# Patient Record
Sex: MALE | Race: BLACK OR AFRICAN AMERICAN | Employment: FULL TIME | URBAN - METROPOLITAN AREA
[De-identification: names, ages, dates, MRNs, and addresses within clinical notes are randomized per-mention and may not be internally consistent; named-entity substitution may affect disease eponyms.]

---
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
## 2018-09-26 PROBLEM — J02.9 SORE THROAT: Status: RESOLVED | Noted: 2017-03-08 | Resolved: 2018-09-26

## 2019-02-01 ENCOUNTER — APPOINTMENT (OUTPATIENT)
Dept: GENERAL RADIOLOGY | Age: 19
DRG: 420 | End: 2019-02-01
Payer: MEDICARE

## 2019-02-01 ENCOUNTER — HOSPITAL ENCOUNTER (INPATIENT)
Age: 19
LOS: 2 days | Discharge: HOME | DRG: 420 | End: 2019-02-03
Attending: EMERGENCY MEDICINE | Admitting: INTERNAL MEDICINE
Payer: MEDICARE

## 2019-02-01 DIAGNOSIS — E10.10 DIABETIC KETOACIDOSIS WITHOUT COMA ASSOCIATED WITH TYPE 1 DIABETES MELLITUS (HCC): Primary | ICD-10-CM

## 2019-02-01 DIAGNOSIS — E10.9 TYPE 1 DIABETES MELLITUS (HCC): ICD-10-CM

## 2019-02-01 PROBLEM — E87.1 HYPONATREMIA: Status: ACTIVE | Noted: 2019-02-01

## 2019-02-01 LAB
-: ABNORMAL
ABSOLUTE EOS #: 0.14 K/UL (ref 0–0.44)
ABSOLUTE IMMATURE GRANULOCYTE: <0.03 K/UL (ref 0–0.3)
ABSOLUTE LYMPH #: 1.12 K/UL (ref 1.2–5.2)
ABSOLUTE MONO #: 0.24 K/UL (ref 0.1–1.4)
ALBUMIN SERPL-MCNC: 4.4 G/DL (ref 3.5–5.2)
ALBUMIN/GLOBULIN RATIO: 1.4 (ref 1–2.5)
ALLEN TEST: ABNORMAL
ALLEN TEST: ABNORMAL
ALP BLD-CCNC: 125 U/L (ref 40–129)
ALT SERPL-CCNC: 8 U/L (ref 5–41)
AMORPHOUS: ABNORMAL
ANION GAP SERPL CALCULATED.3IONS-SCNC: 18 MMOL/L (ref 9–17)
ANION GAP SERPL CALCULATED.3IONS-SCNC: 24 MMOL/L (ref 9–17)
ANION GAP: 12 MMOL/L (ref 7–16)
ANION GAP: 8 MMOL/L (ref 7–16)
AST SERPL-CCNC: 10 U/L
BACTERIA: ABNORMAL
BASOPHILS # BLD: 1 % (ref 0–2)
BASOPHILS ABSOLUTE: 0.05 K/UL (ref 0–0.2)
BETA-HYDROXYBUTYRATE: 7.05 MMOL/L (ref 0.02–0.27)
BILIRUB SERPL-MCNC: 0.57 MG/DL (ref 0.3–1.2)
BILIRUBIN URINE: NEGATIVE
BUN BLDV-MCNC: 16 MG/DL (ref 6–20)
BUN BLDV-MCNC: 18 MG/DL (ref 6–20)
BUN/CREAT BLD: ABNORMAL (ref 9–20)
BUN/CREAT BLD: ABNORMAL (ref 9–20)
CALCIUM SERPL-MCNC: 8.2 MG/DL (ref 8.6–10.4)
CALCIUM SERPL-MCNC: 9.7 MG/DL (ref 8.6–10.4)
CASTS UA: ABNORMAL /LPF (ref 0–8)
CHLORIDE BLD-SCNC: 101 MMOL/L (ref 98–107)
CHLORIDE BLD-SCNC: 92 MMOL/L (ref 98–107)
CHP ED QC CHECK: NORMAL
CO2: 13 MMOL/L (ref 20–31)
CO2: 13 MMOL/L (ref 20–31)
COLOR: YELLOW
CREAT SERPL-MCNC: 0.74 MG/DL (ref 0.7–1.2)
CREAT SERPL-MCNC: 0.92 MG/DL (ref 0.7–1.2)
CRYSTALS, UA: ABNORMAL /HPF
DIFFERENTIAL TYPE: ABNORMAL
DIRECT EXAM: NORMAL
EKG ATRIAL RATE: 60 BPM
EKG P AXIS: 65 DEGREES
EKG P-R INTERVAL: 158 MS
EKG Q-T INTERVAL: 408 MS
EKG QRS DURATION: 106 MS
EKG QTC CALCULATION (BAZETT): 408 MS
EKG R AXIS: -55 DEGREES
EKG T AXIS: 31 DEGREES
EKG VENTRICULAR RATE: 60 BPM
EOSINOPHILS RELATIVE PERCENT: 3 % (ref 1–4)
EPITHELIAL CELLS UA: ABNORMAL /HPF (ref 0–5)
FIO2: ABNORMAL
FIO2: ABNORMAL
GFR AFRICAN AMERICAN: >60 ML/MIN
GFR AFRICAN AMERICAN: >60 ML/MIN
GFR NON-AFRICAN AMERICAN: >60 ML/MIN
GFR SERPL CREATININE-BSD FRML MDRD: >60 ML/MIN
GFR SERPL CREATININE-BSD FRML MDRD: >60 ML/MIN
GFR SERPL CREATININE-BSD FRML MDRD: ABNORMAL ML/MIN/{1.73_M2}
GFR SERPL CREATININE-BSD FRML MDRD: NORMAL ML/MIN/{1.73_M2}
GFR SERPL CREATININE-BSD FRML MDRD: NORMAL ML/MIN/{1.73_M2}
GLUCOSE BLD-MCNC: 252 MG/DL (ref 75–110)
GLUCOSE BLD-MCNC: 274 MG/DL (ref 75–110)
GLUCOSE BLD-MCNC: 295 MG/DL (ref 70–99)
GLUCOSE BLD-MCNC: 306 MG/DL (ref 75–110)
GLUCOSE BLD-MCNC: 330 MG/DL
GLUCOSE BLD-MCNC: 330 MG/DL (ref 75–110)
GLUCOSE BLD-MCNC: 512 MG/DL (ref 74–100)
GLUCOSE BLD-MCNC: 606 MG/DL (ref 74–100)
GLUCOSE BLD-MCNC: 631 MG/DL (ref 70–99)
GLUCOSE URINE: ABNORMAL
HCO3 VENOUS: 13.4 MMOL/L (ref 22–29)
HCT VFR BLD CALC: 41.7 % (ref 40.7–50.3)
HEMOGLOBIN: 13.4 G/DL (ref 13–17)
IMMATURE GRANULOCYTES: 1 %
KETONES, URINE: ABNORMAL
LEUKOCYTE ESTERASE, URINE: NEGATIVE
LIPASE: 18 U/L (ref 13–60)
LYMPHOCYTES # BLD: 27 % (ref 25–45)
Lab: NORMAL
MAGNESIUM: 2.1 MG/DL (ref 1.6–2.6)
MCH RBC QN AUTO: 27.2 PG (ref 25.2–33.5)
MCHC RBC AUTO-ENTMCNC: 32.1 G/DL (ref 28.4–34.8)
MCV RBC AUTO: 84.8 FL (ref 82.6–102.9)
MODE: ABNORMAL
MODE: ABNORMAL
MONOCYTES # BLD: 6 % (ref 2–8)
MUCUS: ABNORMAL
NEGATIVE BASE EXCESS, ART: 13 (ref 0–2)
NEGATIVE BASE EXCESS, VEN: 15 (ref 0–2)
NITRITE, URINE: NEGATIVE
NRBC AUTOMATED: 0 PER 100 WBC
O2 DEVICE/FLOW/%: ABNORMAL
O2 DEVICE/FLOW/%: ABNORMAL
O2 SAT, VEN: 41 % (ref 60–85)
OTHER OBSERVATIONS UA: ABNORMAL
PATIENT TEMP: ABNORMAL
PATIENT TEMP: ABNORMAL
PCO2, VEN: 40.1 MM HG (ref 41–51)
PDW BLD-RTO: 12.1 % (ref 11.8–14.4)
PH UA: 5 (ref 5–8)
PH VENOUS: 7.13 (ref 7.32–7.43)
PHOSPHORUS: 4.7 MG/DL (ref 2.5–4.5)
PLATELET # BLD: 331 K/UL (ref 138–453)
PLATELET ESTIMATE: ABNORMAL
PMV BLD AUTO: 11.8 FL (ref 8.1–13.5)
PO2, VEN: 30.5 MM HG (ref 30–50)
POC CHLORIDE: 105 MMOL/L (ref 98–107)
POC CHLORIDE: 105 MMOL/L (ref 98–107)
POC CREATININE: 0.84 MG/DL (ref 0.51–1.19)
POC CREATININE: 1.1 MG/DL (ref 0.51–1.19)
POC HCO3: 13.9 MMOL/L (ref 21–28)
POC HEMATOCRIT: 44 % (ref 41–53)
POC HEMATOCRIT: 48 % (ref 41–53)
POC HEMOGLOBIN: 15 G/DL (ref 13.5–17.5)
POC HEMOGLOBIN: 16.4 G/DL (ref 13.5–17.5)
POC IONIZED CALCIUM: 1.15 MMOL/L (ref 1.15–1.33)
POC IONIZED CALCIUM: 1.2 MMOL/L (ref 1.15–1.33)
POC LACTIC ACID: 1.74 MMOL/L (ref 0.56–1.39)
POC LACTIC ACID: 2.01 MMOL/L (ref 0.56–1.39)
POC O2 SATURATION: 59 % (ref 94–98)
POC PCO2 TEMP: ABNORMAL MM HG
POC PCO2 TEMP: ABNORMAL MM HG
POC PCO2: 35.5 MM HG (ref 35–48)
POC PH TEMP: ABNORMAL
POC PH TEMP: ABNORMAL
POC PH: 7.2 (ref 7.35–7.45)
POC PO2 TEMP: ABNORMAL MM HG
POC PO2 TEMP: ABNORMAL MM HG
POC PO2: 36.9 MM HG (ref 83–108)
POC POTASSIUM: 5.1 MMOL/L (ref 3.5–4.5)
POC POTASSIUM: 5.7 MMOL/L (ref 3.5–4.5)
POC SODIUM: 127 MMOL/L (ref 138–146)
POC SODIUM: 130 MMOL/L (ref 138–146)
POSITIVE BASE EXCESS, ART: ABNORMAL (ref 0–3)
POSITIVE BASE EXCESS, VEN: ABNORMAL (ref 0–3)
POTASSIUM SERPL-SCNC: 4.3 MMOL/L (ref 3.7–5.3)
POTASSIUM SERPL-SCNC: 5.8 MMOL/L (ref 3.7–5.3)
PROTEIN UA: NEGATIVE
RBC # BLD: 4.92 M/UL (ref 4.21–5.77)
RBC # BLD: ABNORMAL 10*6/UL
RBC UA: ABNORMAL /HPF (ref 0–4)
RENAL EPITHELIAL, UA: ABNORMAL /HPF
SAMPLE SITE: ABNORMAL
SAMPLE SITE: ABNORMAL
SEG NEUTROPHILS: 62 % (ref 34–64)
SEGMENTED NEUTROPHILS ABSOLUTE COUNT: 2.64 K/UL (ref 1.8–8)
SODIUM BLD-SCNC: 129 MMOL/L (ref 135–144)
SODIUM BLD-SCNC: 132 MMOL/L (ref 135–144)
SPECIFIC GRAVITY UA: 1.03 (ref 1–1.03)
SPECIMEN DESCRIPTION: NORMAL
STATUS: NORMAL
TCO2 (CALC), ART: 15 MMOL/L (ref 22–29)
TOTAL CO2, VENOUS: 15 MMOL/L (ref 23–30)
TOTAL PROTEIN: 7.6 G/DL (ref 6.4–8.3)
TRICHOMONAS: ABNORMAL
TROPONIN INTERP: NORMAL
TROPONIN T: NORMAL NG/ML
TROPONIN, HIGH SENSITIVITY: <6 NG/L (ref 0–22)
TURBIDITY: CLEAR
URINE HGB: NEGATIVE
UROBILINOGEN, URINE: NORMAL
WBC # BLD: 4.2 K/UL (ref 4.5–13.5)
WBC # BLD: ABNORMAL 10*3/UL
WBC UA: ABNORMAL /HPF (ref 0–5)
YEAST: ABNORMAL

## 2019-02-01 PROCEDURE — 2580000003 HC RX 258: Performed by: PHYSICIAN ASSISTANT

## 2019-02-01 PROCEDURE — 84132 ASSAY OF SERUM POTASSIUM: CPT

## 2019-02-01 PROCEDURE — 6360000002 HC RX W HCPCS: Performed by: PHYSICIAN ASSISTANT

## 2019-02-01 PROCEDURE — 71046 X-RAY EXAM CHEST 2 VIEWS: CPT

## 2019-02-01 PROCEDURE — 82330 ASSAY OF CALCIUM: CPT

## 2019-02-01 PROCEDURE — 87804 INFLUENZA ASSAY W/OPTIC: CPT

## 2019-02-01 PROCEDURE — 85025 COMPLETE CBC W/AUTO DIFF WBC: CPT

## 2019-02-01 PROCEDURE — 99285 EMERGENCY DEPT VISIT HI MDM: CPT

## 2019-02-01 PROCEDURE — 85014 HEMATOCRIT: CPT

## 2019-02-01 PROCEDURE — 84100 ASSAY OF PHOSPHORUS: CPT

## 2019-02-01 PROCEDURE — 83690 ASSAY OF LIPASE: CPT

## 2019-02-01 PROCEDURE — 6360000002 HC RX W HCPCS: Performed by: STUDENT IN AN ORGANIZED HEALTH CARE EDUCATION/TRAINING PROGRAM

## 2019-02-01 PROCEDURE — 81001 URINALYSIS AUTO W/SCOPE: CPT

## 2019-02-01 PROCEDURE — 80053 COMPREHEN METABOLIC PANEL: CPT

## 2019-02-01 PROCEDURE — 93005 ELECTROCARDIOGRAM TRACING: CPT

## 2019-02-01 PROCEDURE — 82435 ASSAY OF BLOOD CHLORIDE: CPT

## 2019-02-01 PROCEDURE — 2580000003 HC RX 258: Performed by: STUDENT IN AN ORGANIZED HEALTH CARE EDUCATION/TRAINING PROGRAM

## 2019-02-01 PROCEDURE — 82803 BLOOD GASES ANY COMBINATION: CPT

## 2019-02-01 PROCEDURE — 83605 ASSAY OF LACTIC ACID: CPT

## 2019-02-01 PROCEDURE — 83735 ASSAY OF MAGNESIUM: CPT

## 2019-02-01 PROCEDURE — 6370000000 HC RX 637 (ALT 250 FOR IP): Performed by: PHYSICIAN ASSISTANT

## 2019-02-01 PROCEDURE — 84295 ASSAY OF SERUM SODIUM: CPT

## 2019-02-01 PROCEDURE — 96365 THER/PROPH/DIAG IV INF INIT: CPT

## 2019-02-01 PROCEDURE — 2060000000 HC ICU INTERMEDIATE R&B

## 2019-02-01 PROCEDURE — 84484 ASSAY OF TROPONIN QUANT: CPT

## 2019-02-01 PROCEDURE — 82947 ASSAY GLUCOSE BLOOD QUANT: CPT

## 2019-02-01 PROCEDURE — 36415 COLL VENOUS BLD VENIPUNCTURE: CPT

## 2019-02-01 PROCEDURE — 80048 BASIC METABOLIC PNL TOTAL CA: CPT

## 2019-02-01 PROCEDURE — 82010 KETONE BODYS QUAN: CPT

## 2019-02-01 PROCEDURE — 82565 ASSAY OF CREATININE: CPT

## 2019-02-01 RX ORDER — SODIUM CHLORIDE 450 MG/100ML
INJECTION, SOLUTION INTRAVENOUS CONTINUOUS
Status: DISCONTINUED | OUTPATIENT
Start: 2019-02-02 | End: 2019-02-03 | Stop reason: HOSPADM

## 2019-02-01 RX ORDER — 0.9 % SODIUM CHLORIDE 0.9 %
15 INTRAVENOUS SOLUTION INTRAVENOUS ONCE
Status: DISCONTINUED | OUTPATIENT
Start: 2019-02-01 | End: 2019-02-03 | Stop reason: HOSPADM

## 2019-02-01 RX ORDER — POTASSIUM CHLORIDE 7.45 MG/ML
10 INJECTION INTRAVENOUS PRN
Status: DISCONTINUED | OUTPATIENT
Start: 2019-02-01 | End: 2019-02-01

## 2019-02-01 RX ORDER — ACETAMINOPHEN 325 MG/1
650 TABLET ORAL ONCE
Status: COMPLETED | OUTPATIENT
Start: 2019-02-01 | End: 2019-02-01

## 2019-02-01 RX ORDER — DEXTROSE MONOHYDRATE 25 G/50ML
12.5 INJECTION, SOLUTION INTRAVENOUS PRN
Status: DISCONTINUED | OUTPATIENT
Start: 2019-02-01 | End: 2019-02-03 | Stop reason: HOSPADM

## 2019-02-01 RX ORDER — SODIUM CHLORIDE 0.9 % (FLUSH) 0.9 %
10 SYRINGE (ML) INJECTION EVERY 12 HOURS SCHEDULED
Status: DISCONTINUED | OUTPATIENT
Start: 2019-02-01 | End: 2019-02-03 | Stop reason: HOSPADM

## 2019-02-01 RX ORDER — MAGNESIUM SULFATE 1 G/100ML
1 INJECTION INTRAVENOUS PRN
Status: DISCONTINUED | OUTPATIENT
Start: 2019-02-01 | End: 2019-02-03 | Stop reason: HOSPADM

## 2019-02-01 RX ORDER — ACETAMINOPHEN 325 MG/1
650 TABLET ORAL EVERY 4 HOURS PRN
Status: DISCONTINUED | OUTPATIENT
Start: 2019-02-01 | End: 2019-02-03 | Stop reason: HOSPADM

## 2019-02-01 RX ORDER — ONDANSETRON 2 MG/ML
4 INJECTION INTRAMUSCULAR; INTRAVENOUS EVERY 8 HOURS PRN
Status: DISCONTINUED | OUTPATIENT
Start: 2019-02-01 | End: 2019-02-03 | Stop reason: HOSPADM

## 2019-02-01 RX ORDER — SODIUM CHLORIDE 9 MG/ML
INJECTION, SOLUTION INTRAVENOUS CONTINUOUS
Status: DISCONTINUED | OUTPATIENT
Start: 2019-02-01 | End: 2019-02-01

## 2019-02-01 RX ORDER — DEXTROSE AND SODIUM CHLORIDE 5; .45 G/100ML; G/100ML
INJECTION, SOLUTION INTRAVENOUS CONTINUOUS PRN
Status: DISCONTINUED | OUTPATIENT
Start: 2019-02-01 | End: 2019-02-03 | Stop reason: HOSPADM

## 2019-02-01 RX ORDER — DEXTROSE MONOHYDRATE 25 G/50ML
12.5 INJECTION, SOLUTION INTRAVENOUS PRN
Status: DISCONTINUED | OUTPATIENT
Start: 2019-02-01 | End: 2019-02-01

## 2019-02-01 RX ORDER — SODIUM CHLORIDE 0.9 % (FLUSH) 0.9 %
10 SYRINGE (ML) INJECTION PRN
Status: DISCONTINUED | OUTPATIENT
Start: 2019-02-01 | End: 2019-02-03 | Stop reason: HOSPADM

## 2019-02-01 RX ORDER — POTASSIUM CHLORIDE 7.45 MG/ML
10 INJECTION INTRAVENOUS PRN
Status: DISCONTINUED | OUTPATIENT
Start: 2019-02-01 | End: 2019-02-03 | Stop reason: HOSPADM

## 2019-02-01 RX ORDER — MAGNESIUM SULFATE 1 G/100ML
1 INJECTION INTRAVENOUS PRN
Status: DISCONTINUED | OUTPATIENT
Start: 2019-02-01 | End: 2019-02-01

## 2019-02-01 RX ORDER — DEXTROSE AND SODIUM CHLORIDE 5; .45 G/100ML; G/100ML
INJECTION, SOLUTION INTRAVENOUS CONTINUOUS PRN
Status: DISCONTINUED | OUTPATIENT
Start: 2019-02-01 | End: 2019-02-01

## 2019-02-01 RX ORDER — 0.9 % SODIUM CHLORIDE 0.9 %
1000 INTRAVENOUS SOLUTION INTRAVENOUS ONCE
Status: COMPLETED | OUTPATIENT
Start: 2019-02-01 | End: 2019-02-01

## 2019-02-01 RX ADMIN — ACETAMINOPHEN 650 MG: 325 TABLET ORAL at 18:32

## 2019-02-01 RX ADMIN — SODIUM CHLORIDE 0.1 UNITS/KG/HR: 9 INJECTION, SOLUTION INTRAVENOUS at 19:28

## 2019-02-01 RX ADMIN — SODIUM CHLORIDE: 4.5 INJECTION, SOLUTION INTRAVENOUS at 22:38

## 2019-02-01 RX ADMIN — MAGNESIUM SULFATE HEPTAHYDRATE 1 G: 1 INJECTION, SOLUTION INTRAVENOUS at 18:54

## 2019-02-01 RX ADMIN — POTASSIUM CHLORIDE 10 MEQ: 7.46 INJECTION, SOLUTION INTRAVENOUS at 23:58

## 2019-02-01 RX ADMIN — SODIUM CHLORIDE 1000 ML: 9 INJECTION, SOLUTION INTRAVENOUS at 18:33

## 2019-02-01 RX ADMIN — SODIUM CHLORIDE: 9 INJECTION, SOLUTION INTRAVENOUS at 20:10

## 2019-02-01 RX ADMIN — POTASSIUM CHLORIDE 10 MEQ: 7.46 INJECTION, SOLUTION INTRAVENOUS at 22:48

## 2019-02-01 ASSESSMENT — ENCOUNTER SYMPTOMS
DIARRHEA: 0
COUGH: 0
SHORTNESS OF BREATH: 0
NAUSEA: 0
SORE THROAT: 0
COLOR CHANGE: 0
VOMITING: 0
BACK PAIN: 0
ABDOMINAL PAIN: 1
WHEEZING: 0
BLOOD IN STOOL: 0

## 2019-02-01 ASSESSMENT — PAIN DESCRIPTION - DESCRIPTORS
DESCRIPTORS: ACHING
DESCRIPTORS: ACHING

## 2019-02-01 ASSESSMENT — PAIN SCALES - GENERAL
PAINLEVEL_OUTOF10: 8
PAINLEVEL_OUTOF10: 8
PAINLEVEL_OUTOF10: 4

## 2019-02-01 ASSESSMENT — PAIN DESCRIPTION - LOCATION
LOCATION: CHEST
LOCATION: CHEST

## 2019-02-01 ASSESSMENT — PAIN DESCRIPTION - PROGRESSION: CLINICAL_PROGRESSION: GRADUALLY WORSENING

## 2019-02-01 ASSESSMENT — PAIN DESCRIPTION - PAIN TYPE
TYPE: ACUTE PAIN
TYPE: ACUTE PAIN

## 2019-02-01 ASSESSMENT — PAIN DESCRIPTION - ONSET: ONSET: PROGRESSIVE

## 2019-02-02 LAB
ABSOLUTE EOS #: 0.23 K/UL (ref 0–0.44)
ABSOLUTE IMMATURE GRANULOCYTE: <0.03 K/UL (ref 0–0.3)
ABSOLUTE LYMPH #: 2.46 K/UL (ref 1.2–5.2)
ABSOLUTE MONO #: 0.67 K/UL (ref 0.1–1.4)
ANION GAP SERPL CALCULATED.3IONS-SCNC: 15 MMOL/L (ref 9–17)
ANION GAP SERPL CALCULATED.3IONS-SCNC: 8 MMOL/L (ref 9–17)
ANION GAP SERPL CALCULATED.3IONS-SCNC: 9 MMOL/L (ref 9–17)
BASOPHILS # BLD: 1 % (ref 0–2)
BASOPHILS ABSOLUTE: 0.06 K/UL (ref 0–0.2)
BETA-HYDROXYBUTYRATE: 3.78 MMOL/L (ref 0.02–0.27)
BUN BLDV-MCNC: 13 MG/DL (ref 6–20)
BUN BLDV-MCNC: 9 MG/DL (ref 6–20)
BUN BLDV-MCNC: 9 MG/DL (ref 6–20)
BUN/CREAT BLD: ABNORMAL (ref 9–20)
CALCIUM SERPL-MCNC: 8.4 MG/DL (ref 8.6–10.4)
CALCIUM SERPL-MCNC: 8.5 MG/DL (ref 8.6–10.4)
CALCIUM SERPL-MCNC: 8.5 MG/DL (ref 8.6–10.4)
CHLORIDE BLD-SCNC: 103 MMOL/L (ref 98–107)
CHLORIDE BLD-SCNC: 104 MMOL/L (ref 98–107)
CHLORIDE BLD-SCNC: 110 MMOL/L (ref 98–107)
CO2: 16 MMOL/L (ref 20–31)
CO2: 18 MMOL/L (ref 20–31)
CO2: 18 MMOL/L (ref 20–31)
CREAT SERPL-MCNC: 0.55 MG/DL (ref 0.7–1.2)
CREAT SERPL-MCNC: 0.57 MG/DL (ref 0.7–1.2)
CREAT SERPL-MCNC: 0.68 MG/DL (ref 0.7–1.2)
DIFFERENTIAL TYPE: ABNORMAL
EOSINOPHILS RELATIVE PERCENT: 4 % (ref 1–4)
GFR AFRICAN AMERICAN: >60 ML/MIN
GFR NON-AFRICAN AMERICAN: >60 ML/MIN
GFR SERPL CREATININE-BSD FRML MDRD: ABNORMAL ML/MIN/{1.73_M2}
GLUCOSE BLD-MCNC: 114 MG/DL (ref 75–110)
GLUCOSE BLD-MCNC: 120 MG/DL (ref 75–110)
GLUCOSE BLD-MCNC: 124 MG/DL (ref 70–99)
GLUCOSE BLD-MCNC: 132 MG/DL (ref 75–110)
GLUCOSE BLD-MCNC: 145 MG/DL (ref 75–110)
GLUCOSE BLD-MCNC: 148 MG/DL (ref 75–110)
GLUCOSE BLD-MCNC: 160 MG/DL (ref 75–110)
GLUCOSE BLD-MCNC: 170 MG/DL (ref 75–110)
GLUCOSE BLD-MCNC: 178 MG/DL (ref 75–110)
GLUCOSE BLD-MCNC: 195 MG/DL (ref 75–110)
GLUCOSE BLD-MCNC: 206 MG/DL (ref 75–110)
GLUCOSE BLD-MCNC: 208 MG/DL (ref 75–110)
GLUCOSE BLD-MCNC: 225 MG/DL (ref 75–110)
GLUCOSE BLD-MCNC: 225 MG/DL (ref 75–110)
GLUCOSE BLD-MCNC: 233 MG/DL (ref 75–110)
GLUCOSE BLD-MCNC: 255 MG/DL (ref 75–110)
GLUCOSE BLD-MCNC: 265 MG/DL (ref 70–99)
GLUCOSE BLD-MCNC: 384 MG/DL (ref 75–110)
GLUCOSE BLD-MCNC: 406 MG/DL (ref 75–110)
GLUCOSE BLD-MCNC: 430 MG/DL (ref 75–110)
GLUCOSE BLD-MCNC: 60 MG/DL (ref 75–110)
GLUCOSE BLD-MCNC: 85 MG/DL (ref 75–110)
GLUCOSE BLD-MCNC: 89 MG/DL (ref 70–99)
HCT VFR BLD CALC: 35.2 % (ref 40.7–50.3)
HEMOGLOBIN: 11.8 G/DL (ref 13–17)
IMMATURE GRANULOCYTES: 0 %
LYMPHOCYTES # BLD: 43 % (ref 25–45)
MAGNESIUM: 1.8 MG/DL (ref 1.6–2.6)
MAGNESIUM: 1.8 MG/DL (ref 1.6–2.6)
MAGNESIUM: 1.9 MG/DL (ref 1.6–2.6)
MAGNESIUM: 2 MG/DL (ref 1.6–2.6)
MCH RBC QN AUTO: 27.2 PG (ref 25.2–33.5)
MCHC RBC AUTO-ENTMCNC: 33.5 G/DL (ref 28.4–34.8)
MCV RBC AUTO: 81.1 FL (ref 82.6–102.9)
MONOCYTES # BLD: 12 % (ref 2–8)
NRBC AUTOMATED: 0 PER 100 WBC
PDW BLD-RTO: 12.1 % (ref 11.8–14.4)
PHOSPHORUS: 2.8 MG/DL (ref 2.5–4.5)
PHOSPHORUS: 3.1 MG/DL (ref 2.5–4.5)
PHOSPHORUS: 3.3 MG/DL (ref 2.5–4.5)
PLATELET # BLD: 319 K/UL (ref 138–453)
PLATELET ESTIMATE: ABNORMAL
PMV BLD AUTO: 11 FL (ref 8.1–13.5)
POTASSIUM SERPL-SCNC: 4.1 MMOL/L (ref 3.7–5.3)
POTASSIUM SERPL-SCNC: 4.2 MMOL/L (ref 3.7–5.3)
POTASSIUM SERPL-SCNC: 4.4 MMOL/L (ref 3.7–5.3)
RBC # BLD: 4.34 M/UL (ref 4.21–5.77)
RBC # BLD: ABNORMAL 10*6/UL
SEG NEUTROPHILS: 40 % (ref 34–64)
SEGMENTED NEUTROPHILS ABSOLUTE COUNT: 2.32 K/UL (ref 1.8–8)
SODIUM BLD-SCNC: 131 MMOL/L (ref 135–144)
SODIUM BLD-SCNC: 134 MMOL/L (ref 135–144)
SODIUM BLD-SCNC: 136 MMOL/L (ref 135–144)
WBC # BLD: 5.8 K/UL (ref 4.5–13.5)
WBC # BLD: ABNORMAL 10*3/UL

## 2019-02-02 PROCEDURE — 80048 BASIC METABOLIC PNL TOTAL CA: CPT

## 2019-02-02 PROCEDURE — 36415 COLL VENOUS BLD VENIPUNCTURE: CPT

## 2019-02-02 PROCEDURE — 6370000000 HC RX 637 (ALT 250 FOR IP): Performed by: STUDENT IN AN ORGANIZED HEALTH CARE EDUCATION/TRAINING PROGRAM

## 2019-02-02 PROCEDURE — 90686 IIV4 VACC NO PRSV 0.5 ML IM: CPT | Performed by: INTERNAL MEDICINE

## 2019-02-02 PROCEDURE — 83036 HEMOGLOBIN GLYCOSYLATED A1C: CPT

## 2019-02-02 PROCEDURE — 83735 ASSAY OF MAGNESIUM: CPT

## 2019-02-02 PROCEDURE — 6360000002 HC RX W HCPCS: Performed by: STUDENT IN AN ORGANIZED HEALTH CARE EDUCATION/TRAINING PROGRAM

## 2019-02-02 PROCEDURE — G0008 ADMIN INFLUENZA VIRUS VAC: HCPCS | Performed by: INTERNAL MEDICINE

## 2019-02-02 PROCEDURE — 2580000003 HC RX 258: Performed by: INTERNAL MEDICINE

## 2019-02-02 PROCEDURE — 6360000002 HC RX W HCPCS: Performed by: INTERNAL MEDICINE

## 2019-02-02 PROCEDURE — 2580000003 HC RX 258: Performed by: STUDENT IN AN ORGANIZED HEALTH CARE EDUCATION/TRAINING PROGRAM

## 2019-02-02 PROCEDURE — 99223 1ST HOSP IP/OBS HIGH 75: CPT | Performed by: INTERNAL MEDICINE

## 2019-02-02 PROCEDURE — 2060000000 HC ICU INTERMEDIATE R&B

## 2019-02-02 PROCEDURE — 85025 COMPLETE CBC W/AUTO DIFF WBC: CPT

## 2019-02-02 PROCEDURE — 84100 ASSAY OF PHOSPHORUS: CPT

## 2019-02-02 RX ORDER — DEXTROSE MONOHYDRATE 50 MG/ML
100 INJECTION, SOLUTION INTRAVENOUS PRN
Status: DISCONTINUED | OUTPATIENT
Start: 2019-02-02 | End: 2019-02-03 | Stop reason: HOSPADM

## 2019-02-02 RX ORDER — INSULIN GLARGINE 100 [IU]/ML
30 INJECTION, SOLUTION SUBCUTANEOUS NIGHTLY
Status: DISCONTINUED | OUTPATIENT
Start: 2019-02-02 | End: 2019-02-03 | Stop reason: HOSPADM

## 2019-02-02 RX ORDER — INSULIN GLARGINE 100 [IU]/ML
10 INJECTION, SOLUTION SUBCUTANEOUS ONCE
Status: COMPLETED | OUTPATIENT
Start: 2019-02-02 | End: 2019-02-02

## 2019-02-02 RX ORDER — DEXTROSE MONOHYDRATE 25 G/50ML
12.5 INJECTION, SOLUTION INTRAVENOUS PRN
Status: DISCONTINUED | OUTPATIENT
Start: 2019-02-02 | End: 2019-02-03 | Stop reason: HOSPADM

## 2019-02-02 RX ORDER — NICOTINE POLACRILEX 4 MG
15 LOZENGE BUCCAL PRN
Status: DISCONTINUED | OUTPATIENT
Start: 2019-02-02 | End: 2019-02-03 | Stop reason: HOSPADM

## 2019-02-02 RX ADMIN — POTASSIUM CHLORIDE 10 MEQ: 7.46 INJECTION, SOLUTION INTRAVENOUS at 01:02

## 2019-02-02 RX ADMIN — INFLUENZA A VIRUS A/MICHIGAN/45/2015 X-275 (H1N1) ANTIGEN (FORMALDEHYDE INACTIVATED), INFLUENZA A VIRUS A/SINGAPORE/INFIMH-16-0019/2016 IVR-186 (H3N2) ANTIGEN (FORMALDEHYDE INACTIVATED), INFLUENZA B VIRUS B/PHUKET/3073/2013 ANTIGEN (FORMALDEHYDE INACTIVATED), AND INFLUENZA B VIRUS B/MARYLAND/15/2016 BX-69A ANTIGEN (FORMALDEHYDE INACTIVATED) 0.5 ML: 15; 15; 15; 15 INJECTION, SUSPENSION INTRAMUSCULAR at 08:25

## 2019-02-02 RX ADMIN — ENOXAPARIN SODIUM 40 MG: 40 INJECTION SUBCUTANEOUS at 08:26

## 2019-02-02 RX ADMIN — INSULIN LISPRO 6 UNITS: 100 INJECTION, SOLUTION INTRAVENOUS; SUBCUTANEOUS at 23:49

## 2019-02-02 RX ADMIN — INSULIN GLARGINE 10 UNITS: 100 INJECTION, SOLUTION SUBCUTANEOUS at 20:50

## 2019-02-02 RX ADMIN — POTASSIUM CHLORIDE 10 MEQ: 7.46 INJECTION, SOLUTION INTRAVENOUS at 14:07

## 2019-02-02 RX ADMIN — DEXTROSE MONOHYDRATE 12.5 G: 25 INJECTION, SOLUTION INTRAVENOUS at 06:58

## 2019-02-02 RX ADMIN — Medication 10 ML: at 20:53

## 2019-02-02 RX ADMIN — POTASSIUM CHLORIDE 10 MEQ: 7.46 INJECTION, SOLUTION INTRAVENOUS at 06:54

## 2019-02-02 RX ADMIN — POTASSIUM CHLORIDE 10 MEQ: 7.46 INJECTION, SOLUTION INTRAVENOUS at 03:04

## 2019-02-02 RX ADMIN — DEXTROSE AND SODIUM CHLORIDE: 5; 450 INJECTION, SOLUTION INTRAVENOUS at 13:07

## 2019-02-02 RX ADMIN — INSULIN GLARGINE 30 UNITS: 100 INJECTION, SOLUTION SUBCUTANEOUS at 13:07

## 2019-02-02 RX ADMIN — POTASSIUM CHLORIDE 10 MEQ: 7.46 INJECTION, SOLUTION INTRAVENOUS at 12:13

## 2019-02-02 RX ADMIN — POTASSIUM CHLORIDE 10 MEQ: 7.46 INJECTION, SOLUTION INTRAVENOUS at 10:20

## 2019-02-02 RX ADMIN — DEXTROSE AND SODIUM CHLORIDE: 5; 450 INJECTION, SOLUTION INTRAVENOUS at 00:01

## 2019-02-02 RX ADMIN — POTASSIUM CHLORIDE 10 MEQ: 7.46 INJECTION, SOLUTION INTRAVENOUS at 08:29

## 2019-02-03 VITALS
SYSTOLIC BLOOD PRESSURE: 102 MMHG | HEART RATE: 74 BPM | WEIGHT: 142.3 LBS | TEMPERATURE: 97.5 F | HEIGHT: 70 IN | OXYGEN SATURATION: 100 % | BODY MASS INDEX: 20.37 KG/M2 | RESPIRATION RATE: 16 BRPM | DIASTOLIC BLOOD PRESSURE: 89 MMHG

## 2019-02-03 LAB
ANION GAP SERPL CALCULATED.3IONS-SCNC: 15 MMOL/L (ref 9–17)
BUN BLDV-MCNC: 8 MG/DL (ref 6–20)
BUN/CREAT BLD: ABNORMAL (ref 9–20)
CALCIUM SERPL-MCNC: 8.6 MG/DL (ref 8.6–10.4)
CHLORIDE BLD-SCNC: 100 MMOL/L (ref 98–107)
CO2: 20 MMOL/L (ref 20–31)
CREAT SERPL-MCNC: 0.59 MG/DL (ref 0.7–1.2)
ESTIMATED AVERAGE GLUCOSE: 329 MG/DL
GFR AFRICAN AMERICAN: >60 ML/MIN
GFR NON-AFRICAN AMERICAN: >60 ML/MIN
GFR SERPL CREATININE-BSD FRML MDRD: ABNORMAL ML/MIN/{1.73_M2}
GFR SERPL CREATININE-BSD FRML MDRD: ABNORMAL ML/MIN/{1.73_M2}
GLUCOSE BLD-MCNC: 104 MG/DL (ref 75–110)
GLUCOSE BLD-MCNC: 108 MG/DL (ref 75–110)
GLUCOSE BLD-MCNC: 181 MG/DL (ref 70–99)
GLUCOSE BLD-MCNC: 183 MG/DL (ref 75–110)
GLUCOSE BLD-MCNC: 292 MG/DL (ref 75–110)
GLUCOSE BLD-MCNC: 317 MG/DL (ref 75–110)
GLUCOSE BLD-MCNC: 51 MG/DL (ref 75–110)
GLUCOSE BLD-MCNC: 58 MG/DL (ref 75–110)
GLUCOSE BLD-MCNC: 86 MG/DL (ref 75–110)
GLUCOSE BLD-MCNC: 88 MG/DL (ref 75–110)
HBA1C MFR BLD: 13.1 % (ref 4–6)
POTASSIUM SERPL-SCNC: 3.7 MMOL/L (ref 3.7–5.3)
SODIUM BLD-SCNC: 135 MMOL/L (ref 135–144)

## 2019-02-03 PROCEDURE — 80048 BASIC METABOLIC PNL TOTAL CA: CPT

## 2019-02-03 PROCEDURE — 6360000002 HC RX W HCPCS: Performed by: STUDENT IN AN ORGANIZED HEALTH CARE EDUCATION/TRAINING PROGRAM

## 2019-02-03 PROCEDURE — 36415 COLL VENOUS BLD VENIPUNCTURE: CPT

## 2019-02-03 PROCEDURE — 6370000000 HC RX 637 (ALT 250 FOR IP): Performed by: STUDENT IN AN ORGANIZED HEALTH CARE EDUCATION/TRAINING PROGRAM

## 2019-02-03 PROCEDURE — 99239 HOSP IP/OBS DSCHRG MGMT >30: CPT | Performed by: INTERNAL MEDICINE

## 2019-02-03 PROCEDURE — 82947 ASSAY GLUCOSE BLOOD QUANT: CPT

## 2019-02-03 PROCEDURE — 2580000003 HC RX 258: Performed by: STUDENT IN AN ORGANIZED HEALTH CARE EDUCATION/TRAINING PROGRAM

## 2019-02-03 PROCEDURE — 2580000003 HC RX 258: Performed by: INTERNAL MEDICINE

## 2019-02-03 RX ORDER — PEN NEEDLE, DIABETIC 31 GX5/16"
NEEDLE, DISPOSABLE MISCELLANEOUS
Qty: 200 EACH | Refills: 5 | Status: SHIPPED | OUTPATIENT
Start: 2019-02-03 | End: 2019-02-25 | Stop reason: SDUPTHER

## 2019-02-03 RX ORDER — PEN NEEDLE, DIABETIC 31 GX5/16"
NEEDLE, DISPOSABLE MISCELLANEOUS
Qty: 200 EACH | Refills: 3 | Status: SHIPPED | OUTPATIENT
Start: 2019-02-03 | End: 2019-02-03

## 2019-02-03 RX ADMIN — INSULIN LISPRO 2 UNITS: 100 INJECTION, SOLUTION INTRAVENOUS; SUBCUTANEOUS at 08:34

## 2019-02-03 RX ADMIN — DEXTROSE MONOHYDRATE 12.5 G: 25 INJECTION, SOLUTION INTRAVENOUS at 04:04

## 2019-02-03 RX ADMIN — Medication 10 ML: at 08:33

## 2019-02-03 RX ADMIN — INSULIN LISPRO 6 UNITS: 100 INJECTION, SOLUTION INTRAVENOUS; SUBCUTANEOUS at 12:55

## 2019-02-03 RX ADMIN — ENOXAPARIN SODIUM 40 MG: 40 INJECTION SUBCUTANEOUS at 08:33

## 2019-02-25 ENCOUNTER — OFFICE VISIT (OUTPATIENT)
Dept: FAMILY MEDICINE CLINIC | Age: 19
End: 2019-02-25
Payer: MEDICARE

## 2019-02-25 VITALS
BODY MASS INDEX: 20.62 KG/M2 | TEMPERATURE: 98.7 F | WEIGHT: 144 LBS | HEART RATE: 78 BPM | OXYGEN SATURATION: 98 % | SYSTOLIC BLOOD PRESSURE: 110 MMHG | DIASTOLIC BLOOD PRESSURE: 74 MMHG | HEIGHT: 70 IN

## 2019-02-25 DIAGNOSIS — E10.9 TYPE 1 DIABETES MELLITUS WITHOUT COMPLICATION (HCC): Primary | ICD-10-CM

## 2019-02-25 PROCEDURE — G8420 CALC BMI NORM PARAMETERS: HCPCS | Performed by: FAMILY MEDICINE

## 2019-02-25 PROCEDURE — 99203 OFFICE O/P NEW LOW 30 MIN: CPT | Performed by: FAMILY MEDICINE

## 2019-02-25 PROCEDURE — 2022F DILAT RTA XM EVC RTNOPTHY: CPT | Performed by: FAMILY MEDICINE

## 2019-02-25 PROCEDURE — G8427 DOCREV CUR MEDS BY ELIG CLIN: HCPCS | Performed by: FAMILY MEDICINE

## 2019-02-25 PROCEDURE — 3046F HEMOGLOBIN A1C LEVEL >9.0%: CPT | Performed by: FAMILY MEDICINE

## 2019-02-25 PROCEDURE — 4004F PT TOBACCO SCREEN RCVD TLK: CPT | Performed by: FAMILY MEDICINE

## 2019-02-25 PROCEDURE — G8482 FLU IMMUNIZE ORDER/ADMIN: HCPCS | Performed by: FAMILY MEDICINE

## 2019-02-25 RX ORDER — PEN NEEDLE, DIABETIC 31 GX5/16"
NEEDLE, DISPOSABLE MISCELLANEOUS
Qty: 200 EACH | Refills: 5 | Status: ON HOLD | OUTPATIENT
Start: 2019-02-25 | End: 2020-07-16 | Stop reason: HOSPADM

## 2019-02-25 ASSESSMENT — PATIENT HEALTH QUESTIONNAIRE - PHQ9
2. FEELING DOWN, DEPRESSED OR HOPELESS: 3
10. IF YOU CHECKED OFF ANY PROBLEMS, HOW DIFFICULT HAVE THESE PROBLEMS MADE IT FOR YOU TO DO YOUR WORK, TAKE CARE OF THINGS AT HOME, OR GET ALONG WITH OTHER PEOPLE: 1
SUM OF ALL RESPONSES TO PHQ9 QUESTIONS 1 & 2: 6
4. FEELING TIRED OR HAVING LITTLE ENERGY: 3
1. LITTLE INTEREST OR PLEASURE IN DOING THINGS: 3
8. MOVING OR SPEAKING SO SLOWLY THAT OTHER PEOPLE COULD HAVE NOTICED. OR THE OPPOSITE, BEING SO FIGETY OR RESTLESS THAT YOU HAVE BEEN MOVING AROUND A LOT MORE THAN USUAL: 0
3. TROUBLE FALLING OR STAYING ASLEEP: 3
SUM OF ALL RESPONSES TO PHQ QUESTIONS 1-9: 17
7. TROUBLE CONCENTRATING ON THINGS, SUCH AS READING THE NEWSPAPER OR WATCHING TELEVISION: 2
9. THOUGHTS THAT YOU WOULD BE BETTER OFF DEAD, OR OF HURTING YOURSELF: 1
SUM OF ALL RESPONSES TO PHQ QUESTIONS 1-9: 17
5. POOR APPETITE OR OVEREATING: 1
6. FEELING BAD ABOUT YOURSELF - OR THAT YOU ARE A FAILURE OR HAVE LET YOURSELF OR YOUR FAMILY DOWN: 1

## 2019-02-25 ASSESSMENT — ENCOUNTER SYMPTOMS
NAUSEA: 0
SHORTNESS OF BREATH: 0
SORE THROAT: 0
ABDOMINAL PAIN: 0

## 2019-09-22 ENCOUNTER — APPOINTMENT (OUTPATIENT)
Dept: GENERAL RADIOLOGY | Age: 19
End: 2019-09-22
Payer: MEDICARE

## 2019-09-22 ENCOUNTER — HOSPITAL ENCOUNTER (OUTPATIENT)
Age: 19
Setting detail: OBSERVATION
Discharge: HOME | End: 2019-09-23
Attending: EMERGENCY MEDICINE | Admitting: INTERNAL MEDICINE
Payer: MEDICARE

## 2019-09-22 DIAGNOSIS — T78.2XXA ANAPHYLAXIS, INITIAL ENCOUNTER: Primary | ICD-10-CM

## 2019-09-22 DIAGNOSIS — R73.9 HYPERGLYCEMIA: ICD-10-CM

## 2019-09-22 LAB
ABSOLUTE EOS #: 0.2 K/UL (ref 0–0.44)
ABSOLUTE IMMATURE GRANULOCYTE: <0.03 K/UL (ref 0–0.3)
ABSOLUTE LYMPH #: 2.97 K/UL (ref 1.1–3.7)
ABSOLUTE MONO #: 0.25 K/UL (ref 0.1–1.4)
ALBUMIN SERPL-MCNC: 4.4 G/DL (ref 3.5–5.2)
ALBUMIN/GLOBULIN RATIO: 1.4 (ref 1–2.5)
ALLEN TEST: NORMAL
ALP BLD-CCNC: 108 U/L (ref 40–129)
ALT SERPL-CCNC: 11 U/L (ref 5–41)
ANION GAP SERPL CALCULATED.3IONS-SCNC: 16 MMOL/L (ref 9–17)
ANION GAP: 10 MMOL/L (ref 7–16)
AST SERPL-CCNC: 12 U/L
BASOPHILS # BLD: 0 % (ref 0–2)
BASOPHILS ABSOLUTE: <0.03 K/UL (ref 0–0.2)
BETA-HYDROXYBUTYRATE: 0.1 MMOL/L (ref 0.02–0.27)
BILIRUB SERPL-MCNC: 0.25 MG/DL (ref 0.3–1.2)
BUN BLDV-MCNC: 15 MG/DL (ref 6–20)
BUN/CREAT BLD: ABNORMAL (ref 9–20)
CALCIUM SERPL-MCNC: 9.6 MG/DL (ref 8.6–10.4)
CHLORIDE BLD-SCNC: 98 MMOL/L (ref 98–107)
CO2: 25 MMOL/L (ref 20–31)
CREAT SERPL-MCNC: 0.81 MG/DL (ref 0.7–1.2)
D-DIMER QUANTITATIVE: 0.46 MG/L FEU
DIFFERENTIAL TYPE: ABNORMAL
EOSINOPHILS RELATIVE PERCENT: 4 % (ref 1–4)
FIO2: NORMAL
GFR AFRICAN AMERICAN: >60 ML/MIN
GFR NON-AFRICAN AMERICAN: >60 ML/MIN
GFR NON-AFRICAN AMERICAN: >60 ML/MIN
GFR SERPL CREATININE-BSD FRML MDRD: >60 ML/MIN
GFR SERPL CREATININE-BSD FRML MDRD: ABNORMAL ML/MIN/{1.73_M2}
GFR SERPL CREATININE-BSD FRML MDRD: ABNORMAL ML/MIN/{1.73_M2}
GFR SERPL CREATININE-BSD FRML MDRD: NORMAL ML/MIN/{1.73_M2}
GLUCOSE BLD-MCNC: 372 MG/DL (ref 75–110)
GLUCOSE BLD-MCNC: 382 MG/DL (ref 70–99)
GLUCOSE BLD-MCNC: 397 MG/DL (ref 74–100)
HCO3 VENOUS: 26.3 MMOL/L (ref 22–29)
HCT VFR BLD CALC: 44.7 % (ref 40.7–50.3)
HEMOGLOBIN: 14.6 G/DL (ref 13–17)
IMMATURE GRANULOCYTES: 0 %
LYMPHOCYTES # BLD: 64 % (ref 25–45)
MCH RBC QN AUTO: 28.1 PG (ref 25.2–33.5)
MCHC RBC AUTO-ENTMCNC: 32.7 G/DL (ref 28.4–34.8)
MCV RBC AUTO: 86.1 FL (ref 82.6–102.9)
MODE: NORMAL
MONOCYTES # BLD: 5 % (ref 2–8)
NEGATIVE BASE EXCESS, VEN: NORMAL (ref 0–2)
NRBC AUTOMATED: 0 PER 100 WBC
O2 DEVICE/FLOW/%: NORMAL
O2 SAT, VEN: 64 % (ref 60–85)
PATIENT TEMP: NORMAL
PCO2, VEN: 44.6 MM HG (ref 41–51)
PDW BLD-RTO: 12.5 % (ref 11.8–14.4)
PH VENOUS: 7.38 (ref 7.32–7.43)
PLATELET # BLD: 366 K/UL (ref 138–453)
PLATELET ESTIMATE: ABNORMAL
PMV BLD AUTO: 11.7 FL (ref 8.1–13.5)
PO2, VEN: 34.4 MM HG (ref 30–50)
POC CHLORIDE: 104 MMOL/L (ref 98–107)
POC CREATININE: 0.83 MG/DL (ref 0.51–1.19)
POC HEMATOCRIT: 48 % (ref 41–53)
POC HEMOGLOBIN: 16.4 G/DL (ref 13.5–17.5)
POC IONIZED CALCIUM: 1.17 MMOL/L (ref 1.15–1.33)
POC LACTIC ACID: 5.7 MMOL/L (ref 0.56–1.39)
POC PCO2 TEMP: NORMAL MM HG
POC PH TEMP: NORMAL
POC PO2 TEMP: NORMAL MM HG
POC POTASSIUM: 3.7 MMOL/L (ref 3.5–4.5)
POC SODIUM: 140 MMOL/L (ref 138–146)
POSITIVE BASE EXCESS, VEN: 1 (ref 0–3)
POTASSIUM SERPL-SCNC: 3.9 MMOL/L (ref 3.7–5.3)
RBC # BLD: 5.19 M/UL (ref 4.21–5.77)
RBC # BLD: ABNORMAL 10*6/UL
SAMPLE SITE: NORMAL
SEG NEUTROPHILS: 27 % (ref 34–64)
SEGMENTED NEUTROPHILS ABSOLUTE COUNT: 1.24 K/UL (ref 1.5–8.1)
SODIUM BLD-SCNC: 139 MMOL/L (ref 135–144)
TOTAL CO2, VENOUS: 28 MMOL/L (ref 23–30)
TOTAL PROTEIN: 7.6 G/DL (ref 6.4–8.3)
WBC # BLD: 4.7 K/UL (ref 4.5–13.5)
WBC # BLD: ABNORMAL 10*3/UL

## 2019-09-22 PROCEDURE — 82330 ASSAY OF CALCIUM: CPT

## 2019-09-22 PROCEDURE — 82010 KETONE BODYS QUAN: CPT

## 2019-09-22 PROCEDURE — 87880 STREP A ASSAY W/OPTIC: CPT

## 2019-09-22 PROCEDURE — 82803 BLOOD GASES ANY COMBINATION: CPT

## 2019-09-22 PROCEDURE — 2500000003 HC RX 250 WO HCPCS: Performed by: STUDENT IN AN ORGANIZED HEALTH CARE EDUCATION/TRAINING PROGRAM

## 2019-09-22 PROCEDURE — 99285 EMERGENCY DEPT VISIT HI MDM: CPT

## 2019-09-22 PROCEDURE — G0480 DRUG TEST DEF 1-7 CLASSES: HCPCS

## 2019-09-22 PROCEDURE — 85025 COMPLETE CBC W/AUTO DIFF WBC: CPT

## 2019-09-22 PROCEDURE — 96372 THER/PROPH/DIAG INJ SC/IM: CPT

## 2019-09-22 PROCEDURE — 83605 ASSAY OF LACTIC ACID: CPT

## 2019-09-22 PROCEDURE — 80307 DRUG TEST PRSMV CHEM ANLYZR: CPT

## 2019-09-22 PROCEDURE — 82947 ASSAY GLUCOSE BLOOD QUANT: CPT

## 2019-09-22 PROCEDURE — 84295 ASSAY OF SERUM SODIUM: CPT

## 2019-09-22 PROCEDURE — 96374 THER/PROPH/DIAG INJ IV PUSH: CPT

## 2019-09-22 PROCEDURE — 71045 X-RAY EXAM CHEST 1 VIEW: CPT

## 2019-09-22 PROCEDURE — 70360 X-RAY EXAM OF NECK: CPT

## 2019-09-22 PROCEDURE — 85379 FIBRIN DEGRADATION QUANT: CPT

## 2019-09-22 PROCEDURE — 87804 INFLUENZA ASSAY W/OPTIC: CPT

## 2019-09-22 PROCEDURE — 81003 URINALYSIS AUTO W/O SCOPE: CPT

## 2019-09-22 PROCEDURE — 82435 ASSAY OF BLOOD CHLORIDE: CPT

## 2019-09-22 PROCEDURE — 80053 COMPREHEN METABOLIC PANEL: CPT

## 2019-09-22 PROCEDURE — 82565 ASSAY OF CREATININE: CPT

## 2019-09-22 PROCEDURE — 85014 HEMATOCRIT: CPT

## 2019-09-22 PROCEDURE — 2580000003 HC RX 258: Performed by: STUDENT IN AN ORGANIZED HEALTH CARE EDUCATION/TRAINING PROGRAM

## 2019-09-22 PROCEDURE — 6360000002 HC RX W HCPCS: Performed by: STUDENT IN AN ORGANIZED HEALTH CARE EDUCATION/TRAINING PROGRAM

## 2019-09-22 PROCEDURE — 84132 ASSAY OF SERUM POTASSIUM: CPT

## 2019-09-22 PROCEDURE — 96375 TX/PRO/DX INJ NEW DRUG ADDON: CPT

## 2019-09-22 RX ORDER — ONDANSETRON 2 MG/ML
4 INJECTION INTRAMUSCULAR; INTRAVENOUS ONCE
Status: COMPLETED | OUTPATIENT
Start: 2019-09-22 | End: 2019-09-22

## 2019-09-22 RX ORDER — METHYLPREDNISOLONE SODIUM SUCCINATE 125 MG/2ML
125 INJECTION, POWDER, LYOPHILIZED, FOR SOLUTION INTRAMUSCULAR; INTRAVENOUS ONCE
Status: COMPLETED | OUTPATIENT
Start: 2019-09-22 | End: 2019-09-22

## 2019-09-22 RX ORDER — DIPHENHYDRAMINE HYDROCHLORIDE 50 MG/ML
25 INJECTION INTRAMUSCULAR; INTRAVENOUS ONCE
Status: COMPLETED | OUTPATIENT
Start: 2019-09-22 | End: 2019-09-22

## 2019-09-22 RX ORDER — 0.9 % SODIUM CHLORIDE 0.9 %
1000 INTRAVENOUS SOLUTION INTRAVENOUS ONCE
Status: COMPLETED | OUTPATIENT
Start: 2019-09-22 | End: 2019-09-22

## 2019-09-22 RX ORDER — 0.9 % SODIUM CHLORIDE 0.9 %
1000 INTRAVENOUS SOLUTION INTRAVENOUS ONCE
Status: COMPLETED | OUTPATIENT
Start: 2019-09-22 | End: 2019-09-23

## 2019-09-22 RX ADMIN — SODIUM CHLORIDE 1000 ML: 9 INJECTION, SOLUTION INTRAVENOUS at 23:50

## 2019-09-22 RX ADMIN — DIPHENHYDRAMINE HYDROCHLORIDE 25 MG: 50 INJECTION, SOLUTION INTRAMUSCULAR; INTRAVENOUS at 23:06

## 2019-09-22 RX ADMIN — SODIUM CHLORIDE 1000 ML: 9 INJECTION, SOLUTION INTRAVENOUS at 23:08

## 2019-09-22 RX ADMIN — ONDANSETRON 4 MG: 2 INJECTION INTRAMUSCULAR; INTRAVENOUS at 23:06

## 2019-09-22 RX ADMIN — METHYLPREDNISOLONE SODIUM SUCCINATE 125 MG: 125 INJECTION, POWDER, FOR SOLUTION INTRAMUSCULAR; INTRAVENOUS at 23:28

## 2019-09-22 RX ADMIN — EPINEPHRINE 0.3 MG: 1 INJECTION INTRAMUSCULAR; INTRAVENOUS; SUBCUTANEOUS at 23:26

## 2019-09-22 RX ADMIN — FAMOTIDINE 20 MG: 10 INJECTION, SOLUTION INTRAVENOUS at 23:08

## 2019-09-23 ENCOUNTER — APPOINTMENT (OUTPATIENT)
Dept: CT IMAGING | Age: 19
End: 2019-09-23
Payer: MEDICARE

## 2019-09-23 VITALS
OXYGEN SATURATION: 97 % | HEART RATE: 58 BPM | BODY MASS INDEX: 20.19 KG/M2 | SYSTOLIC BLOOD PRESSURE: 117 MMHG | DIASTOLIC BLOOD PRESSURE: 74 MMHG | RESPIRATION RATE: 18 BRPM | WEIGHT: 144.2 LBS | HEIGHT: 71 IN | TEMPERATURE: 97.3 F

## 2019-09-23 PROBLEM — T78.2XXA ACUTE ANAPHYLAXIS: Status: ACTIVE | Noted: 2019-09-23

## 2019-09-23 LAB
ACETAMINOPHEN LEVEL: <5 UG/ML (ref 10–30)
AMPHETAMINE SCREEN URINE: NEGATIVE
BARBITURATE SCREEN URINE: NEGATIVE
BENZODIAZEPINE SCREEN, URINE: NEGATIVE
BILIRUBIN URINE: NEGATIVE
BUPRENORPHINE URINE: ABNORMAL
CANNABINOID SCREEN URINE: POSITIVE
CHP ED QC CHECK: NORMAL
COCAINE METABOLITE, URINE: NEGATIVE
COLOR: YELLOW
COMMENT UA: ABNORMAL
DIRECT EXAM: NORMAL
DIRECT EXAM: NORMAL
ETHANOL PERCENT: <0.01 %
ETHANOL: <10 MG/DL
GLUCOSE BLD-MCNC: 224 MG/DL
GLUCOSE BLD-MCNC: 224 MG/DL (ref 75–110)
GLUCOSE BLD-MCNC: 472 MG/DL (ref 75–110)
GLUCOSE URINE: ABNORMAL
KETONES, URINE: ABNORMAL
LACTIC ACID, WHOLE BLOOD: 1.5 MMOL/L (ref 0.7–2.1)
LACTIC ACID: NORMAL MMOL/L
LEUKOCYTE ESTERASE, URINE: NEGATIVE
Lab: NORMAL
Lab: NORMAL
MDMA URINE: ABNORMAL
METHADONE SCREEN, URINE: NEGATIVE
METHAMPHETAMINE, URINE: ABNORMAL
NITRITE, URINE: NEGATIVE
OPIATES, URINE: NEGATIVE
OXYCODONE SCREEN URINE: NEGATIVE
PH UA: 5.5 (ref 5–8)
PHENCYCLIDINE, URINE: NEGATIVE
PROPOXYPHENE, URINE: ABNORMAL
PROTEIN UA: NEGATIVE
SALICYLATE LEVEL: <1 MG/DL (ref 3–10)
SPECIFIC GRAVITY UA: 1.05 (ref 1–1.03)
SPECIMEN DESCRIPTION: NORMAL
SPECIMEN DESCRIPTION: NORMAL
TEST INFORMATION: ABNORMAL
TOXIC TRICYCLIC SC,BLOOD: NEGATIVE
TRICYCLIC ANTIDEPRESSANTS, UR: ABNORMAL
TURBIDITY: CLEAR
URINE HGB: NEGATIVE
UROBILINOGEN, URINE: NORMAL

## 2019-09-23 PROCEDURE — 96376 TX/PRO/DX INJ SAME DRUG ADON: CPT

## 2019-09-23 PROCEDURE — G0378 HOSPITAL OBSERVATION PER HR: HCPCS

## 2019-09-23 PROCEDURE — 6360000004 HC RX CONTRAST MEDICATION: Performed by: STUDENT IN AN ORGANIZED HEALTH CARE EDUCATION/TRAINING PROGRAM

## 2019-09-23 PROCEDURE — 6360000002 HC RX W HCPCS: Performed by: NURSE PRACTITIONER

## 2019-09-23 PROCEDURE — 82947 ASSAY GLUCOSE BLOOD QUANT: CPT

## 2019-09-23 PROCEDURE — 99219 PR INITIAL OBSERVATION CARE/DAY 50 MINUTES: CPT | Performed by: INTERNAL MEDICINE

## 2019-09-23 PROCEDURE — 2580000003 HC RX 258: Performed by: NURSE PRACTITIONER

## 2019-09-23 PROCEDURE — APPSS45 APP SPLIT SHARED TIME 31-45 MINUTES: Performed by: NURSE PRACTITIONER

## 2019-09-23 PROCEDURE — 2500000003 HC RX 250 WO HCPCS: Performed by: NURSE PRACTITIONER

## 2019-09-23 PROCEDURE — 71260 CT THORAX DX C+: CPT

## 2019-09-23 PROCEDURE — 81003 URINALYSIS AUTO W/O SCOPE: CPT

## 2019-09-23 PROCEDURE — 6370000000 HC RX 637 (ALT 250 FOR IP): Performed by: NURSE PRACTITIONER

## 2019-09-23 RX ORDER — DIPHENHYDRAMINE HYDROCHLORIDE 50 MG/ML
25 INJECTION INTRAMUSCULAR; INTRAVENOUS EVERY 6 HOURS
Status: DISCONTINUED | OUTPATIENT
Start: 2019-09-23 | End: 2019-09-23 | Stop reason: HOSPADM

## 2019-09-23 RX ORDER — INSULIN GLARGINE 100 [IU]/ML
30 INJECTION, SOLUTION SUBCUTANEOUS NIGHTLY
Status: DISCONTINUED | OUTPATIENT
Start: 2019-09-23 | End: 2019-09-23 | Stop reason: HOSPADM

## 2019-09-23 RX ORDER — NICOTINE 21 MG/24HR
1 PATCH, TRANSDERMAL 24 HOURS TRANSDERMAL DAILY PRN
Status: DISCONTINUED | OUTPATIENT
Start: 2019-09-23 | End: 2019-09-23 | Stop reason: HOSPADM

## 2019-09-23 RX ORDER — SODIUM CHLORIDE 9 MG/ML
INJECTION, SOLUTION INTRAVENOUS CONTINUOUS
Status: DISCONTINUED | OUTPATIENT
Start: 2019-09-23 | End: 2019-09-23 | Stop reason: HOSPADM

## 2019-09-23 RX ORDER — DEXTROSE MONOHYDRATE 50 MG/ML
100 INJECTION, SOLUTION INTRAVENOUS PRN
Status: DISCONTINUED | OUTPATIENT
Start: 2019-09-23 | End: 2019-09-23 | Stop reason: HOSPADM

## 2019-09-23 RX ORDER — POTASSIUM CHLORIDE 20 MEQ/1
40 TABLET, EXTENDED RELEASE ORAL PRN
Status: DISCONTINUED | OUTPATIENT
Start: 2019-09-23 | End: 2019-09-23 | Stop reason: HOSPADM

## 2019-09-23 RX ORDER — ONDANSETRON 2 MG/ML
4 INJECTION INTRAMUSCULAR; INTRAVENOUS EVERY 6 HOURS PRN
Status: DISCONTINUED | OUTPATIENT
Start: 2019-09-23 | End: 2019-09-23 | Stop reason: HOSPADM

## 2019-09-23 RX ORDER — POTASSIUM CHLORIDE 7.45 MG/ML
10 INJECTION INTRAVENOUS PRN
Status: DISCONTINUED | OUTPATIENT
Start: 2019-09-23 | End: 2019-09-23 | Stop reason: HOSPADM

## 2019-09-23 RX ORDER — ACETAMINOPHEN 325 MG/1
650 TABLET ORAL EVERY 4 HOURS PRN
Status: DISCONTINUED | OUTPATIENT
Start: 2019-09-23 | End: 2019-09-23 | Stop reason: HOSPADM

## 2019-09-23 RX ORDER — MAGNESIUM SULFATE 1 G/100ML
1 INJECTION INTRAVENOUS PRN
Status: DISCONTINUED | OUTPATIENT
Start: 2019-09-23 | End: 2019-09-23 | Stop reason: HOSPADM

## 2019-09-23 RX ORDER — DEXTROSE MONOHYDRATE 25 G/50ML
12.5 INJECTION, SOLUTION INTRAVENOUS PRN
Status: DISCONTINUED | OUTPATIENT
Start: 2019-09-23 | End: 2019-09-23 | Stop reason: HOSPADM

## 2019-09-23 RX ORDER — METHYLPREDNISOLONE SODIUM SUCCINATE 125 MG/2ML
40 INJECTION, POWDER, LYOPHILIZED, FOR SOLUTION INTRAMUSCULAR; INTRAVENOUS EVERY 6 HOURS
Status: DISCONTINUED | OUTPATIENT
Start: 2019-09-23 | End: 2019-09-23 | Stop reason: HOSPADM

## 2019-09-23 RX ORDER — EPINEPHRINE 0.15 MG/.3ML
INJECTION INTRAMUSCULAR
Qty: 2 DEVICE | Refills: 3 | Status: ON HOLD | OUTPATIENT
Start: 2019-09-23 | End: 2020-10-02 | Stop reason: SDUPTHER

## 2019-09-23 RX ORDER — NICOTINE POLACRILEX 4 MG
15 LOZENGE BUCCAL PRN
Status: DISCONTINUED | OUTPATIENT
Start: 2019-09-23 | End: 2019-09-23 | Stop reason: HOSPADM

## 2019-09-23 RX ADMIN — FAMOTIDINE 20 MG: 10 INJECTION, SOLUTION INTRAVENOUS at 09:35

## 2019-09-23 RX ADMIN — SODIUM CHLORIDE: 9 INJECTION, SOLUTION INTRAVENOUS at 05:03

## 2019-09-23 RX ADMIN — INSULIN LISPRO 4 UNITS: 100 INJECTION, SOLUTION INTRAVENOUS; SUBCUTANEOUS at 09:37

## 2019-09-23 RX ADMIN — DIPHENHYDRAMINE HYDROCHLORIDE 25 MG: 50 INJECTION, SOLUTION INTRAMUSCULAR; INTRAVENOUS at 05:51

## 2019-09-23 RX ADMIN — IOVERSOL 75 ML: 741 INJECTION INTRA-ARTERIAL; INTRAVENOUS at 03:29

## 2019-09-23 RX ADMIN — METHYLPREDNISOLONE SODIUM SUCCINATE 40 MG: 125 INJECTION, POWDER, FOR SOLUTION INTRAMUSCULAR; INTRAVENOUS at 05:50

## 2019-09-23 ASSESSMENT — PAIN SCALES - GENERAL: PAINLEVEL_OUTOF10: 6

## 2019-09-23 ASSESSMENT — PAIN DESCRIPTION - ORIENTATION: ORIENTATION: RIGHT;LEFT

## 2019-09-23 ASSESSMENT — ENCOUNTER SYMPTOMS
VOMITING: 1
ROS SKIN COMMENTS: URTICARIA
NAUSEA: 1
ABDOMINAL PAIN: 1
FACIAL SWELLING: 1
WHEEZING: 0
SHORTNESS OF BREATH: 1

## 2019-09-23 ASSESSMENT — PAIN DESCRIPTION - PAIN TYPE: TYPE: ACUTE PAIN

## 2019-09-23 ASSESSMENT — PAIN DESCRIPTION - LOCATION: LOCATION: LEG

## 2019-09-23 ASSESSMENT — PAIN DESCRIPTION - FREQUENCY: FREQUENCY: CONTINUOUS

## 2019-09-23 NOTE — PROGRESS NOTES
person, place and time and normal affect  Lungs:  clear to auscultation bilaterally, normal effort  Heart:  regular rate and rhythm, no murmur  Abdomen:  soft, nontender, nondistended, normal bowel sounds, no masses, hepatomegaly, splenomegaly  Extremities:  no edema, redness, tenderness in the calves  Skin:  no gross lesions, rashes, induration    Assessment:        Hospital Problems           Last Modified POA    * (Principal) Acute anaphylaxis 9/23/2019 Yes    Type 1 diabetes mellitus ([email or web address] 9/23/2019 Yes    Overview Signed 5/30/2013  2:10 PM by JOSÉ LUIS Brambila NP     Dx April 2013               Plan:         1. Monitor blood glucose levels to determine adequate control before discharge   2. F/U with PCP for allergen testing and referral to diabetic counseling   3. Epi pen on discharge   4.  Pt. Education on marijuana and tobacco use    John Garcia  9/23/2019  8:29 AM

## 2019-09-23 NOTE — H&P
[address]    HISTORY AND PHYSICAL EXAMINATION            Date:   9/23/2019  Patient name:  William Riggins  Date of admission:  9/22/2019 10:45 PM  MRN:   [id number]  Account:  [de-identified]  YOB: 2000  PCP:    Edgar Osborn MD  Room:   0350/0350-01  Code Status:    Full Code    Chief Complaint:     Chief Complaint   Patient presents with    Hyperglycemia       History Obtained From:     patient, electronic medical record    History of Present Illness:     William Riggins is a 22 y.o. Non-/non  male who presents with Hyperglycemia   and is admitted to the hospital for the management of Acute anaphylaxis. Jameel Salomon is a 22 yr old male who has given varying reports of the events that occurred today. Patient was brought to EMS via his cousins for acute onset of facial swelling, chest tightness, throat pain, and suspected allergic reaction. Patient reports to floor RN he was at a party prior to arrival-- reports to writer he was walking alone at the park and had a diet soda on the way to a friends house. En route, he all of a sudden didn't feel well, throat began to hurt and started 'not feeling right'. Patient initially thought his blood sugar was high given his history of DM I-- however, upon arrival to his friends house, family members noted his entire face was swollen and brought him to the hospital.    On arrival the patient had generalized urticaria with redness noted to BUE and Abdomen in addition to facial and respiratory symptoms. Patient was given Benadryl, Epi, Pepcid, and Solumedrol in ED and placed on supplemental Oxygen for short period of time. Labs remain grossly unremarkable. Urine Tox positive for cannabinoids. Patient does admit to smoking both marijuana and cigarettes, but denies marijuana use or additional substances immediately prior to this event.   He denies any known exposure to foods, allergens, or contact irritants. On exam, the patient still with periorbital and lip swelling, denies CP/SOB, no stridor or wheezing, patient appears comfortable and without signs of distress. Overall, reports feeling better. Past Medical History:     Past Medical History:   Diagnosis Date    Diabetes mellitus ([facility]     Type 1 diabetes mellitus ([facility] April 2013        Past Surgical History:     History reviewed. No pertinent surgical history. Medications Prior to Admission:     Prior to Admission medications    Medication Sig Start Date End Date Taking? Authorizing Provider   insulin aspart (NOVOLOG FLEXPEN) 100 UNIT/ML injection pen Inject 1 Units into the skin 3 times daily (before meals) Per sliding scale 2/25/19  Yes Ana Irvin MD   B-D UF III MINI PEN NEEDLES 31G X 5 MM MISC Disp BD Autoshield Duo pen needles, 5mm, Sig:  Use as directed for injections 4 - 6 times per day.  2/25/19  Yes Ana Irvin MD   blood glucose test strips (ONE TOUCH ULTRA TEST) strip Sig: For blood testing 4-6 times/day 2/3/19  Yes Giovanny Cardoso MD   Ketone Blood Test STRP 1 strip by Does not apply route daily as needed    Historical Provider, MD   insulin glargine (BASAGLAR KWIKPEN) 100 UNIT/ML injection pen Inject 30 Units into the skin nightly 2/25/19   Ana Irvin MD   acetaminophen (TYLENOL) 325 MG tablet Take 2 tablets by mouth every 6 hours as needed for Pain 10/22/17   Zohra Estrella DO   ibuprofen (ADVIL;MOTRIN) 200 MG tablet Take 3 tablets by mouth every 8 hours as needed for Pain or Fever 10/22/17   Zohar Estrella DO   insulin aspart (NOVOLOG) 100 UNIT/ML injection vial Inject 1 Units into the skin 3 times daily (before meals) 1 unit per 15 grams CHO for meals and snacks  Sliding scale: =0, 125-150=1, 151-175=2, 175-200=3, 201-225=4, 226-250=5, 251-275=6, 276-300=7, 301-325=8, 326-350=9, 351-375=10, 376-400=11 10/2/17 12/31/17  Molly Justice MD   glucagon (GLUCAGON EMERGENCY) 1 MG injection As directed for extreme hypoglycemia 16   Hugo Heart DO        Allergies:     Patient has no known allergies. Social History:     Tobacco:    reports that he has been smoking cigarettes. He has a 0.50 pack-year smoking history. He has never used smokeless tobacco.  Alcohol:      reports that he does not drink alcohol. Drug Use:  reports that he does not use drugs. Family History:     Family History   Problem Relation Age of Onset    Asthma Father        Review of Systems:     Positive and Negative as described in HPI.     CONSTITUTIONAL:  negative for fevers, chills, sweats, fatigue, weight loss  HEENT:  +lip swelling, +periorbital swelling, +generalized facial swelling  RESPIRATORY:  negative for shortness of breath, cough, congestion, wheezing  CARDIOVASCULAR:  negative for chest pain, palpitations  GASTROINTESTINAL:  negative for nausea, vomiting, diarrhea, constipation, change in bowel habits, abdominal pain   GENITOURINARY:  negative for difficulty of urination, burning with urination, frequency   INTEGUMENT:  negative for rash, skin lesions, easy bruising   HEMATOLOGIC/LYMPHATIC:  negative for swelling/edema   ALLERGIC/IMMUNOLOGIC:  negative for urticaria , itching  ENDOCRINE:  negative increase in drinking, increase in urination, hot or cold intolerance  MUSCULOSKELETAL:  negative joint pains, muscle aches, swelling of joints  NEUROLOGICAL:  negative for headaches, dizziness, lightheadedness, numbness, pain, tingling extremities  BEHAVIOR/PSYCH:  negative for depression, anxiety    Physical Exam:   /79   Pulse 58   Temp 97.9 °F (36.6 °C)   Resp 16   Ht 5' 11\" (1.803 m)   Wt 144 lb 3.2 oz (65.4 kg)   SpO2 97%   BMI 20.11 kg/m²   Temp (24hrs), Av.1 °F (36.7 °C), Min:97.9 °F (36.6 °C), Max:98.3 °F (36.8 °C)    Recent Labs     19  0032   POCGLU 372* 397* 224*       Intake/Output Summary (Last 24 hours) at 2019 0530  Last data filed at 9/23/2019 0035  Gross per 24 hour   Intake 1000 ml   Output --   Net 1000 ml       General Appearance: alert and in no acute distress  Mental status: oriented to person, place, and time  Head: normocephalic, atraumatic  Eye: no icterus, redness, pupils equal and reactive, extraocular eye movements intact, conjunctiva clear, subcutaneous swelling noted around eyes and face  Ear: normal external ear, no discharge, hearing intact  Nose: no drainage noted  Mouth: mucous membranes moist, lip swelling noted  Neck: supple, no carotid bruits, thyroid not palpable  Lungs: Bilateral equal air entry, clear to ausculation, no wheezing, rales or rhonchi, normal effort, no stridor  Cardiovascular: normal rate, regular rhythm, no murmur, gallop, rub  Abdomen: Soft, nontender, nondistended, normal bowel sounds, no hepatomegaly or splenomegaly  Neurologic: There are no new focal motor or sensory deficits, normal muscle tone and bulk, no abnormal sensation, normal speech, cranial nerves II through XII grossly intact  Skin: No gross lesions, rashes, bruising or bleeding on exposed skin area  Extremities: peripheral pulses palpable, no pedal edema or calf pain with palpation  Psych: normal affect    Investigations:      Laboratory Testing:  Recent Results (from the past 24 hour(s))   POC Glucose Fingerstick    Collection Time: 09/22/19 10:53 PM   Result Value Ref Range    POC Glucose 372 (H) 75 - 110 mg/dL   Venous Blood Gas, POC    Collection Time: 09/22/19 10:59 PM   Result Value Ref Range    pH, [place] 7.378 7.320 - 7.430    pCO2, [place] 44.6 41.0 - 51.0 mm Hg    pO2, [place] 34.4 30.0 - 50.0 mm Hg    HCO3, Venous 26.3 22.0 - 29.0 mmol/L    Total CO2, Venous 28 23.0 - 30.0 mmol/L    Negative Base Excess, [place] NOT REPORTED 0.0 - 2.0    Positive Base Excess, [place] 1 0.0 - 3.0    O2 Sat, [place] 64 60.0 - 85.0 %    O2 Device/Flow/% NOT REPORTED     Shane Test NOT REPORTED     Sample Site NOT REPORTED     Mode NOT REPORTED     FIO2 NOT REPORTED Tricyclic Antidepressants, Urine NOT REPORTED NEGATIVE    MDMA, Urine NOT REPORTED NEGATIVE    Buprenorphine Urine NOT REPORTED NEGATIVE    Test Information       Assay provides medical screening only. The absence of expected drug(s) and/or metabolite(s) may indicate diluted or adulterated urine, limitations of testing or timing of collection. Imaging/Diagnostics:  Xr Neck Soft Tissue    Result Date: 9/23/2019  Unremarkable soft tissue radiograph     Ct Chest W Contrast    Result Date: 9/23/2019  Negative CT scan. No abnormalities identified. Xr Chest Portable    Result Date: 9/22/2019  No acute cardiopulmonary abnormality. Assessment :      Hospital Problems           Last Modified POA    * (Principal) Acute anaphylaxis 9/23/2019 Yes    Type 1 diabetes mellitus ([facility] 9/23/2019 Yes    Overview Signed 5/30/2013  2:10 PM by JOSÉ LUIS Bates NP     Dx April 2013               Plan:     Patient status Admit as observation in the  Med/Surge    1. Acute Anaphylaxis-- unknown causative agent. Symptoms improved with Solumedrol, Epi, Benadryl, and Pepcid. 2. DM I-- patient maintains blood glucose on Lantus and Sliding scale coverage-- managed by his PCP  3. Marijuana and tobacco use  4. Continue IV fluids and IV solumedrol  5. Supplement Oxygen as needed  6. Resume home insulin requirements  7. Resume diet  8. Epi pen on discharge  9. Airway precautions  10. Telemetry monitoring    Consultations:   IP CONSULT TO HOSPITALIST    Patient is admitted as inpatient status because of co-morbidities listed above, severity of signs and symptoms as outlined, requirement for current medical therapies and most importantly because of direct risk to patient if care not provided in a hospital setting.     JOSÉ LUIS Doty NP  9/23/2019  5:30 AM    Copy sent to Dr. Mirta Lobo MD

## 2019-09-23 NOTE — ED NOTES
Pt resting on stretcher, no respiratory distress noted, pt updated on plan of care, will continue to monitor, call light in reach.        Hans Bermeo RN  09/23/19 [id number]

## 2019-09-23 NOTE — PROGRESS NOTES
Management assessment at level    ·   · [x]    Bronchodilator Assessment  BRONCHODILATOR ASSESSMENT SCORE  Score 0 1 2 3 4 5   Breath Sounds   [x]  Patient Baseline [x]  No Wheeze good aeration []  Faint, scattered wheezing, good aeration []  Expiratory Wheezing and or moderately diminished []  Insp/Exp wheeze and/or very diminished []  Insp/Exp and/ or marked distress   Respiratory Rate   [x]  Patient Baseline [x]  Less than 20 [x]  Less than 20 []  20-25 []  Greater than 25 []  Greater than 25   Peak flow % of Pred or PB [x]  NA   []  Greater than 90%  []  81-90% []  71-80% []  Less than or equal to 70%  or unable to perform []  Unable due to Respiratory Distress   Dyspnea re []  Patient Baseline [x]  No SOB [x]  No SOB []  SOB on exertion []  SOB min activity []  At rest/acute   e FEV% Predicted       [x]  NA []  Above 69%  []  Unable []  Above 60-69%  []  Unable []  Above 50-59%  []  Unable []  Above 35-49%  []  Unable []  Less than 35%  []  Unable                 []  Hyperinflation Assessment  Score 1 2 3   CXR and Breath Sounds   []  Clear []  No atelectasis  Basilar aeration []  Atelectasis or absent basilar breath sounds   Incentive Spirometry Volume  (Per IBW)   []  Greater than or equal to 15ml/Kg []  less than 15ml/Kg []  less than 15ml/Kg   Surgery within last 2 weeks []  None or general   []  Abdominal or thoracic surgery  []  Abdominal or thoracic   Chronic Pulmonary Historyre []  No []  Yes []  Yes     []  Secretion Management Assessment  Score 1 2 3   Bilateral Breath Sounds   []  Occasional Rhonchi []  Scattered Rhonchi []  Course Rhonchi and/or poor aeration   Sputum    []  Small amount of thin secretions []  Moderate amount of viscous secretions []  Copius, Viscious Yellow/ Secretions   CXR as reported by physician []  clear  []  Unavailable []  Infiltrates and/or consolidation  []  Unavailable []  Mucus Plugging and or lobar consolidation  []  Unavailable   Cough []  Strong, productive cough []  Weak productive cough []  No cough or weak non-productive cough   Herminia SUJATA Mccrary  5:35 AM                            FEMALE                                  MALE                            FEV1 Predicted Normal Values                        FEV1 Predicted Normal Values          Age                                     Height in Feet and Inches       Age                                     Height in Feet and Inches       4' 11\" 5' 1\" 5' 3\" 5' 5\" 5' 7\" 5' 9\" 5' 11\" 6' 1\"  4' 11\" 5' 1\" 5' 3\" 5' 5\" 5' 7\" 5' 9\" 5' 11\" 6' 1\"   42 - 45 2.49 2.66 2.84 3.03 3.22 3.42 3.62 3.83 42 - 45 2.82 3.03 3.26 3.49 3.72 3.96 4.22 4.47   46 - 49 2.40 2.57 2.76 2.94 3.14 3.33 3.54 3.75 46 - 49 2.70 2.92 3.14 3.37 3.61 3.85 4.10 4.36   50 - 53 2.31 2.48 2.66 2.85 3.04 3.24 3.45 3.66 50 - 53 2.58 2.80 3.02 3.25 3.49 3.73 3.98 4.24   54 - 57 2.21 2.38 2.57 2.75 2.95 3.14 3.35 3.56 54 - 57 2.46 2.67 2.89 3.12 3.36 3.60 3.85 4.11   58 - 61 2.10 2.28 2.46 2.65 2.84 3.04 3.24 3.45 58 - 61 2.32 2.54 2.76 2.99 3.23 3.47 3.72 3.98   62 - 65 1.99 2.17 2.35 2.54 2.73 2.93 3.13 3.34 62 - 65 2.19 2.40 2.62 2.85 3.09 3.33 3.58 3.84   66 - 69 1.88 2.05 2.23 2.42 2.61 2.81 3.02 3.23 66 - 69 2.04 2.26 2.48 2.71 2.95 3.19 3.44 3.70   70+ 1.82 1.99 2.17 2.36 2.55 2.75 2.95 3.16 70+ 1.97 2.19 2.41 2.64 2.87 3.12 3.37 3.62             Predicted Peak Expiratory Flow Rate                                       Height (in)  Female       Height (in) Male           Age 62 41 79 01 29 77 78 74 Age            20 473 996 910 649 315 137 907 879  78 00 60 49 00 70 72 74 76   25 337 352 366 381 396 411 426 441 25 447 476 505 533 562 591 619 648 677   30 329 344 359 374 389 404 419 434 30 437 466 494 523 552 580 609 638 667   35 322 337 351 366 381 396 411 426 35 426 455 484 512 541 570 598 627 657   40 314 329 344 359 374 389 404 419 40 416 445 473 502 531 559 588 617 647   45 307 322 336 351 366 381 396 411 45 405 434 463 491 520 549 577 606 636   50 299 314 329

## 2019-09-23 NOTE — ED PROVIDER NOTES
[facility]  ED  Emergency Department Encounter  Emergency Medicine Resident     Pt Name: Drea Munguia  MRN: [id number]  [place] 2000  Date of evaluation: 9/22/19  PCP:  Michael Pascual MD    [address] [place] [address]       Chief Complaint   Patient presents with    Hyperglycemia       HISTORY OFPRESENT ILLNESS  (Location/Symptom, Timing/Onset, Context/Setting, Quality, Duration, Modifying Patrici Hawk.)      Drea Munguia is a 22year old male who presents with nausea vomiting and sob associated with diffuse urticaria which occurred just pta. Brought in by family. No identifiable triggers. Hasn't been recently ill no sick contacts or new foods no new laundry detergents. Patient has not been febrile patient had some nausea and emesis prior to arrival in the parking lot. Never had similar ymptoms before patient is a known type I diabetic on insulin but has no known allergies and denies any new ingestions, medications, etc.     PAST MEDICAL / SURGICAL / SOCIAL / FAMILY HISTORY      has a past medical history of Diabetes mellitus ([facility] [address] [facility] and Type 1 diabetes mellitus ([facility] [address] [facility] has no past surgical history on file.      Social History     Socioeconomic History    Marital status: Single     Spouse name: Not on file    Number of children: Not on file    Years of education: Not on file    Highest education level: Not on file   Occupational History     Employer: N/A   Social Needs    Financial resource strain: Not on file    Food insecurity:     Worry: Not on file     Inability: Not on file    Transportation needs:     Medical: Not on file     Non-medical: Not on file   Tobacco Use    Smoking status: Light Tobacco Smoker     Packs/day: 0.25     Years: 2.00     Pack years: 0.50     Types: Cigarettes    Smokeless tobacco: Never Used   Substance and Sexual Activity    Alcohol use: No    Drug use: No    Sexual activity: Never   Lifestyle    Physical activity:     Days per week: Not on file Minutes per session: Not on file    Stress: Not on file   Relationships    Social connections:     Talks on phone: Not on file     Gets together: Not on file     Attends Orthodox service: Not on file     Active member of club or organization: Not on file     Attends meetings of clubs or organizations: Not on file     Relationship status: Not on file    Intimate partner violence:     Fear of current or ex partner: Not on file     Emotionally abused: Not on file     Physically abused: Not on file     Forced sexual activity: Not on file   Other Topics Concern    Not on file   Social History Narrative    ** Merged History Encounter **            Family History   Problem Relation Age of Onset    Asthma Father         Allergies:  Patient has no known allergies. Home Medications:  Prior to Admission medications    Medication Sig Start Date End Date Taking? Authorizing Provider   Ketone Blood Test STRP 1 strip by Does not apply route daily as needed    Historical Provider, MD   insulin aspart (NOVOLOG FLEXPEN) 100 UNIT/ML injection pen Inject 1 Units into the skin 3 times daily (before meals) Per sliding scale 2/25/19   Lauren Winter MD   insulin glargine Charna Calderon) 100 UNIT/ML injection pen Inject 30 Units into the skin nightly 2/25/19   Lauren Winter MD   B-D UF III MINI PEN NEEDLES 31G X 5 MM MISC Disp BD Autoshield Duo pen needles, 5mm, Sig:  Use as directed for injections 4 - 6 times per day.  2/25/19   Lauren Winter MD   blood glucose test strips (ONE TOUCH ULTRA TEST) strip Sig: For blood testing 4-6 times/day 2/3/19   Lori Starr MD   acetaminophen (TYLENOL) 325 MG tablet Take 2 tablets by mouth every 6 hours as needed for Pain 10/22/17   Smith Estrella,    ibuprofen (ADVIL;MOTRIN) 200 MG tablet Take 3 tablets by mouth every 8 hours as needed for Pain or Fever 10/22/17   Smith Estrella,    insulin aspart (NOVOLOG) 100 UNIT/ML injection vial Inject 1 Units into the skin 3 times RAPID INFLUENZA A/B ANTIGENS    XR CHEST PORTABLE    XR Neck Soft Tissue    CBC Auto Differential    Comprehensive Metabolic Panel w/ Reflex to MG    Beta-Hydroxybutyrate    Urinalysis Reflex to Culture    KETONES, URINE    Hemoglobin and hematocrit, blood    Lactic Acid, Plasma    SODIUM (POC)    POTASSIUM (POC)    CHLORIDE (POC)    CALCIUM, IONIC (POC)    D-Dimer, Quantitative    TOX SCR, BLD, ED    Urine Drug Screen    POCT Glucose    POC Blood Gas and Chemistry    POC Glucose Fingerstick    Venous Blood Gas, POC    Creatinine W/GFR Point of Care    Lactic Acid, POC    POCT Glucose    Anion Gap (Calc) POC       MEDICATIONS ORDERED:  Orders Placed This Encounter   Medications    0.9 % sodium chloride bolus    ondansetron (ZOFRAN) injection 4 mg    diphenhydrAMINE (BENADRYL) injection 25 mg    famotidine (PEPCID) injection 20 mg    EPINEPHrine 1 MG/ML injection 0.3 mg    methylPREDNISolone sodium (SOLU-MEDROL) injection 125 mg       DDX: anaphylaxis,dka, HONK state, intraabdominal infection, pneumonia, aspiration, gastroenteritis, influenza, pulmonary embolism    Initial MDM/Plan: 22 y.o. male who presents with acute sob, n/v, malaise, and urticaria. Plan will be to obtain poc labs/vbg, cbc, chemistry, ua, lactate, ketones, d dimer, cxr, soft tissue neck influenza swabs, provide ivf, famotidine, zofran, benadryl 125 solumedrol and 0.3mg IM epi and reassess.       DIAGNOSTIC RESULTS / EMERGENCYDEPARTMENT COURSE / MDM     LABS:  Labs Reviewed   CBC WITH AUTO DIFFERENTIAL - Abnormal; Notable for the following components:       Result Value    Seg Neutrophils 27 (*)     Lymphocytes 64 (*)     Segs Absolute 1.24 (*)     All other components within normal limits   POC GLUCOSE FINGERSTICK - Abnormal; Notable for the following components:    POC Glucose 372 (*)     All other components within normal limits   LACTIC ACID,POINT OF CARE - Abnormal; Notable for the following components:    POC

## 2019-09-23 NOTE — ED NOTES
Report given to [employer]  All questions answered at this time     Kathy Muñiz, RN  09/23/19 3326

## 2019-09-23 NOTE — DISCHARGE SUMMARY
[facility]    Discharge Summary     Patient ID: Matthieu El  :  2000   MRN: [id number]     ACCOUNT:  [de-identified]   Patient's PCP: Shaylee Garcia MD  Admit Date: 2019   Discharge Date: 2019  Length of Stay: 0  Code Status:  Prior  Admitting Physician: Tayo Hall DO  Discharge Physician: Silvestre Williamson MD     Active Discharge Diagnoses:     Hospital Problem Lists:  Principal Problem:    Acute anaphylaxis  Active Problems:    Type 1 diabetes mellitus ([facility] [address]    Anaphylactic syndrome  Resolved Problems:    * No resolved hospital problems. *      Admission Condition:  fair     Discharged Condition: stable    Hospital Stay:     Hospital Course:   Lamar Summers is a 22 yr old male who has given varying reports of the events that occurred today. Patient was brought to EMS via his cousins for acute onset of facial swelling, chest tightness, throat pain, and suspected allergic reaction. Patient reports to floor RN he was at a party prior to arrival-- reports to writer he was walking alone at the park and had a diet soda on the way to a friends house. En route, he all of a sudden didn't feel well, throat began to hurt and started 'not feeling right'. Patient initially thought his blood sugar was high given his history of DM I-- however, upon arrival to his friends house, family members noted his entire face was swollen and brought him to the hospital.     On arrival the patient had generalized urticaria with redness noted to BUE and Abdomen in addition to facial and respiratory symptoms. Patient was given Benadryl, Epi, Pepcid, and Solumedrol in ED and placed on supplemental Oxygen for short period of time. Labs remain grossly unremarkable. Urine Tox positive for cannabinoids.   Patient does admit to smoking both marijuana and cigarettes, but denies marijuana use or additional substances immediately prior to this Use as directed for injections 4 - 6 times per day. blood glucose test strips strip  Commonly known as:  ASCENSIA AUTODISC VI;ONE TOUCH ULTRA TEST VI  Sig: For blood testing 4-6 times/day     glucagon 1 MG injection  As directed for extreme hypoglycemia     ibuprofen 200 MG tablet  Commonly known as:  ADVIL;MOTRIN  Take 3 tablets by mouth every 8 hours as needed for Pain or Fever     * insulin aspart 100 UNIT/ML injection vial  Commonly known as:  NOVOLOG  Inject 1 Units into the skin 3 times daily (before meals) 1 unit per 15 grams CHO for meals and snacks  Sliding scale: =0, 125-150=1, 151-175=2, 175-200=3, 201-225=4, 226-250=5, 251-275=6, 276-300=7, 301-325=8, 326-350=9, 351-375=10, 376-400=11     * insulin aspart 100 UNIT/ML injection pen  Commonly known as:  NOVOLOG  Inject 1 Units into the skin 3 times daily (before meals) Per sliding scale     insulin glargine 100 UNIT/ML injection pen  Commonly known as:  LANTUS  Inject 30 Units into the skin nightly     Ketone Blood Test Strp         * This list has 2 medication(s) that are the same as other medications prescribed for you. Read the directions carefully, and ask your doctor or other care provider to review them with you. Where to Get Your Medications      These medications were sent to [facility] [address] [facility], [address], [address] [place] [address]    Phone:  [phone]   · EPINEPHrine 0.15 MG/0.3ML Soaj         Time Spent on discharge is  20 mins in patient examination, evaluation, counseling as well as medication reconciliation, prescriptions for required medications, discharge plan and follow up. Electronically signed by   Pelon Alicea MD  9/23/2019  6:48 PM      Thank you Dr. Russell Tejada MD for the opportunity to be involved in this patient's care.

## 2019-09-24 ENCOUNTER — TELEPHONE (OUTPATIENT)
Dept: FAMILY MEDICINE CLINIC | Age: 19
End: 2019-09-24

## 2019-10-23 ENCOUNTER — TELEPHONE (OUTPATIENT)
Dept: FAMILY MEDICINE CLINIC | Age: 19
End: 2019-10-23

## 2019-11-08 ENCOUNTER — TELEPHONE (OUTPATIENT)
Dept: FAMILY MEDICINE CLINIC | Age: 19
End: 2019-11-08

## 2020-03-16 ENCOUNTER — HOSPITAL ENCOUNTER (EMERGENCY)
Age: 20
Discharge: HOME | End: 2020-03-16
Attending: EMERGENCY MEDICINE
Payer: MEDICAID

## 2020-03-16 ENCOUNTER — APPOINTMENT (OUTPATIENT)
Dept: GENERAL RADIOLOGY | Age: 20
End: 2020-03-16
Payer: MEDICAID

## 2020-03-16 VITALS
HEART RATE: 75 BPM | RESPIRATION RATE: 16 BRPM | TEMPERATURE: 97 F | SYSTOLIC BLOOD PRESSURE: 110 MMHG | DIASTOLIC BLOOD PRESSURE: 80 MMHG | OXYGEN SATURATION: 99 %

## 2020-03-16 LAB
ABSOLUTE EOS #: 0.33 K/UL (ref 0–0.44)
ABSOLUTE IMMATURE GRANULOCYTE: <0.03 K/UL (ref 0–0.3)
ABSOLUTE LYMPH #: 1.77 K/UL (ref 1.1–3.7)
ABSOLUTE MONO #: 0.34 K/UL (ref 0.1–1.4)
ALBUMIN SERPL-MCNC: 4.6 G/DL (ref 3.5–5.2)
ALBUMIN/GLOBULIN RATIO: 1.4 (ref 1–2.5)
ALLEN TEST: ABNORMAL
ALP BLD-CCNC: 95 U/L (ref 40–129)
ALT SERPL-CCNC: 9 U/L (ref 5–41)
ANION GAP SERPL CALCULATED.3IONS-SCNC: 13 MMOL/L (ref 9–17)
ANION GAP: 10 MMOL/L (ref 7–16)
AST SERPL-CCNC: 13 U/L
BASOPHILS # BLD: 1 % (ref 0–2)
BASOPHILS ABSOLUTE: 0.05 K/UL (ref 0–0.2)
BETA-HYDROXYBUTYRATE: 1.57 MMOL/L (ref 0.02–0.27)
BILIRUB SERPL-MCNC: 0.87 MG/DL (ref 0.3–1.2)
BILIRUBIN URINE: NEGATIVE
BUN BLDV-MCNC: 14 MG/DL (ref 6–20)
BUN/CREAT BLD: ABNORMAL (ref 9–20)
CALCIUM SERPL-MCNC: 9.6 MG/DL (ref 8.6–10.4)
CHLORIDE BLD-SCNC: 95 MMOL/L (ref 98–107)
CO2: 24 MMOL/L (ref 20–31)
COLOR: YELLOW
COMMENT UA: ABNORMAL
CREAT SERPL-MCNC: 0.49 MG/DL (ref 0.7–1.2)
DIFFERENTIAL TYPE: ABNORMAL
EOSINOPHILS RELATIVE PERCENT: 8 % (ref 1–4)
FIO2: ABNORMAL
GFR AFRICAN AMERICAN: >60 ML/MIN
GFR NON-AFRICAN AMERICAN: >60 ML/MIN
GFR NON-AFRICAN AMERICAN: >60 ML/MIN
GFR SERPL CREATININE-BSD FRML MDRD: >60 ML/MIN
GFR SERPL CREATININE-BSD FRML MDRD: ABNORMAL ML/MIN/{1.73_M2}
GFR SERPL CREATININE-BSD FRML MDRD: ABNORMAL ML/MIN/{1.73_M2}
GFR SERPL CREATININE-BSD FRML MDRD: NORMAL ML/MIN/{1.73_M2}
GLUCOSE BLD-MCNC: 282 MG/DL (ref 75–110)
GLUCOSE BLD-MCNC: 320 MG/DL (ref 75–110)
GLUCOSE BLD-MCNC: 344 MG/DL (ref 70–99)
GLUCOSE BLD-MCNC: 397 MG/DL (ref 74–100)
GLUCOSE BLD-MCNC: 454 MG/DL (ref 75–110)
GLUCOSE URINE: ABNORMAL
HCO3 VENOUS: 28.3 MMOL/L (ref 22–29)
HCT VFR BLD CALC: 46.8 % (ref 40.7–50.3)
HEMOGLOBIN: 15.1 G/DL (ref 13–17)
IMMATURE GRANULOCYTES: 1 %
KETONES, URINE: ABNORMAL
LEUKOCYTE ESTERASE, URINE: NEGATIVE
LIPASE: 15 U/L (ref 13–60)
LYMPHOCYTES # BLD: 44 % (ref 25–45)
MCH RBC QN AUTO: 27.6 PG (ref 25.2–33.5)
MCHC RBC AUTO-ENTMCNC: 32.3 G/DL (ref 28.4–34.8)
MCV RBC AUTO: 85.6 FL (ref 82.6–102.9)
MODE: ABNORMAL
MONOCYTES # BLD: 8 % (ref 2–8)
NEGATIVE BASE EXCESS, VEN: ABNORMAL (ref 0–2)
NITRITE, URINE: NEGATIVE
NRBC AUTOMATED: 0 PER 100 WBC
O2 DEVICE/FLOW/%: ABNORMAL
O2 SAT, VEN: 45 % (ref 60–85)
PATIENT TEMP: ABNORMAL
PCO2, VEN: 48 MM HG (ref 41–51)
PDW BLD-RTO: 12.2 % (ref 11.8–14.4)
PH UA: 6.5 (ref 5–8)
PH VENOUS: 7.38 (ref 7.32–7.43)
PLATELET # BLD: 339 K/UL (ref 138–453)
PLATELET ESTIMATE: ABNORMAL
PMV BLD AUTO: 11.6 FL (ref 8.1–13.5)
PO2, VEN: 25.8 MM HG (ref 30–50)
POC CHLORIDE: 98 MMOL/L (ref 98–107)
POC CREATININE: 0.69 MG/DL (ref 0.51–1.19)
POC HEMATOCRIT: 49 % (ref 41–53)
POC HEMOGLOBIN: 16.6 G/DL (ref 13.5–17.5)
POC IONIZED CALCIUM: 1.21 MMOL/L (ref 1.15–1.33)
POC LACTIC ACID: 1.27 MMOL/L (ref 0.56–1.39)
POC PCO2 TEMP: ABNORMAL MM HG
POC PH TEMP: ABNORMAL
POC PO2 TEMP: ABNORMAL MM HG
POC POTASSIUM: 5.1 MMOL/L (ref 3.5–4.5)
POC SODIUM: 136 MMOL/L (ref 138–146)
POSITIVE BASE EXCESS, VEN: 2 (ref 0–3)
POTASSIUM SERPL-SCNC: 5.2 MMOL/L (ref 3.7–5.3)
PROTEIN UA: NEGATIVE
RBC # BLD: 5.47 M/UL (ref 4.21–5.77)
RBC # BLD: ABNORMAL 10*6/UL
SAMPLE SITE: ABNORMAL
SEG NEUTROPHILS: 38 % (ref 34–64)
SEGMENTED NEUTROPHILS ABSOLUTE COUNT: 1.55 K/UL (ref 1.5–8.1)
SODIUM BLD-SCNC: 132 MMOL/L (ref 135–144)
SPECIFIC GRAVITY UA: 1.05 (ref 1–1.03)
TOTAL CO2, VENOUS: 30 MMOL/L (ref 23–30)
TOTAL PROTEIN: 7.9 G/DL (ref 6.4–8.3)
TURBIDITY: CLEAR
URINE HGB: NEGATIVE
UROBILINOGEN, URINE: NORMAL
WBC # BLD: 4.1 K/UL (ref 4.5–13.5)
WBC # BLD: ABNORMAL 10*3/UL

## 2020-03-16 PROCEDURE — 2580000003 HC RX 258: Performed by: PHYSICIAN ASSISTANT

## 2020-03-16 PROCEDURE — 6360000002 HC RX W HCPCS: Performed by: PHYSICIAN ASSISTANT

## 2020-03-16 PROCEDURE — 83605 ASSAY OF LACTIC ACID: CPT

## 2020-03-16 PROCEDURE — 83690 ASSAY OF LIPASE: CPT

## 2020-03-16 PROCEDURE — 6370000000 HC RX 637 (ALT 250 FOR IP): Performed by: PHYSICIAN ASSISTANT

## 2020-03-16 PROCEDURE — 82803 BLOOD GASES ANY COMBINATION: CPT

## 2020-03-16 PROCEDURE — 93005 ELECTROCARDIOGRAM TRACING: CPT | Performed by: PHYSICIAN ASSISTANT

## 2020-03-16 PROCEDURE — 82435 ASSAY OF BLOOD CHLORIDE: CPT

## 2020-03-16 PROCEDURE — 85025 COMPLETE CBC W/AUTO DIFF WBC: CPT

## 2020-03-16 PROCEDURE — 82010 KETONE BODYS QUAN: CPT

## 2020-03-16 PROCEDURE — 80053 COMPREHEN METABOLIC PANEL: CPT

## 2020-03-16 PROCEDURE — 82947 ASSAY GLUCOSE BLOOD QUANT: CPT

## 2020-03-16 PROCEDURE — 82565 ASSAY OF CREATININE: CPT

## 2020-03-16 PROCEDURE — 96361 HYDRATE IV INFUSION ADD-ON: CPT

## 2020-03-16 PROCEDURE — 84295 ASSAY OF SERUM SODIUM: CPT

## 2020-03-16 PROCEDURE — 99285 EMERGENCY DEPT VISIT HI MDM: CPT

## 2020-03-16 PROCEDURE — 85014 HEMATOCRIT: CPT

## 2020-03-16 PROCEDURE — 82330 ASSAY OF CALCIUM: CPT

## 2020-03-16 PROCEDURE — 96374 THER/PROPH/DIAG INJ IV PUSH: CPT

## 2020-03-16 PROCEDURE — 71046 X-RAY EXAM CHEST 2 VIEWS: CPT

## 2020-03-16 PROCEDURE — 84132 ASSAY OF SERUM POTASSIUM: CPT

## 2020-03-16 PROCEDURE — 81003 URINALYSIS AUTO W/O SCOPE: CPT

## 2020-03-16 PROCEDURE — 96372 THER/PROPH/DIAG INJ SC/IM: CPT

## 2020-03-16 RX ORDER — 0.9 % SODIUM CHLORIDE 0.9 %
1000 INTRAVENOUS SOLUTION INTRAVENOUS ONCE
Status: COMPLETED | OUTPATIENT
Start: 2020-03-16 | End: 2020-03-16

## 2020-03-16 RX ORDER — ONDANSETRON 2 MG/ML
4 INJECTION INTRAMUSCULAR; INTRAVENOUS ONCE
Status: COMPLETED | OUTPATIENT
Start: 2020-03-16 | End: 2020-03-16

## 2020-03-16 RX ORDER — ACETAMINOPHEN 325 MG/1
650 TABLET ORAL ONCE
Status: COMPLETED | OUTPATIENT
Start: 2020-03-16 | End: 2020-03-16

## 2020-03-16 RX ORDER — INSULIN GLARGINE 100 [IU]/ML
30 INJECTION, SOLUTION SUBCUTANEOUS ONCE
Status: COMPLETED | OUTPATIENT
Start: 2020-03-16 | End: 2020-03-16

## 2020-03-16 RX ORDER — INSULIN GLARGINE 100 [IU]/ML
30 INJECTION, SOLUTION SUBCUTANEOUS NIGHTLY
Qty: 5 PEN | Refills: 1 | Status: ON HOLD | OUTPATIENT
Start: 2020-03-16 | End: 2020-07-16 | Stop reason: SDUPTHER

## 2020-03-16 RX ORDER — INSULIN ASPART 100 [IU]/ML
1 INJECTION, SOLUTION INTRAVENOUS; SUBCUTANEOUS
Qty: 5 PEN | Refills: 1 | Status: ON HOLD | OUTPATIENT
Start: 2020-03-16 | End: 2020-07-16 | Stop reason: HOSPADM

## 2020-03-16 RX ADMIN — ONDANSETRON 4 MG: 2 INJECTION, SOLUTION INTRAMUSCULAR; INTRAVENOUS at 17:29

## 2020-03-16 RX ADMIN — SODIUM CHLORIDE 1000 ML: 9 INJECTION, SOLUTION INTRAVENOUS at 17:29

## 2020-03-16 RX ADMIN — INSULIN GLARGINE 30 UNITS: 100 INJECTION, SOLUTION SUBCUTANEOUS at 20:16

## 2020-03-16 RX ADMIN — ACETAMINOPHEN 650 MG: 325 TABLET ORAL at 19:38

## 2020-03-16 RX ADMIN — SODIUM CHLORIDE 1000 ML: 9 INJECTION, SOLUTION INTRAVENOUS at 18:48

## 2020-03-16 ASSESSMENT — ENCOUNTER SYMPTOMS
COLOR CHANGE: 0
COUGH: 0
DIARRHEA: 0
ABDOMINAL PAIN: 1
BACK PAIN: 0
VOMITING: 0
NAUSEA: 1
SORE THROAT: 0
SHORTNESS OF BREATH: 0

## 2020-03-16 ASSESSMENT — PAIN SCALES - GENERAL: PAINLEVEL_OUTOF10: 6

## 2020-03-16 NOTE — ED PROVIDER NOTES
[facility]     Emergency Department     Faculty Attestation    I performed a history and physical examination of the patient and discussed management with the resident. I reviewed the residents note and agree with the documented findings and plan of care. Any areas of disagreement are noted on the chart. I was personally present for the key portions of any procedures. I have documented in the chart those procedures where I was not present during the key portions. I have reviewed the emergency nurses triage note. I agree with the chief complaint, past medical history, past surgical history, allergies, medications, social and family history as documented unless otherwise noted below. Documentation of the HPI, Physical Exam and Medical Decision Making performed by medical students or scribes is based on my personal performance of the HPI, PE and MDM. For Physician Assistant/ Nurse Practitioner cases/documentation I have personally evaluated this patient and have completed at least one if not all key elements of the E/M (history, physical exam, and MDM). Additional findings are as noted. Vital signs:   Vitals:    03/16/20 1730   BP:    Pulse:    Resp: 16   Temp:    SpO3       49-year-old male with known history of diabetes here with elevated glucose. He states he was unable to get his prescription filled because the copay was $135 and he could not afford it. Patient states he feels fatigued and mildly nauseated. On exam, his mucus membranes look dry. His abdomen is soft and non-tender. Normal bowel sounds. We will check labs. IV fluids. Insulin as needed. Social work consult.              Angela Desouza M.D,  Attending Emergency  Physician           Amado Cockayne, MD  03/16/20 9904

## 2020-03-16 NOTE — ED NOTES
Writer vouchered diabetic Rx under [employer]. Patient denied additional needs.       EDE Connell, [place]  03/16/20 9066

## 2020-03-16 NOTE — ED NOTES
Pt to ED with for medication refill of his insulin and diabetic supplies. Pt last had insulin yesterday. Pt states he had issues with his insurance yesterday and was anable to pay for his insulin but is working on it. In the mean time he needs assistance with getting refill at affordable cost. Pt states he feels generally fatigued but denies any other symptoms. Pt is alert and oriented x4, no acute signs of distress noted.       Magali Valdovinos RN  03/16/20 6404

## 2020-03-16 NOTE — ED NOTES
Report received from Davian Newsome, [place]  All questions answered     Manuel Jaffe, TERESA  03/16/20 1261

## 2020-03-16 NOTE — ED PROVIDER NOTES
[facility]  ED  eMERGENCY dEPARTMENT eNCOUnter      Pt Name: Catherine Hannah  MRN: [id number]  [place] 2000  Date of evaluation: 3/16/2020  Provider: Alvena Landau, [address]       Chief Complaint   Patient presents with    Medication Refill    Hyperglycemia             HISTORY OF PRESENT ILLNESS  (Location/Symptom, Timing/Onset, Context/Setting, Quality, Duration, Modifying Factors, Severity.)   Catherine Hannah is a 22 y.o. male who presents to the emergencydepartment for a refill on his insulin. He states he ran out yesterday and did receive a dose yesterday. He states he does feel slightly nauseated with mild epigastric abdominal pain and chest pain. He states this usually happens when he runs out of his insulin. He states he is working with his insurance right now to get everything squared away as they did not approve his refill yesterday. He does complain of some mild chest pain but again this is normal when he runs out of his insulin. No shortness of breath. No vomiting. He states he has been in DKA before and it does not feel like this. No fever or chills. No other symptoms. REVIEW OF SYSTEMS    (2-9 systems for level 4, 10 ormore for level 5)     Review of Systems   Constitutional: Negative for chills, fatigue and fever. HENT: Negative for sore throat. Respiratory: Negative for cough and shortness of breath. Cardiovascular: Positive for chest pain. Negative for palpitations and leg swelling. Gastrointestinal: Positive for abdominal pain and nausea. Negative for diarrhea and vomiting. Genitourinary: Negative for flank pain. Musculoskeletal: Negative for back pain, neck pain and neck stiffness. Skin: Negative for color change. Neurological: Negative for dizziness, facial asymmetry, speech difficulty, weakness, light-headedness, numbness and headaches.          PAST MEDICAL HISTORY         Diagnosis Date    Diabetes mellitus ([facility] [address]     Type 1 diabetes mellitus [facility]) April 2013       SURGICAL HISTORY     History reviewed. No pertinent surgical history. CURRENT MEDICATIONS       Discharge Medication List as of 3/16/2020  7:48 PM      CONTINUE these medications which have NOT CHANGED    Details   Ketone Blood Test STRP 1 strip by Does not apply route daily as neededHistorical Med      blood glucose test strips (ONE TOUCH ULTRA TEST) strip Disp-150 each, R-3, NormalSig: For blood testing 4-6 times/day      EPINEPHrine (EPIPEN JR 2-ITALO) 0.15 MG/0.3ML SOAJ Use as directed for allergic reaction, Disp-2 Device, R-3Normal      B-D UF III MINI PEN NEEDLES 31G X 5 MM MISC Disp-200 each, R-5, MIRELLA, NormalDisp BD Autoshield Duo pen needles, 5mm, Sig:  Use as directed for injections 4 - 6 times per day. acetaminophen (TYLENOL) 325 MG tablet Take 2 tablets by mouth every 6 hours as needed for Pain, Disp-30 tablet, R-0Print      ibuprofen (ADVIL;MOTRIN) 200 MG tablet Take 3 tablets by mouth every 8 hours as needed for Pain or Fever, Disp-30 tablet, R-0Print      insulin aspart (NOVOLOG) 100 UNIT/ML injection vial Inject 1 Units into the skin 3 times daily (before meals) 1 unit per 15 grams CHO for meals and snacks  Sliding scale: =0, 125-150=1, 151-175=2, 175-200=3, 201-225=4, 226-250=5, 251-275=6, 276-300=7, 301-325=8, 326-350=9, 351-375=10, 376-400=11, Di sp-2.7 mL, R-0Print      glucagon (GLUCAGON EMERGENCY) 1 MG injection As directed for extreme hypoglycemia, Disp-1 kit, R-2             ALLERGIES     Patient has no known allergies. Reviewed   FAMILY HISTORY           Problem Relation Age of Onset    Asthma Father      Family Status   Relation Name Status    Father  (Not Specified)        SOCIAL HISTORY      reports that he has been smoking cigarettes. He has a 0.50 pack-year smoking history. He has never used smokeless tobacco. He reports that he does not drink alcohol or use drugs.     PHYSICAL EXAM    (up to 7 for level 4, 8 or more for level normal.           DIAGNOSTIC RESULTS     EKG: All EKG's are interpreted by Cady Weathers PA-C in theabsence of a cardiologist.    Reviewed by attending physician. RADIOLOGY:   Non-plain film images such asCT, Ultrasound and MRI are read by the radiologist. Plain radiographic images are visualized and preliminarily interpreted by Cady Weathers PA-C with the below findings:    See below. Interpretation per the Radiologist below, if available at the time of this note:    XR CHEST STANDARD (2 VW)   Final Result   No radiographic evidence of acute cardiopulmonary disease.                LABS:  Labs Reviewed   CBC WITH AUTO DIFFERENTIAL - Abnormal; Notable for the following components:       Result Value    WBC 4.1 (*)     Eosinophils % 8 (*)     Immature Granulocytes 1 (*)     All other components within normal limits   COMPREHENSIVE METABOLIC PANEL - Abnormal; Notable for the following components:    Glucose 344 (*)     CREATININE 0.49 (*)     Sodium 132 (*)     Chloride 95 (*)     All other components within normal limits   URINE RT REFLEX TO CULTURE - Abnormal; Notable for the following components:    Glucose, Ur 3+ (*)     Ketones, Urine LARGE (*)     Specific Gravity, UA 1.047 (*)     All other components within normal limits   BETA-HYDROXYBUTYRATE - Abnormal; Notable for the following components:    Beta-Hydroxybutyrate 1.57 (*)     All other components within normal limits   SODIUM (POC) - Abnormal; Notable for the following components:    POC Sodium 136 (*)     All other components within normal limits   POTASSIUM (POC) - Abnormal; Notable for the following components:    POC Potassium 5.1 (*)     All other components within normal limits   POC GLUCOSE FINGERSTICK - Abnormal; Notable for the following components:    POC Glucose 454 (*)     All other components within normal limits   VENOUS BLOOD GAS, POINT OF CARE - Abnormal; Notable for the following components:    pO2, [place] 25.8 (*)     O2 Sat, [place] 45

## 2020-03-17 ENCOUNTER — TELEPHONE (OUTPATIENT)
Dept: FAMILY MEDICINE CLINIC | Age: 20
End: 2020-03-17

## 2020-03-17 LAB
EKG ATRIAL RATE: 55 BPM
EKG P AXIS: 52 DEGREES
EKG P-R INTERVAL: 152 MS
EKG Q-T INTERVAL: 420 MS
EKG QRS DURATION: 98 MS
EKG QTC CALCULATION (BAZETT): 401 MS
EKG R AXIS: -68 DEGREES
EKG T AXIS: 31 DEGREES
EKG VENTRICULAR RATE: 55 BPM

## 2020-06-25 ENCOUNTER — OFFICE VISIT (OUTPATIENT)
Dept: PRIMARY CARE CLINIC | Age: 20
End: 2020-06-25
Payer: MEDICARE

## 2020-06-25 VITALS
WEIGHT: 145 LBS | SYSTOLIC BLOOD PRESSURE: 131 MMHG | TEMPERATURE: 99.1 F | OXYGEN SATURATION: 98 % | DIASTOLIC BLOOD PRESSURE: 76 MMHG | BODY MASS INDEX: 20.22 KG/M2 | HEART RATE: 80 BPM

## 2020-06-25 PROCEDURE — 4004F PT TOBACCO SCREEN RCVD TLK: CPT | Performed by: INTERNAL MEDICINE

## 2020-06-25 PROCEDURE — G8420 CALC BMI NORM PARAMETERS: HCPCS | Performed by: INTERNAL MEDICINE

## 2020-06-25 PROCEDURE — G8427 DOCREV CUR MEDS BY ELIG CLIN: HCPCS | Performed by: INTERNAL MEDICINE

## 2020-06-25 PROCEDURE — 99213 OFFICE O/P EST LOW 20 MIN: CPT | Performed by: INTERNAL MEDICINE

## 2020-06-25 RX ORDER — AMOXICILLIN AND CLAVULANATE POTASSIUM 875; 125 MG/1; MG/1
1 TABLET, FILM COATED ORAL 2 TIMES DAILY
Qty: 20 TABLET | Refills: 0 | Status: SHIPPED | OUTPATIENT
Start: 2020-06-25 | End: 2020-07-05

## 2020-06-25 ASSESSMENT — PATIENT HEALTH QUESTIONNAIRE - PHQ9
SUM OF ALL RESPONSES TO PHQ9 QUESTIONS 1 & 2: 0
SUM OF ALL RESPONSES TO PHQ QUESTIONS 1-9: 0
1. LITTLE INTEREST OR PLEASURE IN DOING THINGS: 0
2. FEELING DOWN, DEPRESSED OR HOPELESS: 0
SUM OF ALL RESPONSES TO PHQ QUESTIONS 1-9: 0

## 2020-06-25 NOTE — PROGRESS NOTES
[address] [facility]  [address] [facility] [address]  Dept: [phone]  Dept Fax: [phone]    Yesenia Santamaria is a 22 y.o. male who presents to the urgent care today for his medicalconditions/complaints as noted below. Yesenia Santamaria is c/o of Dental Pain (RT side )      HPI:     Dental Pain    This is a new problem. The current episode started in the past 7 days. The problem occurs constantly. The problem has been gradually worsening. The pain is moderate. Associated symptoms include facial pain. He has tried nothing for the symptoms. The treatment provided no relief.        Past Medical History:   Diagnosis Date    Diabetes mellitus ([facility] [address]     Type 1 diabetes mellitus ([facility] [address] April 2013        Current Outpatient Medications   Medication Sig Dispense Refill    amoxicillin-clavulanate (AUGMENTIN) 875-125 MG per tablet Take 1 tablet by mouth 2 times daily for 10 days 20 tablet 0    insulin aspart (NOVOLOG FLEXPEN) 100 UNIT/ML injection pen Inject 1 Units into the skin 3 times daily (before meals) Indications: Use per sliding scale Per sliding scale 5 pen 1    Ketone Blood Test STRP 1 strip by Does not apply route daily as needed      acetaminophen (TYLENOL) 325 MG tablet Take 2 tablets by mouth every 6 hours as needed for Pain 30 tablet 0    ibuprofen (ADVIL;MOTRIN) 200 MG tablet Take 3 tablets by mouth every 8 hours as needed for Pain or Fever 30 tablet 0    insulin aspart (NOVOLOG) 100 UNIT/ML injection vial Inject 1 Units into the skin 3 times daily (before meals) 1 unit per 15 grams CHO for meals and snacks  Sliding scale: =0, 125-150=1, 151-175=2, 175-200=3, 201-225=4, 226-250=5, 251-275=6, 276-300=7, 301-325=8, 326-350=9, 351-375=10, 376-400=11 2.7 mL 0    insulin glargine (BASAGLAR KWIKPEN) 100 UNIT/ML injection pen Inject 30 Units into the skin nightly (Patient not taking: Reported on 6/25/2020) 5 pen 1    EPINEPHrine (EPIPEN JR 2-ITALO) 0.15 MG/0.3ML SOAJ Use as directed for allergic reaction (Patient not taking: Reported on 6/25/2020) 2 Device 3    B-D UF III MINI PEN NEEDLES 31G X 5 MM MISC Disp BD Autoshield Duo pen needles, 5mm, Sig:  Use as directed for injections 4 - 6 times per day. (Patient not taking: Reported on 6/25/2020) 200 each 5    blood glucose test strips (ONE TOUCH ULTRA TEST) strip Sig: For blood testing 4-6 times/day (Patient not taking: Reported on 6/25/2020) 150 each 3    glucagon (GLUCAGON EMERGENCY) 1 MG injection As directed for extreme hypoglycemia (Patient not taking: Reported on 6/25/2020) 1 kit 2     No current facility-administered medications for this visit. No Known Allergies    Health Maintenance   Topic Date Due    Pneumococcal 0-64 years Vaccine (1 of 1 - PPSV23) 09/16/2004    Diabetic foot exam  09/16/2008    Diabetic retinal exam  09/16/2008    HIV screen  09/16/2013    Diabetic microalbuminuria test  01/21/2017    Lipid screen  01/21/2017    A1C test (Diabetic or Prediabetic)  02/02/2020    Flu vaccine (Season Ended) 09/01/2020    DTaP/Tdap/Td vaccine (7 - Td) 05/08/2022    Hepatitis A vaccine  Completed    Hepatitis B vaccine  Completed    Hib vaccine  Completed    HPV vaccine  Completed    Varicella vaccine  Completed    Meningococcal (ACWY) vaccine  Aged Out       Subjective:      Review of Systems   All other systems reviewed and are negative. Objective:     Physical Exam  Constitutional:       Appearance: Normal appearance. HENT:      Head: Normocephalic and atraumatic. Mouth/Throat:     Skin:     General: Skin is warm and dry. Neurological:      General: No focal deficit present. Mental Status: He is alert and oriented to person, place, and time.    Psychiatric:         Mood and Affect: Mood normal.         Behavior: Behavior normal.       /76 (Site: Right Upper Arm, Position: Sitting, Cuff Size: Medium Adult)   Pulse 80   Temp 99.1 °F (37.3 °C) Wt 145 lb (65.8 kg)   SpO2 98%   BMI 20.22 kg/m²       Assessment:       Diagnosis Orders   1. Dental abscess  amoxicillin-clavulanate (AUGMENTIN) 875-125 MG per tablet       Plan:      No follow-ups on file. Orders Placed This Encounter   Medications    amoxicillin-clavulanate (AUGMENTIN) 875-125 MG per tablet     Sig: Take 1 tablet by mouth 2 times daily for 10 days     Dispense:  20 tablet     Refill:  0     No orders of the defined types were placed in this encounter. Patient given educational materials - see patient instructions. Discussed use, benefit, and side effects of prescribed medications. All patientquestions answered. Pt voiced understanding.     Electronically signed by Luis Jackson MD on 6/25/2020at 3:36 PM

## 2020-07-15 ENCOUNTER — HOSPITAL ENCOUNTER (INPATIENT)
Age: 20
LOS: 1 days | Discharge: HOME | DRG: 420 | End: 2020-07-16
Attending: EMERGENCY MEDICINE | Admitting: FAMILY MEDICINE
Payer: MEDICARE

## 2020-07-15 ENCOUNTER — APPOINTMENT (OUTPATIENT)
Dept: GENERAL RADIOLOGY | Age: 20
DRG: 420 | End: 2020-07-15
Payer: MEDICARE

## 2020-07-15 PROBLEM — E10.10 TYPE 1 DIABETES MELLITUS WITH KETOACIDOSIS AND WITHOUT COMA (HCC): Status: ACTIVE | Noted: 2020-07-15

## 2020-07-15 PROBLEM — R00.1 BRADYCARDIA: Status: ACTIVE | Noted: 2020-07-15

## 2020-07-15 LAB
-: NORMAL
ALBUMIN SERPL-MCNC: 5.1 G/DL (ref 3.5–5.2)
ALBUMIN/GLOBULIN RATIO: 1.5 (ref 1–2.5)
ALLEN TEST: ABNORMAL
ALP BLD-CCNC: 142 U/L (ref 40–129)
ALT SERPL-CCNC: 12 U/L (ref 5–41)
AMORPHOUS: NORMAL
ANION GAP SERPL CALCULATED.3IONS-SCNC: 16 MMOL/L (ref 9–17)
ANION GAP SERPL CALCULATED.3IONS-SCNC: 22 MMOL/L (ref 9–17)
ANION GAP SERPL CALCULATED.3IONS-SCNC: 33 MMOL/L (ref 9–17)
ANION GAP: 16 MMOL/L (ref 7–16)
AST SERPL-CCNC: 15 U/L
BACTERIA: NORMAL
BETA-HYDROXYBUTYRATE: 7.58 MMOL/L (ref 0.02–0.27)
BILIRUB SERPL-MCNC: 1.14 MG/DL (ref 0.3–1.2)
BILIRUBIN URINE: NEGATIVE
BUN BLDV-MCNC: 20 MG/DL (ref 6–20)
BUN BLDV-MCNC: 22 MG/DL (ref 6–20)
BUN BLDV-MCNC: 23 MG/DL (ref 6–20)
BUN/CREAT BLD: ABNORMAL (ref 9–20)
CALCIUM SERPL-MCNC: 10.5 MG/DL (ref 8.6–10.4)
CALCIUM SERPL-MCNC: 8.8 MG/DL (ref 8.6–10.4)
CALCIUM SERPL-MCNC: 9 MG/DL (ref 8.6–10.4)
CASTS UA: NORMAL /LPF (ref 0–8)
CHLORIDE BLD-SCNC: 102 MMOL/L (ref 98–107)
CHLORIDE BLD-SCNC: 102 MMOL/L (ref 98–107)
CHLORIDE BLD-SCNC: 89 MMOL/L (ref 98–107)
CO2: 14 MMOL/L (ref 20–31)
CO2: 17 MMOL/L (ref 20–31)
CO2: 17 MMOL/L (ref 20–31)
COLOR: YELLOW
COMMENT UA: ABNORMAL
CREAT SERPL-MCNC: 0.75 MG/DL (ref 0.7–1.2)
CREAT SERPL-MCNC: 0.87 MG/DL (ref 0.7–1.2)
CREAT SERPL-MCNC: 1.01 MG/DL (ref 0.7–1.2)
CRYSTALS, UA: NORMAL /HPF
EPITHELIAL CELLS UA: NORMAL /HPF (ref 0–5)
FIO2: ABNORMAL
GFR AFRICAN AMERICAN: >60 ML/MIN
GFR NON-AFRICAN AMERICAN: >60 ML/MIN
GFR SERPL CREATININE-BSD FRML MDRD: >60 ML/MIN
GFR SERPL CREATININE-BSD FRML MDRD: ABNORMAL ML/MIN/{1.73_M2}
GFR SERPL CREATININE-BSD FRML MDRD: NORMAL ML/MIN/{1.73_M2}
GLUCOSE BLD-MCNC: 173 MG/DL (ref 75–110)
GLUCOSE BLD-MCNC: 176 MG/DL (ref 75–110)
GLUCOSE BLD-MCNC: 185 MG/DL (ref 70–99)
GLUCOSE BLD-MCNC: 195 MG/DL (ref 75–110)
GLUCOSE BLD-MCNC: 217 MG/DL (ref 75–110)
GLUCOSE BLD-MCNC: 218 MG/DL (ref 75–110)
GLUCOSE BLD-MCNC: 224 MG/DL (ref 75–110)
GLUCOSE BLD-MCNC: 234 MG/DL (ref 70–99)
GLUCOSE BLD-MCNC: 288 MG/DL (ref 75–110)
GLUCOSE BLD-MCNC: 320 MG/DL (ref 75–110)
GLUCOSE BLD-MCNC: 359 MG/DL (ref 75–110)
GLUCOSE BLD-MCNC: 517 MG/DL (ref 70–99)
GLUCOSE BLD-MCNC: 581 MG/DL (ref 74–100)
GLUCOSE URINE: ABNORMAL
HCO3 VENOUS: 17.8 MMOL/L (ref 22–29)
HCT VFR BLD CALC: 46.5 % (ref 40.7–50.3)
HEMOGLOBIN: 15.1 G/DL (ref 13–17)
KETONES, URINE: ABNORMAL
LACTIC ACID, SEPSIS WHOLE BLOOD: 1.2 MMOL/L (ref 0.5–1.9)
LACTIC ACID, SEPSIS WHOLE BLOOD: 2.1 MMOL/L (ref 0.5–1.9)
LACTIC ACID, SEPSIS: ABNORMAL MMOL/L (ref 0.5–1.9)
LACTIC ACID, SEPSIS: NORMAL MMOL/L (ref 0.5–1.9)
LEUKOCYTE ESTERASE, URINE: NEGATIVE
LIPASE: 16 U/L (ref 13–60)
MAGNESIUM: 2 MG/DL (ref 1.6–2.6)
MAGNESIUM: 2.1 MG/DL (ref 1.6–2.6)
MAGNESIUM: 2.4 MG/DL (ref 1.6–2.6)
MCH RBC QN AUTO: 28.9 PG (ref 25.2–33.5)
MCHC RBC AUTO-ENTMCNC: 32.5 G/DL (ref 28.4–34.8)
MCV RBC AUTO: 89.1 FL (ref 82.6–102.9)
MODE: ABNORMAL
MUCUS: NORMAL
NEGATIVE BASE EXCESS, VEN: 8 (ref 0–2)
NITRITE, URINE: NEGATIVE
NRBC AUTOMATED: 0 PER 100 WBC
O2 DEVICE/FLOW/%: ABNORMAL
O2 SAT, VEN: 72 % (ref 60–85)
OTHER OBSERVATIONS UA: NORMAL
PATIENT TEMP: ABNORMAL
PCO2, VEN: 35.9 MM HG (ref 41–51)
PDW BLD-RTO: 12.5 % (ref 11.8–14.4)
PH UA: 5.5 (ref 5–8)
PH VENOUS: 7.3 (ref 7.32–7.43)
PHOSPHORUS: 2.7 MG/DL (ref 2.5–4.5)
PHOSPHORUS: 4.1 MG/DL (ref 2.5–4.5)
PHOSPHORUS: 6.2 MG/DL (ref 2.5–4.5)
PLATELET # BLD: 395 K/UL (ref 138–453)
PMV BLD AUTO: 11.8 FL (ref 8.1–13.5)
PO2, VEN: 41.4 MM HG (ref 30–50)
POC CHLORIDE: 99 MMOL/L (ref 98–107)
POC CREATININE: 0.92 MG/DL (ref 0.51–1.19)
POC HEMATOCRIT: 51 % (ref 41–53)
POC HEMOGLOBIN: 17.2 G/DL (ref 13.5–17.5)
POC IONIZED CALCIUM: 1.07 MMOL/L (ref 1.15–1.33)
POC LACTIC ACID: 3.21 MMOL/L (ref 0.56–1.39)
POC PCO2 TEMP: ABNORMAL MM HG
POC PH TEMP: ABNORMAL
POC PO2 TEMP: ABNORMAL MM HG
POC POTASSIUM: 4.1 MMOL/L (ref 3.5–4.5)
POC SODIUM: 133 MMOL/L (ref 138–146)
POSITIVE BASE EXCESS, VEN: ABNORMAL (ref 0–3)
POTASSIUM SERPL-SCNC: 4.3 MMOL/L (ref 3.7–5.3)
POTASSIUM SERPL-SCNC: 4.6 MMOL/L (ref 3.7–5.3)
POTASSIUM SERPL-SCNC: 5.2 MMOL/L (ref 3.7–5.3)
PROTEIN UA: ABNORMAL
RBC # BLD: 5.22 M/UL (ref 4.21–5.77)
RBC UA: NORMAL /HPF (ref 0–4)
RENAL EPITHELIAL, UA: NORMAL /HPF
SAMPLE SITE: ABNORMAL
SERUM OSMOLALITY: 326 MOSM/KG (ref 275–295)
SODIUM BLD-SCNC: 135 MMOL/L (ref 135–144)
SODIUM BLD-SCNC: 136 MMOL/L (ref 135–144)
SODIUM BLD-SCNC: 141 MMOL/L (ref 135–144)
SPECIFIC GRAVITY UA: 1.03 (ref 1–1.03)
TOTAL CO2, VENOUS: 19 MMOL/L (ref 23–30)
TOTAL PROTEIN: 8.5 G/DL (ref 6.4–8.3)
TRICHOMONAS: NORMAL
TROPONIN INTERP: NORMAL
TROPONIN T: NORMAL NG/ML
TROPONIN, HIGH SENSITIVITY: <6 NG/L (ref 0–22)
TURBIDITY: CLEAR
URINE HGB: NEGATIVE
UROBILINOGEN, URINE: NORMAL
WBC # BLD: 5.8 K/UL (ref 4.5–13.5)
WBC UA: NORMAL /HPF (ref 0–5)
YEAST: NORMAL

## 2020-07-15 PROCEDURE — 84132 ASSAY OF SERUM POTASSIUM: CPT

## 2020-07-15 PROCEDURE — 82330 ASSAY OF CALCIUM: CPT

## 2020-07-15 PROCEDURE — 83930 ASSAY OF BLOOD OSMOLALITY: CPT

## 2020-07-15 PROCEDURE — 80053 COMPREHEN METABOLIC PANEL: CPT

## 2020-07-15 PROCEDURE — 6360000002 HC RX W HCPCS: Performed by: NURSE PRACTITIONER

## 2020-07-15 PROCEDURE — 82947 ASSAY GLUCOSE BLOOD QUANT: CPT

## 2020-07-15 PROCEDURE — 82565 ASSAY OF CREATININE: CPT

## 2020-07-15 PROCEDURE — 6370000000 HC RX 637 (ALT 250 FOR IP): Performed by: NURSE PRACTITIONER

## 2020-07-15 PROCEDURE — 82435 ASSAY OF BLOOD CHLORIDE: CPT

## 2020-07-15 PROCEDURE — 83735 ASSAY OF MAGNESIUM: CPT

## 2020-07-15 PROCEDURE — 2580000003 HC RX 258: Performed by: EMERGENCY MEDICINE

## 2020-07-15 PROCEDURE — 71045 X-RAY EXAM CHEST 1 VIEW: CPT

## 2020-07-15 PROCEDURE — 96374 THER/PROPH/DIAG INJ IV PUSH: CPT

## 2020-07-15 PROCEDURE — 2580000003 HC RX 258: Performed by: NURSE PRACTITIONER

## 2020-07-15 PROCEDURE — 93005 ELECTROCARDIOGRAM TRACING: CPT | Performed by: NURSE PRACTITIONER

## 2020-07-15 PROCEDURE — 6360000002 HC RX W HCPCS: Performed by: EMERGENCY MEDICINE

## 2020-07-15 PROCEDURE — 2060000000 HC ICU INTERMEDIATE R&B

## 2020-07-15 PROCEDURE — 83605 ASSAY OF LACTIC ACID: CPT

## 2020-07-15 PROCEDURE — 81001 URINALYSIS AUTO W/SCOPE: CPT

## 2020-07-15 PROCEDURE — 84484 ASSAY OF TROPONIN QUANT: CPT

## 2020-07-15 PROCEDURE — 36415 COLL VENOUS BLD VENIPUNCTURE: CPT

## 2020-07-15 PROCEDURE — 85014 HEMATOCRIT: CPT

## 2020-07-15 PROCEDURE — 99222 1ST HOSP IP/OBS MODERATE 55: CPT | Performed by: NURSE PRACTITIONER

## 2020-07-15 PROCEDURE — 6370000000 HC RX 637 (ALT 250 FOR IP): Performed by: EMERGENCY MEDICINE

## 2020-07-15 PROCEDURE — 83690 ASSAY OF LIPASE: CPT

## 2020-07-15 PROCEDURE — 80048 BASIC METABOLIC PNL TOTAL CA: CPT

## 2020-07-15 PROCEDURE — 84295 ASSAY OF SERUM SODIUM: CPT

## 2020-07-15 PROCEDURE — 99285 EMERGENCY DEPT VISIT HI MDM: CPT

## 2020-07-15 PROCEDURE — 82803 BLOOD GASES ANY COMBINATION: CPT

## 2020-07-15 PROCEDURE — 82010 KETONE BODYS QUAN: CPT

## 2020-07-15 PROCEDURE — 84100 ASSAY OF PHOSPHORUS: CPT

## 2020-07-15 PROCEDURE — 85027 COMPLETE CBC AUTOMATED: CPT

## 2020-07-15 RX ORDER — DEXTROSE MONOHYDRATE 25 G/50ML
12.5 INJECTION, SOLUTION INTRAVENOUS PRN
Status: DISCONTINUED | OUTPATIENT
Start: 2020-07-15 | End: 2020-07-16 | Stop reason: HOSPADM

## 2020-07-15 RX ORDER — DEXTROSE AND SODIUM CHLORIDE 5; .45 G/100ML; G/100ML
INJECTION, SOLUTION INTRAVENOUS CONTINUOUS PRN
Status: DISCONTINUED | OUTPATIENT
Start: 2020-07-15 | End: 2020-07-15

## 2020-07-15 RX ORDER — MAGNESIUM SULFATE 1 G/100ML
1 INJECTION INTRAVENOUS PRN
Status: DISCONTINUED | OUTPATIENT
Start: 2020-07-15 | End: 2020-07-16 | Stop reason: HOSPADM

## 2020-07-15 RX ORDER — SODIUM CHLORIDE 450 MG/100ML
INJECTION, SOLUTION INTRAVENOUS CONTINUOUS
Status: DISCONTINUED | OUTPATIENT
Start: 2020-07-15 | End: 2020-07-16

## 2020-07-15 RX ORDER — SODIUM CHLORIDE 450 MG/100ML
INJECTION, SOLUTION INTRAVENOUS CONTINUOUS
Status: DISCONTINUED | OUTPATIENT
Start: 2020-07-15 | End: 2020-07-15

## 2020-07-15 RX ORDER — DEXTROSE, SODIUM CHLORIDE, AND POTASSIUM CHLORIDE 5; .45; .15 G/100ML; G/100ML; G/100ML
INJECTION INTRAVENOUS CONTINUOUS PRN
Status: DISCONTINUED | OUTPATIENT
Start: 2020-07-15 | End: 2020-07-15

## 2020-07-15 RX ORDER — ONDANSETRON 2 MG/ML
4 INJECTION INTRAMUSCULAR; INTRAVENOUS ONCE
Status: COMPLETED | OUTPATIENT
Start: 2020-07-15 | End: 2020-07-15

## 2020-07-15 RX ORDER — INSULIN GLARGINE 100 [IU]/ML
30 INJECTION, SOLUTION SUBCUTANEOUS NIGHTLY
Status: DISCONTINUED | OUTPATIENT
Start: 2020-07-15 | End: 2020-07-16 | Stop reason: HOSPADM

## 2020-07-15 RX ORDER — 0.9 % SODIUM CHLORIDE 0.9 %
1000 INTRAVENOUS SOLUTION INTRAVENOUS ONCE
Status: COMPLETED | OUTPATIENT
Start: 2020-07-15 | End: 2020-07-15

## 2020-07-15 RX ORDER — POTASSIUM CHLORIDE 7.45 MG/ML
10 INJECTION INTRAVENOUS PRN
Status: DISCONTINUED | OUTPATIENT
Start: 2020-07-15 | End: 2020-07-16 | Stop reason: HOSPADM

## 2020-07-15 RX ORDER — DEXTROSE AND SODIUM CHLORIDE 5; .45 G/100ML; G/100ML
INJECTION, SOLUTION INTRAVENOUS CONTINUOUS
Status: DISCONTINUED | OUTPATIENT
Start: 2020-07-15 | End: 2020-07-16

## 2020-07-15 RX ORDER — 0.9 % SODIUM CHLORIDE 0.9 %
15 INTRAVENOUS SOLUTION INTRAVENOUS ONCE
Status: DISCONTINUED | OUTPATIENT
Start: 2020-07-15 | End: 2020-07-16

## 2020-07-15 RX ADMIN — ENOXAPARIN SODIUM 40 MG: 40 INJECTION SUBCUTANEOUS at 18:09

## 2020-07-15 RX ADMIN — POTASSIUM CHLORIDE: 2 INJECTION, SOLUTION, CONCENTRATE INTRAVENOUS at 13:45

## 2020-07-15 RX ADMIN — POTASSIUM CHLORIDE 10 MEQ: 7.46 INJECTION, SOLUTION INTRAVENOUS at 23:35

## 2020-07-15 RX ADMIN — ONDANSETRON 4 MG: 2 INJECTION INTRAMUSCULAR; INTRAVENOUS at 11:45

## 2020-07-15 RX ADMIN — POTASSIUM CHLORIDE 10 MEQ: 7.46 INJECTION, SOLUTION INTRAVENOUS at 21:57

## 2020-07-15 RX ADMIN — SODIUM CHLORIDE 1000 ML: 9 INJECTION, SOLUTION INTRAVENOUS at 11:45

## 2020-07-15 RX ADMIN — INSULIN GLARGINE 30 UNITS: 100 INJECTION, SOLUTION SUBCUTANEOUS at 22:02

## 2020-07-15 RX ADMIN — SODIUM CHLORIDE 0.1 UNITS/KG/HR: 9 INJECTION, SOLUTION INTRAVENOUS at 13:23

## 2020-07-15 RX ADMIN — DEXTROSE AND SODIUM CHLORIDE: 5; 450 INJECTION, SOLUTION INTRAVENOUS at 19:00

## 2020-07-15 ASSESSMENT — ENCOUNTER SYMPTOMS
BACK PAIN: 0
SINUS PRESSURE: 0
CONSTIPATION: 0
ABDOMINAL PAIN: 1
SORE THROAT: 0
CHEST TIGHTNESS: 0
DIARRHEA: 0
EYE PAIN: 0
VOMITING: 1
COUGH: 0
ABDOMINAL PAIN: 0
TROUBLE SWALLOWING: 0
SHORTNESS OF BREATH: 0
NAUSEA: 1
SINUS PAIN: 0

## 2020-07-15 ASSESSMENT — PAIN DESCRIPTION - FREQUENCY: FREQUENCY: INTERMITTENT

## 2020-07-15 ASSESSMENT — PAIN DESCRIPTION - ONSET: ONSET: AWAKENED FROM SLEEP

## 2020-07-15 ASSESSMENT — PAIN SCALES - GENERAL
PAINLEVEL_OUTOF10: 0
PAINLEVEL_OUTOF10: 8

## 2020-07-15 ASSESSMENT — PAIN DESCRIPTION - LOCATION: LOCATION: ABDOMEN;CHEST

## 2020-07-15 ASSESSMENT — PAIN DESCRIPTION - PROGRESSION: CLINICAL_PROGRESSION: NOT CHANGED

## 2020-07-15 ASSESSMENT — PAIN DESCRIPTION - DESCRIPTORS: DESCRIPTORS: PRESSURE

## 2020-07-15 ASSESSMENT — PAIN DESCRIPTION - PAIN TYPE: TYPE: ACUTE PAIN

## 2020-07-15 NOTE — H&P
[address] [facility]    HISTORY AND PHYSICAL EXAMINATION            Date:   7/15/2020  Patient name:  Graciela Aguilar  Date of admission:  7/15/2020 11:24 AM  MRN:   [id number]  Account:  [de-identified]  YOB: 2000  PCP:    Jesus Raymundo MD  Room:   27/27  Code Status:    Prior    Chief Complaint:     Chief Complaint   Patient presents with    Chest Pain    Emesis       History Obtained From:     patient, electronic medical record    History of Present Illness:     Graciela Aguilar is a 22 y.o. Non-/non  male who presents with Chest Pain and Emesis   and is admitted to the hospital for the management of Diabetic ketoacidosis without coma associated with type 1 diabetes mellitus ([facility] This is a 24-year-old male with a history of diabetes mellitus type 1, with frequent admissions for DKA who presented to the emergency department with nausea vomiting with complaints similar to previous DKA admissions. Patient also complaining of chest pain with a negative troponin, described more like GERD and or acid reflux. Diagnostics in the emergency department included negative chest x-ray. Laboratory data revealed metabolic acidosis in the setting of diabetic ketoacidosis with a lactic acid of 3.2, Blood sugars greater than 500, beta hydroxybutyrate at 7.58. Blood gas also reflective of metabolic acidosis. Patient was initiated on insulin drip with home dose of Lantus started. On assessment patient is awake and alert answering questions and following simple commands. Insulin drip is been initiated. He states that he has been nauseous with vomiting over the last couple days but still been giving himself his insulin. He denies any other symptoms with the abdominal pain/nausea/vomiting. He states that he is already feeling better after receiving IV fluids.   Currently denying any chest pain, shortness of breath, diarrhea, constipation, fever, chills, urinary symptoms or pain. BS responding well to insulin gtt  Past Medical History:     Past Medical History:   Diagnosis Date    Diabetes mellitus ([facility]     Type 1 diabetes mellitus ([facility] April 2013        Past Surgical History:     No past surgical history on file. Medications Prior to Admission:     Prior to Admission medications    Medication Sig Start Date End Date Taking? Authorizing Provider   insulin glargine (BASAGLAR KWIKPEN) 100 UNIT/ML injection pen Inject 30 Units into the skin nightly  Patient not taking: Reported on 6/25/2020 3/16/20   [facility]   insulin aspart (NOVOLOG FLEXPEN) 100 UNIT/ML injection pen Inject 1 Units into the skin 3 times daily (before meals) Indications: Use per sliding scale Per sliding scale 3/16/20   [address], PA-C   EPINEPHrine (Khalida Beecham 2-ITALO) 0.15 MG/0.3ML SOAJ Use as directed for allergic reaction  Patient not taking: Reported on 6/25/2020 9/23/19   Viraj Montenegro MD   Ketone Blood Test STRP 1 strip by Does not apply route daily as needed    Historical Provider, MD VELÁZQUEZ UF III MINI PEN NEEDLES 31G X 5 MM MISC Disp BD Autoshield Duo pen needles, 5mm, Sig:  Use as directed for injections 4 - 6 times per day.   Patient not taking: Reported on 6/25/2020 2/25/19   Jose Michaels MD   blood glucose test strips (ONE TOUCH ULTRA TEST) strip Sig: For blood testing 4-6 times/day  Patient not taking: Reported on 6/25/2020 2/3/19   Malu Fuentes MD   acetaminophen (TYLENOL) 325 MG tablet Take 2 tablets by mouth every 6 hours as needed for Pain 10/22/17   Wai Estrella,    ibuprofen (ADVIL;MOTRIN) 200 MG tablet Take 3 tablets by mouth every 8 hours as needed for Pain or Fever 10/22/17   Wai Estrella, DO   insulin aspart (NOVOLOG) 100 UNIT/ML injection vial Inject 1 Units into the skin 3 times daily (before meals) 1 unit per 15 grams CHO for meals and snacks  Sliding scale: =0, 125-150=1, 151-175=2, 175-200=3, 201-225=4, 226-250=5, 251-275=6, 276-300=7, 301-325=8, 326-350=9, 351-375=10, 376-400=11 10/2/17 6/25/20  Jean Marie Gomez MD   glucagon (GLUCAGON EMERGENCY) 1 MG injection As directed for extreme hypoglycemia  Patient not taking: Reported on 2020   Thien Méndez DO        Allergies:     Patient has no known allergies. Social History:     Tobacco:    reports that he has been smoking cigarettes. He has a 0.50 pack-year smoking history. He has never used smokeless tobacco.  Alcohol:      reports no history of alcohol use. Drug Use:  reports no history of drug use. Family History:     Family History   Problem Relation Age of Onset    Asthma Father        Review of Systems:     Positive and Negative as described in HPI. Review of Systems   Constitutional: Positive for appetite change. Negative for activity change and chills. HENT: Negative for congestion, sinus pressure, sinus pain, sore throat and trouble swallowing. Eyes: Negative for visual disturbance. Respiratory: Negative for shortness of breath. Cardiovascular: Positive for chest pain (described more nausea versus cardiac in nature ). Negative for palpitations and leg swelling. Gastrointestinal: Positive for abdominal pain, nausea and vomiting. Negative for constipation and diarrhea. Genitourinary: Negative for dysuria, frequency and urgency. Musculoskeletal: Negative for back pain and neck pain. Skin: Negative for rash and wound. Neurological: Positive for headaches. Negative for dizziness, facial asymmetry, weakness and light-headedness. Hematological: Negative for adenopathy. Psychiatric/Behavioral: Negative for confusion.        Physical Exam:   /79   Pulse 66   Temp 97.8 °F (36.6 °C)   Resp 13   Ht 5' 11\" (1.803 m)   Wt 140 lb (63.5 kg)   SpO2 97%   BMI 19.53 kg/m²   Temp (24hrs), Av.8 °F (36.6 °C), Min:97.8 °F (36.6 °C), Max:97.8 °F (36.6 °C)    Recent Labs     07/15/20  1137   POCGLU 581* NOT REPORTED     Mode NOT REPORTED     FIO2 NOT REPORTED     Pt Temp NOT REPORTED     POC pH Temp NOT REPORTED     POC pCO2 Temp NOT REPORTED mm Hg    POC pO2 Temp NOT REPORTED mm Hg   Hemoglobin and hematocrit, blood    Collection Time: 07/15/20 11:37 AM   Result Value Ref Range    POC Hemoglobin 17.2 13.5 - 17.5 g/dL    POC Hematocrit 51 41 - 53 %   Creatinine W/GFR Point of Care    Collection Time: 07/15/20 11:37 AM   Result Value Ref Range    POC Creatinine 0.92 0.51 - 1.19 mg/dL    GFR Comment >60 >60 mL/min    GFR Non-African American >60 >60 mL/min    GFR Comment         SODIUM (POC)    Collection Time: 07/15/20 11:37 AM   Result Value Ref Range    POC Sodium 133 (L) 138 - 146 mmol/L   POTASSIUM (POC)    Collection Time: 07/15/20 11:37 AM   Result Value Ref Range    POC Potassium 4.1 3.5 - 4.5 mmol/L   CHLORIDE (POC)    Collection Time: 07/15/20 11:37 AM   Result Value Ref Range    POC Chloride 99 98 - 107 mmol/L   CALCIUM, IONIC (POC)    Collection Time: 07/15/20 11:37 AM   Result Value Ref Range    POC Ionized Calcium 1.07 (L) 1.15 - 1.33 mmol/L   Lactic Acid, POC    Collection Time: 07/15/20 11:37 AM   Result Value Ref Range    POC Lactic Acid 3.21 (H) 0.56 - 1.39 mmol/L   POCT Glucose    Collection Time: 07/15/20 11:37 AM   Result Value Ref Range    POC Glucose 581 (HH) 74 - 100 mg/dL   Anion Gap (Calc) POC    Collection Time: 07/15/20 11:37 AM   Result Value Ref Range    Anion Gap 16 7 - 16 mmol/L   CBC    Collection Time: 07/15/20 11:45 AM   Result Value Ref Range    WBC 5.8 4.5 - 13.5 k/uL    RBC 5.22 4.21 - 5.77 m/uL    Hemoglobin 15.1 13.0 - 17.0 g/dL    Hematocrit 46.5 40.7 - 50.3 %    MCV 89.1 82.6 - 102.9 fL    MCH 28.9 25.2 - 33.5 pg    MCHC 32.5 28.4 - 34.8 g/dL    RDW 12.5 11.8 - 14.4 %    Platelets 848 299 - 210 k/uL    MPV 11.8 8.1 - 13.5 fL    NRBC Automated 0.0 0.0 per 100 WBC   Comprehensive Metabolic Panel    Collection Time: 07/15/20 11:45 AM   Result Value Ref Range    Glucose 517 (HH) 70 - 99 mg/dL    BUN 23 (H) 6 - 20 mg/dL    CREATININE 1.01 0.70 - 1.20 mg/dL    Bun/Cre Ratio NOT REPORTED 9 - 20    Calcium 10.5 (H) 8.6 - 10.4 mg/dL    Sodium 136 135 - 144 mmol/L    Potassium 4.3 3.7 - 5.3 mmol/L    Chloride 89 (L) 98 - 107 mmol/L    CO2 14 (L) 20 - 31 mmol/L    Anion Gap 33 (H) 9 - 17 mmol/L    Alkaline Phosphatase 142 (H) 40 - 129 U/L    ALT 12 5 - 41 U/L    AST 15 <40 U/L    Total Bilirubin 1.14 0.3 - 1.2 mg/dL    Total Protein 8.5 (H) 6.4 - 8.3 g/dL    Alb 5.1 3.5 - 5.2 g/dL    Albumin/Globulin Ratio 1.5 1.0 - 2.5    GFR Non-African American >60 >60 mL/min    GFR African American >60 >60 mL/min    GFR Comment          GFR Staging NOT REPORTED    Lipase    Collection Time: 07/15/20 11:45 AM   Result Value Ref Range    Lipase 16 13 - 60 U/L   Beta-Hydroxybutyrate    Collection Time: 07/15/20 11:45 AM   Result Value Ref Range    Beta-Hydroxybutyrate 7.58 (H) 0.02 - 0.27 mmol/L   OSMOLALITY    Collection Time: 07/15/20 11:45 AM   Result Value Ref Range    Serum Osmolality 326 (HH) 275 - 295 mOsm/kg   MAGNESIUM    Collection Time: 07/15/20 11:45 AM   Result Value Ref Range    Magnesium 2.4 1.6 - 2.6 mg/dL   PHOSPHORUS    Collection Time: 07/15/20 11:45 AM   Result Value Ref Range    Phosphorus 6.2 (H) 2.5 - 4.5 mg/dL   Troponin    Collection Time: 07/15/20 11:45 AM   Result Value Ref Range    Troponin, High Sensitivity <6 0 - 22 ng/L    Troponin T NOT REPORTED <0.03 ng/mL    Troponin Interp NOT REPORTED        Imaging/Diagnostics:  Xr Chest Portable    Result Date: 7/15/2020  Negative portable chest exam       Assessment :      Hospital Problems           Last Modified POA    * (Principal) Diabetic ketoacidosis without coma associated with type [facility] diabetes mellitus [facility] 7/15/2020 Yes    Anemia 4/01/1783 Yes    Metabolic acidosis 2/08/6549 Yes    Bradycardia 7/15/2020 Yes          Plan:     Patient status inpatient in the Progressive Unit/Step down    1.  Diabetic ketoacidosis: Continue insulin

## 2020-07-15 NOTE — PLAN OF CARE
Problem: Falls - Risk of:  Goal: Will remain free from falls  Description: Will remain free from falls  Outcome: Met This Shift  Goal: Absence of physical injury  Description: Absence of physical injury  Outcome: Met This Shift     Problem: Serum Glucose Level - Abnormal:  Goal: Ability to maintain appropriate glucose levels will improve  Description: Ability to maintain appropriate glucose levels will improve  Outcome: Ongoing

## 2020-07-15 NOTE — ED PROVIDER NOTES
[facility]  Emergency Department Encounter  Emergency Medicine Attending Note     Pt Name: Enoch Kee  MRN: [id number]  [place] 2000   Date of evaluation: 7/15/2020  PCP:  Rishi Meza MD    [address] [place] [address]       Chief Complaint   Patient presents with    Chest Pain    Emesis       HISTORY OF PRESENT ILLNESS  (Location/Symptom, Timing/Onset, Context/Setting, Quality, Duration, Modifying Factors, Severity.)      Enoch Kee is a 22 y.o. male who presents with nausea, vomiting, and midsternal chest pain that started this morning. Woke up and started vomiting and then developed chest pain. Patient otherwise felt well recently. No fevers. No cough. No known sick contacts. Has a history of type 1 diabetes and has been in DKA previously. He states until today his sugars been fine. Not checking sugars today. No headache, lightheadedness, or dizziness. PAST MEDICAL / SURGICAL / SOCIAL / FAMILY HISTORY     Past Medical History:  has a past medical history of Diabetes mellitus ([address] and Type 1 diabetes mellitus ([facility] Past Surgical History: reviewed with patient and none reported. Allergies:  Patient has no known allergies. Home Meds:   Prior to Visit Medications    Medication Sig Taking?  Authorizing Provider   insulin glargine (BASAGLAR KWIKPEN) 100 UNIT/ML injection pen Inject 30 Units into the skin nightly  Patient not taking: Reported on 6/25/2020  [address], PA-C   insulin aspart (NOVOLOG FLEXPEN) 100 UNIT/ML injection pen Inject 1 Units into the skin 3 times daily (before meals) Indications: Use per sliding scale Per sliding scale  [address], PA-C   EPINEPHrine (Norma Rosenbaum 2-ITALO) 0.15 MG/0.3ML SOAJ Use as directed for allergic reaction  Patient not taking: Reported on 6/25/2020  Ubaldo Munoz MD   Ketone Blood Test STRP 1 strip by Does not apply route daily as needed  Historical Provider, MD VELÁZQUEZ UF III MINI PEN NEEDLES 31G X 5 MM MISC Disp BD Autoshield Duo pen needles, 5mm, Sig:  Use as directed for injections 4 - 6 times per day. Patient not taking: Reported on 6/25/2020  Rishi Meza MD   blood glucose test strips (ONE TOUCH ULTRA TEST) strip Sig: For blood testing 4-6 times/day  Patient not taking: Reported on 6/25/2020  Louis Nair MD   acetaminophen (TYLENOL) 325 MG tablet Take 2 tablets by mouth every 6 hours as needed for Pain  Slava Stark DO   ibuprofen (ADVIL;MOTRIN) 200 MG tablet Take 3 tablets by mouth every 8 hours as needed for Pain or Fever  Luis F Estrella,    insulin aspart (NOVOLOG) 100 UNIT/ML injection vial Inject 1 Units into the skin 3 times daily (before meals) 1 unit per 15 grams CHO for meals and snacks  Sliding scale: =0, 125-150=1, 151-175=2, 175-200=3, 201-225=4, 226-250=5, 251-275=6, 276-300=7, 301-325=8, 326-350=9, 351-375=10, 376-400=11  Bobby Siddiqi MD   glucagon (GLUCAGON EMERGENCY) 1 MG injection As directed for extreme hypoglycemia  Patient not taking: Reported on 6/25/2020  Ana Ayoub DO     Please note that medications prescribed at discharge will auto-populate into this medication list when note is refreshed. Please look at prescription date andprescriber to clarify. Family History:family history includes Asthma in his father. Social History: He reports that he has been smoking cigarettes. He has a 0.50 pack-year smoking history. He has never used smokeless tobacco. He reports that he does not drink alcohol or use drugs. He reports never being sexually active. REVIEW OF SYSTEMS    (2-9 systems for level 4, 10 or more for level 5)      Review of Systems   Constitutional: Negative for chills and fever. HENT: Negative for congestion, sneezing and sore throat. Eyes: Negative for pain and visual disturbance. Respiratory: Negative for cough, chest tightness and shortness of breath. Cardiovascular: Positive for chest pain. Negative for palpitations. Gastrointestinal: Positive for nausea and vomiting. Negative for abdominal pain, constipation and diarrhea. Genitourinary: Negative for dysuria and frequency. Musculoskeletal: Negative for arthralgias, back pain, joint swelling and myalgias. Skin: Negative for rash and wound. Neurological: Negative for dizziness, light-headedness and headaches. PHYSICAL EXAM   (up to 7 for level 4, 8 or more for level 5)      Initial Vitals   ED Triage Vitals   BP Temp Temp src Pulse Resp SpO2 Height Weight   07/15/20 1137 07/15/20 1135 -- 07/15/20 1134 07/15/20 1134 07/15/20 1134 07/15/20 1134 07/15/20 1134   (!) 126/98 97.8 °F (36.6 °C)  (!) 47 16 96 % 5' 11\" (1.803 m) 140 lb (63.5 kg)       Physical Exam  Vitals signs and nursing note reviewed. Constitutional:       Comments: Appears to feel ill, but non-toxic   HENT:      Head: Normocephalic and atraumatic. Mouth/Throat:      Comments: Patient is currently wearing a facial mask. Given that patient is not complaining of sore throat or difficulty swallowing, mask was left in place to decrease risk of aersolization of particles in the setting of current CoVID-19 pandemic    Eyes:      Extraocular Movements: Extraocular movements intact. Conjunctiva/sclera: Conjunctivae normal.   Neck:      Musculoskeletal: Normal range of motion. No neck rigidity or muscular tenderness. Cardiovascular:      Rate and Rhythm: Regular rhythm. Bradycardia present. Heart sounds: No murmur. No friction rub. No gallop. Pulmonary:      Effort: Pulmonary effort is normal.      Breath sounds: Normal breath sounds. No wheezing. Abdominal:      General: There is no distension. Palpations: Abdomen is soft. Tenderness: There is no abdominal tenderness. Musculoskeletal: Normal range of motion. General: No swelling. Skin:     General: Skin is warm and dry. Capillary Refill: Capillary refill takes less than 2 seconds. Findings: No bruising. Neurological:      General: No focal deficit present. Mental Status: He is alert and oriented to person, place, and time. Motor: No weakness. DIFFERENTIAL DIAGNOSIS/IMPRESSION     DDX: DKA, ACS, esophageal irritation from vomiting    Impression: 22 y.o. male who presents with type 1 diabetes woke up this morning with nausea vomiting and then developed chest pain. On exam appears to feel ill but nontoxic-appearing. Patient is initial glucose is in the 500s and pH is 7.302, with an anion gap on the point of care, consistent with likely DKA. Patient is history of recurrent DKA. Will start an IV fluid bolus. I with a chest is likely esophageal irritation from vomiting, however will check EKG and troponin . Get basic labs. Likely plan for admission. DIAGNOSTIC RESULTS     EKG: All EKG's are interpreted by the Emergency Department Physician who either signs or Co-signs this chart in the absence of a cardiologist.    EKG Interpretation    Interpreted by emergency department physician    Rhythm: sinus bradycardia  Rate: normal  Axis: left  Ectopy: none  Conduction: right bundle branch block (complete) and left anterior fasciclar block  ST Segments: normal  T Waves: non specific changes  Q Waves: nonspecific    Clinical Impression: Bradycardia with right bundle branch block and left anterior fascicular block. Age-indeterminate septal infarct. Compared to EKG on 3/16/2020, no acute changes.     Martha Fall      LABS: Giovanny Thakkar reviewed by me and abnormal results are displayed above     Labs Reviewed   COMPREHENSIVE METABOLIC PANEL - Abnormal; Notable for the following components:       Result Value    Glucose 517 (*)     BUN 23 (*)     Calcium 10.5 (*)     Chloride 89 (*)     CO2 14 (*)     Anion Gap 33 (*)     Alkaline Phosphatase 142 (*)     Total Protein 8.5 (*)     All other components within normal limits   BETA-HYDROXYBUTYRATE - Abnormal; Notable for the following components: Beta-Hydroxybutyrate 7.58 (*)     All other components within normal limits   OSMOLALITY - Abnormal; Notable for the following components:    Serum Osmolality 326 (*)     All other components within normal limits   PHOSPHORUS - Abnormal; Notable for the following components:    Phosphorus 6.2 (*)     All other components within normal limits   SODIUM (POC) - Abnormal; Notable for the following components:    POC Sodium 133 (*)     All other components within normal limits   CALCIUM, IONIC (POC) - Abnormal; Notable for the following components:    POC Ionized Calcium 1.07 (*)     All other components within normal limits   VENOUS BLOOD GAS, POINT OF CARE - Abnormal; Notable for the following components:    pH, [place] 7.304 (*)     pCO2, [place] 35.9 (*)     HCO3, Venous 17.8 (*)     Total CO2, Venous 19 (*)     Negative Base Excess, [place] 8 (*)     All other components within normal limits   LACTIC ACID,POINT OF CARE - Abnormal; Notable for the following components:    POC Lactic Acid 3.21 (*)     All other components within normal limits   POCT GLUCOSE - Abnormal; Notable for the following components:    POC Glucose 581 (*)     All other components within normal limits   POC GLUCOSE FINGERSTICK - Abnormal; Notable for the following components:    POC Glucose 359 (*)     All other components within normal limits   CBC   LIPASE   MAGNESIUM   TROPONIN   HGB/HCT   POTASSIUM (POC)   CHLORIDE (POC)   URINE RT REFLEX TO CULTURE   BASIC METABOLIC PANEL   BASIC METABOLIC PANEL   MAGNESIUM   MAGNESIUM   PHOSPHORUS   PHOSPHORUS   BASIC METABOLIC PANEL   MAGNESIUM   PHOSPHORUS   POC BLOOD GAS AND CHEMISTRY   CREATININE W/GFR POINT OF CARE   ANION GAP (CALC) POC   POCT GLUCOSE   POCT GLUCOSE   POCT GLUCOSE   POCT GLUCOSE   POCT GLUCOSE   POCT GLUCOSE   POCT GLUCOSE   POCT GLUCOSE   POCT GLUCOSE   POCT GLUCOSE   POCT GLUCOSE   POCT GLUCOSE   POCT GLUCOSE   POCT GLUCOSE       RADIOLOGY: All plain film, CT, MRI, and formal ultrasound images (except ED bedside ultrasound) are read by the radiologist, see reports below, unless otherwise noted in ED Course, MDM or here. Xr Chest Portable    Result Date: 7/15/2020  EXAMINATION: ONE XRAY VIEW OF THE CHEST 7/15/2020 12:04 pm COMPARISON: March 16, 2020 HISTORY: ORDERING SYSTEM PROVIDED HISTORY: chest pain, vomiting TECHNOLOGIST PROVIDED HISTORY: chest pain, vomiting Reason for Exam: Chest pain, vomiting/   AP erect/ port./ GP used Acuity: Unknown Type of Exam: Unknown FINDINGS: Cardiomediastinal silhouette, hilar structures and pulmonary vascularity are within normal limits. No focal lung consolidation or infiltrate. No pleural effusion or pneumothorax. Osseous structures are grossly intact. Negative portable chest exam       BEDSIDE ULTRASOUND:  Not clinically indicated at this time.       ED COURSE      ED Medication Orders (From admission, onward)    Start Ordered     Status Ordering Provider    07/15/20 1300 07/15/20 1240  insulin regular (HUMULIN R;NOVOLIN R) 100 Units in sodium chloride 0.9 % 100 mL infusion  CONTINUOUS      Last MAR action:  Rate/Dose Change - by Rafal Harrison on 07/15/20 [facility]    07/15/20 1245 07/15/20 1241  potassium chloride 20 mEq in sodium chloride 0.45 % 1,000 mL infusion  CONTINUOUS      Last MAR action:  New Bag - by Guy Tate on 07/15/20 at 1345 K[facility]LIE WARE E    07/15/20 1239 07/15/20 1240  dextrose 5 % and 0.45 % NaCl with KCl 20 mEq infusion  CONTINUOUS PRN      Acknowledged KARLIE WARE    07/15/20 1237 07/15/20 1240  dextrose 50 % IV solution  PRN      Acknowledged KARLIE WARE    07/15/20 1145 07/15/20 1142  0.9 % sodium chloride bolus  ONCE      Last MAR action:  Stopped - by Rafal Christy on 07/15/20 at [address]    07/15/20 1145 07/15/20 1142  ondansetron (ZOFRAN) injection 4 mg  ONCE      Last MAR action:  Given - by Rafal Christy on 07/15/20 at [facility] E          EMERGENCYDEPARTMENT COURSE:  ED Course as of Jul 15 1509   Wed Jul 15, 2020   1243 Patient sodium is normal, potassium 4.3. Will start on half-normal saline with 20 equivalents KCl. Start on insulin drip. Plan for admission, stable for stepdown given that bicarb is 14. [CK]   1256 Spoke to [facility], patient's pH 7.302 and lactic is 3.2. The accepted patient for the floor.    [CK]      ED Course User Index  [CK] Sebastian Amaya MD        PROCEDURES:  None     CONSULTS:  IP CONSULT TO HOSPITALIST  IP CONSULT TO DIABETES EDUCATOR  IP CONSULT TO DIETITIAN    CRITICAL CARE:  There was significant risk of life threatening deterioration of patient's condition requiring my direct management. Critical care time 30minutes, excluding any documented procedures. FINAL IMPRESSION      1.  Diabetic ketoacidosis without coma associated with type 1 diabetes mellitus ([facility]        DISPOSITION / PLAN     DISPOSITION Admitted 07/15/2020 01:20:23 PM      PATIENT REFERRED TO:  Gita Worthy MD  [place] [address]  [phone]            DISCHARGE MEDICATIONS:  New Prescriptions    No medications on file       Sebastian Amaya MD  Emergency Medicine Attending      (Please note that portions of this note were completed with a voice recognition program.  Efforts were made to edit the dictations but occasionallywords are mis-transcribed.)         Sebastian Amaya MD  07/15/20 [id number]

## 2020-07-16 VITALS
HEART RATE: 59 BPM | OXYGEN SATURATION: 98 % | BODY MASS INDEX: 19.6 KG/M2 | SYSTOLIC BLOOD PRESSURE: 123 MMHG | RESPIRATION RATE: 15 BRPM | WEIGHT: 140 LBS | HEIGHT: 71 IN | TEMPERATURE: 98.8 F | DIASTOLIC BLOOD PRESSURE: 78 MMHG

## 2020-07-16 PROBLEM — E87.20 METABOLIC ACIDOSIS: Status: RESOLVED | Noted: 2017-03-08 | Resolved: 2020-07-16

## 2020-07-16 PROBLEM — E10.10 TYPE 1 DIABETES MELLITUS WITH KETOACIDOSIS AND WITHOUT COMA (HCC): Status: RESOLVED | Noted: 2020-07-15 | Resolved: 2020-07-16

## 2020-07-16 PROBLEM — R00.1 BRADYCARDIA: Status: RESOLVED | Noted: 2020-07-15 | Resolved: 2020-07-16

## 2020-07-16 LAB
ANION GAP SERPL CALCULATED.3IONS-SCNC: 12 MMOL/L (ref 9–17)
ANION GAP SERPL CALCULATED.3IONS-SCNC: 14 MMOL/L (ref 9–17)
BUN BLDV-MCNC: 16 MG/DL (ref 6–20)
BUN BLDV-MCNC: 18 MG/DL (ref 6–20)
BUN/CREAT BLD: ABNORMAL (ref 9–20)
BUN/CREAT BLD: ABNORMAL (ref 9–20)
CALCIUM SERPL-MCNC: 8.3 MG/DL (ref 8.6–10.4)
CALCIUM SERPL-MCNC: 8.6 MG/DL (ref 8.6–10.4)
CHLORIDE BLD-SCNC: 100 MMOL/L (ref 98–107)
CHLORIDE BLD-SCNC: 101 MMOL/L (ref 98–107)
CO2: 18 MMOL/L (ref 20–31)
CO2: 18 MMOL/L (ref 20–31)
CREAT SERPL-MCNC: 0.63 MG/DL (ref 0.7–1.2)
CREAT SERPL-MCNC: 0.72 MG/DL (ref 0.7–1.2)
EKG ATRIAL RATE: 47 BPM
EKG P AXIS: 79 DEGREES
EKG P-R INTERVAL: 164 MS
EKG Q-T INTERVAL: 476 MS
EKG QRS DURATION: 122 MS
EKG QTC CALCULATION (BAZETT): 421 MS
EKG R AXIS: -77 DEGREES
EKG T AXIS: 65 DEGREES
EKG VENTRICULAR RATE: 47 BPM
ESTIMATED AVERAGE GLUCOSE: 283 MG/DL
GFR AFRICAN AMERICAN: >60 ML/MIN
GFR AFRICAN AMERICAN: >60 ML/MIN
GFR NON-AFRICAN AMERICAN: >60 ML/MIN
GFR NON-AFRICAN AMERICAN: >60 ML/MIN
GFR SERPL CREATININE-BSD FRML MDRD: ABNORMAL ML/MIN/{1.73_M2}
GLUCOSE BLD-MCNC: 113 MG/DL (ref 75–110)
GLUCOSE BLD-MCNC: 121 MG/DL (ref 70–99)
GLUCOSE BLD-MCNC: 126 MG/DL (ref 75–110)
GLUCOSE BLD-MCNC: 132 MG/DL (ref 70–99)
GLUCOSE BLD-MCNC: 134 MG/DL (ref 75–110)
GLUCOSE BLD-MCNC: 137 MG/DL (ref 75–110)
GLUCOSE BLD-MCNC: 138 MG/DL (ref 75–110)
GLUCOSE BLD-MCNC: 157 MG/DL (ref 75–110)
GLUCOSE BLD-MCNC: 175 MG/DL (ref 75–110)
GLUCOSE BLD-MCNC: 180 MG/DL (ref 75–110)
HBA1C MFR BLD: 11.5 % (ref 4–6)
HCT VFR BLD CALC: 37.6 % (ref 40.7–50.3)
HEMOGLOBIN: 12.2 G/DL (ref 13–17)
MAGNESIUM: 1.8 MG/DL (ref 1.6–2.6)
MAGNESIUM: 1.9 MG/DL (ref 1.6–2.6)
MAGNESIUM: 2 MG/DL (ref 1.6–2.6)
MCH RBC QN AUTO: 28.2 PG (ref 25.2–33.5)
MCHC RBC AUTO-ENTMCNC: 32.4 G/DL (ref 28.4–34.8)
MCV RBC AUTO: 87 FL (ref 82.6–102.9)
NRBC AUTOMATED: 0 PER 100 WBC
PDW BLD-RTO: 12.6 % (ref 11.8–14.4)
PHOSPHORUS: 3.2 MG/DL (ref 2.5–4.5)
PLATELET # BLD: 341 K/UL (ref 138–453)
PMV BLD AUTO: 11.6 FL (ref 8.1–13.5)
POTASSIUM SERPL-SCNC: 3.9 MMOL/L (ref 3.7–5.3)
POTASSIUM SERPL-SCNC: 4.2 MMOL/L (ref 3.7–5.3)
RBC # BLD: 4.32 M/UL (ref 4.21–5.77)
SODIUM BLD-SCNC: 131 MMOL/L (ref 135–144)
SODIUM BLD-SCNC: 132 MMOL/L (ref 135–144)
WBC # BLD: 9.9 K/UL (ref 4.5–13.5)

## 2020-07-16 PROCEDURE — 93010 ELECTROCARDIOGRAM REPORT: CPT | Performed by: INTERNAL MEDICINE

## 2020-07-16 PROCEDURE — 80048 BASIC METABOLIC PNL TOTAL CA: CPT

## 2020-07-16 PROCEDURE — 99232 SBSQ HOSP IP/OBS MODERATE 35: CPT | Performed by: FAMILY MEDICINE

## 2020-07-16 PROCEDURE — 2500000003 HC RX 250 WO HCPCS: Performed by: NURSE PRACTITIONER

## 2020-07-16 PROCEDURE — 36415 COLL VENOUS BLD VENIPUNCTURE: CPT

## 2020-07-16 PROCEDURE — APPSS60 APP SPLIT SHARED TIME 46-60 MINUTES: Performed by: NURSE PRACTITIONER

## 2020-07-16 PROCEDURE — 6360000002 HC RX W HCPCS: Performed by: NURSE PRACTITIONER

## 2020-07-16 PROCEDURE — G0108 DIAB MANAGE TRN  PER INDIV: HCPCS

## 2020-07-16 PROCEDURE — 6370000000 HC RX 637 (ALT 250 FOR IP): Performed by: NURSE PRACTITIONER

## 2020-07-16 PROCEDURE — 82947 ASSAY GLUCOSE BLOOD QUANT: CPT

## 2020-07-16 PROCEDURE — 2580000003 HC RX 258: Performed by: NURSE PRACTITIONER

## 2020-07-16 PROCEDURE — 84100 ASSAY OF PHOSPHORUS: CPT

## 2020-07-16 PROCEDURE — 85027 COMPLETE CBC AUTOMATED: CPT

## 2020-07-16 PROCEDURE — 83036 HEMOGLOBIN GLYCOSYLATED A1C: CPT

## 2020-07-16 PROCEDURE — 83735 ASSAY OF MAGNESIUM: CPT

## 2020-07-16 RX ORDER — NICOTINE POLACRILEX 4 MG
15 LOZENGE BUCCAL PRN
Status: DISCONTINUED | OUTPATIENT
Start: 2020-07-16 | End: 2020-07-16 | Stop reason: HOSPADM

## 2020-07-16 RX ORDER — INSULIN ASPART 100 [IU]/ML
1-11 INJECTION, SOLUTION INTRAVENOUS; SUBCUTANEOUS
Qty: 5 PEN | Refills: 3 | Status: ON HOLD | OUTPATIENT
Start: 2020-07-16 | End: 2020-10-02 | Stop reason: SDUPTHER

## 2020-07-16 RX ORDER — DEXTROSE MONOHYDRATE 50 MG/ML
100 INJECTION, SOLUTION INTRAVENOUS PRN
Status: DISCONTINUED | OUTPATIENT
Start: 2020-07-16 | End: 2020-07-16 | Stop reason: HOSPADM

## 2020-07-16 RX ORDER — DEXTROSE MONOHYDRATE 25 G/50ML
12.5 INJECTION, SOLUTION INTRAVENOUS PRN
Status: DISCONTINUED | OUTPATIENT
Start: 2020-07-16 | End: 2020-07-16 | Stop reason: HOSPADM

## 2020-07-16 RX ORDER — LANCETS 30 GAUGE
1 EACH MISCELLANEOUS
Qty: 300 EACH | Refills: 0 | Status: ON HOLD | OUTPATIENT
Start: 2020-07-16 | End: 2020-10-02 | Stop reason: SDUPTHER

## 2020-07-16 RX ORDER — INSULIN GLARGINE 100 [IU]/ML
30 INJECTION, SOLUTION SUBCUTANEOUS NIGHTLY
Qty: 5 PEN | Refills: 1 | Status: ON HOLD | OUTPATIENT
Start: 2020-07-16 | End: 2020-10-02 | Stop reason: HOSPADM

## 2020-07-16 RX ADMIN — DEXTROSE AND SODIUM CHLORIDE: 5; 450 INJECTION, SOLUTION INTRAVENOUS at 01:19

## 2020-07-16 RX ADMIN — INSULIN LISPRO 2 UNITS: 100 INJECTION, SOLUTION INTRAVENOUS; SUBCUTANEOUS at 11:52

## 2020-07-16 RX ADMIN — POTASSIUM CHLORIDE 10 MEQ: 7.46 INJECTION, SOLUTION INTRAVENOUS at 04:29

## 2020-07-16 RX ADMIN — INSULIN LISPRO 2 UNITS: 100 INJECTION, SOLUTION INTRAVENOUS; SUBCUTANEOUS at 17:29

## 2020-07-16 RX ADMIN — ENOXAPARIN SODIUM 40 MG: 40 INJECTION SUBCUTANEOUS at 09:17

## 2020-07-16 RX ADMIN — POTASSIUM CHLORIDE 10 MEQ: 7.46 INJECTION, SOLUTION INTRAVENOUS at 06:35

## 2020-07-16 RX ADMIN — POTASSIUM CHLORIDE 10 MEQ: 7.46 INJECTION, SOLUTION INTRAVENOUS at 00:54

## 2020-07-16 RX ADMIN — POTASSIUM CHLORIDE 10 MEQ: 7.46 INJECTION, SOLUTION INTRAVENOUS at 03:12

## 2020-07-16 RX ADMIN — SODIUM PHOSPHATE, MONOBASIC, MONOHYDRATE 10 MMOL: 276; 142 INJECTION, SOLUTION INTRAVENOUS at 01:17

## 2020-07-16 ASSESSMENT — ENCOUNTER SYMPTOMS
BACK PAIN: 0
CONSTIPATION: 0
ABDOMINAL DISTENTION: 0
ABDOMINAL PAIN: 0
SINUS PAIN: 0
SINUS PRESSURE: 0
SHORTNESS OF BREATH: 0
VOMITING: 0
DIARRHEA: 0
COUGH: 0
NAUSEA: 0

## 2020-07-16 NOTE — DISCHARGE INSTR - DIET

## 2020-07-16 NOTE — PROGRESS NOTES
Comprehensive Nutrition Assessment    Type and Reason for Visit:  Initial, Consult(DKA/Uncontrolled DM)    Nutrition Recommendations/Plan:   - Continue current Carb Control diet - encourage/monitor intakes as tolerated. - Will provide Glucerna ONS x 2 per day to boost intakes. Nutrition Assessment:  Consulted for DKA/Uncontrolled DM. Pt admitted with chest pain, nausea, and vomiting. Pt has been started on an oral diet and is tolerating PO intakes. Pt not really taking much of meals currently - ate about 25% of his meal tray. Noted Diabetes Educator has spoken with pt about diet. Labs/Meds reviewed. Malnutrition Assessment:  Malnutrition Status: At risk for malnutrition (Comment)    Context:  Chronic Illness     Findings of the 6 clinical characteristics of malnutrition:  Energy Intake:  Mild decrease in energy intake (Comment)  Weight Loss:  Unable to assess     Body Fat Loss:  No significant body fat loss     Muscle Mass Loss:  No significant muscle mass loss    Fluid Accumulation:  No significant fluid accumulation     Strength:  Not Performed    Estimated Daily Nutrient Needs:  Energy (kcal):  MSJ x 1.2-1.3 = 6056-8979 kcals/day; Weight Used for Energy Requirements:  Admission     Protein (g):  x 1.2-1.5 =  gm pro/day; Weight Used for Protein Requirements:  Admission          Nutrition Related Findings:  Labs reviewed: Na+131 mmol/L (L), HgbA1c 11.5 (H), Glucose 113-175 mg/dL. Meds reviewed. Wounds:  None       Current Nutrition Therapies:    DIET GENERAL; Carb Control: 5 carb choices (75 gms)/meal    Anthropometric Measures:  · Height: 5' 11\" (180.3 cm)  · Current Body Weight: 140 lb (63.5 kg)   · Admission Body Weight: 140 lb (63.5 kg)    · Ideal Body Weight: 172 lbs; 81.4 lbs   · BMI: 19.5  · BMI Categories: Normal Weight (BMI 18.5-24. 9)       Nutrition Diagnosis:   · Inadequate oral intake related to endocrine dysfuntion(DKA) as evidenced by poor intake prior to admission, nausea, vomiting    Nutrition Interventions:   Food and/or Nutrient Delivery:  Continue Current Diet, Start Oral Nutrition Supplement  Nutrition Education/Counseling:  Education initiated(Handout left at bedside.)   Coordination of Nutrition Care:  Continued Inpatient Monitoring    Goals:  Oral intakes to meet at least 75% of estimated nutrition needs. Nutrition Monitoring and Evaluation:   Behavioral-Environmental Outcomes:  (N/A)   Food/Nutrient Intake Outcomes:  Food and Nutrient Intake, Supplement Intake  Physical Signs/Symptoms Outcomes:  Biochemical Data, GI Status, Hemodynamic Status, Weight     Discharge Planning:     Too soon to determine     Electronically signed by Masoud Randall RD, LD on 7/16/20 at 2:54 PM EDT    Contact: [phone]

## 2020-07-16 NOTE — PROGRESS NOTES
Occupational Therapy Not Seen Note    DATE: 2020  Name: Rosalio Krishnamurthy  : 2000  MRN: [id number]    Patient not available for Occupational Therapy due to:    Per pt and RN report, pt independent with functional mobility and functional tasks.  Pt with no OT acute care needs at this time, will defer OT eval.    Next Scheduled Treatment: N/A    Electronically signed by Lavonne Toure on 2020 at 1:38 PM

## 2020-07-16 NOTE — PLAN OF CARE
Problem: Falls - Risk of:  Goal: Will remain free from falls  Description: Will remain free from falls  7/16/2020 1450 by Billy Grady RN  Outcome: Met This Shift  Note: Patient assessed for fall risk and fall precautions initiated as needed. Patient and family  instructed about safety devices and allowed to make decisions related to safey. Environment kept free of clutter and adequate lighting provided. Bed in lowest position with brakes locked. Call light in reach. Patient ID band correct and in place. Patient transferred with appropriate methods. Will continue to monitor.     7/16/2020 7312 by Alyson Figueroa RN  Outcome: Met This Shift     Problem: Falls - Risk of:  Goal: Absence of physical injury  Description: Absence of physical injury  7/16/2020 1450 by Billy Grady RN  Outcome: Met This Shift  7/16/2020 0614 by Alyson Figueroa RN  Outcome: Met This Shift     Problem: Serum Glucose Level - Abnormal:  Goal: Ability to maintain appropriate glucose levels will improve  Description: Ability to maintain appropriate glucose levels will improve  7/16/2020 1450 by Billy Grady RN  Outcome: Met This Shift  7/16/2020 0614 by Alyson Figueroa RN  Outcome: Ongoing

## 2020-07-16 NOTE — DISCHARGE SUMMARY
[facility]    Discharge Summary     Patient ID: Alvarez Garcia  :  2000   MRN: [id number]     ACCOUNT:  [de-identified]   Patient's PCP: Oren Russo MD  Admit Date: 7/15/2020   Discharge Date: 2020     Length of Stay: 1  Code Status:  Full Code  Admitting Physician: Anthony Boo MD  Discharge Physician: JOSÉ LUIS Wilder NP     Active Discharge Diagnoses:     Hospital Problem Lists:  Principal Problem (Resolved):    Diabetic ketoacidosis without coma associated with type 1 diabetes mellitus ([facility]  Active Problems:    * No active hospital problems. *  Resolved Problems:    Metabolic acidosis    Bradycardia    Type 1 diabetes mellitus with ketoacidosis and without coma [facility])      Admission Condition:  fair     Discharged Condition: good    Hospital Stay:     Hospital Course:  Alvarez Garcia is a 22 y.o. male who was admitted for the management of   Diabetic ketoacidosis without coma associated with type 1 diabetes mellitus ([facility] , presented to ER with Chest Pain and Emesis    Per records:    Cielo Friend a 19 y.o. Non-/non  male who presents with Chest Pain and Emesis   and is admitted to the hospital for the management of Diabetic ketoacidosis without coma associated with type 1 diabetes mellitus ([facility]      This is a 23-year-old male with a history of diabetes mellitus type 1, with frequent admissions for DKA who presented to the emergency department with nausea vomiting with complaints similar to previous DKA admissions.  Patient also complaining of chest pain with a negative troponin, described more like GERD and or acid reflux.  Diagnostics in the emergency department included negative chest x-ray.  Laboratory data revealed metabolic acidosis in the setting of diabetic ketoacidosis with a lactic acid of 3.2, Blood sugars greater than 500, beta hydroxybutyrate at 7.58.  Blood gas also reflective of metabolic acidosis.  Patient was initiated on insulin drip with home dose of Lantus started.      Insulin gtt stopped  Po intake established      Significant therapeutic interventions:     1. DKA: resolved. Continue SSI and lantus at time of discharge  2. Metabolic acidosis: resolved, labs reviewed  3. Bradycardia: asymptomatic  4.  Discharge planning home today    Significant Diagnostic Studies:   Labs / Micro:  CBC:   Lab Results   Component Value Date    WBC 9.9 07/16/2020    RBC 4.32 07/16/2020    HGB 12.2 07/16/2020    HCT 37.6 07/16/2020    MCV 87.0 07/16/2020    MCH 28.2 07/16/2020    MCHC 32.4 07/16/2020    RDW 12.6 07/16/2020     07/16/2020     BMP:    Lab Results   Component Value Date    GLUCOSE 121 07/16/2020     07/16/2020    K 4.2 07/16/2020     07/16/2020    CO2 18 07/16/2020    ANIONGAP 12 07/16/2020    BUN 16 07/16/2020    CREATININE 0.63 07/16/2020    BUNCRER NOT REPORTED 07/16/2020    CALCIUM 8.3 07/16/2020    LABGLOM >60 07/16/2020    GFRAA >60 07/16/2020    GFR      07/16/2020    GFR NOT REPORTED 07/16/2020     HFP:    Lab Results   Component Value Date    PROT 8.5 07/15/2020     CMP:    Lab Results   Component Value Date    GLUCOSE 121 07/16/2020     07/16/2020    K 4.2 07/16/2020     07/16/2020    CO2 18 07/16/2020    BUN 16 07/16/2020    CREATININE 0.63 07/16/2020    ANIONGAP 12 07/16/2020    ALKPHOS 142 07/15/2020    ALT 12 07/15/2020    AST 15 07/15/2020    BILITOT 1.14 07/15/2020    LABALBU 5.1 07/15/2020    ALBUMIN 1.5 07/15/2020    LABGLOM >60 07/16/2020    GFRAA >60 07/16/2020    GFR      07/16/2020    GFR NOT REPORTED 07/16/2020    PROT 8.5 07/15/2020    CALCIUM 8.3 07/16/2020     PT/INR:  No results found for: PTINR, PROTIME, INR  PTT: No results found for: APTT  FLP:    Lab Results   Component Value Date    CHOL 328 01/21/2016    TRIG 192 01/21/2016    HDL 75 01/21/2016     U/A:    Lab Results   Component Value Date    COLORU YELLOW 07/15/2020 TURBIDITY CLEAR 07/15/2020    SPECGRAV 1.030 07/15/2020    HGBUR NEGATIVE 07/15/2020    PHUR 5.5 07/15/2020    PROTEINU 1+ 07/15/2020    GLUCOSEU 3+ 07/15/2020    KETUA LARGE 07/15/2020    BILIRUBINUR NEGATIVE 07/15/2020    BILIRUBINUR neg 05/30/2013    UROBILINOGEN Normal 07/15/2020    NITRU NEGATIVE 07/15/2020    LEUKOCYTESUR NEGATIVE 07/15/2020     TSH:    Lab Results   Component Value Date    TSH 1.13 03/08/2017        Radiology:  Xr Chest Portable    Result Date: 7/15/2020  Negative portable chest exam       Consultations:    Consults:     Final Specialist Recommendations/Findings:   IP CONSULT TO HOSPITALIST  IP CONSULT TO DIABETES EDUCATOR  IP CONSULT TO DIETITIAN      The patient was seen and examined on day of discharge and this discharge summary is in conjunction with any daily progress note from day of discharge. Discharge plan:     Disposition: Home    Physician Follow Up:     Danisha Laurent MD  [address]  [phone]    In 1 week  for follow up after hospitalization       Requiring Further Evaluation/Follow Up POST HOSPITALIZATION/Incidental Findings: none    Diet: diabetic diet    Activity: As tolerated    Instructions to Patient: Take all medications as prescribed. Continue to take BS 4 times daily with carb counting.      Discharge Medications:      Medication List      CHANGE how you take these medications    Insulin Pen Needle 30G X 5 MM Misc  1 Device by Does not apply route 4 times daily  What changed:    · medication strength  · how much to take  · how to take this  · when to take this  · additional instructions     NovoLOG FlexPen 100 UNIT/ML injection pen  Generic drug:  insulin aspart  Inject 1-11 Units into the skin 3 times daily (before meals) 1 unit per 15 grams CHO for meals and snacks  Sliding scale: =0, 125-150=1, 151-175=2, 175-200=3, 201-225=4, 226-250=5, 251-275=6, 276-300=7, 301-325=8, 326-350=9, 351-375=10, 376-400=11  What changed: · how much to take  · additional instructions  · Another medication with the same name was removed. Continue taking this medication, and follow the directions you see here. CONTINUE taking these medications    acetaminophen 325 MG tablet  Commonly known as:  Tylenol  Take 2 tablets by mouth every 6 hours as needed for Pain     Basaglar KwikPen 100 UNIT/ML injection pen  Generic drug:  insulin glargine  Inject 30 Units into the skin nightly     blood glucose test strips strip  Commonly known as:  ONE TOUCH ULTRA TEST  Sig: For blood testing 4-6 times/day     EPINEPHrine 0.15 MG/0.3ML Soaj  Commonly known as:  EpiPen Jr 2-Ernie  Use as directed for allergic reaction     glucagon 1 MG injection  Commonly known as:  Glucagon Emergency  As directed for extreme hypoglycemia     ibuprofen 200 MG tablet  Commonly known as:  ADVIL;MOTRIN  Take 3 tablets by mouth every 8 hours as needed for Pain or Fever     Ketone Blood Test Strp           Where to Get Your Medications      These medications were sent to [facility] [address] [facility], [address], [address] [facility] [address]    Phone:  [phone]   · Basaglar KwikPen 100 UNIT/ML injection pen  · blood glucose test strips strip  · Insulin Pen Needle 30G X 5 MM Misc     You can get these medications from any pharmacy    Bring a paper prescription for each of these medications  · NovoLOG FlexPen 100 UNIT/ML injection pen         Discharge Procedure Orders   Lancets   Order Comments: Delica       Time Spent on discharge is  35 mins in patient examination, evaluation, counseling as well as medication reconciliation, prescriptions for required medications, discharge plan and follow up. Discussed with attending  Electronically signed by   JOSÉ LUIS Canales NP  7/16/2020  1:16 PM      Thank you Dr. Levon Red MD for the opportunity to be involved in this patient's care.

## 2020-07-16 NOTE — PLAN OF CARE
Problem: Falls - Risk of:  Goal: Will remain free from falls  Description: Will remain free from falls  7/16/2020 0614 by Hayde Smith RN  Outcome: Met This Shift     Problem: Falls - Risk of:  Goal: Absence of physical injury  Description: Absence of physical injury  7/16/2020 0614 by Hayde Smith RN  Outcome: Met This Shift     Problem: Serum Glucose Level - Abnormal:  Goal: Ability to maintain appropriate glucose levels will improve  Description: Ability to maintain appropriate glucose levels will improve  7/16/2020 0614 by Hayde Smith RN  Outcome: Ongoing

## 2020-07-16 NOTE — PROGRESS NOTES
[facility]    Progress Note    7/16/2020    8:07 AM    Name:   Nahun Jain  MRN:     [id number]     Acct:      [de-identified]   Room:   [address] [place] [address] Day:  1  Admit Date:  7/15/2020 11:24 AM    PCP:   Spencer Gilmore MD  Code Status:  Full Code    Subjective:     C/C:   Chief Complaint   Patient presents with    Chest Pain    Emesis     Interval History Status: improved. Patient seen and evaluated in room resting in bed in no acute distress. Insulin drip has been discontinued. Blood sugars responded well. Clarified with patient he has been out of his insulin for approximately 2 days prior to presentation. Tolerating p.o. intake well. Discharge planning today    Brief History:     Per records:    Nahun Jain is a 22 y.o. Non-/non  male who presents with Chest Pain and Emesis   and is admitted to the hospital for the management of Diabetic ketoacidosis without coma associated with type 1 diabetes mellitus ([facility]    This is a 53-year-old male with a history of diabetes mellitus type 1, with frequent admissions for DKA who presented to the emergency department with nausea vomiting with complaints similar to previous DKA admissions. Patient also complaining of chest pain with a negative troponin, described more like GERD and or acid reflux. Diagnostics in the emergency department included negative chest x-ray. Laboratory data revealed metabolic acidosis in the setting of diabetic ketoacidosis with a lactic acid of 3.2, Blood sugars greater than 500, beta hydroxybutyrate at 7.58. Blood gas also reflective of metabolic acidosis. Patient was initiated on insulin drip with home dose of Lantus started. Insulin gtt stopped  Po intake established    Review of Systems:     Review of Systems   Constitutional: Positive for appetite change (improved). Negative for activity change, fatigue and fever.    HENT: Negative for congestion, sinus pressure and sinus pain. Eyes: Negative for visual disturbance. Respiratory: Negative for cough and shortness of breath. Cardiovascular: Negative for chest pain, palpitations and leg swelling. Gastrointestinal: Negative for abdominal distention, abdominal pain, constipation, diarrhea, nausea and vomiting. Genitourinary: Negative for dysuria, frequency and urgency. Musculoskeletal: Negative for back pain and neck pain. Skin: Negative for rash and wound. Neurological: Negative for dizziness, weakness and numbness. Hematological: Negative for adenopathy. Psychiatric/Behavioral: Negative for confusion. Medications: Allergies:  No Known Allergies    Current Meds:   Scheduled Meds:    insulin lispro  0-12 Units Subcutaneous TID WC    insulin lispro  0-6 Units Subcutaneous Nightly    enoxaparin  40 mg Subcutaneous Daily    insulin glargine  30 Units Subcutaneous Nightly     Continuous Infusions:    dextrose      IV infusion builder 250 mL/hr at 07/15/20 1345     PRN Meds: glucose, dextrose, glucagon (rDNA), dextrose, dextrose, dextrose, potassium chloride, magnesium sulfate, sodium phosphate IVPB **OR** sodium phosphate IVPB **OR** sodium phosphate IVPB    Data:     Past Medical History:   has a past medical history of Diabetes mellitus ([facility] and Type 1 diabetes mellitus ([facility] Social History:   reports that he has been smoking cigarettes. He has a 0.50 pack-year smoking history. He has never used smokeless tobacco. He reports that he does not drink alcohol or use drugs.      Family History:   Family History   Problem Relation Age of Onset    Asthma Father        Vitals:  /71   Pulse 61   Temp 98.2 °F (36.8 °C) (Temporal)   Resp 11   Ht 5' 11\" (1.803 m)   Wt 140 lb (63.5 kg)   SpO2 98%   BMI 19.53 kg/m²   Temp (24hrs), Av.3 °F (36.8 °C), Min:97.8 °F (36.6 °C), Max:98.9 °F (37.2 °C)    Recent Labs     20  0309 20  0427 20  0748 07/16/20  0631   POCGLU 138* 137* 113* 126*       I/O (24Hr): Intake/Output Summary (Last 24 hours) at 7/16/2020 0807  Last data filed at 7/16/2020 0315  Gross per 24 hour   Intake 2835 ml   Output --   Net 2835 ml       Labs:  Hematology:  Recent Labs     07/15/20  1145   WBC 5.8   RBC 5.22   HGB 15.1   HCT 46.5   MCV 89.1   MCH 28.9   MCHC 32.5   RDW 12.5      MPV 11.8     Chemistry:  Recent Labs     07/15/20  1145 07/15/20  2024 07/15/20  2258 07/16/20  0304    141 135 132*   K 4.3 5.2 4.6 3.9   CL 89* 102 102 100   CO2 14* 17* 17* 18*   GLUCOSE 517* 234* 185* 132*   BUN 23* 22* 20 18   CREATININE 1.01 0.87 0.75 0.72   MG 2.4 2.1 2.0 2.0   ANIONGAP 33* 22* 16 14   LABGLOM >60 >60 >60 >60   GFRAA >60 >60 >60 >60   CALCIUM 10.5* 9.0 8.8 8.6   PHOS 6.2* 4.1 2.7 3.2   TROPHS <6  --   --   --      Recent Labs     07/15/20  1145  07/16/20  0051 07/16/20  0155 07/16/20  0309 07/16/20  0427 07/16/20  0535 07/16/20  0631   PROT 8.5*  --   --   --   --   --   --   --    LABALBU 5.1  --   --   --   --   --   --   --    AST 15  --   --   --   --   --   --   --    ALT 12  --   --   --   --   --   --   --    ALKPHOS 142*  --   --   --   --   --   --   --    BILITOT 1.14  --   --   --   --   --   --   --    LIPASE 16  --   --   --   --   --   --   --    POCGLU  --    < > 157* 134* 138* 137* 113* 126*    < > = values in this interval not displayed.      ABG:  Lab Results   Component Value Date    POCPH 7.201 02/01/2019    POCPCO2 35.5 02/01/2019    POCPO2 36.9 02/01/2019    POCHCO3 13.9 02/01/2019    NBEA 13 02/01/2019    PBEA NOT REPORTED 02/01/2019    BJR6PGO 15 02/01/2019    ALVU8QGF 59 02/01/2019    FIO2 NOT REPORTED 07/15/2020     Lab Results   Component Value Date/Time    SPECIAL NOT REPORTED 09/22/2019 11:45 PM    SPECIAL NOT REPORTED 09/22/2019 11:45 PM     Lab Results   Component Value Date/Time    CULTURE NO SIGNIFICANT GROWTH 03/19/2015 03:44 PM    CULTURE  03/19/2015 03:44 PM     [facility] [address] [facility], [address] [place] [address] [phone]       Radiology:  Xr Chest Portable    Result Date: 7/15/2020  Negative portable chest exam       Physical Examination:        Physical Exam  Vitals signs and nursing note reviewed. Constitutional:       General: He is not in acute distress. Appearance: Normal appearance. He is normal weight. HENT:      Head: Normocephalic and atraumatic. Mouth/Throat:      Mouth: Mucous membranes are moist.   Eyes:      Extraocular Movements: Extraocular movements intact. Pupils: Pupils are equal, round, and reactive to light. Neck:      Musculoskeletal: Normal range of motion and neck supple. Cardiovascular:      Rate and Rhythm: Normal rate. Pulses: Normal pulses. Heart sounds: Normal heart sounds. No murmur. Pulmonary:      Effort: Pulmonary effort is normal.      Breath sounds: Normal breath sounds. No wheezing or rhonchi. Abdominal:      General: Bowel sounds are normal.      Palpations: Abdomen is soft. Tenderness: There is no abdominal tenderness. There is no guarding. Musculoskeletal: Normal range of motion. Right lower leg: No edema. Left lower leg: No edema. Skin:     General: Skin is warm and dry. Capillary Refill: Capillary refill takes less than 2 seconds. Neurological:      General: No focal deficit present. Mental Status: He is alert and oriented to person, place, and time. Psychiatric:         Mood and Affect: Mood normal.         Behavior: Behavior normal.         Assessment:        Hospital Problems           Last Modified POA    * (Principal) Diabetic ketoacidosis without coma associated with type 1 diabetes mellitus ([facility] 6/06/4746 Yes    Metabolic acidosis 1/26/7017 Yes    Bradycardia 7/15/2020 Yes    Type 1 diabetes mellitus with ketoacidosis and without coma ([facility] 7/15/2020 Yes          Plan:        1. DKA: resolved. Continue SSI and lantus at time of discharge  2.  Metabolic acidosis: resolved, labs reviewed  3. Bradycardia: asymptomatic  4.  Discharge planning home today    JOSÉ LUIS Osborne NP  7/16/2020  8:07 AM

## 2020-07-16 NOTE — PROGRESS NOTES
Diabetes Education Progress Note  Lenore Taj, 2000  Lab Results   Component Value Date    LABA1C 13.1 (H) 02/02/2019     Lab Results   Component Value Date     02/02/2019         Met with Pollo Vang  for education. Patient states that he woke up yesterday with chest pain and difficulty breathing, having nausea and vomiting. Pt states that he was taking his Basaglar 30 units at bedtime and Novolog for meals , however he was with out insurance and had to get it restarted so ran out of insulin as he needed to see a  to get Rx. Pt states he followed with Doctor on the [place] side but recently moved with family to [facility]. He no longer has a doctor to follow for his Diabetes. He did see an Endocrinologist years ago when he was first Dx around age 15-16. He lives with mother, father, 11 sisters, 1 brother and nieces and nephews. 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 He keeps busy with chores at home. Reviewed pathophysiology of Type 1 Diabetes and basic CHO metabolism, the role of insulin and production of ketones, checking for ketones with ketostix. Patient does not have any test strips at home to check ketones. Reviewed signs and symptoms of hyperglycemia and DKA. Also addressed prevention of long term complications as R1K has been very elevated. Patient could verbalize target range of <7%. Briefly discussed role of diet and physical activity, and need for routine self monitoring of blood sugars. Patient uses One Touch Ultra meter and tries to check 3x/d but sometimes misses. If no testing at meal time he will only cover CHO eaten. Discussed the action of long & rapid acting insulins, and basal/bolus insulin administration. Reviewed carbohydrate counting & label reading, and calculation of meal time insulin dosage using carb-to-insulin ratio & correction scale.   Patient states that he covers 1 unit for every 10 grams/CHO and estimates at meals, he does not look at labels. \" he knows now as he eats same foods\". Patient states he only drinks diet pop or water as beverage. Patient thought he covered 1 unit for every 30mg/dl > 130 mg/dl, but not sure. Reviewed set up of pens and patient verbalized proper technique. Patient uses arms, legs and abd area. Arms show signs of some hypertrophy and gave chart to show areas of rotation. Discussed signs and symptoms of hypoglycemia and treatment. Patient does have Glucagon Emergency kit at home and mom and Dad know how to use it. He has never needed it as he is seldom low. Marialuisa Negrete has  understanding  of survival skills education. Plan:  Patient will need medications before discharge . Patient need new PCP and also encouraged to get back in to see Endocrinology. Recommended OP Diabetes Education as ongoing support but referral will be needed by pcp. Contact number given. Patient will need prescriptions for the following supplies at discharge:   Preferred / formulary blood glucose meter, with strips and lancets for 4 times per day blood glucose testing  Insulin pen needle tips - 4mm yana or mini(5mm) needles that he has used in past for insulin injections 5 times per day  Ketodiastix  Glucagon Emergency kit /Patient states he does have at home.

## 2020-07-17 ENCOUNTER — TELEPHONE (OUTPATIENT)
Dept: FAMILY MEDICINE CLINIC | Age: 20
End: 2020-07-17

## 2020-07-17 NOTE — PROGRESS NOTES
CLINICAL PHARMACY NOTE: MEDS TO [address] Select Patient?: No  Total # of Prescriptions Filled: 6   The following medications were delivered to the patient:  · ketostix   · Lancets   · Pen needles   · novolog  · basaglar   · Test strips    Total # of Interventions Completed: 0  Time Spent (min): 0    Additional Documentation:

## 2020-07-17 NOTE — TELEPHONE ENCOUNTER
[facility] Trans[facility] Initial Follow Up Call    Outreach made within 2 business days of discharge: Yes    Patient: Andreina Cee Patient : 2000   MRN: [id number]  Reason for Admission: There are no discharge diagnoses documented for the most recent discharge. Discharge Date: 20       Spoke with: Gin Rivers    Discharge department/facility: [facility]     TCM Interactive Patient Contact:  Was patient able to fill all prescriptions: Yes  Was patient instructed to bring all medications to the follow-up visit: Yes  Is patient taking all medications as directed in the discharge summary?  Yes  Does patient understand their discharge instructions: Yes  Does patient have questions or concerns that need addressed prior to 7-14 day follow up office visit: no    Scheduled appointment with PCP within 7-14 days    Follow Up  Future Appointments   Date Time Provider Hans Zheng   2020  2:15 PM Leander Barraza, APRN - CNP fp sc [address], [place]

## 2020-09-26 ENCOUNTER — HOSPITAL ENCOUNTER (EMERGENCY)
Age: 20
Discharge: HOME | End: 2020-09-26
Attending: EMERGENCY MEDICINE
Payer: MEDICARE

## 2020-09-26 VITALS
DIASTOLIC BLOOD PRESSURE: 78 MMHG | OXYGEN SATURATION: 98 % | RESPIRATION RATE: 18 BRPM | HEIGHT: 71 IN | BODY MASS INDEX: 19.6 KG/M2 | HEART RATE: 82 BPM | TEMPERATURE: 98 F | SYSTOLIC BLOOD PRESSURE: 115 MMHG | WEIGHT: 140 LBS

## 2020-09-26 PROCEDURE — 99283 EMERGENCY DEPT VISIT LOW MDM: CPT

## 2020-09-26 NOTE — ED PROVIDER NOTES
[facility]  ED  Emergency Department Encounter  EmergencyMedicine Resident     Pt Mel Darnell  MRN: [id number]  [place] 2000  Date of evaluation: 9/26/20  PCP:  Jordyn Polanco MD    CHIEF COMPLAINT       Chief Complaint   Patient presents with    Medication Refill     Pt arrives stating has prescription for insulin ins has ran out until the 4th, unable to pay for insulin, last used insulin last night, now out       HISTORY OF PRESENT ILLNESS  (Location/Symptom, Timing/Onset, Context/Setting, Quality, Duration, Modifying Factors, Severity.)      Dawn Duran is a 23 y.o. male who presents with medication refill. Patient is a type I diabetic, diagnosed 8 to 9 years ago. Has a prescription for basiglar 30 units nightly, NovoLog sliding scale insulin for short acting coverage. Patient has not not had a problem with obtaining this medication in the past.  Today, patient went to get refill of his insulin, pharmacist reported that his insurance would not cover this insulin until 4 October. Patient tried to call his insurance company, but the office is not open until Monday. Patient comes for insulin refill. Patient also reports that he feels like he is a little hyperglycemic with very slight lightheadedness type feeling, does not feel like he is going to pass out though. Patient denies any other symptoms. PAST MEDICAL / SURGICAL / SOCIAL / FAMILY HISTORY      has a past medical history of Diabetes mellitus ([address] and Type 1 diabetes mellitus ([facility] [address] has no past surgical history on file.   No past surgical history    Social History     Socioeconomic History    Marital status: Single     Spouse name: Not on file    Number of children: Not on file    Years of education: Not on file    Highest education level: Not on file   Occupational History     Employer: N/A   Social Needs    Financial resource strain: Not on file    Food insecurity     Worry: Not on file Inability: Not on file    Transportation needs     Medical: Not on file     Non-medical: Not on file   Tobacco Use    Smoking status: Current Some Day Smoker     Packs/day: 0.25     Years: 2.00     Pack years: 0.50     Types: Cigarettes    Smokeless tobacco: Never Used   Substance and Sexual Activity    Alcohol use: No    Drug use: No    Sexual activity: Never   Lifestyle    Physical activity     Days per week: Not on file     Minutes per session: Not on file    Stress: Not on file   Relationships    Social connections     Talks on phone: Not on file     Gets together: Not on file     Attends Denominational service: Not on file     Active member of club or organization: Not on file     Attends meetings of clubs or organizations: Not on file     Relationship status: Not on file    Intimate partner violence     Fear of current or ex partner: Not on file     Emotionally abused: Not on file     Physically abused: Not on file     Forced sexual activity: Not on file   Other Topics Concern    Not on file   Social History Narrative    ** Merged History Encounter **            Family History   Problem Relation Age of Onset    Asthma Father        Allergies:  Patient has no known allergies. Home Medications:  Prior to Admission medications    Medication Sig Start Date End Date Taking?  Authorizing Provider   insulin glargine [facility]) 100 UNIT/ML injection pen Inject 30 Units into the skin nightly 7/16/20   JOSÉ LUIS Ruano NP   blood glucose test strips (ONE TOUCH ULTRA TEST) strip Sig: For blood testing 4-6 times/day 7/16/20   JOSÉ LUIS Ruano NP   Insulin Pen Needle 30G X 5 MM MISC 1 Device by Does not apply route 4 times daily 7/16/20   JOSÉ LUIS Ruano NP   insulin aspart (NOVOLOG FLEXPEN) 100 UNIT/ML injection pen Inject 1-11 Units into the skin 3 times daily (before meals) 1 unit per 15 grams CHO for meals and snacks  Sliding scale: =0, 125-150=1, 151-175=2, 175-200=3, 201-225=4, 226-250=5, 251-275=6, 276-300=7, 301-325=8, 326-350=9, 351-375=10, 376-400=11 7/16/20   JOSÉ LUIS Escalera NP   Ketone Blood Test STRP 1 strip by Does not apply route daily as needed (for high blood sugar) 7/16/20   JOSÉ LUIS Escalera NP   Lancets MISC 1 each by Does not apply route 4 times daily (before meals and nightly) 7/16/20   JOSÉ LUIS Escalera NP   EPINEPHrine ([place] 2-ITALO) 0.15 MG/0.3ML SOAJ Use as directed for allergic reaction  Patient not taking: Reported on 6/25/2020 9/23/19   Earl Robledo MD   acetaminophen (TYLENOL) 325 MG tablet Take 2 tablets by mouth every 6 hours as needed for Pain 10/22/17   Brayan Estrella,    ibuprofen (ADVIL;MOTRIN) 200 MG tablet Take 3 tablets by mouth every 8 hours as needed for Pain or Fever 10/22/17   Lucero Garza DO   glucagon (GLUCAGON EMERGENCY) 1 MG injection As directed for extreme hypoglycemia  Patient not taking: Reported on 6/25/2020 1/22/16   Adrianne Dasilva,        REVIEW OF SYSTEMS    (2-9 systems for level 4, 10 or more for level 5)      Review of Systems   Positive for lightheadedness, negative for visual changes, hearing changes, dizziness, syncope, cough, congestion, respiratory distress, confusion. PHYSICAL EXAM   (up to 7 for level 4, 8 or more for level 5)      INITIAL VITALS:   /78   Pulse 82   Temp 98 °F (36.7 °C) (Oral)   Resp 18   Ht 5' 11\" (1.803 m)   Wt 140 lb (63.5 kg)   SpO2 98%   BMI 19.53 kg/m²     Physical Exam  Constitutional:       General: He is not in acute distress. Appearance: Normal appearance. He is well-developed. He is not ill-appearing, toxic-appearing or diaphoretic. HENT:      Head: Normocephalic and atraumatic. Eyes:      General:         Right eye: No discharge. Left eye: No discharge. Extraocular Movements: Extraocular movements intact. Neck:      Musculoskeletal: Normal range of motion. Vascular: No JVD. Trachea: No tracheal deviation.    Cardiovascular:      Rate and Rhythm: Normal rate and regular rhythm. Heart sounds: Normal heart sounds. No murmur. No friction rub. No gallop. Pulmonary:      Effort: Pulmonary effort is normal. No respiratory distress. Breath sounds: Normal breath sounds. No stridor. No wheezing, rhonchi or rales. Chest:      Chest wall: No tenderness. Abdominal:      General: There is no distension. Palpations: Abdomen is soft. Tenderness: There is no abdominal tenderness. Musculoskeletal: Normal range of motion. Neurological:      Mental Status: He is alert and oriented to person, place, and time. Cranial Nerves: No cranial nerve deficit. Sensory: No sensory deficit. Psychiatric:         Behavior: Behavior normal.         DIFFERENTIAL  DIAGNOSIS     PLAN (LABS / IMAGING / EKG):  Orders Placed This Encounter   Procedures    POCT Glucose       MEDICATIONS ORDERED:  No orders of the defined types were placed in this encounter. DDX: Hyperglycemia, medication refill    DIAGNOSTIC RESULTS / EMERGENCY DEPARTMENT COURSE / MDM   LAB RESULTS:  No results found for this visit on 09/26/20. IMPRESSION: 78-year-old male who is a known type I diabetic for the past 8 or 9 years, unable to obtain insulin. Patient reports feeling like his blood glucose is elevated, will obtain point-of-care glucose. No other complaints. Will consult social work for obtaining insulin. RADIOLOGY:      EKG      All EKG's are interpreted by the Emergency Department Physician who either signs or Co-signs this chart in the absence of a cardiologist.    EMERGENCY DEPARTMENT COURSE:  Patient came to emergency department, HPI and physical exam were conducted. All nursing notes were reviewed. Social work discussed with patient options for obtaining insulin. Patient was not happy with options discussed, left the emergency department before treatment was complete. Unable to get point-of-care glucose prior to patient leaving.     Attempted to call patient on his phone, left voicemail. Notify patient that I would write him a prescription for insulin which he could take to pharmacy and attempt to fill this prescription. If patient returns to the emergency department, recommend writing prescription for new insulin requirements so that he can get the insulin he requires to live. PROCEDURES:      CONSULTS:  None    CRITICAL CARE:  Please see attending note    FINAL IMPRESSION      1. Encounter for medication refill          DISPOSITION / PLAN     DISPOSITION eloped      PATIENT REFERRED TO:  No follow-up provider specified.     DISCHARGE MEDICATIONS:  Discharge Medication List as of 9/26/2020  1:02 PM          Justin Livingston MD  Emergency Medicine Resident    (Please note that portions of thisnote were completed with a voice recognition program.  Efforts were made to edit the dictations but occasionally words are mis-transcribed.)       Justin Livingston MD  Resident  09/26/20 [id number]

## 2020-09-26 NOTE — ED NOTES
Sw was consulted for Pt. Pt has run out of insulin and the pharmacy will not refill it due to insurance barriers. Pt reported he ran out yesterday (9/25/20) and is not scheduled to get it filled until 10/4/20. Sw called his pharmacy who reported his one month script was filled on the 14th and that in order to get a new script filled he would need to set up a new appointment with his primary care. This was due to him running out of his insulin quickly and needing to have everything reevaluated. They reported once his primary care appointment has been completed they can fill a script. This information was relayed to the pt who had no other questions.      Note entered by Nisreen Rojas [place]   MSW [address]  09/26/20 8093

## 2020-09-26 NOTE — ED PROVIDER NOTES
[facility] ED     Emergency Department     Faculty Attestation    I performed a history and physical examination of the patient and discussed management with the resident. I reviewed the residents note and agree with the documented findings and plan of care. Any areas of disagreement are noted on the chart. I was personally present for the key portions of any procedures. I have documented in the chart those procedures where I was not present during the key portions. I have reviewed the emergency nurses triage note. I agree with the chief complaint, past medical history, past surgical history, allergies, medications, social and family history as documented unless otherwise noted below. For Physician Assistant/ Nurse Practitioner cases/documentation I have personally evaluated this patient and have completed at least one if not all key elements of the E/M (history, physical exam, and MDM). Additional findings are as noted. This patient was evaluated in the Emergency Department for symptoms described in the history of present illness. He/she was evaluated in the context of the global COVID-19 pandemic, which necessitated consideration that the patient might be at risk for infection with the SARS-CoV-2 virus that causes COVID-19. Institutional protocols and algorithms that pertain to the evaluation of patients at risk for COVID-19 are in a state of rapid change based on information released by regulatory bodies including the CDC and federal and state organizations. These policies and algorithms were followed during the patient's care in the ED. Ran out of insulin last night, states couldn't refill at the pharmacy. No other complaints. Will have LSW see patient for assistance.       Critical Care     non    Freeman Gutierrez MD, Odalys Middleton  Attending Emergency  Physician              Freeman Gutierrez MD  09/26/20 [id number]

## 2020-09-28 ENCOUNTER — TELEPHONE (OUTPATIENT)
Dept: FAMILY MEDICINE CLINIC | Age: 20
End: 2020-09-28

## 2020-09-28 NOTE — TELEPHONE ENCOUNTER
[facility]) ED Follow up Call    Reason for ED visit:           Ray Prakash , this is Dilan Gilmore from Dr. Camden Steele office, just calling to see how you are doing after your recent ED visit. Did you receive discharge instructions? Yes  Do you understand the discharge instructions? Yes  Did the ED give you any new prescriptions? Yes  Were you able to fill your prescriptions? Yes      Do you have one of our red, yellow and green  Zone sheets that help you to determine when you should go to the ED? Yes      Do you need or want to make a follow up appt with your PCP? Yes    Do you have any further needs in the home i.e. Equipment?   No        FU appts/Provider:    Future Appointments   Date Time Provider Hans Zheng   9/28/2020  1:15 PM JOSÉ LUIS Canales - CNP fp sc [facility]

## 2020-09-30 ENCOUNTER — HOSPITAL ENCOUNTER (INPATIENT)
Age: 20
LOS: 2 days | Discharge: HOME | DRG: 420 | End: 2020-10-02
Attending: EMERGENCY MEDICINE | Admitting: INTERNAL MEDICINE
Payer: MEDICARE

## 2020-09-30 PROBLEM — E10.10 DKA, TYPE 1, NOT AT GOAL (HCC): Status: ACTIVE | Noted: 2020-09-30

## 2020-09-30 LAB
-: ABNORMAL
ALLEN TEST: ABNORMAL
AMORPHOUS: ABNORMAL
ANION GAP SERPL CALCULATED.3IONS-SCNC: 14 MMOL/L (ref 9–17)
ANION GAP SERPL CALCULATED.3IONS-SCNC: 24 MMOL/L (ref 9–17)
ANION GAP: 10 MMOL/L (ref 7–16)
BACTERIA: ABNORMAL
BETA-HYDROXYBUTYRATE: 7.07 MMOL/L (ref 0.02–0.27)
BILIRUBIN URINE: NEGATIVE
BUN BLDV-MCNC: 14 MG/DL (ref 6–20)
BUN BLDV-MCNC: 18 MG/DL (ref 6–20)
BUN/CREAT BLD: ABNORMAL (ref 9–20)
BUN/CREAT BLD: ABNORMAL (ref 9–20)
CALCIUM SERPL-MCNC: 8.3 MG/DL (ref 8.6–10.4)
CALCIUM SERPL-MCNC: 9.9 MG/DL (ref 8.6–10.4)
CASTS UA: ABNORMAL /LPF (ref 0–8)
CHLORIDE BLD-SCNC: 100 MMOL/L (ref 98–107)
CHLORIDE BLD-SCNC: 92 MMOL/L (ref 98–107)
CHP ED QC CHECK: NORMAL
CO2: 15 MMOL/L (ref 20–31)
CO2: 18 MMOL/L (ref 20–31)
COLOR: YELLOW
CREAT SERPL-MCNC: 0.64 MG/DL (ref 0.7–1.2)
CREAT SERPL-MCNC: 0.87 MG/DL (ref 0.7–1.2)
CRYSTALS, UA: ABNORMAL /HPF
EPITHELIAL CELLS UA: ABNORMAL /HPF (ref 0–5)
FIO2: ABNORMAL
GFR AFRICAN AMERICAN: >60 ML/MIN
GFR AFRICAN AMERICAN: >60 ML/MIN
GFR NON-AFRICAN AMERICAN: >60 ML/MIN
GFR SERPL CREATININE-BSD FRML MDRD: >60 ML/MIN
GFR SERPL CREATININE-BSD FRML MDRD: ABNORMAL ML/MIN/{1.73_M2}
GFR SERPL CREATININE-BSD FRML MDRD: NORMAL ML/MIN/{1.73_M2}
GLUCOSE BLD-MCNC: 123 MG/DL (ref 75–110)
GLUCOSE BLD-MCNC: 127 MG/DL (ref 75–110)
GLUCOSE BLD-MCNC: 139 MG/DL (ref 75–110)
GLUCOSE BLD-MCNC: 142 MG/DL (ref 75–110)
GLUCOSE BLD-MCNC: 143 MG/DL (ref 75–110)
GLUCOSE BLD-MCNC: 144 MG/DL (ref 75–110)
GLUCOSE BLD-MCNC: 150 MG/DL (ref 70–99)
GLUCOSE BLD-MCNC: 157 MG/DL (ref 75–110)
GLUCOSE BLD-MCNC: 177 MG/DL (ref 75–110)
GLUCOSE BLD-MCNC: 267 MG/DL
GLUCOSE BLD-MCNC: 267 MG/DL (ref 75–110)
GLUCOSE BLD-MCNC: 410 MG/DL (ref 75–110)
GLUCOSE BLD-MCNC: 421 MG/DL (ref 75–110)
GLUCOSE BLD-MCNC: 446 MG/DL (ref 70–99)
GLUCOSE BLD-MCNC: 471 MG/DL (ref 74–100)
GLUCOSE BLD-MCNC: 91 MG/DL (ref 75–110)
GLUCOSE URINE: ABNORMAL
HCO3 VENOUS: 17.8 MMOL/L (ref 22–29)
HCT VFR BLD CALC: 48.7 % (ref 40.7–50.3)
HEMOGLOBIN: 15.4 G/DL (ref 13–17)
KETONES, URINE: ABNORMAL
LEUKOCYTE ESTERASE, URINE: NEGATIVE
MAGNESIUM: 1.9 MG/DL (ref 1.6–2.6)
MCH RBC QN AUTO: 28.1 PG (ref 25.2–33.5)
MCHC RBC AUTO-ENTMCNC: 31.6 G/DL (ref 28.4–34.8)
MCV RBC AUTO: 88.9 FL (ref 82.6–102.9)
MODE: ABNORMAL
MUCUS: ABNORMAL
NEGATIVE BASE EXCESS, VEN: 8 (ref 0–2)
NITRITE, URINE: NEGATIVE
NRBC AUTOMATED: 0 PER 100 WBC
O2 DEVICE/FLOW/%: ABNORMAL
O2 SAT, VEN: 65 % (ref 60–85)
OTHER OBSERVATIONS UA: ABNORMAL
PATIENT TEMP: ABNORMAL
PCO2, VEN: 37 MM HG (ref 41–51)
PDW BLD-RTO: 12.2 % (ref 11.8–14.4)
PH UA: 5 (ref 5–8)
PH VENOUS: 7.29 (ref 7.32–7.43)
PHOSPHORUS: 3.5 MG/DL (ref 2.5–4.5)
PLATELET # BLD: 343 K/UL (ref 138–453)
PMV BLD AUTO: 11.6 FL (ref 8.1–13.5)
PO2, VEN: 37.1 MM HG (ref 30–50)
POC CHLORIDE: 103 MMOL/L (ref 98–107)
POC CREATININE: 0.68 MG/DL (ref 0.51–1.19)
POC HEMATOCRIT: 51 % (ref 41–53)
POC HEMOGLOBIN: 17.3 G/DL (ref 13.5–17.5)
POC IONIZED CALCIUM: 1.06 MMOL/L (ref 1.15–1.33)
POC LACTIC ACID: 2.16 MMOL/L (ref 0.56–1.39)
POC PCO2 TEMP: ABNORMAL MM HG
POC PH TEMP: ABNORMAL
POC PO2 TEMP: ABNORMAL MM HG
POC POTASSIUM: 4.7 MMOL/L (ref 3.5–4.5)
POC SODIUM: 131 MMOL/L (ref 138–146)
POSITIVE BASE EXCESS, VEN: ABNORMAL (ref 0–3)
POTASSIUM SERPL-SCNC: 4.3 MMOL/L (ref 3.7–5.3)
POTASSIUM SERPL-SCNC: 4.9 MMOL/L (ref 3.7–5.3)
PROTEIN UA: ABNORMAL
RBC # BLD: 5.48 M/UL (ref 4.21–5.77)
RBC UA: ABNORMAL /HPF (ref 0–4)
RENAL EPITHELIAL, UA: ABNORMAL /HPF
SAMPLE SITE: ABNORMAL
SODIUM BLD-SCNC: 131 MMOL/L (ref 135–144)
SODIUM BLD-SCNC: 132 MMOL/L (ref 135–144)
SPECIFIC GRAVITY UA: 1.03 (ref 1–1.03)
TOTAL CO2, VENOUS: 19 MMOL/L (ref 23–30)
TRICHOMONAS: ABNORMAL
TURBIDITY: CLEAR
URINE HGB: NEGATIVE
UROBILINOGEN, URINE: NORMAL
WBC # BLD: 3.8 K/UL (ref 3.5–11.3)
WBC UA: ABNORMAL /HPF (ref 0–5)
YEAST: ABNORMAL

## 2020-09-30 PROCEDURE — 85027 COMPLETE CBC AUTOMATED: CPT

## 2020-09-30 PROCEDURE — 99285 EMERGENCY DEPT VISIT HI MDM: CPT

## 2020-09-30 PROCEDURE — 82010 KETONE BODYS QUAN: CPT

## 2020-09-30 PROCEDURE — 83735 ASSAY OF MAGNESIUM: CPT

## 2020-09-30 PROCEDURE — 36415 COLL VENOUS BLD VENIPUNCTURE: CPT

## 2020-09-30 PROCEDURE — 82803 BLOOD GASES ANY COMBINATION: CPT

## 2020-09-30 PROCEDURE — 82435 ASSAY OF BLOOD CHLORIDE: CPT

## 2020-09-30 PROCEDURE — 85014 HEMATOCRIT: CPT

## 2020-09-30 PROCEDURE — 2060000000 HC ICU INTERMEDIATE R&B

## 2020-09-30 PROCEDURE — 2580000003 HC RX 258: Performed by: STUDENT IN AN ORGANIZED HEALTH CARE EDUCATION/TRAINING PROGRAM

## 2020-09-30 PROCEDURE — 2580000003 HC RX 258: Performed by: EMERGENCY MEDICINE

## 2020-09-30 PROCEDURE — 81001 URINALYSIS AUTO W/SCOPE: CPT

## 2020-09-30 PROCEDURE — 84295 ASSAY OF SERUM SODIUM: CPT

## 2020-09-30 PROCEDURE — 84132 ASSAY OF SERUM POTASSIUM: CPT

## 2020-09-30 PROCEDURE — 84100 ASSAY OF PHOSPHORUS: CPT

## 2020-09-30 PROCEDURE — 82947 ASSAY GLUCOSE BLOOD QUANT: CPT

## 2020-09-30 PROCEDURE — 83605 ASSAY OF LACTIC ACID: CPT

## 2020-09-30 PROCEDURE — 82330 ASSAY OF CALCIUM: CPT

## 2020-09-30 PROCEDURE — 6360000002 HC RX W HCPCS: Performed by: EMERGENCY MEDICINE

## 2020-09-30 PROCEDURE — 82565 ASSAY OF CREATININE: CPT

## 2020-09-30 PROCEDURE — 80048 BASIC METABOLIC PNL TOTAL CA: CPT

## 2020-09-30 PROCEDURE — 6370000000 HC RX 637 (ALT 250 FOR IP): Performed by: EMERGENCY MEDICINE

## 2020-09-30 RX ORDER — DEXTROSE MONOHYDRATE 50 MG/ML
100 INJECTION, SOLUTION INTRAVENOUS PRN
Status: DISCONTINUED | OUTPATIENT
Start: 2020-09-30 | End: 2020-10-02 | Stop reason: HOSPADM

## 2020-09-30 RX ORDER — DEXTROSE AND SODIUM CHLORIDE 5; .45 G/100ML; G/100ML
INJECTION, SOLUTION INTRAVENOUS CONTINUOUS
Status: DISCONTINUED | OUTPATIENT
Start: 2020-09-30 | End: 2020-10-01

## 2020-09-30 RX ORDER — 0.9 % SODIUM CHLORIDE 0.9 %
1000 INTRAVENOUS SOLUTION INTRAVENOUS ONCE
Status: COMPLETED | OUTPATIENT
Start: 2020-09-30 | End: 2020-09-30

## 2020-09-30 RX ORDER — DEXTROSE MONOHYDRATE 25 G/50ML
12.5 INJECTION, SOLUTION INTRAVENOUS PRN
Status: DISCONTINUED | OUTPATIENT
Start: 2020-09-30 | End: 2020-10-02 | Stop reason: HOSPADM

## 2020-09-30 RX ORDER — MAGNESIUM SULFATE IN WATER 40 MG/ML
2 INJECTION, SOLUTION INTRAVENOUS EVERY 4 HOURS PRN
Status: DISCONTINUED | OUTPATIENT
Start: 2020-09-30 | End: 2020-10-01

## 2020-09-30 RX ORDER — POTASSIUM CHLORIDE 20 MEQ/1
40 TABLET, EXTENDED RELEASE ORAL PRN
Status: DISCONTINUED | OUTPATIENT
Start: 2020-09-30 | End: 2020-10-02 | Stop reason: HOSPADM

## 2020-09-30 RX ORDER — POTASSIUM CHLORIDE 7.45 MG/ML
10 INJECTION INTRAVENOUS PRN
Status: DISCONTINUED | OUTPATIENT
Start: 2020-09-30 | End: 2020-10-02 | Stop reason: HOSPADM

## 2020-09-30 RX ORDER — ONDANSETRON 2 MG/ML
4 INJECTION INTRAMUSCULAR; INTRAVENOUS ONCE
Status: COMPLETED | OUTPATIENT
Start: 2020-09-30 | End: 2020-09-30

## 2020-09-30 RX ORDER — NICOTINE POLACRILEX 4 MG
15 LOZENGE BUCCAL PRN
Status: DISCONTINUED | OUTPATIENT
Start: 2020-09-30 | End: 2020-10-02 | Stop reason: HOSPADM

## 2020-09-30 RX ADMIN — DEXTROSE AND SODIUM CHLORIDE: 5; 450 INJECTION, SOLUTION INTRAVENOUS at 22:57

## 2020-09-30 RX ADMIN — SODIUM CHLORIDE 1000 ML: 9 INJECTION, SOLUTION INTRAVENOUS at 11:29

## 2020-09-30 RX ADMIN — DEXTROSE AND SODIUM CHLORIDE: 5; 450 INJECTION, SOLUTION INTRAVENOUS at 14:48

## 2020-09-30 RX ADMIN — SODIUM CHLORIDE 0.1 UNITS/KG/HR: 9 INJECTION, SOLUTION INTRAVENOUS at 12:53

## 2020-09-30 RX ADMIN — SODIUM CHLORIDE 1000 ML: 9 INJECTION, SOLUTION INTRAVENOUS at 12:52

## 2020-09-30 RX ADMIN — ONDANSETRON 4 MG: 2 INJECTION INTRAMUSCULAR; INTRAVENOUS at 14:40

## 2020-09-30 ASSESSMENT — PAIN SCALES - GENERAL: PAINLEVEL_OUTOF10: 0

## 2020-09-30 ASSESSMENT — ENCOUNTER SYMPTOMS
VOMITING: 1
COUGH: 0
SHORTNESS OF BREATH: 0
CONSTIPATION: 0
WHEEZING: 0
NAUSEA: 1
RHINORRHEA: 0
ABDOMINAL DISTENTION: 0
SORE THROAT: 0
DIARRHEA: 0

## 2020-09-30 NOTE — ED NOTES
Writer met with sister and patient at bedside regarding concerns for medication management. Patient states that he was only given 2 Novolog pens when he was supposed to get more. Patient and sister both report that patient is responsible regarding management of his diabetes and feel that this is a pharmacy/insurance/PCP issue. Writer consulted with ED pharmacist regarding patient report, to understand more about how this medication is prescribed and any alternative solutions to assist patient as he is unable to fill prescription of Novolog until October 4th. Writer relayed information from pharmacist to patient (see pharmacy note) regarding the active prescription for Basaglar. Patient states that he did ask his doctor about needing this prescription and then received an notification from the pharmacy that the prescription was denied. Patient and sister both reporting understanding of need for taking all medications that are prescribed. Writer talked with physician regarding plan if patient is discharged. Patient reports no further details.       MARU Birch  09/30/20 [id number]

## 2020-09-30 NOTE — PROGRESS NOTES
Contacted [facility] pharmacy, spoke to pharmacist about access to insulin products. Stated 2 novolog pens were picked up, using 24 units daily should last til 10/9 but insurance allows fill on 10/4. [address] has not been filled in nearly a year, and he does not have an active rx for it. Relayed info to ARIADNA and MD.    Dena Sands, Pharm. D.

## 2020-09-30 NOTE — ED PROVIDER NOTES
[facility] ED  Emergency Department        Pt Name: Maureen Hernandez  MRN: [id number]  [place] 2000  Date of evaluation: 9/30/20    CHIEF COMPLAINT       Chief Complaint   Patient presents with    Emesis     and weakness out of insulin x 3 days        HISTORY OF PRESENT ILLNESS  (Location/Symptom, Timing/Onset, Context/Setting, Quality, Duration, ModifyingFactors, Severity.)      Maureen Hernandez is a 23 y.o. male who presents with not having insulin meds for the past 3 days vomiting this morning. He is type I diabetic, also feeling weak, states right I did not give him all of the medications when he had it filled a week ago. And he ran out 3 days ago. He is accompanied by his sister. No fevers or chills no abdominal pain. POCT upon arrival is [address] / SURGICAL / SOCIAL / FAMILY HISTORY      has a past medical history of Diabetes mellitus ([facility] [address] and Type 1 diabetes mellitus ([facility] [address] has no past surgical history on file.        Social History     Socioeconomic History    Marital status: Single     Spouse name: Not on file    Number of children: Not on file    Years of education: Not on file    Highest education level: Not on file   Occupational History     Employer: N/A   Social Needs    Financial resource strain: Not on file    Food insecurity     Worry: Not on file     Inability: Not on file    Transportation needs     Medical: Not on file     Non-medical: Not on file   Tobacco Use    Smoking status: Current Some Day Smoker     Packs/day: 0.25     Years: 2.00     Pack years: 0.50     Types: Cigarettes    Smokeless tobacco: Never Used   Substance and Sexual Activity    Alcohol use: No    Drug use: No    Sexual activity: Never   Lifestyle    Physical activity     Days per week: Not on file     Minutes per session: Not on file    Stress: Not on file   Relationships    Social connections     Talks on phone: Not on file     Gets together: Not on file     Attends Yarsanism service: Not on file     Active member of club or organization: Not on file     Attends meetings of clubs or organizations: Not on file     Relationship status: Not on file    Intimate partner violence     Fear of current or ex partner: Not on file     Emotionally abused: Not on file     Physically abused: Not on file     Forced sexual activity: Not on file   Other Topics Concern    Not on file   Social History Narrative    ** Merged History Encounter **            Family History   Problem Relation Age of Onset    Asthma Father        Allergies:  Patient has no known allergies. Home Medications:  Prior to Admission medications    Medication Sig Start Date End Date Taking?  Authorizing Provider   insulin glargine [facility]) 100 UNIT/ML injection pen Inject 30 Units into the skin nightly 7/16/20   JOSÉ LUIS Garrett NP   blood glucose test strips (ONE TOUCH ULTRA TEST) strip Sig: For blood testing 4-6 times/day 7/16/20   JOSÉ LUIS Garrett NP   Insulin Pen Needle 30G X 5 MM MISC 1 Device by Does not apply route 4 times daily 7/16/20   JOSÉ LUIS Garrett NP   insulin aspart (NOVOLOG FLEXPEN) 100 UNIT/ML injection pen Inject 1-11 Units into the skin 3 times daily (before meals) 1 unit per 15 grams CHO for meals and snacks  Sliding scale: =0, 125-150=1, 151-175=2, 175-200=3, 201-225=4, 226-250=5, 251-275=6, 276-300=7, 301-325=8, 326-350=9, 351-375=10, 376-400=11 7/16/20   JOSÉ LUIS Garrett NP   Ketone Blood Test STRP 1 strip by Does not apply route daily as needed (for high blood sugar) 7/16/20   JOSÉ LUIS Garrett NP   Lancets MISC 1 each by Does not apply route 4 times daily (before meals and nightly) 7/16/20   JOSÉ LUIS Garrett NP   EPINEPHrine (Ora D[place]er 2-ITALO) 0.15 MG/0.3ML SOAJ Use as directed for allergic reaction  Patient not taking: Reported on 6/25/2020 9/23/19   Paula Son MD   acetaminophen (TYLENOL) 325 MG tablet Take 2 tablets by mouth every 6 hours as needed for Pain DIAGNOSTIC RESULTS / EMERGENCY DEPARTMENT COURSE / MDM     LABS:  Results for orders placed or performed during the hospital encounter of 09/30/20   CBC   Result Value Ref Range    WBC 3.8 3.5 - 11.3 k/uL    RBC 5.48 4.21 - 5.77 m/uL    Hemoglobin 15.4 13.0 - 17.0 g/dL    Hematocrit 48.7 40.7 - 50.3 %    MCV 88.9 82.6 - 102.9 fL    MCH 28.1 25.2 - 33.5 pg    MCHC 31.6 28.4 - 34.8 g/dL    RDW 12.2 11.8 - 14.4 %    Platelets 673 702 - 612 k/uL    MPV 11.6 8.1 - 13.5 fL    NRBC Automated 0.0 0.0 per 100 WBC   BASIC METABOLIC PANEL   Result Value Ref Range    Glucose 446 (HH) 70 - 99 mg/dL    BUN 18 6 - 20 mg/dL    CREATININE 0.87 0.70 - 1.20 mg/dL    Bun/Cre Ratio NOT REPORTED 9 - 20    Calcium 9.9 8.6 - 10.4 mg/dL    Sodium 131 (L) 135 - 144 mmol/L    Potassium 4.9 3.7 - 5.3 mmol/L    Chloride 92 (L) 98 - 107 mmol/L    CO2 15 (L) 20 - 31 mmol/L    Anion Gap 24 (H) 9 - 17 mmol/L    GFR Non-African American >60 >60 mL/min    GFR African American >60 >60 mL/min    GFR Comment          GFR Staging NOT REPORTED    Beta-Hydroxybutyrate   Result Value Ref Range    Beta-Hydroxybutyrate 7.07 (H) 0.02 - 0.27 mmol/L   Hemoglobin and hematocrit, blood   Result Value Ref Range    POC Hemoglobin 17.3 13.5 - 17.5 g/dL    POC Hematocrit 51 41 - 53 %   SODIUM (POC)   Result Value Ref Range    POC Sodium 131 (L) 138 - 146 mmol/L   POTASSIUM (POC)   Result Value Ref Range    POC Potassium 4.7 (H) 3.5 - 4.5 mmol/L   CHLORIDE (POC)   Result Value Ref Range    POC Chloride 103 98 - 107 mmol/L   CALCIUM, IONIC (POC)   Result Value Ref Range    POC Ionized Calcium 1.06 (L) 1.15 - 1.33 mmol/L   POC Glucose Fingerstick   Result Value Ref Range    POC Glucose 421 (HH) 75 - 110 mg/dL   Venous Blood Gas, POC   Result Value Ref Range    pH, [place] 7.292 (L) 7.320 - 7.430    pCO2, [place] 37.0 (L) 41.0 - 51.0 mm Hg    pO2, [place] 37.1 30.0 - 50.0 mm Hg    HCO3, Venous 17.8 (L) 22.0 - 29.0 mmol/L    Total CO2, Venous 19 (L) 23.0 - 30.0 mmol/L Negative Base Excess, [place] 8 (H) 0.0 - 2.0    Positive Base Excess, Flaquito NOT REPORTED 0.0 - 3.0    O2 Sat, [place] 65 60.0 - 85.0 %    O2 Device/Flow/% NOT REPORTED     Shane Test NOT REPORTED     Sample Site NOT REPORTED     Mode NOT REPORTED     FIO2 NOT REPORTED     Pt Temp NOT REPORTED     POC pH Temp NOT REPORTED     POC pCO2 Temp NOT REPORTED mm Hg    POC pO2 Temp NOT REPORTED mm Hg   Creatinine W/GFR Point of Care   Result Value Ref Range    POC Creatinine 0.68 0.51 - 1.19 mg/dL    GFR Comment >60 >60 mL/min    GFR Non-African American >60 >60 mL/min    GFR Comment         Lactic Acid, POC   Result Value Ref Range    POC Lactic Acid 2.16 (H) 0.56 - 1.39 mmol/L   POCT Glucose   Result Value Ref Range    POC Glucose 471 (HH) 74 - 100 mg/dL   Anion Gap (Calc) POC   Result Value Ref Range    Anion Gap 10 7 - 16 mmol/L       IMPRESSION: DKA      EMERGENCY DEPARTMENT COURSE:  Patient with ag, and acidosis on labs, plan for insulin drip and admission, updated patient who is sleeping on the gurney, and his sister. 12:19 PM EDT  sPoke with medicine resident and plan fro admission, orders placed at this time    CONSULTS:  Armand Bartholomew      1. Diabetic ketoacidosis without coma associated with type 1 diabetes mellitus ([facility] [address]          DISPOSITION / PLAN     DISPOSITION        PATIENT REFERRED TO:  No follow-up provider specified.     DISCHARGE MEDICATIONS:  New Prescriptions    No medications on file       Marcy Fernandez  12:19 PM    Attending Emergency Physician  [facility] ED    (Please note that portions of this note were completed with a voice recognition program.  Corrine David made to edit the dictations but occasionally words are mis-transcribed.)              Daniel Hernandez, DO  09/30/20 [facility], DO  09/30/20 2044

## 2020-09-30 NOTE — PLAN OF CARE
Problem: Fluid Volume - Imbalance:  Goal: Absence of imbalanced fluid volume signs and symptoms  Description: Absence of imbalanced fluid volume signs and symptoms  Outcome: Ongoing  Goal: Absence of nausea/vomiting  Description: Absence of nausea/vomiting  Outcome: Ongoing     Problem: Discharge Planning:  Goal: Discharged to appropriate level of care  Description: Discharged to appropriate level of care  Outcome: Ongoing     Problem: Serum Glucose Level - Abnormal:  Goal: Ability to maintain appropriate glucose levels will improve  Description: Ability to maintain appropriate glucose levels will improve  Outcome: Ongoing     Problem: Sensory Perception - Impaired:  Goal: Ability to maintain a stable neurologic state will improve  Description: Ability to maintain a stable neurologic state will improve  Outcome: Ongoing

## 2020-09-30 NOTE — ED NOTES
Dextrose started per Dr. Thu Enrique for BS of 267. Pt to be transferred to the floor.              Landry Quigley RN  09/30/20 2652

## 2020-09-30 NOTE — ED NOTES
Pt  Came in today with c/o N/V x 1 day. Pt c/o weakness. Pt has been out of insulin x 3 days. Pt is having a discrepancy with pharmacy and get prescriptions until 10/4. Pt was seen a few days ago and given a shot of insulin and d/c. Pt placed on BP cuff and pulse ox. Dr. Kevin Estevez at bedside. .       Edy Muñoz, RN  09/30/20 1683

## 2020-09-30 NOTE — H&P
Dermatological ROS:  Completed and except as mentioned above were negative  Psychological ROS:  Completed and except as mentioned above were negative    PAST MEDICAL HISTORY     Past Medical History:   Diagnosis Date    Diabetes mellitus ([facility] [address]     Type 1 diabetes mellitus ([facility] [address] April 2013       PAST SURGICAL HISTORY     History reviewed. No pertinent surgical history. ALLERGIES     Patient has no known allergies. MEDICATIONS PRIOR TO ADMISSION     Prior to Admission medications    Medication Sig Start Date End Date Taking?  Authorizing Provider   insulin glargine [facility]) 100 UNIT/ML injection pen Inject 30 Units into the skin nightly 7/16/20   JOSÉ LUIS Nichole NP   blood glucose test strips (ONE TOUCH ULTRA TEST) strip Sig: For blood testing 4-6 times/day 7/16/20   JOSÉ LUIS Nichole NP   Insulin Pen Needle 30G X 5 MM MISC 1 Device by Does not apply route 4 times daily 7/16/20   JOSÉ LUIS Nichole NP   insulin aspart (NOVOLOG FLEXPEN) 100 UNIT/ML injection pen Inject 1-11 Units into the skin 3 times daily (before meals) 1 unit per 15 grams CHO for meals and snacks  Sliding scale: =0, 125-150=1, 151-175=2, 175-200=3, 201-225=4, 226-250=5, 251-275=6, 276-300=7, 301-325=8, 326-350=9, 351-375=10, 376-400=11 7/16/20   JOSÉ LUIS Nichole NP   Ketone Blood Test STRP 1 strip by Does not apply route daily as needed (for high blood sugar) 7/16/20   JOSÉ LUIS Nichole NP   Lancets MISC 1 each by Does not apply route 4 times daily (before meals and nightly) 7/16/20   JOSÉ LUIS Nichole NP   EPINEPHrine (Venda Files 2-ITALO) 0.15 MG/0.3ML SOAJ Use as directed for allergic reaction  Patient not taking: Reported on 6/25/2020 9/23/19   Kale Laurent MD   acetaminophen (TYLENOL) 325 MG tablet Take 2 tablets by mouth every 6 hours as needed for Pain 10/22/17   Emilie Estrella DO   ibuprofen (ADVIL;MOTRIN) 200 MG tablet Take 3 tablets by mouth every 8 hours as needed for Pain or Fever 10/22/17   Emilie Feliciano DO Sameer   glucagon (GLUCAGON EMERGENCY) 1 MG injection As directed for extreme hypoglycemia  Patient not taking: Reported on 2020   Armay SpringDO jeremy       SOCIAL HISTORY     Tobacco: one cigarette daily      FAMILY HISTORY     Family History   Problem Relation Age of Onset    Asthma Father        PHYSICAL EXAM     Vitals: /66   Pulse 55   Temp 97.7 °F (36.5 °C) (Oral)   Resp 16   Ht 5' 11\" (1.803 m)   Wt 140 lb (63.5 kg)   SpO2 97%   BMI 19.53 kg/m²   Tmax: Temp (24hrs), Av.7 °F (36.5 °C), Min:97.7 °F (36.5 °C), Max:97.7 °F (36.5 °C)    Last Body weight:   Wt Readings from Last 3 Encounters:   20 140 lb (63.5 kg)   20 140 lb (63.5 kg)   07/15/20 140 lb (63.5 kg)     Body Mass Index : Body mass index is 19.53 kg/m². PHYSICAL EXAMINATION:  Constitutional: This is a well developed, well nourished, 18.5-24.9 - Normal 23y.o. year old male who is alert, oriented, cooperative and in no apparent distress. Head:normocephalic and atraumatic. EENT:  PERRLA. No conjunctival injections. Septum was midline, mucosa was without erythema, exudates or cobblestoning. No thrush was noted. Neck: Supple without thyromegaly. No elevated JVP. Trachea was midline. Respiratory: Chest was symmetrical without dullness to percussion. Breath sounds bilaterally were clear to auscultation. There were no wheezes, rhonchi or rales. There is no intercostal retraction or use of accessory muscles. No egophony noted. Cardiovascular: Regular without murmur, clicks, gallops or rubs. Abdomen: Slightly rounded and soft without organomegaly. No rebound, rigidity or guarding was appreciated. Lymphatic: No lymphadenopathy. Musculoskeletal: Normal curvature of the spine. No gross muscle weakness. Extremities:  No lower extremity edema, ulcerations, tenderness, varicosities or erythema. Muscle size, tone and strength are normal.  No involuntary movements are noted.     Skin:  Warm and dry.  Good color, turgor and pigmentation. No lesions or scars.   No cyanosis or clubbing  Neurological/Psychiatric: The patient's general behavior, level of consciousness, thought content and emotional status is normal.          INVESTIGATIONS     Laboratory Testing:     Recent Results (from the past 24 hour(s))   POC Glucose Fingerstick    Collection Time: 09/30/20 11:11 AM   Result Value Ref Range    POC Glucose 421 (HH) 75 - 110 mg/dL   Venous Blood Gas, POC    Collection Time: 09/30/20 11:21 AM   Result Value Ref Range    pH, [place]quito 7.292 (L) 7.320 - 7.430    pCO2, [place] 37.0 (L) 41.0 - 51.0 mm Hg    pO2, [place] 37.1 30.0 - 50.0 mm Hg    HCO3, Venous 17.8 (L) 22.0 - 29.0 mmol/L    Total CO2, Venous 19 (L) 23.0 - 30.0 mmol/L    Negative Base Excess, [place] 8 (H) 0.0 - 2.0    Positive Base Excess, [place]quito NOT REPORTED 0.0 - 3.0    O2 Sat, [place] 65 60.0 - 85.0 %    O2 Device/Flow/% NOT REPORTED     Shane Test NOT REPORTED     Sample Site NOT REPORTED     Mode NOT REPORTED     FIO2 NOT REPORTED     Pt Temp NOT REPORTED     POC pH Temp NOT REPORTED     POC pCO2 Temp NOT REPORTED mm Hg    POC pO2 Temp NOT REPORTED mm Hg   Hemoglobin and hematocrit, blood    Collection Time: 09/30/20 11:21 AM   Result Value Ref Range    POC Hemoglobin 17.3 13.5 - 17.5 g/dL    POC Hematocrit 51 41 - 53 %   Creatinine W/GFR Point of Care    Collection Time: 09/30/20 11:21 AM   Result Value Ref Range    POC Creatinine 0.68 0.51 - 1.19 mg/dL    GFR Comment >60 >60 mL/min    GFR Non-African American >60 >60 mL/min    GFR Comment         SODIUM (POC)    Collection Time: 09/30/20 11:21 AM   Result Value Ref Range    POC Sodium 131 (L) 138 - 146 mmol/L   POTASSIUM (POC)    Collection Time: 09/30/20 11:21 AM   Result Value Ref Range    POC Potassium 4.7 (H) 3.5 - 4.5 mmol/L   CHLORIDE (POC)    Collection Time: 09/30/20 11:21 AM   Result Value Ref Range    POC Chloride 103 98 - 107 mmol/L   CALCIUM, IONIC (POC)    Collection Time: 09/30/20 11:21 AM   Result Value Ref Range    POC Ionized Calcium 1.06 (L) 1.15 - 1.33 mmol/L   Lactic Acid, POC    Collection Time: 09/30/20 11:21 AM   Result Value Ref Range    POC Lactic Acid 2.16 (H) 0.56 - 1.39 mmol/L   POCT Glucose    Collection Time: 09/30/20 11:21 AM   Result Value Ref Range    POC Glucose 471 (HH) 74 - 100 mg/dL   Anion Gap (Calc) POC    Collection Time: 09/30/20 11:21 AM   Result Value Ref Range    Anion Gap 10 7 - 16 mmol/L   CBC    Collection Time: 09/30/20 11:32 AM   Result Value Ref Range    WBC 3.8 3.5 - 11.3 k/uL    RBC 5.48 4.21 - 5.77 m/uL    Hemoglobin 15.4 13.0 - 17.0 g/dL    Hematocrit 48.7 40.7 - 50.3 %    MCV 88.9 82.6 - 102.9 fL    MCH 28.1 25.2 - 33.5 pg    MCHC 31.6 28.4 - 34.8 g/dL    RDW 12.2 11.8 - 14.4 %    Platelets 060 178 - 210 k/uL    MPV 11.6 8.1 - 13.5 fL    NRBC Automated 0.0 0.0 per 100 WBC   BASIC METABOLIC PANEL    Collection Time: 09/30/20 11:32 AM   Result Value Ref Range    Glucose 446 (HH) 70 - 99 mg/dL    BUN 18 6 - 20 mg/dL    CREATININE 0.87 0.70 - 1.20 mg/dL    Bun/Cre Ratio NOT REPORTED 9 - 20    Calcium 9.9 8.6 - 10.4 mg/dL    Sodium 131 (L) 135 - 144 mmol/L    Potassium 4.9 3.7 - 5.3 mmol/L    Chloride 92 (L) 98 - 107 mmol/L    CO2 15 (L) 20 - 31 mmol/L    Anion Gap 24 (H) 9 - 17 mmol/L    GFR Non-African American >60 >60 mL/min    GFR African American >60 >60 mL/min    GFR Comment          GFR Staging NOT REPORTED    Beta-Hydroxybutyrate    Collection Time: 09/30/20 11:32 AM   Result Value Ref Range    Beta-Hydroxybutyrate 7.07 (H) 0.02 - 0.27 mmol/L   POC Glucose Fingerstick    Collection Time: 09/30/20 12:53 PM   Result Value Ref Range    POC Glucose 410 (HH) 75 - 110 mg/dL       Imaging:   No results found. ASSESSMENT & PLAN     ASSESSMENT / PLAN:     IMPRESSION  This is a 23 y.o. male who presented with nausea and found to have [address] Patient admitted to inpatient status because of need for insulin gtt.      Active Problems:  DM 1: Diagnosed 8 years ago, noncompliant

## 2020-10-01 LAB
ABSOLUTE EOS #: 0.32 K/UL (ref 0–0.44)
ABSOLUTE IMMATURE GRANULOCYTE: <0.03 K/UL (ref 0–0.3)
ABSOLUTE LYMPH #: 2.63 K/UL (ref 1.1–3.7)
ABSOLUTE MONO #: 0.34 K/UL (ref 0.1–1.2)
ANION GAP SERPL CALCULATED.3IONS-SCNC: 10 MMOL/L (ref 9–17)
ANION GAP SERPL CALCULATED.3IONS-SCNC: 10 MMOL/L (ref 9–17)
ANION GAP SERPL CALCULATED.3IONS-SCNC: 11 MMOL/L (ref 9–17)
ANION GAP SERPL CALCULATED.3IONS-SCNC: 7 MMOL/L (ref 9–17)
ANION GAP SERPL CALCULATED.3IONS-SCNC: 9 MMOL/L (ref 9–17)
BASOPHILS # BLD: 1 % (ref 0–2)
BASOPHILS ABSOLUTE: 0.05 K/UL (ref 0–0.2)
BUN BLDV-MCNC: 10 MG/DL (ref 6–20)
BUN BLDV-MCNC: 11 MG/DL (ref 6–20)
BUN BLDV-MCNC: 11 MG/DL (ref 6–20)
BUN BLDV-MCNC: 12 MG/DL (ref 6–20)
BUN BLDV-MCNC: 6 MG/DL (ref 6–20)
BUN BLDV-MCNC: 7 MG/DL (ref 6–20)
BUN/CREAT BLD: ABNORMAL (ref 9–20)
CALCIUM SERPL-MCNC: 7.4 MG/DL (ref 8.6–10.4)
CALCIUM SERPL-MCNC: 7.9 MG/DL (ref 8.6–10.4)
CALCIUM SERPL-MCNC: 8 MG/DL (ref 8.6–10.4)
CALCIUM SERPL-MCNC: 8.1 MG/DL (ref 8.6–10.4)
CALCIUM SERPL-MCNC: 8.2 MG/DL (ref 8.6–10.4)
CALCIUM SERPL-MCNC: 8.4 MG/DL (ref 8.6–10.4)
CALCIUM SERPL-MCNC: 8.5 MG/DL (ref 8.6–10.4)
CALCIUM SERPL-MCNC: 8.5 MG/DL (ref 8.6–10.4)
CHLORIDE BLD-SCNC: 100 MMOL/L (ref 98–107)
CHLORIDE BLD-SCNC: 100 MMOL/L (ref 98–107)
CHLORIDE BLD-SCNC: 102 MMOL/L (ref 98–107)
CHLORIDE BLD-SCNC: 102 MMOL/L (ref 98–107)
CHLORIDE BLD-SCNC: 103 MMOL/L (ref 98–107)
CHLORIDE BLD-SCNC: 103 MMOL/L (ref 98–107)
CHLORIDE BLD-SCNC: 104 MMOL/L (ref 98–107)
CHLORIDE BLD-SCNC: 96 MMOL/L (ref 98–107)
CO2: 18 MMOL/L (ref 20–31)
CO2: 19 MMOL/L (ref 20–31)
CO2: 20 MMOL/L (ref 20–31)
CO2: 20 MMOL/L (ref 20–31)
CO2: 22 MMOL/L (ref 20–31)
CREAT SERPL-MCNC: 0.51 MG/DL (ref 0.7–1.2)
CREAT SERPL-MCNC: 0.52 MG/DL (ref 0.7–1.2)
CREAT SERPL-MCNC: 0.58 MG/DL (ref 0.7–1.2)
CREAT SERPL-MCNC: 0.59 MG/DL (ref 0.7–1.2)
CREAT SERPL-MCNC: 0.59 MG/DL (ref 0.7–1.2)
CREAT SERPL-MCNC: 0.61 MG/DL (ref 0.7–1.2)
CREAT SERPL-MCNC: 0.65 MG/DL (ref 0.7–1.2)
CREAT SERPL-MCNC: 0.7 MG/DL (ref 0.7–1.2)
DIFFERENTIAL TYPE: ABNORMAL
EOSINOPHILS RELATIVE PERCENT: 7 % (ref 1–4)
GFR AFRICAN AMERICAN: >60 ML/MIN
GFR NON-AFRICAN AMERICAN: >60 ML/MIN
GFR SERPL CREATININE-BSD FRML MDRD: ABNORMAL ML/MIN/{1.73_M2}
GLUCOSE BLD-MCNC: 100 MG/DL (ref 75–110)
GLUCOSE BLD-MCNC: 128 MG/DL (ref 75–110)
GLUCOSE BLD-MCNC: 131 MG/DL (ref 70–99)
GLUCOSE BLD-MCNC: 140 MG/DL (ref 75–110)
GLUCOSE BLD-MCNC: 142 MG/DL (ref 75–110)
GLUCOSE BLD-MCNC: 143 MG/DL (ref 75–110)
GLUCOSE BLD-MCNC: 155 MG/DL (ref 70–99)
GLUCOSE BLD-MCNC: 157 MG/DL (ref 75–110)
GLUCOSE BLD-MCNC: 184 MG/DL (ref 70–99)
GLUCOSE BLD-MCNC: 187 MG/DL (ref 75–110)
GLUCOSE BLD-MCNC: 199 MG/DL (ref 70–99)
GLUCOSE BLD-MCNC: 202 MG/DL (ref 75–110)
GLUCOSE BLD-MCNC: 210 MG/DL (ref 75–110)
GLUCOSE BLD-MCNC: 240 MG/DL (ref 70–99)
GLUCOSE BLD-MCNC: 247 MG/DL (ref 70–99)
GLUCOSE BLD-MCNC: 250 MG/DL (ref 75–110)
GLUCOSE BLD-MCNC: 259 MG/DL (ref 75–110)
GLUCOSE BLD-MCNC: 279 MG/DL (ref 70–99)
GLUCOSE BLD-MCNC: 564 MG/DL (ref 70–99)
HCT VFR BLD CALC: 37.4 % (ref 40.7–50.3)
HEMOGLOBIN: 12.6 G/DL (ref 13–17)
IMMATURE GRANULOCYTES: 0 %
LYMPHOCYTES # BLD: 54 % (ref 24–43)
MAGNESIUM: 1.6 MG/DL (ref 1.6–2.6)
MAGNESIUM: 2 MG/DL (ref 1.6–2.6)
MAGNESIUM: 2.1 MG/DL (ref 1.6–2.6)
MAGNESIUM: 6.4 MG/DL (ref 1.6–2.6)
MCH RBC QN AUTO: 29.4 PG (ref 25.2–33.5)
MCHC RBC AUTO-ENTMCNC: 33.7 G/DL (ref 28.4–34.8)
MCV RBC AUTO: 87.2 FL (ref 82.6–102.9)
MONOCYTES # BLD: 7 % (ref 3–12)
NRBC AUTOMATED: 0 PER 100 WBC
PDW BLD-RTO: 12.6 % (ref 11.8–14.4)
PHOSPHORUS: 2.5 MG/DL (ref 2.5–4.5)
PHOSPHORUS: 2.6 MG/DL (ref 2.5–4.5)
PHOSPHORUS: 3.1 MG/DL (ref 2.5–4.5)
PLATELET # BLD: 319 K/UL (ref 138–453)
PLATELET ESTIMATE: ABNORMAL
PMV BLD AUTO: 11.3 FL (ref 8.1–13.5)
POTASSIUM SERPL-SCNC: 3.3 MMOL/L (ref 3.7–5.3)
POTASSIUM SERPL-SCNC: 3.4 MMOL/L (ref 3.7–5.3)
POTASSIUM SERPL-SCNC: 3.4 MMOL/L (ref 3.7–5.3)
POTASSIUM SERPL-SCNC: 3.7 MMOL/L (ref 3.7–5.3)
POTASSIUM SERPL-SCNC: 3.9 MMOL/L (ref 3.7–5.3)
POTASSIUM SERPL-SCNC: 4.1 MMOL/L (ref 3.7–5.3)
POTASSIUM SERPL-SCNC: 4.2 MMOL/L (ref 3.7–5.3)
POTASSIUM SERPL-SCNC: 4.4 MMOL/L (ref 3.7–5.3)
RBC # BLD: 4.29 M/UL (ref 4.21–5.77)
RBC # BLD: ABNORMAL 10*6/UL
SEG NEUTROPHILS: 31 % (ref 36–65)
SEGMENTED NEUTROPHILS ABSOLUTE COUNT: 1.51 K/UL (ref 1.5–8.1)
SODIUM BLD-SCNC: 127 MMOL/L (ref 135–144)
SODIUM BLD-SCNC: 130 MMOL/L (ref 135–144)
SODIUM BLD-SCNC: 130 MMOL/L (ref 135–144)
SODIUM BLD-SCNC: 131 MMOL/L (ref 135–144)
SODIUM BLD-SCNC: 132 MMOL/L (ref 135–144)
SODIUM BLD-SCNC: 134 MMOL/L (ref 135–144)
WBC # BLD: 4.9 K/UL (ref 3.5–11.3)
WBC # BLD: ABNORMAL 10*3/UL

## 2020-10-01 PROCEDURE — 99222 1ST HOSP IP/OBS MODERATE 55: CPT | Performed by: INTERNAL MEDICINE

## 2020-10-01 PROCEDURE — 2060000002 HC BURN ICU INTERMEDIATE R&B

## 2020-10-01 PROCEDURE — 83735 ASSAY OF MAGNESIUM: CPT

## 2020-10-01 PROCEDURE — 6370000000 HC RX 637 (ALT 250 FOR IP): Performed by: STUDENT IN AN ORGANIZED HEALTH CARE EDUCATION/TRAINING PROGRAM

## 2020-10-01 PROCEDURE — 36415 COLL VENOUS BLD VENIPUNCTURE: CPT

## 2020-10-01 PROCEDURE — 80048 BASIC METABOLIC PNL TOTAL CA: CPT

## 2020-10-01 PROCEDURE — 2500000003 HC RX 250 WO HCPCS: Performed by: STUDENT IN AN ORGANIZED HEALTH CARE EDUCATION/TRAINING PROGRAM

## 2020-10-01 PROCEDURE — 2580000003 HC RX 258: Performed by: STUDENT IN AN ORGANIZED HEALTH CARE EDUCATION/TRAINING PROGRAM

## 2020-10-01 PROCEDURE — 84100 ASSAY OF PHOSPHORUS: CPT

## 2020-10-01 PROCEDURE — 85025 COMPLETE CBC W/AUTO DIFF WBC: CPT

## 2020-10-01 PROCEDURE — 6360000002 HC RX W HCPCS: Performed by: STUDENT IN AN ORGANIZED HEALTH CARE EDUCATION/TRAINING PROGRAM

## 2020-10-01 PROCEDURE — 82947 ASSAY GLUCOSE BLOOD QUANT: CPT

## 2020-10-01 RX ORDER — SODIUM CHLORIDE 0.9 % (FLUSH) 0.9 %
10 SYRINGE (ML) INJECTION EVERY 12 HOURS SCHEDULED
Status: DISCONTINUED | OUTPATIENT
Start: 2020-10-01 | End: 2020-10-02 | Stop reason: HOSPADM

## 2020-10-01 RX ORDER — ACETAMINOPHEN 650 MG/1
650 SUPPOSITORY RECTAL EVERY 6 HOURS PRN
Status: DISCONTINUED | OUTPATIENT
Start: 2020-10-01 | End: 2020-10-02 | Stop reason: HOSPADM

## 2020-10-01 RX ORDER — INSULIN GLARGINE 100 [IU]/ML
12 INJECTION, SOLUTION SUBCUTANEOUS NIGHTLY
Status: DISCONTINUED | OUTPATIENT
Start: 2020-10-01 | End: 2020-10-01

## 2020-10-01 RX ORDER — INSULIN GLARGINE 100 [IU]/ML
20 INJECTION, SOLUTION SUBCUTANEOUS NIGHTLY
Status: DISCONTINUED | OUTPATIENT
Start: 2020-10-02 | End: 2020-10-02 | Stop reason: HOSPADM

## 2020-10-01 RX ORDER — SODIUM CHLORIDE 0.9 % (FLUSH) 0.9 %
10 SYRINGE (ML) INJECTION PRN
Status: DISCONTINUED | OUTPATIENT
Start: 2020-10-01 | End: 2020-10-02 | Stop reason: HOSPADM

## 2020-10-01 RX ORDER — POTASSIUM CHLORIDE 7.45 MG/ML
40 INJECTION INTRAVENOUS ONCE
Status: COMPLETED | OUTPATIENT
Start: 2020-10-01 | End: 2020-10-01

## 2020-10-01 RX ORDER — ONDANSETRON 2 MG/ML
4 INJECTION INTRAMUSCULAR; INTRAVENOUS EVERY 6 HOURS PRN
Status: DISCONTINUED | OUTPATIENT
Start: 2020-10-01 | End: 2020-10-02 | Stop reason: HOSPADM

## 2020-10-01 RX ORDER — DEXTROSE MONOHYDRATE 25 G/50ML
12.5 INJECTION, SOLUTION INTRAVENOUS PRN
Status: DISCONTINUED | OUTPATIENT
Start: 2020-10-01 | End: 2020-10-01

## 2020-10-01 RX ORDER — DEXTROSE MONOHYDRATE 25 G/50ML
12.5 INJECTION, SOLUTION INTRAVENOUS PRN
Status: DISCONTINUED | OUTPATIENT
Start: 2020-10-01 | End: 2020-10-02 | Stop reason: HOSPADM

## 2020-10-01 RX ORDER — ACETAMINOPHEN 325 MG/1
650 TABLET ORAL EVERY 6 HOURS PRN
Status: DISCONTINUED | OUTPATIENT
Start: 2020-10-01 | End: 2020-10-02 | Stop reason: HOSPADM

## 2020-10-01 RX ORDER — INSULIN GLARGINE 100 [IU]/ML
12 INJECTION, SOLUTION SUBCUTANEOUS ONCE
Status: COMPLETED | OUTPATIENT
Start: 2020-10-01 | End: 2020-10-01

## 2020-10-01 RX ORDER — SODIUM CHLORIDE 9 MG/ML
INJECTION, SOLUTION INTRAVENOUS CONTINUOUS
Status: DISCONTINUED | OUTPATIENT
Start: 2020-10-01 | End: 2020-10-02 | Stop reason: HOSPADM

## 2020-10-01 RX ORDER — DEXTROSE AND SODIUM CHLORIDE 5; .45 G/100ML; G/100ML
INJECTION, SOLUTION INTRAVENOUS CONTINUOUS PRN
Status: DISCONTINUED | OUTPATIENT
Start: 2020-10-01 | End: 2020-10-02

## 2020-10-01 RX ORDER — DEXTROSE MONOHYDRATE 50 MG/ML
100 INJECTION, SOLUTION INTRAVENOUS PRN
Status: DISCONTINUED | OUTPATIENT
Start: 2020-10-01 | End: 2020-10-02 | Stop reason: HOSPADM

## 2020-10-01 RX ORDER — MAGNESIUM SULFATE 1 G/100ML
1 INJECTION INTRAVENOUS PRN
Status: DISCONTINUED | OUTPATIENT
Start: 2020-10-01 | End: 2020-10-01

## 2020-10-01 RX ORDER — POTASSIUM CHLORIDE 7.45 MG/ML
10 INJECTION INTRAVENOUS PRN
Status: DISCONTINUED | OUTPATIENT
Start: 2020-10-01 | End: 2020-10-02 | Stop reason: HOSPADM

## 2020-10-01 RX ORDER — PROMETHAZINE HYDROCHLORIDE 12.5 MG/1
12.5 TABLET ORAL EVERY 6 HOURS PRN
Status: DISCONTINUED | OUTPATIENT
Start: 2020-10-01 | End: 2020-10-02 | Stop reason: HOSPADM

## 2020-10-01 RX ORDER — NICOTINE POLACRILEX 4 MG
15 LOZENGE BUCCAL PRN
Status: DISCONTINUED | OUTPATIENT
Start: 2020-10-01 | End: 2020-10-02 | Stop reason: HOSPADM

## 2020-10-01 RX ADMIN — INSULIN LISPRO 1 UNITS: 100 INJECTION, SOLUTION INTRAVENOUS; SUBCUTANEOUS at 22:11

## 2020-10-01 RX ADMIN — MAGNESIUM SULFATE HEPTAHYDRATE 1 G: 1 INJECTION, SOLUTION INTRAVENOUS at 00:41

## 2020-10-01 RX ADMIN — INSULIN LISPRO 6 UNITS: 100 INJECTION, SOLUTION INTRAVENOUS; SUBCUTANEOUS at 17:26

## 2020-10-01 RX ADMIN — SODIUM CHLORIDE, PRESERVATIVE FREE 10 ML: 5 INJECTION INTRAVENOUS at 07:33

## 2020-10-01 RX ADMIN — POTASSIUM CHLORIDE 10 MEQ: 7.46 INJECTION, SOLUTION INTRAVENOUS at 04:14

## 2020-10-01 RX ADMIN — SODIUM PHOSPHATE, MONOBASIC, MONOHYDRATE 10 MMOL: 276; 142 INJECTION, SOLUTION INTRAVENOUS at 01:30

## 2020-10-01 RX ADMIN — INSULIN LISPRO 3 UNITS: 100 INJECTION, SOLUTION INTRAVENOUS; SUBCUTANEOUS at 17:29

## 2020-10-01 RX ADMIN — POTASSIUM CHLORIDE 10 MEQ: 7.46 INJECTION, SOLUTION INTRAVENOUS at 01:53

## 2020-10-01 RX ADMIN — INSULIN GLARGINE 12 UNITS: 100 INJECTION, SOLUTION SUBCUTANEOUS at 05:08

## 2020-10-01 RX ADMIN — SODIUM CHLORIDE, PRESERVATIVE FREE 10 ML: 5 INJECTION INTRAVENOUS at 21:00

## 2020-10-01 RX ADMIN — INSULIN LISPRO 6 UNITS: 100 INJECTION, SOLUTION INTRAVENOUS; SUBCUTANEOUS at 11:41

## 2020-10-01 RX ADMIN — POTASSIUM CHLORIDE 10 MEQ: 7.46 INJECTION, SOLUTION INTRAVENOUS at 00:37

## 2020-10-01 RX ADMIN — POTASSIUM CHLORIDE 40 MEQ: 7.46 INJECTION, SOLUTION INTRAVENOUS at 05:17

## 2020-10-01 RX ADMIN — DEXTROSE AND SODIUM CHLORIDE: 5; 450 INJECTION, SOLUTION INTRAVENOUS at 05:17

## 2020-10-01 RX ADMIN — SODIUM PHOSPHATE, MONOBASIC, MONOHYDRATE 10 MMOL: 276; 142 INJECTION, SOLUTION INTRAVENOUS at 10:52

## 2020-10-01 RX ADMIN — INSULIN GLARGINE 12 UNITS: 100 INJECTION, SOLUTION SUBCUTANEOUS at 22:52

## 2020-10-01 ASSESSMENT — PAIN SCALES - GENERAL
PAINLEVEL_OUTOF10: 0

## 2020-10-01 NOTE — PROGRESS NOTES
Occupational Therapy Not Seen Note    DATE: 10/1/2020  Name: Selvin Bob  : 2000  MRN: [id number]    Patient not available for Occupational Therapy due to:    Pt independent with functional mobility and functional tasks.  Pt with no OT acute care needs at this time, will defer OT eval.    Electronically signed by CATA Packer on 10/1/2020 at 11:55 AM

## 2020-10-01 NOTE — PROGRESS NOTES
[facility]  Internal Medicine Teaching Residency Program  Inpatient Daily Progress Note  ______________________________________________________________________________    Patient: Pernell Rivera  YOB: 2000   VNM:[id number]    Acct: [de-identified]     Room: [address] [place] [address]  Admit date: 9/30/2020  Today's date: 10/01/20  Number of days in the hospital: 1    SUBJECTIVE   Admitting Diagnosis: <principal problem not specified>  CC: Vomiting  Pt examined at bedside. Chart & results reviewed. Doing well. Able to eat without emesis. Denies abdominal pain, chest pain, shortness of breath, nausea and vomiting. Vitally and hemodynamically stable   BP:118/91, HR: 62, A feb   Saturating well at room air. Anion gap: 11  Last blood sugar is 100. ROS:  Constitutional:  negative for chills, fevers, sweats  Respiratory:  negative for cough, dyspnea on exertion, hemoptysis, shortness of breath, wheezing  Cardiovascular:  negative for chest pain, chest pressure/discomfort, lower extremity edema, palpitations  Gastrointestinal:  negative for abdominal pain, constipation, diarrhea, nausea, vomiting  Neurological:  negative for dizziness, headache  BRIEF HISTORY     The patient is a pleasant 23 y.o. male presents with a chief complaint of nausea and vomiting. Patient reports occurred this morning, nonbloody, presented to the ER. Patient has been noncompliant with long-acting insulin, not filled for past year. Patient has been using short acting insulin, has been increasing usage of insulin recently, pharmacy did not fill for the past 3 days resulting in uncontrolled glucose. Patient denies any changes in vision, chest pain, shortness of breath, abdominal pains. In the ED, patient found to be acidotic, decreased bicarb, positive for beta hydroxybutyrate. Multiple fluid boluses given, insulin drip started. Patient admitted for management of DKA.     OBJECTIVE     Vital Signs: /70   Pulse 64   Temp 97.8 °F (36.6 °C) (Oral)   Resp 15   Ht 5' 11\" (1.803 m)   Wt 145 lb (65.8 kg)   SpO2 98%   BMI 20.22 kg/m²     Temp (24hrs), Av.2 °F (36.8 °C), Min:97.7 °F (36.5 °C), Max:98.9 °F (37.2 °C)    In: 2664   Out: 650 [Urine:650]    Physical Exam:  Constitutional: This is a well developed, well nourished, 18.5-24.9 - Normal 23y.o. year old male who is alert, oriented, cooperative and in no apparent distress. Head:normocephalic and atraumatic. EENT:  PERRLA. No conjunctival injections. Septum was midline, mucosa was without erythema, exudates or cobblestoning. No thrush was noted. Neck: Supple without thyromegaly. No elevated JVP. Trachea was midline. Respiratory: Chest was symmetrical without dullness to percussion. Breath sounds bilaterally were clear to auscultation. There were no wheezes, rhonchi or rales. There is no intercostal retraction or use of accessory muscles. No egophony noted. Cardiovascular: Regular without murmur, clicks, gallops or rubs. Abdomen: Slightly rounded and soft without organomegaly. No rebound, rigidity or guarding was appreciated. Lymphatic: No lymphadenopathy. Musculoskeletal: Normal curvature of the spine. No gross muscle weakness. Extremities:  No lower extremity edema, ulcerations, tenderness, varicosities or erythema. Muscle size, tone and strength are normal.  No involuntary movements are noted. Skin:  Warm and dry. Good color, turgor and pigmentation. No lesions or scars.   No cyanosis or clubbing  Neurological/Psychiatric: The patient's general behavior, level of consciousness, thought content and emotional status is normal.        Medications:  Scheduled Medications:    sodium chloride flush  10 mL Intravenous 2 times per day    enoxaparin  40 mg Subcutaneous Daily    insulin glargine  12 Units Subcutaneous Nightly    insulin lispro  0-12 Units Subcutaneous TID WC    insulin lispro  0-6 Units Subcutaneous Nightly     Continuous Infusions:    sodium chloride      dextrose 5 % and 0.45 % NaCl      dextrose      dextrose      insulin Stopped (10/01/20 0715)    dextrose 5 % and 0.45 % NaCl Stopped (10/01/20 0713)     PRN Medicationssodium chloride flush, 10 mL, PRN  acetaminophen, 650 mg, Q6H PRN    Or  acetaminophen, 650 mg, Q6H PRN  magnesium hydroxide, 30 mL, Daily PRN  promethazine, 12.5 mg, Q6H PRN    Or  ondansetron, 4 mg, Q6H PRN  potassium chloride, 10 mEq, PRN  sodium phosphate IVPB, 10 mmol, PRN    Or  sodium phosphate IVPB, 15 mmol, PRN    Or  sodium phosphate IVPB, 20 mmol, PRN  dextrose 5 % and 0.45 % NaCl, , Continuous PRN  glucose, 15 g, PRN  dextrose, 12.5 g, PRN  glucagon (rDNA), 1 mg, PRN  dextrose, 100 mL/hr, PRN  glucose, 15 g, PRN  dextrose, 12.5 g, PRN  dextrose, 100 mL/hr, PRN  potassium chloride, 40 mEq, PRN    Or  potassium alternative oral replacement, 40 mEq, PRN    Or  potassium chloride, 10 mEq, PRN  sodium phosphate IVPB, 0.16 mmol/kg, PRN    Or  sodium phosphate IVPB, 0.32 mmol/kg, PRN        Diagnostic Labs:  CBC:   Recent Labs     09/30/20  1132 10/01/20  0707   WBC 3.8 4.9   RBC 5.48 4.29   HGB 15.4 12.6*   HCT 48.7 37.4*   MCV 88.9 87.2   RDW 12.2 12.6    319     BMP:   Recent Labs     09/30/20  1848 09/30/20  2327 10/01/20  0057 10/01/20  0324   * 130* 127* 131*   K 4.3 3.4* 3.3* 3.4*    100 96* 100   CO2 18* 19* 20 20   PHOS 3.5 2.5  --  3.1   BUN 14 11 12 11   CREATININE 0.64* 0.65* 0.70 0.58*     FASTING LIPID PANEL:  Lab Results   Component Value Date    CHOL 328 (H) 01/21/2016    HDL 75 01/21/2016    TRIG 192 (H) 01/21/2016       MICROBIOLOGY:   Lab Results   Component Value Date/Time    CULTURE NO SIGNIFICANT GROWTH 03/19/2015 03:44 PM    CULTURE  03/19/2015 03:44 PM     [employer] [address] [facility], [address] [place] [address] [phone]       Imaging:    No results found.     ASSESSMENT & PLAN     ASSESSMENT / PLAN:     Active Problems:    DKA, type 1, not at goal [facility])  Plan:   1. Diabetes Mellitus Type I: non-compliance to insulin- Improved with hydration, insulin administration. Lantus 12U and medium dose corrective algorithm. Monitor blood glucose and electolytes  2. Anion Gap metabolic acidosis: due to #1 resolved   3. Hypokalemia: correction as needed   4. DVT prophylaxis: On Lovenox 40mg       Sherin Damian MD  Internal Medicine Resident, PGY-1  [address], [place]  10/1/2020, 7:34 AM

## 2020-10-01 NOTE — PROGRESS NOTES
Physical Therapy  DATE: 10/1/2020    NAME: Isabell Mark  MRN: [id number]   : 2000    Patient not seen this date for Physical Therapy due to:  [] Blood transfusion in progress  [] Hemodialysis  []  Patient Declined  [] Spine Precautions   [] Strict Bedrest  [] Surgery/ Procedure  [] Testing      [] Other        [x] PT being discontinued at this time. Patient independent with functional mobility per RN. No further needs. Please re-order PT with any change in functional status during admission. [] PT being discontinued at this time as the patient has been transferred to palliative care. No further needs.     Taj Wood, PT

## 2020-10-01 NOTE — PLAN OF CARE
Problem: Fluid Volume - Imbalance:  Goal: Absence of imbalanced fluid volume signs and symptoms  Description: Absence of imbalanced fluid volume signs and symptoms  10/1/2020 0344 by Yanira Morel RN  Outcome: Ongoing    Goal: Absence of nausea/vomiting  Description: Absence of nausea/vomiting  10/1/2020 0344 by Yanira Morel RN  Outcome: Ongoing       Problem: Serum Glucose Level - Abnormal:  Goal: Ability to maintain appropriate glucose levels will improve  Description: Ability to maintain appropriate glucose levels will improve  10/1/2020 0344 by Yanira Morel RN  Outcome: Ongoing       Problem: Falls - Risk of:  Goal: Will remain free from falls  Description: Will remain free from falls  Outcome: Ongoing

## 2020-10-02 VITALS
TEMPERATURE: 97.4 F | HEART RATE: 44 BPM | OXYGEN SATURATION: 99 % | BODY MASS INDEX: 19.72 KG/M2 | RESPIRATION RATE: 14 BRPM | DIASTOLIC BLOOD PRESSURE: 79 MMHG | HEIGHT: 71 IN | WEIGHT: 140.87 LBS | SYSTOLIC BLOOD PRESSURE: 125 MMHG

## 2020-10-02 LAB
ABSOLUTE EOS #: 0.33 K/UL (ref 0–0.44)
ABSOLUTE IMMATURE GRANULOCYTE: <0.03 K/UL (ref 0–0.3)
ABSOLUTE LYMPH #: 2.05 K/UL (ref 1.1–3.7)
ABSOLUTE MONO #: 0.28 K/UL (ref 0.1–1.2)
ANION GAP SERPL CALCULATED.3IONS-SCNC: 9 MMOL/L (ref 9–17)
BASOPHILS # BLD: 1 % (ref 0–2)
BASOPHILS ABSOLUTE: 0.03 K/UL (ref 0–0.2)
BUN BLDV-MCNC: 7 MG/DL (ref 6–20)
BUN/CREAT BLD: ABNORMAL (ref 9–20)
CALCIUM SERPL-MCNC: 8.3 MG/DL (ref 8.6–10.4)
CHLORIDE BLD-SCNC: 104 MMOL/L (ref 98–107)
CO2: 22 MMOL/L (ref 20–31)
CREAT SERPL-MCNC: 0.53 MG/DL (ref 0.7–1.2)
DIFFERENTIAL TYPE: ABNORMAL
EOSINOPHILS RELATIVE PERCENT: 9 % (ref 1–4)
GFR AFRICAN AMERICAN: >60 ML/MIN
GFR NON-AFRICAN AMERICAN: >60 ML/MIN
GFR SERPL CREATININE-BSD FRML MDRD: ABNORMAL ML/MIN/{1.73_M2}
GFR SERPL CREATININE-BSD FRML MDRD: ABNORMAL ML/MIN/{1.73_M2}
GLUCOSE BLD-MCNC: 119 MG/DL (ref 75–110)
GLUCOSE BLD-MCNC: 133 MG/DL (ref 75–110)
GLUCOSE BLD-MCNC: 167 MG/DL (ref 70–99)
HCT VFR BLD CALC: 36.4 % (ref 40.7–50.3)
HEMOGLOBIN: 12.1 G/DL (ref 13–17)
IMMATURE GRANULOCYTES: 0 %
LYMPHOCYTES # BLD: 53 % (ref 24–43)
MAGNESIUM: 1.9 MG/DL (ref 1.6–2.6)
MCH RBC QN AUTO: 28.5 PG (ref 25.2–33.5)
MCHC RBC AUTO-ENTMCNC: 33.2 G/DL (ref 28.4–34.8)
MCV RBC AUTO: 85.8 FL (ref 82.6–102.9)
MONOCYTES # BLD: 7 % (ref 3–12)
NRBC AUTOMATED: 0 PER 100 WBC
PDW BLD-RTO: 12.6 % (ref 11.8–14.4)
PLATELET # BLD: 297 K/UL (ref 138–453)
PLATELET ESTIMATE: ABNORMAL
PMV BLD AUTO: 11 FL (ref 8.1–13.5)
POTASSIUM SERPL-SCNC: 3.2 MMOL/L (ref 3.7–5.3)
RBC # BLD: 4.24 M/UL (ref 4.21–5.77)
RBC # BLD: ABNORMAL 10*6/UL
SEG NEUTROPHILS: 30 % (ref 36–65)
SEGMENTED NEUTROPHILS ABSOLUTE COUNT: 1.15 K/UL (ref 1.5–8.1)
SODIUM BLD-SCNC: 135 MMOL/L (ref 135–144)
WBC # BLD: 3.9 K/UL (ref 3.5–11.3)
WBC # BLD: ABNORMAL 10*3/UL

## 2020-10-02 PROCEDURE — 80048 BASIC METABOLIC PNL TOTAL CA: CPT

## 2020-10-02 PROCEDURE — 6360000002 HC RX W HCPCS: Performed by: STUDENT IN AN ORGANIZED HEALTH CARE EDUCATION/TRAINING PROGRAM

## 2020-10-02 PROCEDURE — 83735 ASSAY OF MAGNESIUM: CPT

## 2020-10-02 PROCEDURE — 2580000003 HC RX 258: Performed by: STUDENT IN AN ORGANIZED HEALTH CARE EDUCATION/TRAINING PROGRAM

## 2020-10-02 PROCEDURE — 85025 COMPLETE CBC W/AUTO DIFF WBC: CPT

## 2020-10-02 PROCEDURE — 36415 COLL VENOUS BLD VENIPUNCTURE: CPT

## 2020-10-02 PROCEDURE — 82947 ASSAY GLUCOSE BLOOD QUANT: CPT

## 2020-10-02 PROCEDURE — 99239 HOSP IP/OBS DSCHRG MGMT >30: CPT | Performed by: INTERNAL MEDICINE

## 2020-10-02 PROCEDURE — 6370000000 HC RX 637 (ALT 250 FOR IP): Performed by: STUDENT IN AN ORGANIZED HEALTH CARE EDUCATION/TRAINING PROGRAM

## 2020-10-02 RX ORDER — INSULIN GLARGINE 100 [IU]/ML
20 INJECTION, SOLUTION SUBCUTANEOUS NIGHTLY
Qty: 1 VIAL | Refills: 3 | Status: ON HOLD | OUTPATIENT
Start: 2020-10-02 | End: 2020-12-19 | Stop reason: SDUPTHER

## 2020-10-02 RX ORDER — INSULIN ASPART 100 [IU]/ML
1-11 INJECTION, SOLUTION INTRAVENOUS; SUBCUTANEOUS
Qty: 5 PEN | Refills: 3 | Status: ON HOLD | OUTPATIENT
Start: 2020-10-02 | End: 2020-12-19 | Stop reason: SDUPTHER

## 2020-10-02 RX ORDER — LANCETS 30 GAUGE
1 EACH MISCELLANEOUS
Qty: 300 EACH | Refills: 0 | Status: SHIPPED | OUTPATIENT
Start: 2020-10-02 | End: 2021-06-29 | Stop reason: SDUPTHER

## 2020-10-02 RX ORDER — POTASSIUM CHLORIDE 20 MEQ/1
40 TABLET, EXTENDED RELEASE ORAL ONCE
Status: COMPLETED | OUTPATIENT
Start: 2020-10-02 | End: 2020-10-02

## 2020-10-02 RX ORDER — ACETAMINOPHEN 325 MG/1
650 TABLET ORAL EVERY 6 HOURS PRN
Qty: 30 TABLET | Refills: 0 | Status: ON HOLD | OUTPATIENT
Start: 2020-10-02 | End: 2022-07-27 | Stop reason: HOSPADM

## 2020-10-02 RX ORDER — IBUPROFEN 200 MG
600 TABLET ORAL EVERY 8 HOURS PRN
Qty: 30 TABLET | Refills: 0 | Status: SHIPPED | OUTPATIENT
Start: 2020-10-02 | End: 2020-12-17

## 2020-10-02 RX ORDER — IBUPROFEN 600 MG/1
TABLET ORAL
Qty: 1 KIT | Refills: 2 | Status: SHIPPED | OUTPATIENT
Start: 2020-10-02 | End: 2021-08-17 | Stop reason: SDUPTHER

## 2020-10-02 RX ORDER — EPINEPHRINE 0.15 MG/.3ML
INJECTION INTRAMUSCULAR
Qty: 2 DEVICE | Refills: 3 | Status: SHIPPED | OUTPATIENT
Start: 2020-10-02

## 2020-10-02 RX ADMIN — INSULIN LISPRO 3 UNITS: 100 INJECTION, SOLUTION INTRAVENOUS; SUBCUTANEOUS at 08:51

## 2020-10-02 RX ADMIN — POTASSIUM CHLORIDE 40 MEQ: 1500 TABLET, EXTENDED RELEASE ORAL at 08:51

## 2020-10-02 RX ADMIN — ENOXAPARIN SODIUM 40 MG: 40 INJECTION SUBCUTANEOUS at 08:51

## 2020-10-02 RX ADMIN — INSULIN LISPRO 3 UNITS: 100 INJECTION, SOLUTION INTRAVENOUS; SUBCUTANEOUS at 13:02

## 2020-10-02 RX ADMIN — SODIUM CHLORIDE, PRESERVATIVE FREE 10 ML: 5 INJECTION INTRAVENOUS at 08:51

## 2020-10-02 ASSESSMENT — PAIN SCALES - GENERAL: PAINLEVEL_OUTOF10: 0

## 2020-10-02 NOTE — PROGRESS NOTES
CLINICAL PHARMACY NOTE: MEDS TO [address] Select Patient?: No  Total # of Prescriptions Filled: 8   The following medications were delivered to the patient:  · Acetaminophen  · Ibuprofen  · Glucagon  · Novolog  · Test stripes  · Pen Needles  · Ketone Stripes  · Lancets  Total # of Interventions Completed: 0  Time Spent (min): 5    Additional Documentation: meds delivered to patient 10/2 at 3pm. Patient in room 161. No co-pay. Pharmacy will called patient Monday 10/5 to set up home delivery for Epipen.

## 2020-10-02 NOTE — CARE COORDINATION
Spoke to patient about discharge, is currently awaiting insulin to be delivered by [facility] pharmacy. Patient states can follow up with Dr Isaac Villegas in [place] and will call for appointment    Discharge Report    [facility] Case Management Department  Written by: Amairani Remy RN    Patient Name: Yaya Maria  Attending Provider: Rocky Das MD  Admit Date: 2020 11:01 AM  MRN: [id number]  Account: [de-identified]                     : 2000  Discharge Date:       Disposition: home independently, Dr Isaac Villegas for follow up.      Amairani Remy RN

## 2020-10-02 NOTE — PROGRESS NOTES
Pt was given discharge instructions pt verbalized understanding of new medication. Pt ws educated to make follow up with PCP in one week. Pt verbalized understanding. All of pts questions were answered fully. Medications were delivered per meds to bed. Pts IV was removed. Writer offered to be wheeled to main entrance but pt declined stating he wished to walk. Pts parents picked him up from main entrance of hospital. Pt was safely discharged with all personal belongings and medications.

## 2020-10-02 NOTE — PROGRESS NOTES
[facility]  Internal Medicine Teaching Residency Program  Inpatient Daily Progress Note  ______________________________________________________________________________    Patient: Britney Chacon  YOB: 2000   [id number]    Acct: [de-identified]     Room: [address] [place] [address]  Admit date: 9/30/2020  Today's date: 10/02/20  Number of days in the hospital: 2    SUBJECTIVE   Admitting Diagnosis: Diabetic ketoacidosis ([address]  CC: Vomiting    Pt examined at beside and chart reviewed. Overnight events of bradycardia. Monitored. Denies chest pain, abdominal pain, fever and chills. Vitally and hemodynamically stable   BP:117/75 HR: 62, Afebrile   Saturating well at room air. Last blood sugar is 133  Increased dose of lantus from 12 to 20units    ROS:  Constitutional:  negative for chills, fevers, sweats  Respiratory:  negative for cough, dyspnea on exertion, hemoptysis, shortness of breath, wheezing  Cardiovascular:  negative for chest pain, chest pressure/discomfort, lower extremity edema, palpitations  Gastrointestinal:  negative for abdominal pain, constipation, diarrhea, nausea, vomiting  Neurological:  negative for dizziness, headache  BRIEF HISTORY     The patient is a pleasant 23 y.o. male presents with a chief complaint of nausea and vomiting. Patient reports occurred this morning, nonbloody, presented to the ER. Patient has been noncompliant with long-acting insulin, not filled for past year. Patient has been using short acting insulin, has been increasing usage of insulin recently, pharmacy did not fill for the past 3 days resulting in uncontrolled glucose. Patient denies any changes in vision, chest pain, shortness of breath, abdominal pains. In the ED, patient found to be acidotic, decreased bicarb, positive for beta hydroxybutyrate. Multiple fluid boluses given, insulin drip started. Patient admitted for management of DKA.     OBJECTIVE     Vital Signs: /75   Pulse 64   Temp 98.4 °F (36.9 °C) (Oral)   Resp 14   Ht 5' 11\" (1.803 m)   Wt 145 lb (65.8 kg)   SpO2 98%   BMI 20.22 kg/m²     Temp (24hrs), Av.8 °F (36.6 °C), Min:97.1 °F (36.2 °C), Max:98.4 °F (36.9 °C)    No intake/output data recorded. Physical Exam:  Constitutional: This is a well developed, well nourished, 18.5-24.9 - Normal 23y.o. year old male who is alert, oriented, cooperative and in no apparent distress. Head:normocephalic and atraumatic. EENT:  PERRLA. No conjunctival injections. Septum was midline, mucosa was without erythema, exudates or cobblestoning. No thrush was noted. Neck: Supple without thyromegaly. No elevated JVP. Trachea was midline. Respiratory: Chest was symmetrical without dullness to percussion. Breath sounds bilaterally were clear to auscultation. There were no wheezes, rhonchi or rales. There is no intercostal retraction or use of accessory muscles. No egophony noted. Cardiovascular: Regular without murmur, clicks, gallops or rubs. Abdomen: Slightly rounded and soft without organomegaly. No rebound, rigidity or guarding was appreciated. Lymphatic: No lymphadenopathy. Musculoskeletal: Normal curvature of the spine. No gross muscle weakness. Extremities:  No lower extremity edema, ulcerations, tenderness, varicosities or erythema. Muscle size, tone and strength are normal.  No involuntary movements are noted. Skin:  Warm and dry. Good color, turgor and pigmentation. No lesions or scars.   No cyanosis or clubbing  Neurological/Psychiatric: The patient's general behavior, level of consciousness, thought content and emotional status is normal.        Medications:  Scheduled Medications:    sodium chloride flush  10 mL Intravenous 2 times per day    enoxaparin  40 mg Subcutaneous Daily    insulin lispro  0-12 Units Subcutaneous TID WC    insulin lispro  0-6 Units Subcutaneous Nightly    insulin glargine  20 Units Subcutaneous Nightly    insulin lispro  3 Units Subcutaneous TID WC     Continuous Infusions:    sodium chloride      dextrose 5 % and 0.45 % NaCl      dextrose      dextrose       PRN Medicationssodium chloride flush, 10 mL, PRN  acetaminophen, 650 mg, Q6H PRN    Or  acetaminophen, 650 mg, Q6H PRN  magnesium hydroxide, 30 mL, Daily PRN  promethazine, 12.5 mg, Q6H PRN    Or  ondansetron, 4 mg, Q6H PRN  potassium chloride, 10 mEq, PRN  sodium phosphate IVPB, 10 mmol, PRN    Or  sodium phosphate IVPB, 15 mmol, PRN    Or  sodium phosphate IVPB, 20 mmol, PRN  dextrose 5 % and 0.45 % NaCl, , Continuous PRN  glucose, 15 g, PRN  dextrose, 12.5 g, PRN  glucagon (rDNA), 1 mg, PRN  dextrose, 100 mL/hr, PRN  glucose, 15 g, PRN  dextrose, 12.5 g, PRN  dextrose, 100 mL/hr, PRN  potassium chloride, 40 mEq, PRN    Or  potassium alternative oral replacement, 40 mEq, PRN    Or  potassium chloride, 10 mEq, PRN  sodium phosphate IVPB, 0.16 mmol/kg, PRN    Or  sodium phosphate IVPB, 0.32 mmol/kg, PRN        Diagnostic Labs:  CBC:   Recent Labs     09/30/20  1132 10/01/20  0707 10/02/20  0343   WBC 3.8 4.9 3.9   RBC 5.48 4.29 4.24   HGB 15.4 12.6* 12.1*   HCT 48.7 37.4* 36.4*   MCV 88.9 87.2 85.8   RDW 12.2 12.6 12.6    319 297     BMP:   Recent Labs     09/30/20  2327  10/01/20  0324 10/01/20  0707  10/01/20  1925 10/01/20  2325 10/02/20  0343   *   < > 131* 134*   < > 131* 132* 135   K 3.4*   < > 3.4* 3.7   < > 4.2 3.9 3.2*      < > 100 104   < > 103 103 104   CO2 19*   < > 20 19*   < > 19* 22 22   PHOS 2.5  --  3.1 2.6  --   --   --   --    BUN 11   < > 11 10   < > 6 7 7   CREATININE 0.65*   < > 0.58* 0.59*   < > 0.61* 0.59* 0.53*    < > = values in this interval not displayed.      FASTING LIPID PANEL:  Lab Results   Component Value Date    CHOL 328 (H) 01/21/2016    HDL 75 01/21/2016    TRIG 192 (H) 01/21/2016       MICROBIOLOGY:   Lab Results   Component Value Date/Time    CULTURE NO SIGNIFICANT GROWTH

## 2020-10-02 NOTE — PLAN OF CARE
Problem: Fluid Volume - Imbalance:  Goal: Absence of imbalanced fluid volume signs and symptoms  Description: Absence of imbalanced fluid volume signs and symptoms  Outcome: Completed  Goal: Absence of nausea/vomiting  Description: Absence of nausea/vomiting  Outcome: Completed     Problem: Discharge Planning:  Goal: Discharged to appropriate level of care  Description: Discharged to appropriate level of care  Outcome: Completed     Problem: Serum Glucose Level - Abnormal:  Goal: Ability to maintain appropriate glucose levels will improve  Description: Ability to maintain appropriate glucose levels will improve  Outcome: Completed     Problem: Sensory Perception - Impaired:  Goal: Ability to maintain a stable neurologic state will improve  Description: Ability to maintain a stable neurologic state will improve  Outcome: Completed     Problem: Falls - Risk of:  Goal: Will remain free from falls  Description: Will remain free from falls  Outcome: Completed  Goal: Absence of physical injury  Description: Absence of physical injury  Outcome: Completed

## 2020-10-02 NOTE — PROGRESS NOTES
CLINICAL PHARMACY NOTE: MEDS TO [address] Select Patient?: No  Total # of Prescriptions Filled: 1   The following medications were delivered to the patient:  · lantus slolstar insulin  Total # of Interventions Completed: 1  Time Spent (min): 45    Additional Documentation:Medication delivered to the patient in room 161. He said he has pen needles at home.

## 2020-10-03 NOTE — DISCHARGE SUMMARY
[facility]     Department of Internal Medicine - Staff Internal Medicine Teaching Service    INPATIENT DISCHARGE SUMMARY      Patient Identification:  Araceli Lind is a 23 y.o. male. :  2000  MRN: [id number]     Acct: [de-identified]   PCP: Abiel Scales MD  Admit Date:  2020  Discharge date and time: 10/2/2020  3:16 PM   Attending Provider: No att. providers found                                     [address] Problem Lists:  Principal Problem:    Diabetic ketoacidosis ([facility]  Active Problems:    Uncontrolled diabetes mellitus ([facility]    DKA, type 1, not at goal [facility])  Resolved Problems:    * No resolved hospital problems. *      HOSPITAL STAY     Brief Inpatient course:   23 y.o.  [place] male with PMH of uncontrolled Type 1 DM HbA1C 11.5%  presents vomiting this morning. He is type I diabetic, also feeling weak.  he ran out of medications 3 days ago. He is accompanied by his sister. No fevers or chills no abdominal pain. Patient denies any changes in vision, chest pain, shortness of breath, abdominal pains. In the ED, patient found to be acidotic, decreased bicarb, positive for beta hydroxybutyrate  POCT upon arrival is 421      significant therapeutic interventions done at the hospital:   1. Diabetes Mellitus Type I: non-compliance to insulin- Improved with hydration, insulin administration. HbA1C of 11.5%. Continue Lispro 3U Increase Lantus dose to 20U. follow medium dose corrective algorithm. Monitor blood glucose and electrolytes. 2. Anion Gap metabolic acidosis-DKA: due to #1 resolved   3. Hypokalemia: correction as needed   4. DVT prophylaxis: On Lovenox 40mg     Procedures/ Significant Interventions:    None    Consults:     Consults:     Final Specialist Recommendations/Findings:   IP CONSULT TO INTERNAL MEDICINE  IP CONSULT TO CASE MANAGEMENT      Any Hospital Acquired Infections: none    Discharge Functional Status: stable    DISCHARGE PLAN     Disposition: home    Patient Instructions:   Discharge Medication List as of 10/2/2020 12:24 PM      START taking these medications    Details   insulin glargine (LANTUS) 100 UNIT/ML injection vial Inject 20 Units into the skin nightly, Disp-1 vial,R-3Normal         CONTINUE these medications which have CHANGED    Details   acetaminophen (TYLENOL) 325 MG tablet Take 2 tablets by mouth every 6 hours as needed for Pain, Disp-30 tablet,R-0Normal      ibuprofen (ADVIL;MOTRIN) 200 MG tablet Take 3 tablets by mouth every 8 hours as needed for Pain or Fever, Disp-30 tablet,R-0Normal      EPINEPHrine (EPIPEN JR 2-ITALO) 0.15 MG/0.3ML SOAJ Use as directed for allergic reaction, Disp-2 Device,R-3Normal      glucagon (GLUCAGON EMERGENCY) 1 MG injection As directed for extreme hypoglycemia, Disp-1 kit,R-2Normal      insulin aspart (NOVOLOG FLEXPEN) 100 UNIT/ML injection pen Inject 1-11 Units into the skin 3 times daily (before meals) 1 unit per 15 grams CHO for meals and snacks  Sliding scale: =0, 125-150=1, 151-175=2, 175-200=3, 201-225=4, 226-250=5, 251-275=6, 276-300=7, 301-325=8, 326-350=9, 351-375=10, 376-400=11,  Disp-5 pen,R-3Print      blood glucose test strips (ONE TOUCH ULTRA TEST) strip Disp-150 each,R-3, NormalSig: For blood testing 4-6 times/day      Insulin Pen Needle 30G X 5 MM MISC 4 TIMES DAILY Starting Fri 10/2/2020, Disp-200 each,R-1, Normal      Ketone Blood Test STRP 1 strip by Does not apply route daily as needed (for high blood sugar), Disp-200 strip,R-0Normal      Lancets MISC 4 TIMES DAILY BEFORE MEALS & NIGHTLY Starting Fri 10/2/2020, Disp-300 each,R-0, Normal         STOP taking these medications       insulin glargine (BASAGLAR KWIKPEN) 100 UNIT/ML injection pen Comments:   Reason for Stopping:               Activity: activity as tolerated    Diet: diabetic diet    Follow-up:    Nini Sanchez MD  [address] [phone]    Schedule an appointment as soon as possible for a visit in 1 week  hospital follow up for diabetes and anemia      Patient Instructions:   1. Please take your insulin on time. do not miss any doses or meals  2. Please see your PCP for anemia work up and diabetes management  3. Please report to the ER if symptoms worsen. Note that over 30 minutes was spent in preparing discharge papers, discussing discharge with patient, medication review, etc.      Mary Saenz MD, MD  Internal Medicine Resident, PGY-1  [facility];  [place], [place]  10/3/2020, 11:47 AM

## 2020-10-13 ENCOUNTER — HOSPITAL ENCOUNTER (EMERGENCY)
Age: 20
Discharge: HOME | End: 2020-10-13
Attending: EMERGENCY MEDICINE
Payer: MEDICARE

## 2020-10-13 VITALS
RESPIRATION RATE: 16 BRPM | OXYGEN SATURATION: 95 % | WEIGHT: 140 LBS | TEMPERATURE: 97.7 F | HEIGHT: 71 IN | BODY MASS INDEX: 19.6 KG/M2 | SYSTOLIC BLOOD PRESSURE: 118 MMHG | HEART RATE: 92 BPM | DIASTOLIC BLOOD PRESSURE: 81 MMHG

## 2020-10-13 PROCEDURE — 99282 EMERGENCY DEPT VISIT SF MDM: CPT

## 2020-10-13 RX ORDER — CEPHALEXIN 500 MG/1
500 CAPSULE ORAL 2 TIMES DAILY
Qty: 20 CAPSULE | Refills: 0 | Status: SHIPPED | OUTPATIENT
Start: 2020-10-13 | End: 2020-10-23

## 2020-10-13 ASSESSMENT — PAIN DESCRIPTION - LOCATION: LOCATION: RECTUM;BUTTOCKS

## 2020-10-13 ASSESSMENT — ENCOUNTER SYMPTOMS
NAUSEA: 0
BLOOD IN STOOL: 0
DIARRHEA: 0
SHORTNESS OF BREATH: 0
VOMITING: 0

## 2020-10-13 ASSESSMENT — PAIN DESCRIPTION - FREQUENCY: FREQUENCY: CONTINUOUS

## 2020-10-13 ASSESSMENT — PAIN SCALES - GENERAL: PAINLEVEL_OUTOF10: 5

## 2020-10-13 ASSESSMENT — PAIN DESCRIPTION - DESCRIPTORS: DESCRIPTORS: CONSTANT

## 2020-10-13 NOTE — ED PROVIDER NOTES
[address] [facility]     Emergency Department     Faculty Attestation        I performed a history and physical examination of the patient and discussed management with the resident. I reviewed the residents note and agree with the documented findings and plan of care. Any areas of disagreement are noted on the chart. I was personally present for the key portions of any procedures. I have documented in the chart those procedures where I was not present during the key portions. I have reviewed the emergency nurses triage note. I agree with the chief complaint, past medical history, past surgical history, allergies, medications, social and family history as documented unless otherwise noted below. For mid-level providers such as nurse practitioners as well as physicians assistants:    I have personally seen and evaluated the patient. I find the patient's history and physical exam are consistent with NP/PA documentation. I agree with the care provided, treatment rendered, disposition, & follow-up plan. Additional findings are as noted. Vital Signs: /81   Pulse 92   Temp 97.7 °F (36.5 °C) (Skin)   Resp 16   Ht 5' 11\" (1.803 m)   Wt 140 lb (63.5 kg)   SpO2 95%   BMI 19.53 kg/m²   PCP:  No primary care provider on file. Pertinent Comments:     Patient complains of pain along the right upper gluteal cleft. He has no history of MRSA or abscesses. He denies fevers or chills he is otherwise afebrile nontoxic there is no identifiable drainable abscess will start antibiotics warm compresses and instructions return if he worsens.       Critical Care  None          Akosua Price MD  Attending Emergency Medicine Physician              Emily Quiroga MD  10/13/20 [phone]

## 2020-10-13 NOTE — ED PROVIDER NOTES
occasional    Sexual activity: Yes     Partners: Female   Lifestyle    Physical activity     Days per week: Not on file     Minutes per session: Not on file    Stress: Not on file   Relationships    Social connections     Talks on phone: Not on file     Gets together: Not on file     Attends Latter day service: Not on file     Active member of club or organization: Not on file     Attends meetings of clubs or organizations: Not on file     Relationship status: Not on file    Intimate partner violence     Fear of current or ex partner: Not on file     Emotionally abused: Not on file     Physically abused: Not on file     Forced sexual activity: Not on file   Other Topics Concern    Not on file   Social History Narrative    ** Merged History Encounter **            Family History   Problem Relation Age of Onset    Asthma Father     Asthma Sister     Asthma Brother        Allergies:  Patient has no known allergies. Home Medications:  Prior to Admission medications    Medication Sig Start Date End Date Taking?  Authorizing Provider   cephALEXin (KEFLEX) 500 MG capsule Take 1 capsule by mouth 2 times daily for 10 days 10/13/20 10/23/20 Yes Shira Hinojosa DO   insulin glargine (LANTUS) 100 UNIT/ML injection vial Inject 20 Units into the skin nightly  Patient taking differently: Inject 30 Units into the skin nightly  10/2/20   Christina Betancourt MD   acetaminophen (TYLENOL) 325 MG tablet Take 2 tablets by mouth every 6 hours as needed for Pain 10/2/20   Chrsitina Betancourt MD   ibuprofen (ADVIL;MOTRIN) 200 MG tablet Take 3 tablets by mouth every 8 hours as needed for Pain or Fever 10/2/20   Christina Betancourt MD   EPINEPHrine (Lavenia Pallas 2-ITALO) 0.15 MG/0.3ML SOAJ Use as directed for allergic reaction 10/2/20   Christina Betancourt MD   glucagon (GLUCAGON EMERGENCY) 1 MG injection As directed for extreme hypoglycemia 10/2/20   Christina Betancourt MD   insulin aspart (NOVOLOG FLEXPEN) 100 UNIT/ML injection pen Inject 1-11 Units into the skin 3 times daily (before meals) 1 unit per 15 grams CHO for meals and snacks  Sliding scale: =0, 125-150=1, 151-175=2, 175-200=3, 201-225=4, 226-250=5, 251-275=6, 276-300=7, 301-325=8, 326-350=9, 351-375=10, 376-400=11 10/2/20   Hans Lamb MD   blood glucose test strips (ONE TOUCH ULTRA TEST) strip Sig: For blood testing 4-6 times/day 10/2/20   Hans Lamb MD   Insulin Pen Needle 30G X 5 MM MISC 1 Device by Does not apply route 4 times daily 10/2/20   Hans Lamb MD   Ketone Blood Test STRP 1 strip by Does not apply route daily as needed (for high blood sugar) 10/2/20   Hans Lamb MD   Lancets MISC 1 each by Does not apply route 4 times daily (before meals and nightly) 10/2/20   Hans Lamb MD       REVIEW OF SYSTEMS    (2-9 systems for level 4, 10 or more for level 5)      Review of Systems   Constitutional: Negative for activity change, chills and fever. Respiratory: Negative for shortness of breath. Cardiovascular: Negative for chest pain. Gastrointestinal: Negative for blood in stool, diarrhea, nausea and vomiting. Musculoskeletal: Negative for gait problem. Skin:        Right gluteal skin pain   Psychiatric/Behavioral: Negative for confusion and suicidal ideas. The patient is not nervous/anxious. PHYSICAL EXAM   (up to 7 for level 4, 8 or more for level 5)      INITIAL VITALS:   /81   Pulse 92   Temp 97.7 °F (36.5 °C) (Skin)   Resp 16   Ht 5' 11\" (1.803 m)   Wt 140 lb (63.5 kg)   SpO2 95%   BMI 19.53 kg/m²     Physical Exam  Constitutional:       General: He is not in acute distress. Appearance: Normal appearance. He is not ill-appearing, toxic-appearing or diaphoretic. HENT:      Head: Normocephalic. Mouth/Throat:      Mouth: Mucous membranes are moist.      Pharynx: No oropharyngeal exudate. Eyes:      Extraocular Movements: Extraocular movements intact. Pupils: Pupils are equal, round, and reactive to light.    Cardiovascular:      Rate and Rhythm: Normal rate.      Pulses: Normal pulses. Pulmonary:      Comments: Breathing comfortably room air, symmetric chest rise, speaking full sentences no respiratory distress  Abdominal:      General: Abdomen is flat. There is no distension. Palpations: Abdomen is soft. Tenderness: There is no abdominal tenderness. There is no guarding. Skin:     Comments: Area approximately 0.5 cm in diameter of induration, no redness, tender to palpation at the superio medial aspect of the right buttocks, and near the gregorio cleft, drainage noted, surrounding erythema   Neurological:      General: No focal deficit present. Mental Status: He is alert and oriented to person, place, and time. Gait: Gait normal.   Psychiatric:         Mood and Affect: Mood normal.         Behavior: Behavior normal.         Thought Content: Thought content normal.         DIFFERENTIAL  DIAGNOSIS     PLAN (LABS / IMAGING / EKG):  No orders of the defined types were placed in this encounter. MEDICATIONS ORDERED:  Orders Placed This Encounter   Medications    cephALEXin (KEFLEX) 500 MG capsule     Sig: Take 1 capsule by mouth 2 times daily for 10 days     Dispense:  20 capsule     Refill:  0       DDX: Abscess, perirectal abscess, pineal cyst    DIAGNOSTIC RESULTS / EMERGENCY DEPARTMENT COURSE / MDM   :  No results found for this visit on 10/13/20. RADIOLOGY:  None    EKG  None    All EKG's are interpreted by the Emergency Department Physician who either signs or Co-signs this chart in the absence of a cardiologist.    EMERGENCY DEPARTMENT COURSE/IMPRESSION: 61-year-old male present emerge department with right buttocks pain concern for possible abscess. On exam there is area of induration approximate 0.5 cm, no signs of surrounding erythema, the area is slightly inferior and lateral to where I would expect paralysis to be. However higher than what I would expect a perirectal abscess to be. Nothing to drain at this time.   Patient

## 2020-12-17 ENCOUNTER — HOSPITAL ENCOUNTER (INPATIENT)
Age: 20
LOS: 2 days | Discharge: HOME | DRG: 420 | End: 2020-12-19
Attending: EMERGENCY MEDICINE | Admitting: INTERNAL MEDICINE
Payer: MEDICARE

## 2020-12-17 PROBLEM — R11.2 NON-INTRACTABLE VOMITING WITH NAUSEA: Status: ACTIVE | Noted: 2020-12-17

## 2020-12-17 PROBLEM — E87.1 HYPONATREMIA: Status: ACTIVE | Noted: 2020-12-17

## 2020-12-17 PROBLEM — E87.5 HYPERKALEMIA: Status: ACTIVE | Noted: 2020-12-17

## 2020-12-17 LAB
ABSOLUTE EOS #: <0.03 K/UL (ref 0–0.44)
ABSOLUTE IMMATURE GRANULOCYTE: 0.03 K/UL (ref 0–0.3)
ABSOLUTE LYMPH #: 1.04 K/UL (ref 1.1–3.7)
ABSOLUTE MONO #: 0.36 K/UL (ref 0.1–1.2)
ANION GAP SERPL CALCULATED.3IONS-SCNC: 26 MMOL/L (ref 9–17)
BASOPHILS # BLD: 0 % (ref 0–2)
BASOPHILS ABSOLUTE: 0.03 K/UL (ref 0–0.2)
BETA-HYDROXYBUTYRATE: 9.65 MMOL/L (ref 0.02–0.27)
BUN BLDV-MCNC: 19 MG/DL (ref 6–20)
BUN/CREAT BLD: ABNORMAL (ref 9–20)
CALCIUM SERPL-MCNC: 9.7 MG/DL (ref 8.6–10.4)
CHLORIDE BLD-SCNC: 98 MMOL/L (ref 98–107)
CHP ED QC CHECK: YES
CO2: 10 MMOL/L (ref 20–31)
CREAT SERPL-MCNC: 1.17 MG/DL (ref 0.7–1.2)
DIFFERENTIAL TYPE: ABNORMAL
EOSINOPHILS RELATIVE PERCENT: 0 % (ref 1–4)
GFR AFRICAN AMERICAN: >60 ML/MIN
GFR NON-AFRICAN AMERICAN: >60 ML/MIN
GFR SERPL CREATININE-BSD FRML MDRD: ABNORMAL ML/MIN/{1.73_M2}
GFR SERPL CREATININE-BSD FRML MDRD: ABNORMAL ML/MIN/{1.73_M2}
GLUCOSE BLD-MCNC: 312 MG/DL
GLUCOSE BLD-MCNC: 340 MG/DL (ref 70–99)
GLUCOSE BLD-MCNC: 351 MG/DL (ref 75–110)
HCT VFR BLD CALC: 47.7 % (ref 40.7–50.3)
HEMOGLOBIN: 15.1 G/DL (ref 13–17)
IMMATURE GRANULOCYTES: 0 %
LIPASE: 14 U/L (ref 13–60)
LYMPHOCYTES # BLD: 15 % (ref 24–43)
MCH RBC QN AUTO: 28.3 PG (ref 25.2–33.5)
MCHC RBC AUTO-ENTMCNC: 31.7 G/DL (ref 28.4–34.8)
MCV RBC AUTO: 89.5 FL (ref 82.6–102.9)
MONOCYTES # BLD: 5 % (ref 3–12)
NRBC AUTOMATED: 0 PER 100 WBC
PDW BLD-RTO: 12.8 % (ref 11.8–14.4)
PLATELET # BLD: 442 K/UL (ref 138–453)
PLATELET ESTIMATE: ABNORMAL
PMV BLD AUTO: 11.7 FL (ref 8.1–13.5)
POTASSIUM SERPL-SCNC: 5.4 MMOL/L (ref 3.7–5.3)
RBC # BLD: 5.33 M/UL (ref 4.21–5.77)
RBC # BLD: ABNORMAL 10*6/UL
SEG NEUTROPHILS: 80 % (ref 36–65)
SEGMENTED NEUTROPHILS ABSOLUTE COUNT: 5.34 K/UL (ref 1.5–8.1)
SODIUM BLD-SCNC: 134 MMOL/L (ref 135–144)
WBC # BLD: 6.8 K/UL (ref 3.5–11.3)
WBC # BLD: ABNORMAL 10*3/UL

## 2020-12-17 PROCEDURE — 82010 KETONE BODYS QUAN: CPT

## 2020-12-17 PROCEDURE — 96374 THER/PROPH/DIAG INJ IV PUSH: CPT

## 2020-12-17 PROCEDURE — 80048 BASIC METABOLIC PNL TOTAL CA: CPT

## 2020-12-17 PROCEDURE — 6370000000 HC RX 637 (ALT 250 FOR IP): Performed by: STUDENT IN AN ORGANIZED HEALTH CARE EDUCATION/TRAINING PROGRAM

## 2020-12-17 PROCEDURE — 83605 ASSAY OF LACTIC ACID: CPT

## 2020-12-17 PROCEDURE — 6360000002 HC RX W HCPCS: Performed by: STUDENT IN AN ORGANIZED HEALTH CARE EDUCATION/TRAINING PROGRAM

## 2020-12-17 PROCEDURE — 2580000003 HC RX 258: Performed by: STUDENT IN AN ORGANIZED HEALTH CARE EDUCATION/TRAINING PROGRAM

## 2020-12-17 PROCEDURE — 82947 ASSAY GLUCOSE BLOOD QUANT: CPT

## 2020-12-17 PROCEDURE — 83690 ASSAY OF LIPASE: CPT

## 2020-12-17 PROCEDURE — 99284 EMERGENCY DEPT VISIT MOD MDM: CPT

## 2020-12-17 PROCEDURE — 1200000000 HC SEMI PRIVATE

## 2020-12-17 PROCEDURE — 85025 COMPLETE CBC W/AUTO DIFF WBC: CPT

## 2020-12-17 RX ORDER — 0.9 % SODIUM CHLORIDE 0.9 %
1000 INTRAVENOUS SOLUTION INTRAVENOUS ONCE
Status: COMPLETED | OUTPATIENT
Start: 2020-12-17 | End: 2020-12-17

## 2020-12-17 RX ORDER — 0.9 % SODIUM CHLORIDE 0.9 %
2000 INTRAVENOUS SOLUTION INTRAVENOUS ONCE
Status: DISCONTINUED | OUTPATIENT
Start: 2020-12-17 | End: 2020-12-19 | Stop reason: HOSPADM

## 2020-12-17 RX ORDER — DEXTROSE MONOHYDRATE 50 MG/ML
100 INJECTION, SOLUTION INTRAVENOUS PRN
Status: DISCONTINUED | OUTPATIENT
Start: 2020-12-17 | End: 2020-12-19 | Stop reason: HOSPADM

## 2020-12-17 RX ORDER — DEXTROSE AND SODIUM CHLORIDE 5; .45 G/100ML; G/100ML
INJECTION, SOLUTION INTRAVENOUS CONTINUOUS
Status: DISCONTINUED | OUTPATIENT
Start: 2020-12-17 | End: 2020-12-18

## 2020-12-17 RX ORDER — NICOTINE POLACRILEX 4 MG
15 LOZENGE BUCCAL PRN
Status: DISCONTINUED | OUTPATIENT
Start: 2020-12-17 | End: 2020-12-19 | Stop reason: HOSPADM

## 2020-12-17 RX ORDER — DEXTROSE MONOHYDRATE 25 G/50ML
12.5 INJECTION, SOLUTION INTRAVENOUS PRN
Status: DISCONTINUED | OUTPATIENT
Start: 2020-12-17 | End: 2020-12-19 | Stop reason: HOSPADM

## 2020-12-17 RX ORDER — ONDANSETRON 2 MG/ML
4 INJECTION INTRAMUSCULAR; INTRAVENOUS ONCE
Status: COMPLETED | OUTPATIENT
Start: 2020-12-17 | End: 2020-12-17

## 2020-12-17 RX ORDER — 0.9 % SODIUM CHLORIDE 0.9 %
1000 INTRAVENOUS SOLUTION INTRAVENOUS ONCE
Status: COMPLETED | OUTPATIENT
Start: 2020-12-17 | End: 2020-12-18

## 2020-12-17 RX ADMIN — SODIUM CHLORIDE 1000 ML: 9 INJECTION, SOLUTION INTRAVENOUS at 21:59

## 2020-12-17 RX ADMIN — ONDANSETRON 4 MG: 2 INJECTION INTRAMUSCULAR; INTRAVENOUS at 21:59

## 2020-12-17 RX ADMIN — SODIUM CHLORIDE 0.1 UNITS/KG/HR: 9 INJECTION, SOLUTION INTRAVENOUS at 23:41

## 2020-12-17 RX ADMIN — SODIUM CHLORIDE 1000 ML: 9 INJECTION, SOLUTION INTRAVENOUS at 22:51

## 2020-12-17 ASSESSMENT — PAIN DESCRIPTION - LOCATION: LOCATION: HEAD

## 2020-12-17 ASSESSMENT — PAIN DESCRIPTION - PAIN TYPE: TYPE: ACUTE PAIN

## 2020-12-17 ASSESSMENT — PAIN DESCRIPTION - ONSET: ONSET: ON-GOING

## 2020-12-17 ASSESSMENT — PAIN DESCRIPTION - FREQUENCY: FREQUENCY: CONTINUOUS

## 2020-12-17 ASSESSMENT — PAIN SCALES - GENERAL: PAINLEVEL_OUTOF10: 10

## 2020-12-17 ASSESSMENT — PAIN DESCRIPTION - DESCRIPTORS: DESCRIPTORS: THROBBING

## 2020-12-18 LAB
-: NORMAL
ANION GAP SERPL CALCULATED.3IONS-SCNC: 11 MMOL/L (ref 9–17)
ANION GAP SERPL CALCULATED.3IONS-SCNC: 13 MMOL/L (ref 9–17)
ANION GAP SERPL CALCULATED.3IONS-SCNC: 14 MMOL/L (ref 9–17)
ANION GAP SERPL CALCULATED.3IONS-SCNC: 15 MMOL/L (ref 9–17)
ANION GAP SERPL CALCULATED.3IONS-SCNC: 21 MMOL/L (ref 9–17)
BUN BLDV-MCNC: 10 MG/DL (ref 6–20)
BUN BLDV-MCNC: 11 MG/DL (ref 6–20)
BUN BLDV-MCNC: 13 MG/DL (ref 6–20)
BUN BLDV-MCNC: 13 MG/DL (ref 6–20)
BUN BLDV-MCNC: 16 MG/DL (ref 6–20)
BUN/CREAT BLD: ABNORMAL (ref 9–20)
CALCIUM SERPL-MCNC: 8.5 MG/DL (ref 8.6–10.4)
CALCIUM SERPL-MCNC: 8.5 MG/DL (ref 8.6–10.4)
CALCIUM SERPL-MCNC: 8.6 MG/DL (ref 8.6–10.4)
CALCIUM SERPL-MCNC: 8.7 MG/DL (ref 8.6–10.4)
CALCIUM SERPL-MCNC: 8.8 MG/DL (ref 8.6–10.4)
CHLORIDE BLD-SCNC: 101 MMOL/L (ref 98–107)
CHLORIDE BLD-SCNC: 101 MMOL/L (ref 98–107)
CHLORIDE BLD-SCNC: 102 MMOL/L (ref 98–107)
CHLORIDE BLD-SCNC: 102 MMOL/L (ref 98–107)
CHLORIDE BLD-SCNC: 99 MMOL/L (ref 98–107)
CHP ED QC CHECK: NORMAL
CHP ED QC CHECK: YES
CHP ED QC CHECK: YES
CO2: 11 MMOL/L (ref 20–31)
CO2: 17 MMOL/L (ref 20–31)
CO2: 19 MMOL/L (ref 20–31)
CREAT SERPL-MCNC: 0.81 MG/DL (ref 0.7–1.2)
CREAT SERPL-MCNC: 0.83 MG/DL (ref 0.7–1.2)
CREAT SERPL-MCNC: 0.84 MG/DL (ref 0.7–1.2)
CREAT SERPL-MCNC: 0.85 MG/DL (ref 0.7–1.2)
CREAT SERPL-MCNC: 0.97 MG/DL (ref 0.7–1.2)
CULTURE: NORMAL
ESTIMATED AVERAGE GLUCOSE: 258 MG/DL
GFR AFRICAN AMERICAN: >60 ML/MIN
GFR NON-AFRICAN AMERICAN: >60 ML/MIN
GFR SERPL CREATININE-BSD FRML MDRD: ABNORMAL ML/MIN/{1.73_M2}
GLUCOSE BLD-MCNC: 111 MG/DL
GLUCOSE BLD-MCNC: 111 MG/DL (ref 75–110)
GLUCOSE BLD-MCNC: 113 MG/DL
GLUCOSE BLD-MCNC: 113 MG/DL (ref 75–110)
GLUCOSE BLD-MCNC: 113 MG/DL (ref 75–110)
GLUCOSE BLD-MCNC: 117 MG/DL (ref 75–110)
GLUCOSE BLD-MCNC: 121 MG/DL
GLUCOSE BLD-MCNC: 121 MG/DL (ref 75–110)
GLUCOSE BLD-MCNC: 126 MG/DL (ref 70–99)
GLUCOSE BLD-MCNC: 130 MG/DL (ref 75–110)
GLUCOSE BLD-MCNC: 130 MG/DL (ref 75–110)
GLUCOSE BLD-MCNC: 138 MG/DL (ref 75–110)
GLUCOSE BLD-MCNC: 153 MG/DL
GLUCOSE BLD-MCNC: 153 MG/DL (ref 75–110)
GLUCOSE BLD-MCNC: 163 MG/DL (ref 75–110)
GLUCOSE BLD-MCNC: 164 MG/DL
GLUCOSE BLD-MCNC: 164 MG/DL (ref 75–110)
GLUCOSE BLD-MCNC: 167 MG/DL (ref 70–99)
GLUCOSE BLD-MCNC: 179 MG/DL (ref 70–99)
GLUCOSE BLD-MCNC: 181 MG/DL (ref 75–110)
GLUCOSE BLD-MCNC: 191 MG/DL
GLUCOSE BLD-MCNC: 191 MG/DL (ref 75–110)
GLUCOSE BLD-MCNC: 194 MG/DL
GLUCOSE BLD-MCNC: 194 MG/DL (ref 75–110)
GLUCOSE BLD-MCNC: 203 MG/DL
GLUCOSE BLD-MCNC: 203 MG/DL (ref 70–99)
GLUCOSE BLD-MCNC: 203 MG/DL (ref 75–110)
GLUCOSE BLD-MCNC: 215 MG/DL (ref 70–99)
GLUCOSE BLD-MCNC: 237 MG/DL
GLUCOSE BLD-MCNC: 237 MG/DL (ref 75–110)
GLUCOSE BLD-MCNC: 242 MG/DL (ref 75–110)
GLUCOSE BLD-MCNC: 246 MG/DL
GLUCOSE BLD-MCNC: 246 MG/DL (ref 75–110)
GLUCOSE BLD-MCNC: 293 MG/DL (ref 75–110)
GLUCOSE BLD-MCNC: 312 MG/DL (ref 75–110)
HBA1C MFR BLD: 10.6 % (ref 4–6)
LACTIC ACID, WHOLE BLOOD: 1.8 MMOL/L (ref 0.7–2.1)
Lab: NORMAL
MAGNESIUM: 1.4 MG/DL (ref 1.6–2.6)
MAGNESIUM: 1.8 MG/DL (ref 1.6–2.6)
MAGNESIUM: 1.8 MG/DL (ref 1.6–2.6)
MAGNESIUM: 1.9 MG/DL (ref 1.6–2.6)
MAGNESIUM: 1.9 MG/DL (ref 1.6–2.6)
MAGNESIUM: 2 MG/DL (ref 1.6–2.6)
PHOSPHORUS: 1.7 MG/DL (ref 2.5–4.5)
PHOSPHORUS: 1.8 MG/DL (ref 2.5–4.5)
PHOSPHORUS: 2.2 MG/DL (ref 2.5–4.5)
PHOSPHORUS: 2.6 MG/DL (ref 2.5–4.5)
PHOSPHORUS: 2.8 MG/DL (ref 2.5–4.5)
PHOSPHORUS: 3.1 MG/DL (ref 2.5–4.5)
POTASSIUM SERPL-SCNC: 2.9 MMOL/L (ref 3.7–5.3)
POTASSIUM SERPL-SCNC: 3.5 MMOL/L (ref 3.7–5.3)
POTASSIUM SERPL-SCNC: 3.6 MMOL/L (ref 3.7–5.3)
POTASSIUM SERPL-SCNC: 3.7 MMOL/L (ref 3.7–5.3)
POTASSIUM SERPL-SCNC: 3.9 MMOL/L (ref 3.7–5.3)
POTASSIUM SERPL-SCNC: 3.9 MMOL/L (ref 3.7–5.3)
POTASSIUM SERPL-SCNC: 4.1 MMOL/L (ref 3.7–5.3)
POTASSIUM SERPL-SCNC: 4.3 MMOL/L (ref 3.7–5.3)
POTASSIUM SERPL-SCNC: 4.5 MMOL/L (ref 3.7–5.3)
POTASSIUM SERPL-SCNC: 4.7 MMOL/L (ref 3.7–5.3)
REASON FOR REJECTION: NORMAL
SODIUM BLD-SCNC: 129 MMOL/L (ref 135–144)
SODIUM BLD-SCNC: 132 MMOL/L (ref 135–144)
SODIUM BLD-SCNC: 132 MMOL/L (ref 135–144)
SODIUM BLD-SCNC: 133 MMOL/L (ref 135–144)
SODIUM BLD-SCNC: 134 MMOL/L (ref 135–144)
SPECIMEN DESCRIPTION: NORMAL
ZZ NTE CLEAN UP: ORDERED TEST: NORMAL
ZZ NTE WITH NAME CLEAN UP: SPECIMEN SOURCE: NORMAL

## 2020-12-18 PROCEDURE — 6360000002 HC RX W HCPCS: Performed by: STUDENT IN AN ORGANIZED HEALTH CARE EDUCATION/TRAINING PROGRAM

## 2020-12-18 PROCEDURE — 84100 ASSAY OF PHOSPHORUS: CPT

## 2020-12-18 PROCEDURE — 83735 ASSAY OF MAGNESIUM: CPT

## 2020-12-18 PROCEDURE — 6370000000 HC RX 637 (ALT 250 FOR IP): Performed by: STUDENT IN AN ORGANIZED HEALTH CARE EDUCATION/TRAINING PROGRAM

## 2020-12-18 PROCEDURE — 2580000003 HC RX 258: Performed by: STUDENT IN AN ORGANIZED HEALTH CARE EDUCATION/TRAINING PROGRAM

## 2020-12-18 PROCEDURE — 80048 BASIC METABOLIC PNL TOTAL CA: CPT

## 2020-12-18 PROCEDURE — 83036 HEMOGLOBIN GLYCOSYLATED A1C: CPT

## 2020-12-18 PROCEDURE — 84132 ASSAY OF SERUM POTASSIUM: CPT

## 2020-12-18 PROCEDURE — 87086 URINE CULTURE/COLONY COUNT: CPT

## 2020-12-18 PROCEDURE — 2500000003 HC RX 250 WO HCPCS: Performed by: STUDENT IN AN ORGANIZED HEALTH CARE EDUCATION/TRAINING PROGRAM

## 2020-12-18 PROCEDURE — 1200000000 HC SEMI PRIVATE

## 2020-12-18 PROCEDURE — 82947 ASSAY GLUCOSE BLOOD QUANT: CPT

## 2020-12-18 PROCEDURE — 99223 1ST HOSP IP/OBS HIGH 75: CPT | Performed by: INTERNAL MEDICINE

## 2020-12-18 RX ORDER — MAGNESIUM SULFATE IN WATER 40 MG/ML
2 INJECTION, SOLUTION INTRAVENOUS ONCE
Status: DISCONTINUED | OUTPATIENT
Start: 2020-12-18 | End: 2020-12-18

## 2020-12-18 RX ORDER — DEXTROSE MONOHYDRATE 25 G/50ML
12.5 INJECTION, SOLUTION INTRAVENOUS PRN
Status: DISCONTINUED | OUTPATIENT
Start: 2020-12-18 | End: 2020-12-19 | Stop reason: HOSPADM

## 2020-12-18 RX ORDER — SODIUM CHLORIDE 9 MG/ML
INJECTION, SOLUTION INTRAVENOUS CONTINUOUS
Status: DISCONTINUED | OUTPATIENT
Start: 2020-12-18 | End: 2020-12-19

## 2020-12-18 RX ORDER — DEXTROSE, SODIUM CHLORIDE, AND POTASSIUM CHLORIDE 5; .45; .15 G/100ML; G/100ML; G/100ML
INJECTION INTRAVENOUS CONTINUOUS PRN
Status: DISCONTINUED | OUTPATIENT
Start: 2020-12-18 | End: 2020-12-19

## 2020-12-18 RX ORDER — POTASSIUM CHLORIDE 7.45 MG/ML
10 INJECTION INTRAVENOUS PRN
Status: DISCONTINUED | OUTPATIENT
Start: 2020-12-18 | End: 2020-12-19

## 2020-12-18 RX ORDER — INSULIN GLARGINE 100 [IU]/ML
30 INJECTION, SOLUTION SUBCUTANEOUS NIGHTLY
Status: DISCONTINUED | OUTPATIENT
Start: 2020-12-18 | End: 2020-12-19 | Stop reason: HOSPADM

## 2020-12-18 RX ORDER — POTASSIUM CHLORIDE 20 MEQ/1
40 TABLET, EXTENDED RELEASE ORAL ONCE
Status: DISCONTINUED | OUTPATIENT
Start: 2020-12-18 | End: 2020-12-18

## 2020-12-18 RX ADMIN — INSULIN LISPRO 3 UNITS: 100 INJECTION, SOLUTION INTRAVENOUS; SUBCUTANEOUS at 21:07

## 2020-12-18 RX ADMIN — INSULIN GLARGINE 30 UNITS: 100 INJECTION, SOLUTION SUBCUTANEOUS at 21:40

## 2020-12-18 RX ADMIN — ENOXAPARIN SODIUM 40 MG: 40 INJECTION SUBCUTANEOUS at 09:40

## 2020-12-18 RX ADMIN — POTASSIUM CHLORIDE, DEXTROSE MONOHYDRATE AND SODIUM CHLORIDE: 150; 5; 450 INJECTION, SOLUTION INTRAVENOUS at 16:49

## 2020-12-18 RX ADMIN — DEXTROSE AND SODIUM CHLORIDE: 5; 450 INJECTION, SOLUTION INTRAVENOUS at 00:55

## 2020-12-18 ASSESSMENT — PAIN SCALES - GENERAL: PAINLEVEL_OUTOF10: 0

## 2020-12-18 NOTE — ED NOTES
The following labs were labeled with patient stickers & tubed to lab;    []Lavender   []On Ice  []Blue  [x]Green/ Yellow  []Green/ Black []On Ice  []Pink  []Red  []Yellow    []COVID-19 Swab []Rapid    []Urine Sample  []Pelvic Cultures    []Blood Cultures       Sally BartlettP[facility]  12/18/20 0543

## 2020-12-18 NOTE — ED NOTES
Pt remains resting on cart, calm, nondistressed  GCS=15       Jonathan EnriquezAllegheny General Hospital  12/18/20 1115

## 2020-12-18 NOTE — ED NOTES
Pt alert, oriented, speaking clearly  Denies needs   Will continue to monitor     Arielle Wall RN  12/18/20 0662

## 2020-12-18 NOTE — ED NOTES
Bed: 10  Expected date:   Expected time:   Means of arrival:   Comments:     Eugene Villegas RN  12/17/20 [id number]

## 2020-12-18 NOTE — ED NOTES
The following labs were labeled with patient stickers & tubed to lab;    []Lavender   []On Ice  []Blue  [x]Green/ Yellow  []Green/ Black []On Ice  []Pink  []Red  []Yellow    []COVID-19 Swab []Rapid    []Urine Sample  []Pelvic Cultures    []Blood Cultures       Gricelda Retana RN  12/18/20 6729

## 2020-12-18 NOTE — ED NOTES
Patient resting quietly on stretcher, safety maintained   No signs of distress noted.          Jordyn Grey RN  12/18/20 2586

## 2020-12-18 NOTE — ED NOTES
The following labs were labeled with patient stickers & tubed to lab;    [x]Lavender   []On Ice  []Blue  [x]Green/ Yellow  []Green/ Black []On Ice  []Pink  []Red  []Yellow    []COVID-19 Swab []Rapid    []Urine Sample  []Pelvic Cultures    []Blood 1664 [facility] Soo Dias RN  12/18/20 0086

## 2020-12-18 NOTE — ED NOTES
Bed: 38  Expected date:   Expected time:   Means of arrival:   Comments:  Room [address] [place] [address]  12/18/20 7906

## 2020-12-18 NOTE — ED PROVIDER NOTES
[facility]     Emergency Department     Faculty Attestation    I performed a history and physical examination of the patient and discussed management with the resident. I reviewed the resident´s note and agree with the documented findings and plan of care. Any areas of disagreement are noted on the chart. I was personally present for the key portions of any procedures. I have documented in the chart those procedures where I was not present during the key portions. I have reviewed the emergency nurses triage note. I agree with the chief complaint, past medical history, past surgical history, allergies, medications, social and family history as documented unless otherwise noted below. For Physician Assistant/ Nurse Practitioner cases/documentation I have personally evaluated this patient and have completed at least one if not all key elements of the E/M (history, physical exam, and MDM). Additional findings are as noted. Out of insulin, chest clear, heart exam normal, abdomen benign, good skin turgor, and mucous membranes dry.      Navid Easley MD  12/17/20 5846

## 2020-12-18 NOTE — ED PROVIDER NOTES
[facility] ED  Emergency Department Encounter  EmergencyMedicine Resident     Pt Wong Washington  MRN: [id number]  [place] 2000  Date of evaluation: 12/17/20  PCP:  No primary care provider on file. CHIEF COMPLAINT       Chief Complaint   Patient presents with    Blood Sugar Problem       HISTORY OF PRESENT ILLNESS  (Location/Symptom, Timing/Onset, Context/Setting, Quality, Duration, Modifying Factors, Severity.)      Joann Cobos is a 23 y.o. male who presents with multiple complaints states that he suspects that he is having issues with his blood sugar. Patient has history of type 1 diabetes with history of poorly controlled blood sugar. States that he is not had his insulin, does not have a refill until the end of the month and was not given his Lantus as last refill. When asked if he had a glucometer at home patient stated that he was with his cousins and did not have 1 so 46 was called. Patient complains of abdominal discomfort and chest discomfort after vomiting but denies current nausea. Denies concern for abscess or soft tissue infection denies dysuria. Intake point-of-care glucose 312    PAST MEDICAL / SURGICAL / SOCIAL / FAMILY HISTORY      has a past medical history of Diabetes mellitus ([facility] [address] and Type 1 diabetes mellitus ([facility] [address] has no past surgical history on file.     Social History     Socioeconomic History    Marital status: Single     Spouse name: Not on file    Number of children: Not on file    Years of education: Not on file    Highest education level: Not on file   Occupational History     Employer: N/A   Social Needs    Financial resource strain: Not on file    Food insecurity     Worry: Not on file     Inability: Not on file    Transportation needs     Medical: Not on file     Non-medical: Not on file   Tobacco Use    Smoking status: Current Some Day Smoker     Packs/day: 0.25     Years: 2.00     Pack years: 0.50     Types: Cigarettes    Smokeless tobacco: Never Used   Substance and Sexual Activity    Alcohol use: Yes     Alcohol/week: 10.0 standard drinks     Types: 10 Shots of liquor per week    Drug use: Yes     Types: Marijuana     Comment: occasional    Sexual activity: Yes     Partners: Female   Lifestyle    Physical activity     Days per week: Not on file     Minutes per session: Not on file    Stress: Not on file   Relationships    Social connections     Talks on phone: Not on file     Gets together: Not on file     Attends Synagogue service: Not on file     Active member of club or organization: Not on file     Attends meetings of clubs or organizations: Not on file     Relationship status: Not on file    Intimate partner violence     Fear of current or ex partner: Not on file     Emotionally abused: Not on file     Physically abused: Not on file     Forced sexual activity: Not on file   Other Topics Concern    Not on file   Social History Narrative    ** Merged History Encounter **            Family History   Problem Relation Age of Onset    Asthma Father     Asthma Sister     Asthma Brother        Allergies:  Patient has no known allergies. Home Medications:  Prior to Admission medications    Medication Sig Start Date End Date Taking?  Authorizing Provider   insulin glargine (LANTUS) 100 UNIT/ML injection vial Inject 20 Units into the skin nightly  Patient taking differently: Inject 30 Units into the skin nightly  10/2/20   Juana Mitchell MD   acetaminophen (TYLENOL) 325 MG tablet Take 2 tablets by mouth every 6 hours as needed for Pain 10/2/20   Juana Mitchell MD   EPINEPHrine (Jeanie Sleigh 2-ITALO) 0.15 MG/0.3ML SOAJ Use as directed for allergic reaction 10/2/20   Juana Mitchell MD   glucagon (GLUCAGON EMERGENCY) 1 MG injection As directed for extreme hypoglycemia 10/2/20   Juana Mitchell MD   insulin aspart (NOVOLOG FLEXPEN) 100 UNIT/ML injection pen Inject 1-11 Units into the skin 3 times daily (before meals) 1 unit per 15 grams CHO for peripheral pulses bilaterally. Cap refill less than 2 seconds. No lower extremity edema noted    Abdomen: Epigastric abdominal discomfort with mild voluntary guarding no distention no surgical scars. Neurologic: SANTA  to person, place, time, and event. No sensation deficits. Moving all extremities    Extremities: Skin warm, dry and intact.     DIFFERENTIAL  DIAGNOSIS     PLAN (LABS / IMAGING / EKG):  Orders Placed This Encounter   Procedures    BASIC METABOLIC PANEL    LIPASE    BETA-HYDROXYBUTYRATE    CBC Auto Differential    LACTIC ACID, WHOLE BLOOD    HYPOGLYCEMIA TREATMENT: blood glucose less than 50 mg/dL and patient  ALERT and TOLERATING PO    HYPOGLYCEMIA TREATMENT: blood glucose less than 70 mg/dL and patient ALERT and TOLERATING PO    HYPOGLYCEMIA TREATMENT: blood glucose less than 70 mg/dL and patient NOT ALERT or NPO    Strict intake and output    Inpatient consult to Internal Medicine    POCT glucose    POCT Glucose    POCT Glucose    PATIENT STATUS (FROM ED OR OR/PROCEDURAL) Inpatient       MEDICATIONS ORDERED:  Orders Placed This Encounter   Medications    0.9 % sodium chloride bolus    ondansetron (ZOFRAN) injection 4 mg    0.9 % sodium chloride bolus    glucose (GLUTOSE) 40 % oral gel 15 g    dextrose 50 % IV solution    glucagon (rDNA) injection 1 mg    dextrose 5 % solution    insulin regular (HUMULIN R;NOVOLIN R) 100 Units in sodium chloride 0.9 % 100 mL infusion    0.9 % sodium chloride bolus           DIAGNOSTIC RESULTS / EMERGENCY DEPARTMENT COURSE / MDM     LABS:  Results for orders placed or performed during the hospital encounter of 08/68/98   BASIC METABOLIC PANEL   Result Value Ref Range    Glucose 340 (H) 70 - 99 mg/dL    BUN 19 6 - 20 mg/dL    CREATININE 1.17 0.70 - 1.20 mg/dL    Bun/Cre Ratio NOT REPORTED 9 - 20    Calcium 9.7 8.6 - 10.4 mg/dL    Sodium 134 (L) 135 - 144 mmol/L    Potassium 5.4 (H) 3.7 - 5.3 mmol/L    Chloride 98 98 - 107 mmol/L    CO2 10 (L) 20 - 31 mmol/L    Anion Gap 26 (H) 9 - 17 mmol/L    GFR Non-African American >60 >60 mL/min    GFR African American >60 >60 mL/min    GFR Comment          GFR Staging NOT REPORTED    LIPASE   Result Value Ref Range    Lipase 14 13 - 60 U/L   BETA-HYDROXYBUTYRATE   Result Value Ref Range    Beta-Hydroxybutyrate 9.65 (H) 0.02 - 0.27 mmol/L   CBC Auto Differential   Result Value Ref Range    WBC 6.8 3.5 - 11.3 k/uL    RBC 5.33 4.21 - 5.77 m/uL    Hemoglobin 15.1 13.0 - 17.0 g/dL    Hematocrit 47.7 40.7 - 50.3 %    MCV 89.5 82.6 - 102.9 fL    MCH 28.3 25.2 - 33.5 pg    MCHC 31.7 28.4 - 34.8 g/dL    RDW 12.8 11.8 - 14.4 %    Platelets 995 524 - 714 k/uL    MPV 11.7 8.1 - 13.5 fL    NRBC Automated 0.0 0.0 per 100 WBC    Differential Type NOT REPORTED     Seg Neutrophils 80 (H) 36 - 65 %    Lymphocytes 15 (L) 24 - 43 %    Monocytes 5 3 - 12 %    Eosinophils % 0 (L) 1 - 4 %    Basophils 0 0 - 2 %    Immature Granulocytes 0 0 %    Segs Absolute 5.34 1.50 - 8.10 k/uL    Absolute Lymph # 1.04 (L) 1.10 - 3.70 k/uL    Absolute Mono # 0.36 0.10 - 1.20 k/uL    Absolute Eos # <0.03 0.00 - 0.44 k/uL    Basophils Absolute 0.03 0.00 - 0.20 k/uL    Absolute Immature Granulocyte 0.03 0.00 - 0.30 k/uL    WBC Morphology NOT REPORTED     RBC Morphology NOT REPORTED     Platelet Estimate NOT REPORTED    POCT glucose   Result Value Ref Range    Glucose 312 mg/dL    QC OK? yes      RADIOLOGY:  No results found. EKG  None    All EKG's are interpreted by the Emergency Department Physician who either signs or Co-signs this chart in the absence of a cardiologist.    EMERGENCY DEPARTMENT COURSE/IMPRESSION:   Type 1 diabetes with hyperglycemia nausea vomiting evidence of dehydration.   Do chemistry work-up for DKA, give 1 L normal saline  Bicarb 10, patient is hyperglycemic with ketosis, suitable DKA potassium over 5 will start on insulin given 2 L of normal saline, patient is not tachypneic and is hemodynamically stable is appropriate for stepdown, medicine consulted will admit. Do not have high suspicion for infection as cause of DKA most likely due to medication noncompliance. PROCEDURES:  None    CONSULTS:  IP CONSULT TO INTERNAL MEDICINE  IP CONSULT TO CASE MANAGEMENT    CRITICAL CARE:  None    FINAL IMPRESSION      1. Diabetic ketoacidosis without coma associated with type 1 diabetes mellitus ([facility] [address]          DISPOSITION / PLAN     DISPOSITION Admitted 12/17/2020 11:10:25 PM      PATIENT REFERRED TO:  No follow-up provider specified.     DISCHARGE MEDICATIONS:  New Prescriptions    No medications on file       Talat Leon MD  Emergency Medicine Resident    (Please note that portions of this note were completed with a voice recognition program.  Efforts were made to edit the dictations but occasionally words aremis-transcribed.)        Talat Leon MD  Resident  12/17/20 2737

## 2020-12-18 NOTE — ED NOTES
Patient resting on cart, calm, watching tv  Will continue to monitor  Call light in reach  Morning labs drawn, sent to lab  17 AMANDA Donald RN  12/18/20 4328

## 2020-12-18 NOTE — ED NOTES
Pt presents to ED ambulatory a&o x4. Pt comes with complaints of hyperglycemia. Pt states he ran out of his insulin and is type 1 diabetic. Hernandez snot have appt until mid January. Pt placed on bp and pulse ox. BS around 360s with ems pta at his house and 312 here. Denies cp or sob. Having nausea, back pain and ill-like symptoms.       Anai Calabrese, RN  12/17/20 0774

## 2020-12-18 NOTE — PROGRESS NOTES
for any other symptoms except abdominal pain and vomiting      OBJECTIVE     Vital Signs:  BP (!) 120/92   Pulse 76   Temp 98.3 °F (36.8 °C)   Resp 16   Ht 5' 11\" (1.803 m)   Wt 145 lb (65.8 kg)   SpO2 100%   BMI 20.22 kg/m²     Temp (24hrs), Av.3 °F (36.8 °C), Min:98.3 °F (36.8 °C), Max:98.3 °F (36.8 °C)    No intake/output data recorded. Physical Exam:  Constitutional: This is a well developed, well nourished, 18.5-24.9 - Normal 23y.o. year old male who is alert, oriented, cooperative and in no apparent distress. Head:normocephalic and atraumatic. EENT:  PERRLA. No conjunctival injections. Septum was midline, mucosa was without erythema, exudates or cobblestoning. No thrush was noted. Neck: Supple without thyromegaly. No elevated JVP. Trachea was midline. Respiratory: Chest was symmetrical without dullness to percussion. Breath sounds bilaterally were clear to auscultation. There were no wheezes, rhonchi or rales. There is no intercostal retraction or use of accessory muscles. No egophony noted. Cardiovascular: Regular without murmur, clicks, gallops or rubs. Abdomen: Slightly rounded and soft without organomegaly. No rebound, rigidity or guarding was appreciated. Lymphatic: No lymphadenopathy. Musculoskeletal: Normal curvature of the spine. No gross muscle weakness. Extremities:  No lower extremity edema, ulcerations, tenderness, varicosities or erythema. Muscle size, tone and strength are normal.  No involuntary movements are noted. Skin:  Warm and dry. Good color, turgor and pigmentation. No lesions or scars.   No cyanosis or clubbing  Neurological/Psychiatric: The patient's general behavior, level of consciousness, thought content and emotional status is normal.        Medications:  Scheduled Medications:    enoxaparin  40 mg Subcutaneous Daily    sodium chloride  2,000 mL Intravenous Once     Continuous Infusions:    sodium chloride Stopped (20 3137)   Vero Valadez dextrose 5% and 0.45% NaCl with KCl 20 mEq      dextrose      insulin 0.023 Units/kg/hr (12/18/20 0535)    dextrose 5 % and 0.45 % NaCl 150 mL/hr at 12/18/20 0055     PRN Medications    dextrose, 12.5 g, PRN      dextrose 5% and 0.45% NaCl with KCl 20 mEq, , Continuous PRN      potassium chloride, 10 mEq, PRN      glucose, 15 g, PRN      dextrose, 12.5 g, PRN      glucagon (rDNA), 1 mg, PRN      dextrose, 100 mL/hr, PRN        Diagnostic Labs:  CBC:   Recent Labs     12/17/20 2152   WBC 6.8   RBC 5.33   HGB 15.1   HCT 47.7   MCV 89.5   RDW 12.8        BMP:   Recent Labs     12/17/20  2152 12/18/20  0135 12/18/20  0314 12/18/20  0400 12/18/20  0534   * 134*  --   --   --    K 5.4* 4.5 2.9* 4.3 4.7   CL 98 102  --   --   --    CO2 10* 11*  --   --   --    PHOS  --   --  1.8* 3.1  --    BUN 19 16  --   --   --    CREATININE 1.17 0.97  --   --   --      BNP: No results for input(s): BNP in the last 72 hours. PT/INR: No results for input(s): PROTIME, INR in the last 72 hours. APTT: No results for input(s): APTT in the last 72 hours. CARDIAC ENZYMES: No results for input(s): CKMB, CKMBINDEX, TROPONINI in the last 72 hours. Invalid input(s): CKTOTAL;3  FASTING LIPID PANEL:  Lab Results   Component Value Date    CHOL 328 (H) 01/21/2016    HDL 75 01/21/2016    TRIG 192 (H) 01/21/2016     LIVER PROFILE: No results for input(s): AST, ALT, ALB, BILIDIR, BILITOT, ALKPHOS in the last 72 hours. MICROBIOLOGY:   Lab Results   Component Value Date/Time    CULTURE NO SIGNIFICANT GROWTH 03/19/2015 03:44 PM    CULTURE  03/19/2015 03:44 PM     [employer] [address] [facility], [address] [place] [address] [phone]       Imaging:    No results found.     ASSESSMENT & PLAN     ASSESSMENT / PLAN:     Principal Problem:    Diabetic ketoacidosis ([facility] [address]  Active Problems:    Non compliance w medication regimen    Anaphylaxis    DKA, type 1, not at goal [facility])    Hyperkalemia    Hyponatremia    Non-intractable vomiting with nausea  Resolved Problems:    * No resolved hospital problems. *      Diabetic ketoacidosis-continue on insulin infusion, anion gap not closed yet 26-->21-->15, IV  normal saline continuous, POC glucose checks 1 q., BMP 4 q.,  hypoglycemia protocol, started in dextrose months glucose less than 250 and repeat BMP on 2 successive occasions until anion gap closes and patient able to eat then transition to subcutaneous insulin and continue IV dextrose for 2 more hours and then stop insulin drip, continue hypoglycemia protocol. Hypokalemia, hypomagnesemia -- resolved. DVT ppx : enoxaparin  GI ppx: not indicated    PT/OT: ongoing  Discharge Planning / Dixie Leon: ongoing    David Houston MD  Internal Medicine Resident, PGY-1  [facility];  [place], [place]  12/18/2020, 6:17 AM

## 2020-12-18 NOTE — ED NOTES
The following labs were labeled with patient stickers & tubed to lab;    []Lavender   []On Ice  []Blue  [x]Green/ Yellow  []Green/ Black []On Ice  []Pink  []Red  []Yellow    []COVID-19 Swab []Rapid    []Urine Sample  []Pelvic Cultures    []Blood 0642 [facility] Baltazar Gottron, RN  12/18/20 1194

## 2020-12-18 NOTE — H&P
[address] [facility]     Department of Internal Medicine - Staff Internal Medicine Teaching Service          ADMISSION NOTE/HISTORY AND PHYSICAL EXAMINATION   Date: 12/17/2020  Patient Name: Sunday Ventura  Date of admission: 12/17/2020  9:39 PM  YOB: 2000  PCP: No primary care provider on file. History Obtained From:  patient, electronic medical record    CHIEF COMPLAINT     Chief complaint: Abdominal pain, vomiting    HISTORY OF PRESENTING ILLNESS     The patient is a pleasant 23 y.o. male past medical history of type 1 diabetes insulin-dependent presents with a chief complaint of abdominal pain and vomiting since this morning. Usually takes his insulin at home and is very compliant. He usually ran out of his insulin because of lack of refill. He tried to reach out to his PCP for refill but unable to get refill until 23 December he ran out of his insulin and he took his last shot while he was staying with his cousins. He then noticed symptoms of abdominal pain and was throwing up which made him come to the hospital to receive insulin. His initial blood glucose in EMR is at 312 potassium of 5.4 and with hydroxybutyrate of 9.61 he was started on insulin drip and DKA protocol has been initiated. Review of systems is negative for any other symptoms except abdominal pain and vomiting    PAST MEDICAL HISTORY     Past Medical History:   Diagnosis Date    Diabetes mellitus ([address]     Type 1 diabetes mellitus ([address] April 2013       PAST SURGICAL HISTORY     History reviewed. No pertinent surgical history. ALLERGIES     Patient has no known allergies. MEDICATIONS PRIOR TO ADMISSION     Prior to Admission medications    Medication Sig Start Date End Date Taking?  Authorizing Provider   insulin glargine (LANTUS) 100 UNIT/ML injection vial Inject 20 Units into the skin nightly  Patient taking differently: Inject 30 Units into the skin nightly  10/2/20   Juana Mitchell MD acetaminophen (TYLENOL) 325 MG tablet Take 2 tablets by mouth every 6 hours as needed for Pain 10/2/20   Lupe Velázquez MD   EPINEPHrine (Dewaine Yohannes 2-ITALO) 0.15 MG/0.3ML SOAJ Use as directed for allergic reaction 10/2/20   Lupe Velázquez MD   glucagon (GLUCAGON EMERGENCY) 1 MG injection As directed for extreme hypoglycemia 10/2/20   Lupe Velázquez MD   insulin aspart (NOVOLOG FLEXPEN) 100 UNIT/ML injection pen Inject 1-11 Units into the skin 3 times daily (before meals) 1 unit per 15 grams CHO for meals and snacks  Sliding scale: =0, 125-150=1, 151-175=2, 175-200=3, 201-225=4, 226-250=5, 251-275=6, 276-300=7, 301-325=8, 326-350=9, 351-375=10, 376-400=11 10/2/20   Lupe Velázquez MD   blood glucose test strips (ONE TOUCH ULTRA TEST) strip Sig: For blood testing 4-6 times/day 10/2/20   Lupe Velázquez MD   Insulin Pen Needle 30G X 5 MM MISC 1 Device by Does not apply route 4 times daily 10/2/20   Lupe Velázquez MD   Ketone Blood Test STRP 1 strip by Does not apply route daily as needed (for high blood sugar) 10/2/20   Lupe Velázquez MD   Lancets MISC 1 each by Does not apply route 4 times daily (before meals and nightly) 10/2/20   Lupe Velázquez MD       SOCIAL HISTORY     Tobacco: Active smoker  Alcohol: History of alcohol intake  Illicits: Marijuana user  Occupation:     FAMILY HISTORY     Family History   Problem Relation Age of Onset    Asthma Father     Asthma Sister     Asthma Brother        PHYSICAL EXAM     Vitals: BP (!) 142/80   Pulse 74   Temp 98.3 °F (36.8 °C)   Resp 16   Ht 5' 11\" (1.803 m)   Wt 145 lb (65.8 kg)   SpO2 98%   BMI 20.22 kg/m²   Tmax: Temp (24hrs), Av.3 °F (36.8 °C), Min:98.3 °F (36.8 °C), Max:98.3 °F (36.8 °C)    Last Body weight:   Wt Readings from Last 3 Encounters:   20 145 lb (65.8 kg)   10/13/20 140 lb (63.5 kg)   10/02/20 140 lb 14 oz (63.9 kg)     Body Mass Index : Body mass index is 20.22 kg/m². Physical Exam  Constitutional:       Appearance: Normal appearance.  He is normal weight. He is ill-appearing. HENT:      Head: Normocephalic and atraumatic. Nose: Nose normal.      Mouth/Throat:      Mouth: Mucous membranes are dry. Pharynx: Oropharynx is clear. Eyes:      Pupils: Pupils are equal, round, and reactive to light. Neck:      Musculoskeletal: Normal range of motion. Cardiovascular:      Rate and Rhythm: Normal rate and regular rhythm. Pulses: Normal pulses. Heart sounds: Normal heart sounds. Pulmonary:      Effort: Pulmonary effort is normal.   Abdominal:      General: Abdomen is flat. Palpations: Abdomen is soft. There is no mass. Tenderness: There is abdominal tenderness. There is no guarding. Neurological:      General: No focal deficit present. Mental Status: He is alert and oriented to person, place, and time. Mental status is at baseline. Psychiatric:         Mood and Affect: Mood normal.         Behavior: Behavior normal.         Thought Content:  Thought content normal.         Judgment: Judgment normal.             INVESTIGATIONS     Laboratory Testing:     Recent Results (from the past 24 hour(s))   BASIC METABOLIC PANEL    Collection Time: 12/17/20  9:52 PM   Result Value Ref Range    Glucose 340 (H) 70 - 99 mg/dL    BUN 19 6 - 20 mg/dL    CREATININE 1.17 0.70 - 1.20 mg/dL    Bun/Cre Ratio NOT REPORTED 9 - 20    Calcium 9.7 8.6 - 10.4 mg/dL    Sodium 134 (L) 135 - 144 mmol/L    Potassium 5.4 (H) 3.7 - 5.3 mmol/L    Chloride 98 98 - 107 mmol/L    CO2 10 (L) 20 - 31 mmol/L    Anion Gap 26 (H) 9 - 17 mmol/L    GFR Non-African American >60 >60 mL/min    GFR African American >60 >60 mL/min    GFR Comment          GFR Staging NOT REPORTED    LIPASE    Collection Time: 12/17/20  9:52 PM   Result Value Ref Range    Lipase 14 13 - 60 U/L   BETA-HYDROXYBUTYRATE    Collection Time: 12/17/20  9:52 PM   Result Value Ref Range    Beta-Hydroxybutyrate 9.65 (H) 0.02 - 0.27 mmol/L   CBC Auto Differential    Collection Time: 12/17/20  9:52 PM Result Value Ref Range    WBC 6.8 3.5 - 11.3 k/uL    RBC 5.33 4.21 - 5.77 m/uL    Hemoglobin 15.1 13.0 - 17.0 g/dL    Hematocrit 47.7 40.7 - 50.3 %    MCV 89.5 82.6 - 102.9 fL    MCH 28.3 25.2 - 33.5 pg    MCHC 31.7 28.4 - 34.8 g/dL    RDW 12.8 11.8 - 14.4 %    Platelets 896 582 - 193 k/uL    MPV 11.7 8.1 - 13.5 fL    NRBC Automated 0.0 0.0 per 100 WBC    Differential Type NOT REPORTED     Seg Neutrophils 80 (H) 36 - 65 %    Lymphocytes 15 (L) 24 - 43 %    Monocytes 5 3 - 12 %    Eosinophils % 0 (L) 1 - 4 %    Basophils 0 0 - 2 %    Immature Granulocytes 0 0 %    Segs Absolute 5.34 1.50 - 8.10 k/uL    Absolute Lymph # 1.04 (L) 1.10 - 3.70 k/uL    Absolute Mono # 0.36 0.10 - 1.20 k/uL    Absolute Eos # <0.03 0.00 - 0.44 k/uL    Basophils Absolute 0.03 0.00 - 0.20 k/uL    Absolute Immature Granulocyte 0.03 0.00 - 0.30 k/uL    WBC Morphology NOT REPORTED     RBC Morphology NOT REPORTED     Platelet Estimate NOT REPORTED    POCT glucose    Collection Time: 12/17/20  9:53 PM   Result Value Ref Range    Glucose 312 mg/dL    QC OK? yes    POC Glucose Fingerstick    Collection Time: 12/17/20 11:40 PM   Result Value Ref Range    POC Glucose 351 (H) 75 - 110 mg/dL       Imaging:   No results found. ASSESSMENT & PLAN     ASSESSMENT / PLAN:     IMPRESSION  This is a 23 y.o. male who presented with terminal pain and vomiting and found to have hyperglycemia with increased hydroxybutyrate secondary to missed insulin dosage patient admitted to inpatient status  Because of DKA    Principal Problem:    Diabetic ketoacidosis ([address]  Active Problems:    Non compliance w medication regimen    Anaphylaxis    DKA, type 1, not at goal [facility])    Hyperkalemia    Hyponatremia    Non-intractable vomiting with nausea  Resolved Problems:    * No resolved hospital problems.  *    Diabetic ketoacidosis-continue on insulin infusion, bolus IV fluids, high flow normal saline continuous, UC glucose checks 1 q., BMP 4 q., potassium to q., phosphorus 4 q. magnesium 4 q. hypoglycemia protocol, started in dextrose months glucose less than 250 and repeat BMP on 2 successive occasions until anion gap closes and patient able to eat then transition to subcutaneous insulin and continue IV dextrose for tomorrow was and then stop insulin drip, continue hypoglycemia protocol. Intractable vomiting: Zofran 4 mg IV once    DVT ppx: Lovenox 40 mg  GI ppx: None indicated    PT/OT/SW : Consulted ongoing  Discharge Planning: Consulted ongoing    Phil Marcos MD  Internal Medicine Resident, [facility]; [place], [place]  12/17/2020, 11:56 PM   Attending Physician Statement  I have discussed the care of Maurizio Rehman, including pertinent history and exam findings,  with the resident. I have seen and examined the patient and the key elements of all parts of the encounter have been performed by me. I agree with the assessment, plan and orders as documented by the resident with additions . Treatment plan Discussed with nursing staff in detail , all questions answered . Electronically signed by [place] MD on   12/18/20 at 2:41 PM EST    Please note that this chart was generated using voice recognition Dragon dictation software. Although every effort was made to ensure the accuracy of this automated transcription, some errors in transcription may have occurred.

## 2020-12-19 VITALS
TEMPERATURE: 97.8 F | HEART RATE: 75 BPM | WEIGHT: 138 LBS | DIASTOLIC BLOOD PRESSURE: 67 MMHG | BODY MASS INDEX: 19.32 KG/M2 | HEIGHT: 71 IN | RESPIRATION RATE: 14 BRPM | SYSTOLIC BLOOD PRESSURE: 101 MMHG | OXYGEN SATURATION: 99 %

## 2020-12-19 LAB
ANION GAP SERPL CALCULATED.3IONS-SCNC: 12 MMOL/L (ref 9–17)
BUN BLDV-MCNC: 9 MG/DL (ref 6–20)
BUN/CREAT BLD: ABNORMAL (ref 9–20)
CALCIUM SERPL-MCNC: 9 MG/DL (ref 8.6–10.4)
CHLORIDE BLD-SCNC: 99 MMOL/L (ref 98–107)
CO2: 22 MMOL/L (ref 20–31)
CREAT SERPL-MCNC: 0.74 MG/DL (ref 0.7–1.2)
GFR AFRICAN AMERICAN: >60 ML/MIN
GFR NON-AFRICAN AMERICAN: >60 ML/MIN
GFR SERPL CREATININE-BSD FRML MDRD: ABNORMAL ML/MIN/{1.73_M2}
GFR SERPL CREATININE-BSD FRML MDRD: ABNORMAL ML/MIN/{1.73_M2}
GLUCOSE BLD-MCNC: 142 MG/DL (ref 75–110)
GLUCOSE BLD-MCNC: 148 MG/DL (ref 75–110)
GLUCOSE BLD-MCNC: 170 MG/DL (ref 75–110)
GLUCOSE BLD-MCNC: 56 MG/DL (ref 70–99)
GLUCOSE BLD-MCNC: 80 MG/DL (ref 75–110)
GLUCOSE BLD-MCNC: 82 MG/DL (ref 75–110)
HCT VFR BLD CALC: 38.7 % (ref 40.7–50.3)
HEMOGLOBIN: 12.9 G/DL (ref 13–17)
MCH RBC QN AUTO: 28.5 PG (ref 25.2–33.5)
MCHC RBC AUTO-ENTMCNC: 33.3 G/DL (ref 28.4–34.8)
MCV RBC AUTO: 85.4 FL (ref 82.6–102.9)
NRBC AUTOMATED: 0 PER 100 WBC
PDW BLD-RTO: 12.9 % (ref 11.8–14.4)
PLATELET # BLD: 343 K/UL (ref 138–453)
PMV BLD AUTO: 11.1 FL (ref 8.1–13.5)
POTASSIUM SERPL-SCNC: 3.3 MMOL/L (ref 3.7–5.3)
POTASSIUM SERPL-SCNC: 4.1 MMOL/L (ref 3.7–5.3)
RBC # BLD: 4.53 M/UL (ref 4.21–5.77)
SODIUM BLD-SCNC: 133 MMOL/L (ref 135–144)
WBC # BLD: 4.4 K/UL (ref 3.5–11.3)

## 2020-12-19 PROCEDURE — 87077 CULTURE AEROBIC IDENTIFY: CPT

## 2020-12-19 PROCEDURE — 86403 PARTICLE AGGLUT ANTBDY SCRN: CPT

## 2020-12-19 PROCEDURE — 80048 BASIC METABOLIC PNL TOTAL CA: CPT

## 2020-12-19 PROCEDURE — 87205 SMEAR GRAM STAIN: CPT

## 2020-12-19 PROCEDURE — 36415 COLL VENOUS BLD VENIPUNCTURE: CPT

## 2020-12-19 PROCEDURE — 84132 ASSAY OF SERUM POTASSIUM: CPT

## 2020-12-19 PROCEDURE — 6370000000 HC RX 637 (ALT 250 FOR IP): Performed by: STUDENT IN AN ORGANIZED HEALTH CARE EDUCATION/TRAINING PROGRAM

## 2020-12-19 PROCEDURE — 87040 BLOOD CULTURE FOR BACTERIA: CPT

## 2020-12-19 PROCEDURE — 87186 SC STD MICRODIL/AGAR DIL: CPT

## 2020-12-19 PROCEDURE — 99238 HOSP IP/OBS DSCHRG MGMT 30/<: CPT | Performed by: INTERNAL MEDICINE

## 2020-12-19 PROCEDURE — 82947 ASSAY GLUCOSE BLOOD QUANT: CPT

## 2020-12-19 PROCEDURE — 6360000002 HC RX W HCPCS: Performed by: STUDENT IN AN ORGANIZED HEALTH CARE EDUCATION/TRAINING PROGRAM

## 2020-12-19 PROCEDURE — 85027 COMPLETE CBC AUTOMATED: CPT

## 2020-12-19 RX ORDER — INSULIN GLARGINE 100 [IU]/ML
30 INJECTION, SOLUTION SUBCUTANEOUS NIGHTLY
Qty: 1 VIAL | Refills: 5 | Status: ON HOLD | OUTPATIENT
Start: 2020-12-19 | End: 2021-05-14 | Stop reason: HOSPADM

## 2020-12-19 RX ORDER — INSULIN ASPART 100 [IU]/ML
1-11 INJECTION, SOLUTION INTRAVENOUS; SUBCUTANEOUS
Qty: 5 PEN | Refills: 3 | Status: SHIPPED | OUTPATIENT
Start: 2020-12-19 | End: 2021-06-29 | Stop reason: SDUPTHER

## 2020-12-19 RX ORDER — IBUPROFEN 200 MG
600 TABLET ORAL EVERY 8 HOURS PRN
Qty: 30 TABLET | Refills: 0 | Status: SHIPPED | OUTPATIENT
Start: 2020-12-19 | End: 2021-08-17

## 2020-12-19 RX ORDER — MAGNESIUM SULFATE 1 G/100ML
1 INJECTION INTRAVENOUS ONCE
Status: COMPLETED | OUTPATIENT
Start: 2020-12-19 | End: 2020-12-19

## 2020-12-19 RX ORDER — POTASSIUM CHLORIDE 20 MEQ/1
20 TABLET, EXTENDED RELEASE ORAL
Status: DISPENSED | OUTPATIENT
Start: 2020-12-19 | End: 2020-12-19

## 2020-12-19 RX ADMIN — MAGNESIUM SULFATE HEPTAHYDRATE 1 G: 1 INJECTION, SOLUTION INTRAVENOUS at 12:21

## 2020-12-19 RX ADMIN — POTASSIUM CHLORIDE 20 MEQ: 1500 TABLET, EXTENDED RELEASE ORAL at 12:21

## 2020-12-19 ASSESSMENT — PAIN SCALES - GENERAL: PAINLEVEL_OUTOF10: 0

## 2020-12-19 NOTE — PROGRESS NOTES
Physical Therapy  DATE: 2020    NAME: Glenna Harper  MRN: [id number]   : 2000    Patient not seen this date for Physical Therapy due to:  [] Blood transfusion in progress  [] Hemodialysis  [] Patient Declined  [] Spine Precautions   [] Strict Bedrest  [] Surgery/ Procedure  [] Testing      [] Other        [x] PT is being discontinued at this time. Patient independent. No further needs. [] PT is being discontinued at this time due to declining physical/ medical status. Therapy is not appropriate at this time.     Stacia Duke, PT

## 2020-12-19 NOTE — PROGRESS NOTES
No resolved hospital problems. *    1. Diabetic ketoacidosis-  Anion gap closed, D/C insulin drip and IV dextrose. Bridged to SC insulin. Continue on 30 U basal insulin and 3 U pre meal. Continue on glucose checks pre meal and hypoglycemia protocol. 2. Hypokalemia, hypomagnesemia -- resolved. DVT ppx : enoxaparin  GI ppx: not indicated    PT/OT: ongoing  Discharge Planning / Mirta Purchase: Most likely today. Mark Donohue MD  Internal Medicine Resident, PGY-1  [facility]; [place], [place]  12/19/2020, 7:55 AM   Attending Physician Statement  I have discussed the care of Ricky Leung, including pertinent history and exam findings,  with the resident. I have seen and examined the patient and the key elements of all parts of the encounter have been performed by me. I agree with the assessment, plan and orders as documented by the resident with additions . Treatment plan Discussed with nursing staff in detail , all questions answered . Electronically signed by Jane Reagan MD on   12/19/20 at 2:57 PM EST    Please note that this chart was generated using voice recognition Dragon dictation software. Although every effort was made to ensure the accuracy of this automated transcription, some errors in transcription may have occurred.

## 2020-12-19 NOTE — PLAN OF CARE
Pt being discharged home. [employer] sent to [facility] aid for insulin. Discharge instructions given. Questions answered. Pt walked off unit and refuse wheelchair.

## 2020-12-19 NOTE — DISCHARGE INSTR - COC
Continuity of Care Form    Patient Name: Araceli Lind   :  2000  MRN:  [id number]    Admit date:  2020  Discharge date:  ***    Code Status Order: Full Code   Advance Directives:      Admitting Physician:  Paola Torres MD  PCP: No primary care provider on file. Discharging Nurse: [facility] Unit/Room#: 8041/2819-33  Discharging Unit Phone Number: ***    Emergency Contact:   Extended Emergency Contact Information  Primary Emergency Contact: Nicol Warner  Address: [address] Phone: [phone]  Relation: Parent    Past Surgical History:  History reviewed. No pertinent surgical history. Immunization History:   Immunization History   Administered Date(s) Administered    DTP 1998, 1999, 1999, 1999    DTaP vaccine 2002    HPV Quadrivalent (Gardasil) 10/14/2011, 2012, 2013, 2014    Hepatitis A Ped/Adol (Havrix, Vaqta) 2013, 2014    Hepatitis B Ped/Adol (Engerix-B, Recombivax HB) 1998, 1998, 1999    Hib vaccine 1998, 1999, 1999, 1999    Influenza Live, intranasal, LAIV3 2012, 2015    Influenza Vaccine, unspecified formulation 2016, 2017    Influenza, Quadv, 6 mo and older, IM, PF (Flulaval, Fluarix) 2019    MMR 1999, 2003, 2012    Meningococcal MCV4P (Menactra) 10/14/2011, 2012    Polio Virus Vaccine 1998, 1999, 1999, 2002    Tdap (Boostrix, Adacel) 10/14/2011, 2012    Varicella (Varivax) 1999, 2012       Active Problems:  Patient Active Problem List   Diagnosis Code    Diabetic ketoacidosis ([facility] E11.10    Allergic reaction caused by a drug T78.40XA    Non compliance w medication regimen Z91.14    Anaphylaxis T78. 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    DKA, type 1, not at goal [facility]) E10.10    Hyperkalemia E87.5 Hyponatremia E87.1    Non-intractable vomiting with nausea R11.2       Isolation/Infection:   Isolation            No Isolation          Patient Infection Status       None to display            Nurse Assessment:  Last Vital Signs: /67   Pulse 75   Temp 97.8 °F (36.6 °C) (Oral)   Resp 14   Ht 5' 11\" (1.803 m)   Wt 138 lb (62.6 kg)   SpO2 99%   BMI 19.25 kg/m²     Last documented pain score (0-10 scale): Pain Level: 0  Last Weight:   Wt Readings from Last 1 Encounters:   12/19/20 138 lb (62.6 kg)     Mental Status:  {IP PT MENTAL STATUS:20030:::0}    IV Access:  { SUE IV ACCESS:373824561:::0}    Nursing Mobility/ADLs:  Walking   {CHP DME ADLs:743535858:::0}  Transfer  {CHP DME ADLs:631173785:::0}  Bathing  {CHP DME ADLs:301098159:::0}  Dressing  {CHP DME ADLs:613951463:::0}  Toileting  {CHP DME ADLs:793992456:::0}  Feeding  {CHP DME ADLs:514016598:::0}  Med Admin  {P DME ADLs:595694154:::0}  Med Delivery   { SUE MED Delivery:155193031:::0}    Wound Care Documentation and Therapy:        Elimination:  Continence: Bowel: {YES / DE:92337}  Bladder: {YES / LS:03722}  Urinary Catheter: {Urinary Catheter:696333614:::0}   Colostomy/Ileostomy/Ileal Conduit: {YES / EN:00744}       Date of Last BM: ***  No intake or output data in the 24 hours ending 12/19/20 1458  No intake/output data recorded.     Safety Concerns:     [address] [employer] Safety Concerns:427179837:::0}    Impairments/Disabilities:      [address] [employer] Impairments/Disabilities:385686953:::0}    Nutrition Therapy:  Current Nutrition Therapy:   [address] [employer] Diet List:421649582:::0}    Routes of Feeding: {CHP DME Other Feedings:926034393:::0}  Liquids: {Slp liquid thickness:12682}  Daily Fluid Restriction: {CHP DME Yes amt example:022635952:::0}  Last Modified Barium Swallow with Video (Video Swallowing Test): {Done Not Done [facility]:710376113:::7}    Treatments at the Time of Hospital Discharge:   Respiratory Treatments: ***  Oxygen Therapy:  {Therapy; copd oxygen:67221:::0}  Ventilator:    { CC Vent List:872903902:::0}    Rehab Therapies: {THERAPEUTIC INTERVENTION:7771511105}  Weight Bearing Status/Restrictions: 50Alessandro ROMERO Weight Bearin:::0}  Other Medical Equipment (for information only, NOT a DME order):  {EQUIPMENT:657882479}  Other Treatments: ***    Patient's personal belongings (please select all that are sent with patient):  {CHP DME Belongings:929122786:::0}    RN SIGNATURE:  {Esignature:658964242:::0}    CASE MANAGEMENT/SOCIAL WORK SECTION    Inpatient Status Date: ***    Readmission Risk Assessment Score:  Readmission Risk              Risk of Unplanned Readmission:        14           Discharging to Facility/ Agency   Name:   Address:  Phone:  Fax:    Dialysis Facility (if applicable)   Name:  Address:  Dialysis Schedule:  Phone:  Fax:    / signature: {Esignature:123170800:::0}    PHYSICIAN SECTION    Prognosis: {Prognosis:8045518715:::0}    Condition at Discharge: 50Alessandro Brambila Patient Condition:601345556:::0}    Rehab Potential (if transferring to Rehab): {Prognosis:1273013085:::0}    Recommended Labs or Other Treatments After Discharge: ***    Physician Certification: I certify the above information and transfer of Pernell Rivera  is necessary for the continuing treatment of the diagnosis listed and that he requires {Admit to Appropriate Level of Care:48555:::0} for {GREATER/LESS:649227756} 30 days.      Update Admission H&P: {CHP DME Changes in HandP:521483050:::0}    PHYSICIAN SIGNATURE:  Electronically signed by Lord Bisi MD on 20 at 2:59 PM EST

## 2020-12-19 NOTE — ED NOTES
Pt resting on cart, non distressed  Ambulates to bathroom with stready gait    Will continue to monitor  Call light in reach       Sally Bartlett RN  12/18/20 1958

## 2020-12-21 LAB
CULTURE: ABNORMAL
Lab: ABNORMAL
SPECIMEN DESCRIPTION: ABNORMAL

## 2020-12-22 NOTE — TELEPHONE ENCOUNTER
Please Approve or Refuse.   Send to Pharmacy per Pt's Request:      Next Visit Date:  Visit date not found   Last Visit Date: 9/28/2020    Hemoglobin A1C (%)   Date Value   12/18/2020 10.6 (H)   07/16/2020 11.5 (H)   02/02/2019 13.1 (H)             ( goal A1C is < 7)   BP Readings from Last 3 Encounters:   12/19/20 101/67   10/13/20 118/81   10/02/20 125/79          (goal 120/80)  BUN   Date Value Ref Range Status   12/19/2020 9 6 - 20 mg/dL Final     CREATININE   Date Value Ref Range Status   12/19/2020 0.74 0.70 - 1.20 mg/dL Final     Potassium   Date Value Ref Range Status   12/19/2020 3.3 (L) 3.7 - 5.3 mmol/L Final

## 2020-12-22 NOTE — DISCHARGE SUMMARY
[address]     Department of Internal Medicine - Staff Internal Medicine Teaching Service    INPATIENT DISCHARGE SUMMARY      Patient Identification:  Miriam Richardson is a 23 y.o. male. :  2000  MRN: [id number]     Acct: [de-identified]   PCP: No primary care provider on file. Admit Date:  2020  Discharge date and time: 2020  5:54 PM   Attending Provider: No att. providers found                                     [address] Problem Lists:  Principal Problem:    Diabetic ketoacidosis ([facility] [address]  Active Problems:    Non compliance w medication regimen    Anaphylaxis    DKA, type 1, not at goal [facility])    Hyperkalemia    Hyponatremia    Non-intractable vomiting with nausea  Resolved Problems:    * No resolved hospital problems. *      HOSPITAL STAY     Brief Inpatient course:   Miriam Richardson is a 23 y.o. male who was admitted for the management of Diabetic ketoacidosis ([facility] [address] presented to the emergency department with chief complaint of abdominal pain and vomiting. Maggie Villalobos takes his insulin at home and is very compliant. [facility] usually ran out of his insulin because of lack of refill.  He tried to reach out to his PCP for refill but unable to get refill until . He ran out of his insulin and he took his last shot while he was staying with his cousins. [facility] then noticed symptoms of abdominal pain and was throwing up which made him come to the hospital His initial blood glucose in EMR is at 312 potassium of 5.4 and with hydroxybutyrate of 9.61 he was started on insulin drip and DKA protocol has been initiated.     Review of systems is negative for any other symptoms except abdominal pain and vomiting. He was treated for diabetic ketoacidosis which got resolved and the patient was disharged with home insulin and he was advised to get refill from his PCP instructions given to get refill  during his next office visit.       Procedures/ Significant Interventions:      none    Consults:     Consults:     Final Specialist Recommendations/Findings:   IP CONSULT TO INTERNAL MEDICINE  IP CONSULT TO CASE MANAGEMENT      Any Hospital Acquired Infections: none    Discharge Functional Status:  stable    DISCHARGE PLAN     Disposition: home    Patient Instructions:   Discharge Medication List as of 12/19/2020  5:32 PM      CONTINUE these medications which have CHANGED    Details   ibuprofen (ADVIL;MOTRIN) 200 MG tablet Take 3 tablets by mouth every 8 hours as needed for Pain or Fever, Disp-30 tablet, R-0Normal      insulin aspart (NOVOLOG FLEXPEN) 100 UNIT/ML injection pen Inject 1-11 Units into the skin 3 times daily (before meals) 1 unit per 15 grams CHO for meals and snacks  Sliding scale: =0, 125-150=1, 151-175=2, 175-200=3, 201-225=4, 226-250=5, 251-275=6, 276-300=7, 301-325=8, 326-350=9, 351-375=10, 376-400=11,  Disp-5 pen, R-3Print      insulin glargine (LANTUS) 100 UNIT/ML injection vial Inject 30 Units into the skin nightly, Disp-1 vial, R-5Print         CONTINUE these medications which have NOT CHANGED    Details   acetaminophen (TYLENOL) 325 MG tablet Take 2 tablets by mouth every 6 hours as needed for Pain, Disp-30 tablet,R-0Normal      EPINEPHrine (EPIPEN JR 2-ITALO) 0.15 MG/0.3ML SOAJ Use as directed for allergic reaction, Disp-2 Device,R-3Normal      glucagon (GLUCAGON EMERGENCY) 1 MG injection As directed for extreme hypoglycemia, Disp-1 kit,R-2Normal      blood glucose test strips (ONE TOUCH ULTRA TEST) strip Disp-150 each,R-3, NormalSig: For blood testing 4-6 times/day      Ketone Blood Test STRP 1 strip by Does not apply route daily as needed (for high blood sugar), Disp-200 strip,R-0Normal      Lancets MISC 4 TIMES DAILY BEFORE MEALS & NIGHTLY Starting Fri 10/2/2020, Disp-300 each,R-0, Normal      Insulin Pen Needle 30G X 5 MM MISC 4 TIMES DAILY Starting Fri 10/2/2020, Disp-200 each,R-1, Normal             Activity: activity as tolerated    Diet: diabetic diet    Follow-up:    [address] [place] [address] MD Ashlee  [address] [phone]    Schedule an appointment as soon as possible for a visit in 2 weeks        Patient Instructions: Follow up with your pcp for post hospitalization follow up. Call and make an appointment they can refill your insulin and take care of your diabetes. Follow up labs: none  Follow up imaging: none    Note that over 30 minutes was spent in preparing discharge papers, discussing discharge with patient, medication review, etc.      Paulina Lacey MD,   Internal Medicine Resident, PGY-1  [address], [place]  12/22/2020, 5:55 PM

## 2020-12-29 NOTE — TELEPHONE ENCOUNTER
Request for Insulin Syringes. .     Pt was seen at ED for his sugars and was given Lantus Vials. He has always been on pens so does not have any syringes. He has an upcoming appt scheduled with you on 1/5/2020. Can you please send over script for insulin syringes. Thank you! Next Visit Date:  Future Appointments   Date Time Provider Hans Zheng   1/5/2021  9:30  Hospital MD Ashlee [facility] IM [address] Maintenance   Topic Date Due    Hepatitis C screen  1998    Pneumococcal 0-64 years Vaccine (1 of 1 - PPSV23) 09/16/2004    Diabetic foot exam  09/16/2008    Diabetic retinal exam  09/16/2008    HIV screen  09/16/2013    Diabetic microalbuminuria test  01/21/2017    Lipid screen  01/21/2017    Flu vaccine (1) 09/01/2020    A1C test (Diabetic or Prediabetic)  03/18/2021    DTaP/Tdap/Td vaccine (5 - Td) 05/08/2022    Hepatitis A vaccine  Completed    Hepatitis B vaccine  Completed    Hib vaccine  Completed    HPV vaccine  Completed    Varicella vaccine  Completed    Meningococcal (ACWY) vaccine  Aged Out       Hemoglobin A1C (%)   Date Value   12/18/2020 10.6 (H)   07/16/2020 11.5 (H)   02/02/2019 13.1 (H)             ( goal A1C is < 7)   Microalb/Crt.  Ratio (mcg/mg creat)   Date Value   01/21/2016 25     LDL Cholesterol (mg/dL)   Date Value   01/21/2016 215 (H)       (goal LDL is <100)   AST (U/L)   Date Value   07/15/2020 15     ALT (U/L)   Date Value   07/15/2020 12     BUN (mg/dL)   Date Value   12/19/2020 9     BP Readings from Last 3 Encounters:   12/19/20 101/67   10/13/20 118/81   10/02/20 125/79          (goal 120/80)    All Future Testing planned in [facility]        Patient Active Problem List:     Diabetic ketoacidosis ([address]     Allergic reaction caused by a drug     Non compliance w medication regimen     Anaphylaxis     Neuralgic amyotrophy     DKA, type 1, not at goal [facility])     Hyperkalemia     Hyponatremia     Non-intractable vomiting with nausea

## 2021-01-05 RX ORDER — IBUPROFEN 200 MG
1 TABLET ORAL DAILY
Qty: 100 EACH | Refills: 3 | Status: SHIPPED | OUTPATIENT
Start: 2021-01-05 | End: 2022-08-16

## 2021-05-11 ENCOUNTER — APPOINTMENT (OUTPATIENT)
Dept: GENERAL RADIOLOGY | Age: 21
DRG: 420 | End: 2021-05-11
Payer: MEDICARE

## 2021-05-11 ENCOUNTER — HOSPITAL ENCOUNTER (INPATIENT)
Age: 21
LOS: 3 days | Discharge: HOME | DRG: 420 | End: 2021-05-14
Attending: EMERGENCY MEDICINE | Admitting: FAMILY MEDICINE
Payer: MEDICARE

## 2021-05-11 DIAGNOSIS — E10.10 DIABETIC KETOACIDOSIS WITHOUT COMA ASSOCIATED WITH TYPE 1 DIABETES MELLITUS (HCC): Primary | ICD-10-CM

## 2021-05-11 LAB
-: NORMAL
ABSOLUTE EOS #: 0 K/UL (ref 0–0.4)
ABSOLUTE IMMATURE GRANULOCYTE: 0 K/UL (ref 0–0.3)
ABSOLUTE LYMPH #: 0.56 K/UL (ref 1–4.8)
ABSOLUTE MONO #: 0.19 K/UL (ref 0.1–0.8)
ALBUMIN SERPL-MCNC: 5 G/DL (ref 3.5–5.2)
ALBUMIN/GLOBULIN RATIO: 1.3 (ref 1–2.5)
ALLEN TEST: ABNORMAL
ALP BLD-CCNC: 113 U/L (ref 40–129)
ALT SERPL-CCNC: 13 U/L (ref 5–41)
AMORPHOUS: NORMAL
ANION GAP SERPL CALCULATED.3IONS-SCNC: 31 MMOL/L (ref 9–17)
AST SERPL-CCNC: 20 U/L
BACTERIA: NORMAL
BASOPHILS # BLD: 0 % (ref 0–2)
BASOPHILS ABSOLUTE: 0 K/UL (ref 0–0.2)
BILIRUB SERPL-MCNC: 0.78 MG/DL (ref 0.3–1.2)
BILIRUBIN URINE: NEGATIVE
BUN BLDV-MCNC: 23 MG/DL (ref 6–20)
BUN/CREAT BLD: ABNORMAL (ref 9–20)
CALCIUM SERPL-MCNC: 9.5 MG/DL (ref 8.6–10.4)
CARBOXYHEMOGLOBIN: 1.6 % (ref 0–5)
CASTS UA: NORMAL /LPF (ref 0–8)
CHLORIDE BLD-SCNC: 93 MMOL/L (ref 98–107)
CHP ED QC CHECK: YES
CO2: 10 MMOL/L (ref 20–31)
COLOR: YELLOW
COMMENT UA: ABNORMAL
CREAT SERPL-MCNC: 0.92 MG/DL (ref 0.7–1.2)
CRYSTALS, UA: NORMAL /HPF
DIFFERENTIAL TYPE: ABNORMAL
EOSINOPHILS RELATIVE PERCENT: 0 % (ref 1–4)
EPITHELIAL CELLS UA: NORMAL /HPF (ref 0–5)
FIO2: ABNORMAL
GFR AFRICAN AMERICAN: >60 ML/MIN
GFR NON-AFRICAN AMERICAN: >60 ML/MIN
GFR SERPL CREATININE-BSD FRML MDRD: ABNORMAL ML/MIN/{1.73_M2}
GFR SERPL CREATININE-BSD FRML MDRD: ABNORMAL ML/MIN/{1.73_M2}
GLUCOSE BLD-MCNC: 241 MG/DL (ref 75–110)
GLUCOSE BLD-MCNC: 328 MG/DL
GLUCOSE BLD-MCNC: 328 MG/DL (ref 75–110)
GLUCOSE BLD-MCNC: 365 MG/DL (ref 70–99)
GLUCOSE BLD-MCNC: 390 MG/DL
GLUCOSE BLD-MCNC: 390 MG/DL (ref 75–110)
GLUCOSE BLD-MCNC: 395 MG/DL
GLUCOSE BLD-MCNC: 395 MG/DL (ref 75–110)
GLUCOSE URINE: ABNORMAL
HCO3 VENOUS: 12 MMOL/L (ref 24–30)
HCT VFR BLD CALC: 49.8 % (ref 40.7–50.3)
HEMOGLOBIN: 15.6 G/DL (ref 13–17)
IMMATURE GRANULOCYTES: 0 %
KETONES, URINE: ABNORMAL
LEUKOCYTE ESTERASE, URINE: NEGATIVE
LIPASE: 11 U/L (ref 13–60)
LYMPHOCYTES # BLD: 6 % (ref 24–44)
MCH RBC QN AUTO: 27.4 PG (ref 25.2–33.5)
MCHC RBC AUTO-ENTMCNC: 31.3 G/DL (ref 28.4–34.8)
MCV RBC AUTO: 87.5 FL (ref 82.6–102.9)
METHEMOGLOBIN: ABNORMAL % (ref 0–1.5)
MODE: ABNORMAL
MONOCYTES # BLD: 2 % (ref 1–7)
MORPHOLOGY: NORMAL
MUCUS: NORMAL
NEGATIVE BASE EXCESS, VEN: 16.4 MMOL/L (ref 0–2)
NITRITE, URINE: NEGATIVE
NOTIFICATION TIME: ABNORMAL
NOTIFICATION: ABNORMAL
NRBC AUTOMATED: 0 PER 100 WBC
O2 DEVICE/FLOW/%: ABNORMAL
O2 SAT, VEN: 68 % (ref 60–85)
OTHER OBSERVATIONS UA: NORMAL
OXYHEMOGLOBIN: ABNORMAL % (ref 95–98)
PATIENT TEMP: 37
PCO2, VEN, TEMP ADJ: ABNORMAL MMHG (ref 39–55)
PCO2, VEN: 36.1 (ref 39–55)
PDW BLD-RTO: 13.1 % (ref 11.8–14.4)
PEEP/CPAP: ABNORMAL
PH UA: 5.5 (ref 5–8)
PH VENOUS: 7.15 (ref 7.32–7.42)
PH, VEN, TEMP ADJ: ABNORMAL (ref 7.32–7.42)
PLATELET # BLD: 393 K/UL (ref 138–453)
PLATELET ESTIMATE: ABNORMAL
PMV BLD AUTO: 11.5 FL (ref 8.1–13.5)
PO2, VEN, TEMP ADJ: ABNORMAL MMHG (ref 30–50)
PO2, VEN: 55.8 (ref 30–50)
POSITIVE BASE EXCESS, VEN: ABNORMAL MMOL/L (ref 0–2)
POTASSIUM SERPL-SCNC: 4.9 MMOL/L (ref 3.7–5.3)
PROTEIN UA: ABNORMAL
PSV: ABNORMAL
PT. POSITION: ABNORMAL
RBC # BLD: 5.69 M/UL (ref 4.21–5.77)
RBC # BLD: ABNORMAL 10*6/UL
RBC UA: NORMAL /HPF (ref 0–4)
RENAL EPITHELIAL, UA: NORMAL /HPF
RESPIRATORY RATE: ABNORMAL
SAMPLE SITE: ABNORMAL
SEG NEUTROPHILS: 92 % (ref 36–66)
SEGMENTED NEUTROPHILS ABSOLUTE COUNT: 8.65 K/UL (ref 1.8–7.7)
SET RATE: ABNORMAL
SODIUM BLD-SCNC: 134 MMOL/L (ref 135–144)
SPECIFIC GRAVITY UA: 1.03 (ref 1–1.03)
TEXT FOR RESPIRATORY: ABNORMAL
TOTAL HB: ABNORMAL G/DL (ref 12–16)
TOTAL PROTEIN: 9 G/DL (ref 6.4–8.3)
TOTAL RATE: ABNORMAL
TRICHOMONAS: NORMAL
TURBIDITY: CLEAR
URINE HGB: ABNORMAL
UROBILINOGEN, URINE: NORMAL
VT: ABNORMAL
WBC # BLD: 9.4 K/UL (ref 3.5–11.3)
WBC # BLD: ABNORMAL 10*3/UL
WBC UA: NORMAL /HPF (ref 0–5)
YEAST: NORMAL

## 2021-05-11 PROCEDURE — 2580000003 HC RX 258: Performed by: STUDENT IN AN ORGANIZED HEALTH CARE EDUCATION/TRAINING PROGRAM

## 2021-05-11 PROCEDURE — 6360000002 HC RX W HCPCS: Performed by: STUDENT IN AN ORGANIZED HEALTH CARE EDUCATION/TRAINING PROGRAM

## 2021-05-11 PROCEDURE — 99285 EMERGENCY DEPT VISIT HI MDM: CPT

## 2021-05-11 PROCEDURE — 96361 HYDRATE IV INFUSION ADD-ON: CPT

## 2021-05-11 PROCEDURE — 99222 1ST HOSP IP/OBS MODERATE 55: CPT | Performed by: NURSE PRACTITIONER

## 2021-05-11 PROCEDURE — 71045 X-RAY EXAM CHEST 1 VIEW: CPT

## 2021-05-11 PROCEDURE — 2580000003 HC RX 258: Performed by: EMERGENCY MEDICINE

## 2021-05-11 PROCEDURE — 6370000000 HC RX 637 (ALT 250 FOR IP): Performed by: EMERGENCY MEDICINE

## 2021-05-11 PROCEDURE — 96375 TX/PRO/DX INJ NEW DRUG ADDON: CPT

## 2021-05-11 PROCEDURE — 36415 COLL VENOUS BLD VENIPUNCTURE: CPT

## 2021-05-11 PROCEDURE — 81001 URINALYSIS AUTO W/SCOPE: CPT

## 2021-05-11 PROCEDURE — 82805 BLOOD GASES W/O2 SATURATION: CPT

## 2021-05-11 PROCEDURE — 80053 COMPREHEN METABOLIC PANEL: CPT

## 2021-05-11 PROCEDURE — 85025 COMPLETE CBC W/AUTO DIFF WBC: CPT

## 2021-05-11 PROCEDURE — 96365 THER/PROPH/DIAG IV INF INIT: CPT

## 2021-05-11 PROCEDURE — 83690 ASSAY OF LIPASE: CPT

## 2021-05-11 PROCEDURE — 2060000000 HC ICU INTERMEDIATE R&B

## 2021-05-11 PROCEDURE — 82947 ASSAY GLUCOSE BLOOD QUANT: CPT

## 2021-05-11 PROCEDURE — 2580000003 HC RX 258

## 2021-05-11 RX ORDER — POTASSIUM CHLORIDE 7.45 MG/ML
10 INJECTION INTRAVENOUS ONCE
Status: DISCONTINUED | OUTPATIENT
Start: 2021-05-11 | End: 2021-05-11

## 2021-05-11 RX ORDER — POTASSIUM CHLORIDE 7.45 MG/ML
10 INJECTION INTRAVENOUS PRN
Status: DISCONTINUED | OUTPATIENT
Start: 2021-05-11 | End: 2021-05-14

## 2021-05-11 RX ORDER — 0.9 % SODIUM CHLORIDE 0.9 %
15 INTRAVENOUS SOLUTION INTRAVENOUS ONCE
Status: DISCONTINUED | OUTPATIENT
Start: 2021-05-11 | End: 2021-05-13

## 2021-05-11 RX ORDER — DEXTROSE MONOHYDRATE 25 G/50ML
12.5 INJECTION, SOLUTION INTRAVENOUS PRN
Status: DISCONTINUED | OUTPATIENT
Start: 2021-05-11 | End: 2021-05-11 | Stop reason: ALTCHOICE

## 2021-05-11 RX ORDER — SODIUM CHLORIDE, SODIUM LACTATE, POTASSIUM CHLORIDE, AND CALCIUM CHLORIDE .6; .31; .03; .02 G/100ML; G/100ML; G/100ML; G/100ML
1000 INJECTION, SOLUTION INTRAVENOUS ONCE
Status: COMPLETED | OUTPATIENT
Start: 2021-05-11 | End: 2021-05-11

## 2021-05-11 RX ORDER — DEXTROSE AND SODIUM CHLORIDE 5; .45 G/100ML; G/100ML
INJECTION, SOLUTION INTRAVENOUS
Status: COMPLETED
Start: 2021-05-11 | End: 2021-05-11

## 2021-05-11 RX ORDER — NICOTINE POLACRILEX 4 MG
15 LOZENGE BUCCAL PRN
Status: DISCONTINUED | OUTPATIENT
Start: 2021-05-11 | End: 2021-05-14 | Stop reason: HOSPADM

## 2021-05-11 RX ORDER — DEXTROSE MONOHYDRATE 100 MG/ML
INJECTION, SOLUTION INTRAVENOUS PRN
Status: DISCONTINUED | OUTPATIENT
Start: 2021-05-11 | End: 2021-05-14 | Stop reason: HOSPADM

## 2021-05-11 RX ORDER — DEXTROSE MONOHYDRATE 50 MG/ML
100 INJECTION, SOLUTION INTRAVENOUS PRN
Status: DISCONTINUED | OUTPATIENT
Start: 2021-05-11 | End: 2021-05-14 | Stop reason: HOSPADM

## 2021-05-11 RX ORDER — SODIUM CHLORIDE 450 MG/100ML
INJECTION, SOLUTION INTRAVENOUS CONTINUOUS
Status: DISCONTINUED | OUTPATIENT
Start: 2021-05-12 | End: 2021-05-13

## 2021-05-11 RX ORDER — DEXTROSE MONOHYDRATE 25 G/50ML
12.5 INJECTION, SOLUTION INTRAVENOUS PRN
Status: DISCONTINUED | OUTPATIENT
Start: 2021-05-11 | End: 2021-05-14 | Stop reason: HOSPADM

## 2021-05-11 RX ORDER — ONDANSETRON 2 MG/ML
4 INJECTION INTRAMUSCULAR; INTRAVENOUS ONCE
Status: COMPLETED | OUTPATIENT
Start: 2021-05-11 | End: 2021-05-11

## 2021-05-11 RX ORDER — MAGNESIUM SULFATE 1 G/100ML
1000 INJECTION INTRAVENOUS PRN
Status: DISCONTINUED | OUTPATIENT
Start: 2021-05-11 | End: 2021-05-14 | Stop reason: HOSPADM

## 2021-05-11 RX ORDER — 0.9 % SODIUM CHLORIDE 0.9 %
1000 INTRAVENOUS SOLUTION INTRAVENOUS ONCE
Status: COMPLETED | OUTPATIENT
Start: 2021-05-11 | End: 2021-05-11

## 2021-05-11 RX ORDER — DEXTROSE AND SODIUM CHLORIDE 5; .45 G/100ML; G/100ML
INJECTION, SOLUTION INTRAVENOUS CONTINUOUS PRN
Status: DISCONTINUED | OUTPATIENT
Start: 2021-05-11 | End: 2021-05-14 | Stop reason: HOSPADM

## 2021-05-11 RX ADMIN — SODIUM CHLORIDE 0.1 UNITS/KG/HR: 9 INJECTION, SOLUTION INTRAVENOUS at 21:30

## 2021-05-11 RX ADMIN — DEXTROSE AND SODIUM CHLORIDE: 5; 450 INJECTION, SOLUTION INTRAVENOUS at 23:43

## 2021-05-11 RX ADMIN — ONDANSETRON 4 MG: 2 INJECTION INTRAMUSCULAR; INTRAVENOUS at 21:37

## 2021-05-11 RX ADMIN — SODIUM CHLORIDE, POTASSIUM CHLORIDE, SODIUM LACTATE AND CALCIUM CHLORIDE 1000 ML: 600; 310; 30; 20 INJECTION, SOLUTION INTRAVENOUS at 21:34

## 2021-05-11 RX ADMIN — SODIUM CHLORIDE 1000 ML: 9 INJECTION, SOLUTION INTRAVENOUS at 19:26

## 2021-05-11 ASSESSMENT — PAIN SCALES - GENERAL: PAINLEVEL_OUTOF10: 10

## 2021-05-11 ASSESSMENT — PAIN DESCRIPTION - LOCATION: LOCATION: ABDOMEN

## 2021-05-11 NOTE — ED NOTES
Patient to ED via self taken to room 1  Patient here with c/o generalized abdominal pain and nausea  Patient has a hx of type 1 DM but states he has been taking his medication as well watching his sugars  Patient states his fsbs was 250 at home  Vitals obtained and call light provided  Patient resting comfortably on stretcher in no apparent distress  Respirations even and non-labored  No other needs at this time     Yudi Fairchild RN  05/11/21 1944

## 2021-05-11 NOTE — ED NOTES
Pt reports diffuse abd pain since this morning with nausea & emesis, pt actively vomiting in waiting room, pt reports Type 1 DM and state he has been taking medications appropriately, pt reports chills, and weakness, pt A&Ox4 on arrival, RR even/unlabored     Kim Fowler RN  05/11/21 4290

## 2021-05-11 NOTE — ED PROVIDER NOTES
[address]  ED  Emergency Department Encounter  Emergency Medicine Resident     Pt Name: Hazel Nixon  MRN: [id number]  [place] 2000  Date of evaluation: 5/11/21  PCP:  No primary care provider on file. CHIEF COMPLAINT       Chief Complaint   Patient presents with    Abdominal Pain       HISTORY OFPRESENT ILLNESS  (Location/Symptom, Timing/Onset, Context/Setting, Quality, Duration, Modifying Factors,Severity.)      Hazel Nixon is a 23 y.o. male who presents with nausea, vomiting, generalized abdominal pain that began this morning. Patient cannot identify any triggers. Vomiting somewhat subsided a few hours prior to arrival, but he continues to have pain so he presented to the ED. By the time he was roomed here in the ED, pain has come down as well, but he still feels ill overall. Patient does have a history of type 1 diabetes and states he has been managing it well, sugars have been around 250 by report. He has a history of frequent DKA as well. Patient denies any fevers or chills, no chest pain, shortness of breath, diarrhea, constipation, urinary complaints, any other complaints at this time. PAST MEDICAL / SURGICAL / SOCIAL / FAMILY HISTORY      has a past medical history of Diabetes mellitus ([facility] [address] and Type 1 diabetes mellitus ([facility] [address] has no past surgical history on file.      Social History     Socioeconomic History    Marital status: Single     Spouse name: Not on file    Number of children: Not on file    Years of education: Not on file    Highest education level: Not on file   Occupational History     Employer: N/A   Social Needs    Financial resource strain: Not on file    Food insecurity     Worry: Not on file     Inability: Not on file    Transportation needs     Medical: Not on file     Non-medical: Not on file   Tobacco Use    Smoking status: Current Some Day Smoker     Packs/day: 0.25     Years: 2.00     Pack years: 0.50     Types: Cigarettes    276-300=7, 301-325=8, 326-350=9, 351-375=10, 376-400=11 12/19/20   Kang Blackwood MD   insulin glargine (LANTUS) 100 UNIT/ML injection vial Inject 30 Units into the skin nightly 12/19/20   Marie Peña MD   acetaminophen (TYLENOL) 325 MG tablet Take 2 tablets by mouth every 6 hours as needed for Pain 10/2/20   Aftab Clement MD   EPINEPHrine (Yudith Riches 2-ITALO) 0.15 MG/0.3ML SOAJ Use as directed for allergic reaction 10/2/20   Aftab Clement MD   glucagon (GLUCAGON EMERGENCY) 1 MG injection As directed for extreme hypoglycemia 10/2/20   Aftab Clement MD   blood glucose test strips (ONE TOUCH ULTRA TEST) strip Sig: For blood testing 4-6 times/day 10/2/20   Aftab Clement MD   Ketone Blood Test STRP 1 strip by Does not apply route daily as needed (for high blood sugar) 10/2/20   Aftab Clement MD   Lancets MISC 1 each by Does not apply route 4 times daily (before meals and nightly) 10/2/20   Aftab Clement MD   Insulin Pen Needle 30G X 5 MM MISC 1 Device by Does not apply route 4 times daily 10/2/20   Aftab Clement MD       REVIEW OFSYSTEMS    (2-9 systems for level 4, 10 or more for level 5)      Review of Systems   Constitutional: Negative for chills and fever. HENT: Negative for congestion and rhinorrhea. Eyes: Negative for visual disturbance. Respiratory: Negative for cough and shortness of breath. Cardiovascular: Negative for chest pain. Gastrointestinal: Positive for abdominal pain, nausea and vomiting. Negative for constipation and diarrhea. Genitourinary: Negative for dysuria and frequency. Musculoskeletal: Negative for back pain and neck pain. Skin: Negative for rash. Neurological: Negative for weakness, numbness and headaches.        PHYSICAL EXAM   (up to 7 for level 4, 8 or more forlevel 5)      INITIAL VITALS:   ED Triage Vitals   BP Temp Temp Source Pulse Resp SpO2 Height Weight   05/11/21 1821 05/11/21 1822 05/11/21 1822 05/11/21 1821 05/11/21 1821 05/11/21 1821 05/11/21 1819 05/11/21 1819   134/77 96.1 °F (35.6 °C) Oral 78 16 95 % 5' 11\" (1.803 m) 140 lb (63.5 kg)       Physical Exam  Constitutional:       General: He is not in acute distress. Appearance: Normal appearance. He is not ill-appearing, toxic-appearing or diaphoretic. HENT:      Head: Normocephalic and atraumatic. Mouth/Throat:      Mouth: Mucous membranes are moist.      Pharynx: Oropharynx is clear. Eyes:      Extraocular Movements: Extraocular movements intact. Neck:      Musculoskeletal: Normal range of motion and neck supple. Cardiovascular:      Rate and Rhythm: Normal rate and regular rhythm. Heart sounds: Normal heart sounds. No murmur. Pulmonary:      Effort: Pulmonary effort is normal. No respiratory distress. Breath sounds: Normal breath sounds. No wheezing or rhonchi. Abdominal:      Palpations: Abdomen is soft. Tenderness: There is abdominal tenderness in the epigastric area. There is no guarding or rebound. Hernia: No hernia is present. Musculoskeletal: Normal range of motion. Skin:     General: Skin is warm and dry. Neurological:      General: No focal deficit present. Mental Status: He is alert and oriented to person, place, and time.          DIFFERENTIAL  DIAGNOSIS     PLAN (LABS / IMAGING / EKG):  Orders Placed This Encounter   Procedures    XR CHEST PORTABLE    CBC Auto Differential    Comprehensive Metabolic Panel w/ Reflex to MG    Lipase    BLOOD GAS, VENOUS    Urinalysis    Microscopic Urinalysis    Basic Metabolic Panel    Magnesium    Phosphorus    Hemoglobin A1c    CBC    Diet NPO Effective Now    HYPOGLYCEMIA TREATMENT: blood glucose less than 50 mg/dL and patient  ALERT and TOLERATING PO    HYPOGLYCEMIA TREATMENT: blood glucose less than 70 mg/dL and patient ALERT and TOLERATING PO    HYPOGLYCEMIA TREATMENT: blood glucose less than 70 mg/dL and patient NOT ALERT or NPO    Vital signs per unit routine    Notify physician    Notify Awaiting electrolytes at this time fluid infusing.      [WK]   2033 Insulin drip ordered after 10mEQ of K ordered for the sugar that is normal but will likely drop with insulin drip. Q30 min glucose with infusion and order to alert physician to un hold the dextrose infusion.      [WK]   2047 Will switch fluids to LR and discontinue the K since LR has potassium    [WK]   2152 IMPRESSION:  No acute process. XR CHEST PORTABLE [JS]      ED Course User Index  [JS] Corie Tobar DO  [WK] Leandro Otto DO      Patient is remained stable during his time in the ED. [facility] was initially consulted for management but requested [facility] for admission. I spoke with [facility] who accepted the patient for DKA.   He Remains on insulin drip with sugars improving.:     DIAGNOSTIC RESULTS / EMERGENCYDEPARTMENT COURSE / MDM     LABS:  Labs Reviewed   CBC WITH AUTO DIFFERENTIAL - Abnormal; Notable for the following components:       Result Value    Seg Neutrophils 92 (*)     Lymphocytes 6 (*)     Eosinophils % 0 (*)     Segs Absolute 8.65 (*)     Absolute Lymph # 0.56 (*)     All other components within normal limits   COMPREHENSIVE METABOLIC PANEL W/ REFLEX TO MG FOR LOW K - Abnormal; Notable for the following components:    Glucose 365 (*)     BUN 23 (*)     Sodium 134 (*)     Chloride 93 (*)     CO2 10 (*)     Anion Gap 31 (*)     Total Protein 9.0 (*)     All other components within normal limits   LIPASE - Abnormal; Notable for the following components:    Lipase 11 (*)     All other components within normal limits   BLOOD GAS, VENOUS - Abnormal; Notable for the following components:    pH, [place] 7.149 (*)     pCO2, [place] 36.1 (*)     pO2, [place] 55.8 (*)     HCO3, Venous 12.0 (*)     Negative Base Excess, [place] 16.4 (*)     All other components within normal limits   URINALYSIS - Abnormal; Notable for the following components:    Glucose, Ur 3+ (*)     Ketones, Urine LARGE (*)     Urine Hgb TRACE (*)     Protein, UA 2+ (*) All other components within normal limits   POC GLUCOSE FINGERSTICK - Abnormal; Notable for the following components:    POC Glucose 395 (*)     All other components within normal limits   POC GLUCOSE FINGERSTICK - Abnormal; Notable for the following components:    POC Glucose 390 (*)     All other components within normal limits   POC GLUCOSE FINGERSTICK - Abnormal; Notable for the following components:    POC Glucose 328 (*)     All other components within normal limits   POC GLUCOSE FINGERSTICK - Abnormal; Notable for the following components:    POC Glucose 241 (*)     All other components within normal limits   POC GLUCOSE FINGERSTICK - Abnormal; Notable for the following components:    POC Glucose 242 (*)     All other components within normal limits   POCT GLUCOSE - Normal   POCT GLUCOSE - Normal   POCT GLUCOSE - Normal   POCT GLUCOSE - Normal   MICROSCOPIC URINALYSIS   BASIC METABOLIC PANEL   BASIC METABOLIC PANEL   BASIC METABOLIC PANEL   MAGNESIUM   MAGNESIUM   MAGNESIUM   PHOSPHORUS   PHOSPHORUS   PHOSPHORUS   HEMOGLOBIN A1C   CBC   POCT GLUCOSE   POCT GLUCOSE   POCT GLUCOSE   POCT GLUCOSE   POCT GLUCOSE   POCT GLUCOSE   POCT GLUCOSE   POCT GLUCOSE   POCT GLUCOSE   POCT GLUCOSE   POCT GLUCOSE   POCT GLUCOSE   POCT GLUCOSE   POCT GLUCOSE   POCT GLUCOSE   POCT GLUCOSE   POCT GLUCOSE   POCT GLUCOSE   POCT GLUCOSE   POCT GLUCOSE   POCT GLUCOSE   POCT GLUCOSE   POCT GLUCOSE   POCT GLUCOSE   POCT GLUCOSE   POCT GLUCOSE   POCT GLUCOSE   POCT GLUCOSE           Xr Chest Portable    Result Date: 5/11/2021  EXAMINATION: ONE XRAY VIEW OF THE CHEST 5/11/2021 8:29 pm COMPARISON: Chest x-ray July 15, 2020 HISTORY: ORDERING SYSTEM PROVIDED HISTORY: Cough ronchii (COVID unknown) TECHNOLOGIST PROVIDED HISTORY: Cough ronchii (COVID unknown) Reason for Exam: upr,abd pain FINDINGS: The lungs are without acute focal process. There is no effusion or pneumothorax. The cardiomediastinal silhouette is without acute process.  The

## 2021-05-11 NOTE — ED PROVIDER NOTES
[facility]     Emergency Department     Faculty Note/ Attestation      Pt Name: Marilia Bustos                                       MRN: [id number]  [place] 2000  Date of evaluation: 5/11/2021    Patients PCP:    No primary care provider on file. Attestation  I performed a history and physical examination of the patient and discussed management with the resident. I reviewed the residents note and agree with the documented findings and plan of care. Any areas of disagreement are noted on the chart. I was personally present for the key portions of any procedures. I have documented in the chart those procedures where I was not present during the key portions. I have reviewed the emergency nurses triage note. I agree with the chief complaint, past medical history, past surgical history, allergies, medications, social and family history as documented unless otherwise noted below. For Physician Assistant/ Nurse Practitioner cases/documentation I have personally evaluated this patient and have completed at least one if not all key elements of the E/M (history, physical exam, and MDM). Additional findings are as noted. Initial Screens:        [place] Coma Scale  Eye Opening: Spontaneous  Best Verbal Response: Oriented  Best Motor Response: Obeys commands  Karma Coma Scale Score: 15    Vitals:    Vitals:    05/11/21 1819 05/11/21 1821 05/11/21 1822   BP:  134/77    Pulse:  78    Resp:  16    Temp:   96.1 °F (35.6 °C)   TempSrc:   Oral   SpO2:  95%    Weight: 140 lb (63.5 kg)     Height: 5' 11\" (1.803 m)         CHIEF COMPLAINT       Chief Complaint   Patient presents with    Abdominal Pain       The pt is a 20 DM I patient who has been having abdominal pain since this AM.  The pt noting pain slightly improved now but still having epigastric abdominal pain. Currently pt has no nausea no vomiting.           EMERGENCY DEPARTMENT COURSE:     -------------------------  BP: 134/77, Temp: 96.1 °F (35.6 °C), Pulse: 78, Resp: 16  Physical Exam  Constitutional:       Appearance: He is well-developed. He is not diaphoretic. HENT:      Head: Normocephalic and atraumatic. Right Ear: External ear normal.      Left Ear: External ear normal.   Eyes:      General: No scleral icterus. Right eye: No discharge. Left eye: No discharge. Neck:      Musculoskeletal: Normal range of motion. Trachea: No tracheal deviation. Pulmonary:      Effort: Pulmonary effort is normal. No respiratory distress. Breath sounds: No stridor. Abdominal:      General: There is no distension. There are no signs of injury. Palpations: There is no hepatomegaly. Tenderness: There is no abdominal tenderness. There is no guarding or rebound. Musculoskeletal: Normal range of motion. Skin:     General: Skin is warm and dry. Neurological:      Mental Status: He is alert and oriented to person, place, and time. Coordination: Coordination normal.   Psychiatric:         Behavior: Behavior normal.       Comments  The hyperglycemia with the cough makes concern for pneumonia that is causing higher sugars. Also concerning for electrolyte disturbances and DKA. ED Course as of May 12 0042   Tue May 11, 2021   1906 Patient with history of type 1 diabetes evaluated for nausea, vomiting, abdominal pain that began this morning. This tapered off this evening. He has been able to keep down small amounts of water. We will start work-up with labs and begin fluid bolus. By report, sugars have been well controlled, 250 today. [JS]   2011 The patients pH consistent with DKA. Awaiting electrolytes at this time fluid infusing.      [WK]   2033 Insulin drip ordered after 10mEQ of K ordered for the sugar that is normal but will likely drop with insulin drip.   Q30 min glucose with infusion and order to alert physician to un hold the dextrose infusion.      [WK]   2047 Will switch fluids to LR and discontinue the K since LR has potassium    [WK]   2152 IMPRESSION:  No acute process. XR CHEST PORTABLE [JS]      ED Course User Index  [JS] David Rendon DO  [WK] West Bain DO     CRITICAL CARE: There was a high probability of clinically significant/life threatening deterioration in this patient's condition which required my urgent intervention. Total critical care time was 19 minutes. This excludes any time for separately reportable procedures. Scott DO, RDMS.   Attending Emergency Physician          West Bain DO  05/11/21 2045       West Bain DO  05/12/21 1657

## 2021-05-12 PROBLEM — E87.8 ELECTROLYTE ABNORMALITY: Status: ACTIVE | Noted: 2021-05-12

## 2021-05-12 LAB
ALLEN TEST: ABNORMAL
ANION GAP SERPL CALCULATED.3IONS-SCNC: 12 MMOL/L (ref 9–17)
ANION GAP SERPL CALCULATED.3IONS-SCNC: 12 MMOL/L (ref 9–17)
ANION GAP SERPL CALCULATED.3IONS-SCNC: 14 MMOL/L (ref 9–17)
ANION GAP SERPL CALCULATED.3IONS-SCNC: 15 MMOL/L (ref 9–17)
ANION GAP SERPL CALCULATED.3IONS-SCNC: 17 MMOL/L (ref 9–17)
BETA-HYDROXYBUTYRATE: 2.03 MMOL/L (ref 0.02–0.27)
BUN BLDV-MCNC: 11 MG/DL (ref 6–20)
BUN BLDV-MCNC: 13 MG/DL (ref 6–20)
BUN BLDV-MCNC: 17 MG/DL (ref 6–20)
BUN BLDV-MCNC: 8 MG/DL (ref 6–20)
BUN BLDV-MCNC: 9 MG/DL (ref 6–20)
BUN/CREAT BLD: ABNORMAL (ref 9–20)
CALCIUM SERPL-MCNC: 8.5 MG/DL (ref 8.6–10.4)
CALCIUM SERPL-MCNC: 8.6 MG/DL (ref 8.6–10.4)
CALCIUM SERPL-MCNC: 8.9 MG/DL (ref 8.6–10.4)
CARBOXYHEMOGLOBIN: 0.9 % (ref 0–5)
CHLORIDE BLD-SCNC: 100 MMOL/L (ref 98–107)
CHLORIDE BLD-SCNC: 100 MMOL/L (ref 98–107)
CHLORIDE BLD-SCNC: 102 MMOL/L (ref 98–107)
CHLORIDE BLD-SCNC: 104 MMOL/L (ref 98–107)
CHLORIDE BLD-SCNC: 99 MMOL/L (ref 98–107)
CHP ED QC CHECK: YES
CHP ED QC CHECK: YES
CO2: 16 MMOL/L (ref 20–31)
CO2: 16 MMOL/L (ref 20–31)
CO2: 19 MMOL/L (ref 20–31)
CO2: 20 MMOL/L (ref 20–31)
CO2: 20 MMOL/L (ref 20–31)
CREAT SERPL-MCNC: 0.59 MG/DL (ref 0.7–1.2)
CREAT SERPL-MCNC: 0.64 MG/DL (ref 0.7–1.2)
CREAT SERPL-MCNC: 0.73 MG/DL (ref 0.7–1.2)
CREAT SERPL-MCNC: 0.76 MG/DL (ref 0.7–1.2)
CREAT SERPL-MCNC: 0.79 MG/DL (ref 0.7–1.2)
ESTIMATED AVERAGE GLUCOSE: 240 MG/DL
FIO2: ABNORMAL
GFR AFRICAN AMERICAN: >60 ML/MIN
GFR NON-AFRICAN AMERICAN: >60 ML/MIN
GFR SERPL CREATININE-BSD FRML MDRD: ABNORMAL ML/MIN/{1.73_M2}
GLUCOSE BLD-MCNC: 105 MG/DL (ref 75–110)
GLUCOSE BLD-MCNC: 127 MG/DL (ref 75–110)
GLUCOSE BLD-MCNC: 134 MG/DL (ref 75–110)
GLUCOSE BLD-MCNC: 134 MG/DL (ref 75–110)
GLUCOSE BLD-MCNC: 144 MG/DL (ref 75–110)
GLUCOSE BLD-MCNC: 149 MG/DL (ref 75–110)
GLUCOSE BLD-MCNC: 151 MG/DL (ref 70–99)
GLUCOSE BLD-MCNC: 152 MG/DL (ref 75–110)
GLUCOSE BLD-MCNC: 154 MG/DL (ref 70–99)
GLUCOSE BLD-MCNC: 164 MG/DL (ref 75–110)
GLUCOSE BLD-MCNC: 169 MG/DL (ref 75–110)
GLUCOSE BLD-MCNC: 172 MG/DL (ref 75–110)
GLUCOSE BLD-MCNC: 173 MG/DL (ref 75–110)
GLUCOSE BLD-MCNC: 175 MG/DL (ref 75–110)
GLUCOSE BLD-MCNC: 177 MG/DL (ref 75–110)
GLUCOSE BLD-MCNC: 178 MG/DL
GLUCOSE BLD-MCNC: 178 MG/DL (ref 75–110)
GLUCOSE BLD-MCNC: 180 MG/DL (ref 70–99)
GLUCOSE BLD-MCNC: 196 MG/DL (ref 75–110)
GLUCOSE BLD-MCNC: 198 MG/DL (ref 75–110)
GLUCOSE BLD-MCNC: 202 MG/DL (ref 75–110)
GLUCOSE BLD-MCNC: 207 MG/DL (ref 70–99)
GLUCOSE BLD-MCNC: 214 MG/DL (ref 75–110)
GLUCOSE BLD-MCNC: 220 MG/DL (ref 75–110)
GLUCOSE BLD-MCNC: 222 MG/DL (ref 70–99)
GLUCOSE BLD-MCNC: 229 MG/DL (ref 75–110)
GLUCOSE BLD-MCNC: 242 MG/DL
GLUCOSE BLD-MCNC: 242 MG/DL (ref 75–110)
GLUCOSE BLD-MCNC: 243 MG/DL (ref 75–110)
HBA1C MFR BLD: 10 % (ref 4–6)
HCO3 VENOUS: 16.6 MMOL/L (ref 24–30)
HCT VFR BLD CALC: 41.6 % (ref 40.7–50.3)
HEMOGLOBIN: 13.5 G/DL (ref 13–17)
MAGNESIUM: 1.8 MG/DL (ref 1.6–2.6)
MAGNESIUM: 1.9 MG/DL (ref 1.6–2.6)
MAGNESIUM: 2 MG/DL (ref 1.6–2.6)
MAGNESIUM: 2.1 MG/DL (ref 1.6–2.6)
MAGNESIUM: 2.3 MG/DL (ref 1.6–2.6)
MCH RBC QN AUTO: 27.2 PG (ref 25.2–33.5)
MCHC RBC AUTO-ENTMCNC: 32.5 G/DL (ref 28.4–34.8)
MCV RBC AUTO: 83.7 FL (ref 82.6–102.9)
METHEMOGLOBIN: ABNORMAL % (ref 0–1.5)
MODE: ABNORMAL
MYOGLOBIN: <21 NG/ML (ref 28–72)
NEGATIVE BASE EXCESS, VEN: 8 MMOL/L (ref 0–2)
NOTIFICATION TIME: ABNORMAL
NOTIFICATION: ABNORMAL
NRBC AUTOMATED: 0 PER 100 WBC
O2 DEVICE/FLOW/%: ABNORMAL
O2 SAT, VEN: 92.6 % (ref 60–85)
OXYHEMOGLOBIN: ABNORMAL % (ref 95–98)
PATIENT TEMP: 37
PCO2, VEN, TEMP ADJ: ABNORMAL MMHG (ref 39–55)
PCO2, VEN: 32.6 (ref 39–55)
PDW BLD-RTO: 13 % (ref 11.8–14.4)
PEEP/CPAP: ABNORMAL
PH VENOUS: 7.33 (ref 7.32–7.42)
PH, VEN, TEMP ADJ: ABNORMAL (ref 7.32–7.42)
PHOSPHORUS: 1.4 MG/DL (ref 2.5–4.5)
PHOSPHORUS: 1.6 MG/DL (ref 2.5–4.5)
PHOSPHORUS: 1.9 MG/DL (ref 2.5–4.5)
PHOSPHORUS: 2.2 MG/DL (ref 2.5–4.5)
PHOSPHORUS: 2.5 MG/DL (ref 2.5–4.5)
PLATELET # BLD: 402 K/UL (ref 138–453)
PMV BLD AUTO: 11.1 FL (ref 8.1–13.5)
PO2, VEN, TEMP ADJ: ABNORMAL MMHG (ref 30–50)
PO2, VEN: 65.9 (ref 30–50)
POSITIVE BASE EXCESS, VEN: ABNORMAL MMOL/L (ref 0–2)
POTASSIUM SERPL-SCNC: 3.7 MMOL/L (ref 3.7–5.3)
POTASSIUM SERPL-SCNC: 3.9 MMOL/L (ref 3.7–5.3)
POTASSIUM SERPL-SCNC: 4.1 MMOL/L (ref 3.7–5.3)
POTASSIUM SERPL-SCNC: 4.2 MMOL/L (ref 3.7–5.3)
POTASSIUM SERPL-SCNC: 4.9 MMOL/L (ref 3.7–5.3)
PSV: ABNORMAL
PT. POSITION: ABNORMAL
RBC # BLD: 4.97 M/UL (ref 4.21–5.77)
RESPIRATORY RATE: ABNORMAL
SAMPLE SITE: ABNORMAL
SET RATE: ABNORMAL
SODIUM BLD-SCNC: 132 MMOL/L (ref 135–144)
SODIUM BLD-SCNC: 132 MMOL/L (ref 135–144)
SODIUM BLD-SCNC: 133 MMOL/L (ref 135–144)
SODIUM BLD-SCNC: 134 MMOL/L (ref 135–144)
SODIUM BLD-SCNC: 135 MMOL/L (ref 135–144)
TEXT FOR RESPIRATORY: ABNORMAL
TOTAL HB: ABNORMAL G/DL (ref 12–16)
TOTAL RATE: ABNORMAL
TROPONIN INTERP: ABNORMAL
TROPONIN T: ABNORMAL NG/ML
TROPONIN, HIGH SENSITIVITY: <6 NG/L (ref 0–22)
VT: ABNORMAL
WBC # BLD: 11.4 K/UL (ref 3.5–11.3)

## 2021-05-12 PROCEDURE — 2580000003 HC RX 258: Performed by: NURSE PRACTITIONER

## 2021-05-12 PROCEDURE — 85027 COMPLETE CBC AUTOMATED: CPT

## 2021-05-12 PROCEDURE — 93005 ELECTROCARDIOGRAM TRACING: CPT | Performed by: NURSE PRACTITIONER

## 2021-05-12 PROCEDURE — 84484 ASSAY OF TROPONIN QUANT: CPT

## 2021-05-12 PROCEDURE — 36415 COLL VENOUS BLD VENIPUNCTURE: CPT

## 2021-05-12 PROCEDURE — G0108 DIAB MANAGE TRN  PER INDIV: HCPCS

## 2021-05-12 PROCEDURE — 6360000002 HC RX W HCPCS: Performed by: NURSE PRACTITIONER

## 2021-05-12 PROCEDURE — 83874 ASSAY OF MYOGLOBIN: CPT

## 2021-05-12 PROCEDURE — 83735 ASSAY OF MAGNESIUM: CPT

## 2021-05-12 PROCEDURE — APPNB30 APP NON BILLABLE TIME 0-30 MINS: Performed by: NURSE PRACTITIONER

## 2021-05-12 PROCEDURE — 6370000000 HC RX 637 (ALT 250 FOR IP): Performed by: NURSE PRACTITIONER

## 2021-05-12 PROCEDURE — 80048 BASIC METABOLIC PNL TOTAL CA: CPT

## 2021-05-12 PROCEDURE — 82805 BLOOD GASES W/O2 SATURATION: CPT

## 2021-05-12 PROCEDURE — 83036 HEMOGLOBIN GLYCOSYLATED A1C: CPT

## 2021-05-12 PROCEDURE — 2580000003 HC RX 258

## 2021-05-12 PROCEDURE — 82010 KETONE BODYS QUAN: CPT

## 2021-05-12 PROCEDURE — 2500000003 HC RX 250 WO HCPCS: Performed by: NURSE PRACTITIONER

## 2021-05-12 PROCEDURE — 2060000000 HC ICU INTERMEDIATE R&B

## 2021-05-12 PROCEDURE — C9113 INJ PANTOPRAZOLE SODIUM, VIA: HCPCS | Performed by: NURSE PRACTITIONER

## 2021-05-12 PROCEDURE — 99232 SBSQ HOSP IP/OBS MODERATE 35: CPT | Performed by: FAMILY MEDICINE

## 2021-05-12 PROCEDURE — 82947 ASSAY GLUCOSE BLOOD QUANT: CPT

## 2021-05-12 PROCEDURE — 84100 ASSAY OF PHOSPHORUS: CPT

## 2021-05-12 RX ORDER — PANTOPRAZOLE SODIUM 40 MG/1
40 TABLET, DELAYED RELEASE ORAL
Status: DISCONTINUED | OUTPATIENT
Start: 2021-05-13 | End: 2021-05-14 | Stop reason: HOSPADM

## 2021-05-12 RX ORDER — PANTOPRAZOLE SODIUM 40 MG/10ML
40 INJECTION, POWDER, LYOPHILIZED, FOR SOLUTION INTRAVENOUS ONCE
Status: COMPLETED | OUTPATIENT
Start: 2021-05-12 | End: 2021-05-12

## 2021-05-12 RX ORDER — ONDANSETRON 2 MG/ML
4 INJECTION INTRAMUSCULAR; INTRAVENOUS EVERY 6 HOURS PRN
Status: DISCONTINUED | OUTPATIENT
Start: 2021-05-12 | End: 2021-05-14 | Stop reason: HOSPADM

## 2021-05-12 RX ORDER — SODIUM CHLORIDE 9 MG/ML
10 INJECTION INTRAVENOUS ONCE
Status: COMPLETED | OUTPATIENT
Start: 2021-05-12 | End: 2021-05-12

## 2021-05-12 RX ORDER — MORPHINE SULFATE 2 MG/ML
2 INJECTION, SOLUTION INTRAMUSCULAR; INTRAVENOUS ONCE
Status: COMPLETED | OUTPATIENT
Start: 2021-05-12 | End: 2021-05-12

## 2021-05-12 RX ORDER — DEXTROSE AND SODIUM CHLORIDE 5; .45 G/100ML; G/100ML
INJECTION, SOLUTION INTRAVENOUS
Status: COMPLETED
Start: 2021-05-12 | End: 2021-05-12

## 2021-05-12 RX ADMIN — ENOXAPARIN SODIUM 40 MG: 40 INJECTION SUBCUTANEOUS at 08:23

## 2021-05-12 RX ADMIN — PANTOPRAZOLE SODIUM 40 MG: 40 INJECTION, POWDER, FOR SOLUTION INTRAVENOUS at 23:58

## 2021-05-12 RX ADMIN — SODIUM CHLORIDE 1.48 UNITS/HR: 9 INJECTION, SOLUTION INTRAVENOUS at 16:54

## 2021-05-12 RX ADMIN — SODIUM CHLORIDE 10 ML: 9 INJECTION, SOLUTION INTRAMUSCULAR; INTRAVENOUS; SUBCUTANEOUS at 23:58

## 2021-05-12 RX ADMIN — MORPHINE SULFATE 2 MG: 2 INJECTION, SOLUTION INTRAMUSCULAR; INTRAVENOUS at 21:18

## 2021-05-12 RX ADMIN — POTASSIUM CHLORIDE 10 MEQ: 10 INJECTION, SOLUTION INTRAVENOUS at 04:41

## 2021-05-12 RX ADMIN — DEXTROSE AND SODIUM CHLORIDE: 5; 450 INJECTION, SOLUTION INTRAVENOUS at 13:57

## 2021-05-12 RX ADMIN — SODIUM PHOSPHATE, MONOBASIC, MONOHYDRATE 20 MMOL: 276; 142 INJECTION, SOLUTION INTRAVENOUS at 18:45

## 2021-05-12 RX ADMIN — POTASSIUM CHLORIDE 10 MEQ: 10 INJECTION, SOLUTION INTRAVENOUS at 22:03

## 2021-05-12 RX ADMIN — POTASSIUM CHLORIDE 10 MEQ: 10 INJECTION, SOLUTION INTRAVENOUS at 17:52

## 2021-05-12 RX ADMIN — POTASSIUM CHLORIDE 10 MEQ: 10 INJECTION, SOLUTION INTRAVENOUS at 23:58

## 2021-05-12 RX ADMIN — DEXTROSE AND SODIUM CHLORIDE: 5; 450 INJECTION, SOLUTION INTRAVENOUS at 07:24

## 2021-05-12 RX ADMIN — POTASSIUM CHLORIDE 10 MEQ: 10 INJECTION, SOLUTION INTRAVENOUS at 02:20

## 2021-05-12 RX ADMIN — POTASSIUM CHLORIDE 10 MEQ: 10 INJECTION, SOLUTION INTRAVENOUS at 00:04

## 2021-05-12 RX ADMIN — POTASSIUM CHLORIDE 10 MEQ: 10 INJECTION, SOLUTION INTRAVENOUS at 18:56

## 2021-05-12 RX ADMIN — POTASSIUM CHLORIDE 10 MEQ: 10 INJECTION, SOLUTION INTRAVENOUS at 20:59

## 2021-05-12 RX ADMIN — POTASSIUM CHLORIDE 10 MEQ: 10 INJECTION, SOLUTION INTRAVENOUS at 19:59

## 2021-05-12 RX ADMIN — POTASSIUM CHLORIDE 10 MEQ: 10 INJECTION, SOLUTION INTRAVENOUS at 01:01

## 2021-05-12 RX ADMIN — POTASSIUM CHLORIDE 10 MEQ: 10 INJECTION, SOLUTION INTRAVENOUS at 16:21

## 2021-05-12 ASSESSMENT — ENCOUNTER SYMPTOMS
ABDOMINAL PAIN: 1
VOMITING: 0
NAUSEA: 0
SHORTNESS OF BREATH: 1
BLOOD IN STOOL: 0
ABDOMINAL PAIN: 0
NAUSEA: 1
RHINORRHEA: 0
VOMITING: 1
DIARRHEA: 0
CONSTIPATION: 0
SHORTNESS OF BREATH: 0
WHEEZING: 0
STRIDOR: 0
COUGH: 0
CHEST TIGHTNESS: 0
BACK PAIN: 0

## 2021-05-12 ASSESSMENT — PAIN SCALES - GENERAL
PAINLEVEL_OUTOF10: 0
PAINLEVEL_OUTOF10: 10
PAINLEVEL_OUTOF10: 0

## 2021-05-12 NOTE — CARE COORDINATION
Case Management Initial Discharge Plan  Suzan Ferrer,             Met with:patient to discuss discharge plans. Information verified: address, contacts, phone number, , insurance Yes    Emergency Contact/Next of Kin name & number:   Jennifer Munoz        Parent    [phone]         PCP: list given  Date of last visit:     Insurance Provider: Phong Belcher    Discharge Planning    Living Arrangements:  Family Members, Parent   Support Systems:  Family Members, Parent    Home has 2 stories  3 stairs to climb to get into front door, 15stairs to climb to reach second floor  Location of bedroom/bathroom in home upper    Patient able to perform ADL's:Independent    Current Services (outpatient & in home) none  DME equipment: none  DME provider: none    Receiving oral anticoagulation therapy? No    If indicated:   Physician managing anticoagulation treatment: n/a  Where does patient obtain lab work for ATC treatment? n/a      Potential Assistance Needed:  N/A    Patient agreeable to home care: No  [place] of choice provided:  n/a    Prior SNF/Rehab Placement and Facility: none  Agreeable to SNF/Rehab: No  [place] of choice provided: n/a     Evaluation: no    Expected Discharge date:       Patient expects to be discharged to:  home  Follow Up Appointment: Best Day/ Time:      Transportation provider: parents/ family  Transportation arrangements needed for discharge: No    Readmission Risk              Risk of Unplanned Readmission:        9             Does patient have a readmission risk score greater than 14?: No  If yes, follow-up appointment must be made within 7 days of discharge. Goals of Care: to get better      Discharge Plan: Home independently , has transportation. [facility] pt appt scheduled at [facility]  @ 1:45.  Pt has no showed 2 new pt appts , he must show for this appt pt aware          Electronically signed by Anabel Santos RN on 21 at 11:02 AM EDT

## 2021-05-12 NOTE — PROGRESS NOTES
sodium phosphate IVPB    Data:     Past Medical History:   has a past medical history of Diabetes mellitus ([facility] [address] and Type 1 diabetes mellitus ([facility] [address] Social History:   reports that he has been smoking cigarettes. He has a 0.50 pack-year smoking history. He has never used smokeless tobacco. He reports current alcohol use of about 10.0 standard drinks of alcohol per week. He reports current drug use. Drug: Marijuana. Family History:   Family History   Problem Relation Age of Onset    Asthma Father     Asthma Sister     Asthma Brother        Vitals:  BP (!) 144/96   Pulse 63   Temp 98.9 °F (37.2 °C) (Oral)   Resp 12   Ht 5' 11\" (1.803 m)   Wt 140 lb (63.5 kg)   SpO2 99%   BMI 19.53 kg/m²   Temp (24hrs), Av.4 °F (36.9 °C), Min:96.1 °F (35.6 °C), Max:99.2 °F (37.3 °C)    Recent Labs     21  1130 21  1230 21  1326 21  1430   POCGLU 243* 229* 202* 177*       I/O (24Hr): Intake/Output Summary (Last 24 hours) at 2021 1551  Last data filed at 2021 1520  Gross per 24 hour   Intake 200 ml   Output 1725 ml   Net -1525 ml       Labs:  Hematology:  Recent Labs     21  0432   WBC 9.4 11.4*   RBC 5.69 4.97   HGB 15.6 13.5   HCT 49.8 41.6   MCV 87.5 83.7   MCH 27.4 27.2   MCHC 31.3 32.5   RDW 13.1 13.0    402   MPV 11.5 11.1     Chemistry:  Recent Labs     21  0230 21  0255 21  1021   *  --   --  135 133*   K 4.9  --   --  4.9 4.2   CL 93*  --   --  104 100   CO2 10*  --   --  16* 19*   GLUCOSE 365*   < > 178 180* 222*   BUN 23*  --   --  17 13   CREATININE 0.92  --   --  0.79 0.76   MG  --   --   --  2.1 2.3   ANIONGAP 31*  --   --  15 14   LABGLOM >60  --   --  >60 >60   GFRAA >60  --   --  >60 >60   CALCIUM 9.5  --   --  8.9 8.9   PHOS  --   --   --  2.5 2.2*    < > = values in this interval not displayed.      Recent Labs     21  0432 21  6811 05/12/21  0922 05/12/21  1032 05/12/21  1130 05/12/21  1230 05/12/21  1326 05/12/21  1430   PROT 9.0*  --   --   --   --   --   --   --   --   --    LABALBU 5.0  --   --   --   --   --   --   --   --   --    LABA1C  --   --  10.0*  --   --   --   --   --   --   --    AST 20  --   --   --   --   --   --   --   --   --    ALT 13  --   --   --   --   --   --   --   --   --    ALKPHOS 113  --   --   --   --   --   --   --   --   --    BILITOT 0.78  --   --   --   --   --   --   --   --   --    LIPASE 11*  --   --   --   --   --   --   --   --   --    POCGLU  --    < >  --    < > 172* 175* 243* 229* 202* 177*    < > = values in this interval not displayed. ABG:  Lab Results   Component Value Date    POCPH 7.201 02/01/2019    POCPCO2 35.5 02/01/2019    POCPO2 36.9 02/01/2019    POCHCO3 13.9 02/01/2019    NBEA 13 02/01/2019    PBEA NOT REPORTED 02/01/2019    PLP8MPE 15 02/01/2019    VJAY1CEE 59 02/01/2019    FIO2 INFORMATION NOT PROVIDED 05/12/2021     Lab Results   Component Value Date/Time    SPECIAL RAC 10ML 12/19/2020 12:15 AM     Lab Results   Component Value Date/Time    CULTURE (A) 12/19/2020 12:15 AM     POSITIVE Blood Culture Results called to and read back by: INTERNAL MEDICINE RESIDENT ON CALL VIA PERFECT SERVE AT 21:12 ON 12/19/20    CULTURE  12/19/2020 12:15 AM     DIRECT GRAM STAIN FROM BOTTLE: GRAM POSITIVE COCCI IN CLUSTERS    CULTURE STAPHYLOCOCCUS HOMINIS (A) 12/19/2020 12:15 AM       [facility]    Result Date: 5/11/2021  No acute process. Physical Examination:        Physical Exam  Vitals signs and nursing note reviewed. Constitutional:       General: He is not in acute distress. HENT:      Head: Normocephalic and atraumatic. Eyes:      Conjunctiva/sclera: Conjunctivae normal.      Pupils: Pupils are equal, round, and reactive to light. Cardiovascular:      Rate and Rhythm: Normal rate and regular rhythm. Heart sounds: No murmur.    Pulmonary:      Effort: Pulmonary effort is normal. No accessory muscle usage or respiratory distress. Breath sounds: No stridor. No decreased breath sounds, wheezing, rhonchi or rales. Abdominal:      General: Bowel sounds are normal. There is no distension. Palpations: Abdomen is soft. Abdomen is not rigid. Tenderness: There is no abdominal tenderness. There is no guarding. Musculoskeletal:         General: No tenderness. Skin:     General: Skin is warm and dry. Findings: No erythema, lesion or rash. Neurological:      Mental Status: He is alert and oriented to person, place, and time. Cranial Nerves: No cranial nerve deficit. Motor: No seizure activity. Psychiatric:         Speech: Speech normal.         Behavior: Behavior normal. Behavior is cooperative.          Assessment:        Hospital Problems           Last Modified POA    * (Principal) DKA, type 1, not at goal [facility]) 5/12/2021 Yes    Non compliance w medication regimen 5/12/2021 Yes    Non-intractable vomiting with nausea 5/12/2021 Yes    Electrolyte abnormality 5/12/2021 Yes          Plan:          DKA:   Continue with DKA protocol  Lytes replacement per protocol    Intractable nausea and vomiting :   Symptomatic management  Already improving    Non compliance :  Encouraged to be compliant with his regimen and F/U with PCP  Will facilitate PCP appointment on discharge     DVT PPx    Francis Gauthier MD  5/12/2021  3:51 PM

## 2021-05-12 NOTE — ED NOTES
Patient resting comfortably on stretcher, in no apparent distress  Respirations even and non-labored  Patient has no needs at this time  Call light remains within reach     Laurann Simmonds, RN  05/12/21 0149

## 2021-05-12 NOTE — PLAN OF CARE
Problem: Coping:  Goal: Ability to adjust to condition or change in health will improve  Description: Ability to adjust to condition or change in health will improve  Outcome: Ongoing     Problem: Health Behavior:  Goal: Ability to identify and utilize available resources and services will improve  Description: Ability to identify and utilize available resources and services will improve  Outcome: Ongoing  Goal: Ability to manage health-related needs will improve  Description: Ability to manage health-related needs will improve  Outcome: Ongoing     Problem: Metabolic:  Goal: Ability to maintain appropriate glucose levels will improve  Description: Ability to maintain appropriate glucose levels will improve  Outcome: Ongoing

## 2021-05-12 NOTE — ED NOTES
Patient resting comfortably on stretcher, in no apparent distress  Respirations even and non-labored  Patient has no needs at this time  Call light remains within reach     Rosi Lo RN  05/11/21 2311

## 2021-05-12 NOTE — ED NOTES
Patient resting comfortably on stretcher, in no apparent distress  Respirations even and non-labored  Patient has no needs at this time  Call light remains within reach     Dorota Gatica RN  05/12/21 [id number]

## 2021-05-12 NOTE — ED NOTES
Patient with an episode of emesis  Dr. Nathan Patton made aware     Manish Parsons RN  05/11/21 3529

## 2021-05-12 NOTE — ED NOTES
Repeat blood work collected, labeled, and sent to lab    Patient resting comfortably on stretcher, in no apparent distress  Respirations even and non-labored  Patient has no needs at this time  Call light remains within reach     Flory Mendoza RN  05/12/21 7184

## 2021-05-12 NOTE — PROGRESS NOTES
Patient brought to Bed 37 from ED bed to be boarded, receiving RN clarified whether or not 0645 am labs were drawn and sent to lab, TERESA massey reported that she was unaware of lab order and they were not drawn, Dr Monae Weathers notified via perfect serve that 0645 am labs were not collected and sent to lab okay to draw labs now and readjust time every 4 hours from next draw, patient updated on plan of care denies further needs.

## 2021-05-12 NOTE — H&P
[facility]  Office: [address], DO, Corinne Gay, DO, Mona Gee DO, Davidson Brittany Villar, DO, Nehemias De Dios MD, Cari Munoz MD, Vel Jauregui MD, Jose Antonio Newby MD, Juanis Christianson MD, Melody Shah MD, Sukh Frank MD, [place] Noriega MD, Jojo Weir DO, Luis Armando Diaz MD, John Castaneda DO, Fernandez Solano MD,  Terri Andres DO, Belén Zaragoza MD, Edmundo Joseph MD, Shayla Asif MD, Richmond Francisco MD, [facility], [facility], [facility], CNP, Michael Trujillo, CNP, Delvin Hills, CNS, Calvin Phillips, CNP, Tosin Metcalf, CNP, Leonel Fermin, CNP, Benito Edwards, CNP, Tiana Lagunas, CNP, Fausto Haywood PA-C, Vamsi Lechuga, [facility], Jesi Romero, CNP, Dominique Renee, CNP, Spike Hua, CNP, Ryan Martina, CNP, Johann Davis, CNP, Marialuisa Diamond, [address]    HISTORY AND PHYSICAL EXAMINATION            Date:   5/11/2021  Patient name:  Stephnaie Benavides  Date of admission:  5/11/2021  6:43 PM  MRN:   [id number]  Account:  [de-identified]  YOB: 2000  PCP:    No primary care provider on file. Room:   01/01  Code Status:    Prior    Chief Complaint:     Chief Complaint   Patient presents with    Abdominal Pain       History Obtained From:     patient, electronic medical record    History of Present Illness:     Stephanie Benavides is a 23 y.o. Non-/non  male who presents with Abdominal Pain   and is admitted to the hospital for the management of DKA    Patient presents to the emergency room with the concern of abdomen pain, nausea and vomiting that started the previous 0600. Patient denies eating out at any restaurants or any recent sick family members. Patient states he has only had this one ofther time with DKA. He currently does not follow with someone to manage his Diabetes. He reports \" good blood sugars\" but does not give a range. He denies any episodes of hypoglycemia.  He denies any chest pain, diarrhea, dysuria cough, fevers or chills. Work up in the emergency room showed labs with a sodium of 134, potassium of 4.9, chloride of 93, co2 10, bun 23, creat 0.92 glucose of 328, anion gap of 31. Hemogram normal without acute leukocytosis or anemia. LFT normal with lipase of 11. Urine with keytones, trace hgb. VBG with Ph 7.14, pco2, 36.1, p02 55.8. HCO3 12,   cxr without cute process        Past Medical History:     Past Medical History:   Diagnosis Date    Diabetes mellitus ([facility] [address]     Type 1 diabetes mellitus ([facility] [address] April 2013        Past Surgical History:     History reviewed. No pertinent surgical history. Medications Prior to Admission:     Prior to Admission medications    Medication Sig Start Date End Date Taking?  Authorizing Provider   INSULIN SYRINGE 1CC/29G (AIMSCO INSULIN SYR ULTRA THIN) 29G X 1/2\" 1 ML MISC 1 each by Does not apply route daily 1/5/21   Rosi Allen MD   Insulin Pen Needle 30G X 5 MM MISC 1 Device by Does not apply route 4 times daily 12/22/20   Leander Barraza, APRN - CNP   ibuprofen (ADVIL;MOTRIN) 200 MG tablet Take 3 tablets by mouth every 8 hours as needed for Pain or Fever 12/19/20   Leona Izaguirre MD   insulin aspart (NOVOLOG FLEXPEN) 100 UNIT/ML injection pen Inject 1-11 Units into the skin 3 times daily (before meals) 1 unit per 15 grams CHO for meals and snacks  Sliding scale: =0, 125-150=1, 151-175=2, 175-200=3, 201-225=4, 226-250=5, 251-275=6, 276-300=7, 301-325=8, 326-350=9, 351-375=10, 376-400=11 12/19/20   Kang Figueroa MD   insulin glargine (LANTUS) 100 UNIT/ML injection vial Inject 30 Units into the skin nightly 12/19/20   Jose Batista MD   acetaminophen (TYLENOL) 325 MG tablet Take 2 tablets by mouth every 6 hours as needed for Pain 10/2/20   Tiffany Richards MD   EPINEPHrine (Flores Helms 2-ITALO) 0.15 MG/0.3ML SOAJ Use as directed for allergic reaction 10/2/20   Tiffany Richards MD   glucagon (GLUCAGON EMERGENCY) 1 MG injection As directed for extreme hypoglycemia 10/2/20   Roque aSba MD   blood glucose test strips (ONE TOUCH ULTRA TEST) strip Sig: For blood testing 4-6 times/day 10/2/20   Roque Saba MD   Ketone Blood Test STRP 1 strip by Does not apply route daily as needed (for high blood sugar) 10/2/20   Roque Saba MD   Lancets MISC 1 each by Does not apply route 4 times daily (before meals and nightly) 10/2/20   Roque Saba MD   Insulin Pen Needle 30G X 5 MM MISC 1 Device by Does not apply route 4 times daily 10/2/20   Roque Saba MD        Allergies:     Patient has no known allergies. Social History:     Tobacco:    reports that he has been smoking cigarettes. He has a 0.50 pack-year smoking history. He has never used smokeless tobacco.  Alcohol:      reports current alcohol use of about 10.0 standard drinks of alcohol per week. Drug Use:  reports current drug use. Drug: Marijuana. Family History:     Family History   Problem Relation Age of Onset    Asthma Father     Asthma Sister     Asthma Brother        Review of Systems:     Positive and Negative as described in HPI. Review of Systems   Constitutional: Negative for chills, diaphoresis and fever. HENT: Negative for congestion and hearing loss. Respiratory: Positive for shortness of breath (mild occasionally ). Negative for cough, wheezing and stridor. Cardiovascular: Negative for chest pain, palpitations and leg swelling. Gastrointestinal: Positive for nausea and vomiting. Negative for abdominal pain, blood in stool, constipation and diarrhea. Genitourinary: Negative for dysuria and frequency. Musculoskeletal: Negative for myalgias. Skin: Negative for rash. Neurological: Negative for dizziness, seizures and headaches. Psychiatric/Behavioral: The patient is not nervous/anxious.         Physical Exam:   /77   Pulse 78   Temp 96.1 °F (35.6 °C) (Oral)   Resp 16   Ht 5' 11\" (1.803 m)   Wt 140 lb (63.5 kg)   SpO2 95%   BMI 19.53 kg/m²   Temp (24hrs), Av.1 °F (35.6 °C), Min:96.1 °F (35.6 °C), Max:96.1 °F (35.6 °C)    Recent Labs     214 21  2327   POCGLU 395* 390* 328* 241*     No intake or output data in the 24 hours ending 21 2339    Physical Exam  Vitals signs and nursing note reviewed. Constitutional:       General: He is not in acute distress. Appearance: He is well-developed. He is not diaphoretic. HENT:      Head: Normocephalic and atraumatic. Right Ear: Hearing normal.      Left Ear: Hearing normal.      Nose: Nose normal. No rhinorrhea. Eyes:      General: Lids are normal.      Extraocular Movements:      Right eye: Normal extraocular motion. Left eye: Normal extraocular motion. Conjunctiva/sclera: Conjunctivae normal.      Right eye: Right conjunctiva is not injected. Left eye: Left conjunctiva is not injected. Pupils: Pupils are equal, round, and reactive to light. Pupils are equal.      Right eye: Pupil is reactive. Left eye: Pupil is reactive. Neck:      Musculoskeletal: Neck supple. Thyroid: No thyromegaly. Vascular: No carotid bruit. Trachea: Trachea and phonation normal. No tracheal deviation. Cardiovascular:      Rate and Rhythm: Normal rate and regular rhythm. Pulses: Normal pulses. Heart sounds: Normal heart sounds. No murmur. Pulmonary:      Effort: Pulmonary effort is normal. No respiratory distress. Breath sounds: Normal breath sounds. No stridor. No decreased breath sounds. Abdominal:      General: Bowel sounds are normal. There is no distension. Palpations: Abdomen is soft. There is no mass. Tenderness: There is no abdominal tenderness. There is no guarding. Musculoskeletal:         General: No tenderness. Skin:     General: Skin is warm and dry. Findings: No erythema, lesion or rash.    Neurological:      Mental Status: He is alert and oriented to person, place, and time. He is not disoriented. Cranial Nerves: No cranial nerve deficit. Psychiatric:         Speech: Speech normal.         Behavior: Behavior normal. Behavior is cooperative. Investigations:      Laboratory Testing:  Recent Results (from the past 24 hour(s))   POC Glucose Fingerstick    Collection Time: 05/11/21  7:28 PM   Result Value Ref Range    POC Glucose 395 (H) 75 - 110 mg/dL   POCT Glucose    Collection Time: 05/11/21  7:29 PM   Result Value Ref Range    Glucose 395 mg/dL    QC OK?  YES    CBC Auto Differential    Collection Time: 05/11/21  7:56 PM   Result Value Ref Range    WBC 9.4 3.5 - 11.3 k/uL    RBC 5.69 4.21 - 5.77 m/uL    Hemoglobin 15.6 13.0 - 17.0 g/dL    Hematocrit 49.8 40.7 - 50.3 %    MCV 87.5 82.6 - 102.9 fL    MCH 27.4 25.2 - 33.5 pg    MCHC 31.3 28.4 - 34.8 g/dL    RDW 13.1 11.8 - 14.4 %    Platelets 607 180 - 967 k/uL    MPV 11.5 8.1 - 13.5 fL    NRBC Automated 0.0 0.0 per 100 WBC    Differential Type NOT REPORTED     WBC Morphology NOT REPORTED     RBC Morphology NOT REPORTED     Platelet Estimate NOT REPORTED     Immature Granulocytes 0 0 %    Seg Neutrophils 92 (H) 36 - 66 %    Lymphocytes 6 (L) 24 - 44 %    Monocytes 2 1 - 7 %    Eosinophils % 0 (L) 1 - 4 %    Basophils 0 0 - 2 %    Absolute Immature Granulocyte 0.00 0.00 - 0.30 k/uL    Segs Absolute 8.65 (H) 1.8 - 7.7 k/uL    Absolute Lymph # 0.56 (L) 1.0 - 4.8 k/uL    Absolute Mono # 0.19 0.1 - 0.8 k/uL    Absolute Eos # 0.00 0.0 - 0.4 k/uL    Basophils Absolute 0.00 0.0 - 0.2 k/uL    Morphology Normal    Comprehensive Metabolic Panel w/ Reflex to MG    Collection Time: 05/11/21  7:56 PM   Result Value Ref Range    Glucose 365 (H) 70 - 99 mg/dL    BUN 23 (H) 6 - 20 mg/dL    CREATININE 0.92 0.70 - 1.20 mg/dL    Bun/Cre Ratio NOT REPORTED 9 - 20    Calcium 9.5 8.6 - 10.4 mg/dL    Sodium 134 (L) 135 - 144 mmol/L    Potassium 4.9 3.7 - 5.3 mmol/L    Chloride 93 (L) 98 - 107 mmol/L    CO2 10 (L) 20 - 31 mmol/L Anion Gap 31 (H) 9 - 17 mmol/L    Alkaline Phosphatase 113 40 - 129 U/L    ALT 13 5 - 41 U/L    AST 20 <40 U/L    Total Bilirubin 0.78 0.3 - 1.2 mg/dL    Total Protein 9.0 (H) 6.4 - 8.3 g/dL    Albumin 5.0 3.5 - 5.2 g/dL    Albumin/Globulin Ratio 1.3 1.0 - 2.5    GFR Non-African American >60 >60 mL/min    GFR African American >60 >60 mL/min    GFR Comment          GFR Staging NOT REPORTED    Lipase    Collection Time: 05/11/21  7:56 PM   Result Value Ref Range    Lipase 11 (L) 13 - 60 U/L   BLOOD GAS, VENOUS    Collection Time: 05/11/21  7:56 PM   Result Value Ref Range    pH, [place] 7.149 (LL) 7.320 - 7.420    pCO2, [place] 36.1 (L) 39 - 55    pO2, [place] 55.8 (H) 30 - 50    HCO3, Venous 12.0 (L) 24 - 30 mmol/L    Positive Base Excess, [place] NOT REPORTED 0.0 - 2.0 mmol/L    Negative Base Excess, [place] 16.4 (H) 0.0 - 2.0 mmol/L    O2 Sat, [place] 68.0 60.0 - 85.0 %    Total Hb NOT REPORTED 12.0 - 16.0 g/dl    Oxyhemoglobin NOT REPORTED 95.0 - 98.0 %    Carboxyhemoglobin 1.6 0 - 5 %    Methemoglobin NOT REPORTED 0.0 - 1.5 %    Pt Temp 37.0     pH, [place], Temp Adj NOT REPORTED 7.320 - 7.420    pCO2, [place], Temp Adj NOT REPORTED 39 - 55 mmHg    pO2, [place], Temp Adj NOT REPORTED 30 - 50 mmHg    O2 Device/Flow/% NOT REPORTED     Respiratory Rate NOT REPORTED     Shane Test NOT REPORTED     Sample Site NOT REPORTED     Pt.  Position NOT REPORTED     Mode NOT REPORTED     Set Rate NOT REPORTED     Total Rate NOT REPORTED     VT NOT REPORTED     FIO2 INFORMATION NOT PROVIDED     Peep/Cpap NOT REPORTED     PSV NOT REPORTED     Text for Respiratory NOT REPORTED     NOTIFICATION NOT REPORTED     NOTIFICATION TIME NOT REPORTED    Urinalysis    Collection Time: 05/11/21  8:10 PM   Result Value Ref Range    Color, UA YELLOW YELLOW    Turbidity UA CLEAR CLEAR    Glucose, Ur 3+ (A) NEGATIVE    Bilirubin Urine NEGATIVE NEGATIVE    Ketones, Urine LARGE (A) NEGATIVE    Specific Gravity, UA 1.029 1.005 - 1.030    Urine Hgb TRACE (A) NEGATIVE    pH, UA 5.5 5.0 - 8.0    Protein, UA 2+ (A) NEGATIVE    Urobilinogen, Urine Normal Normal    Nitrite, Urine NEGATIVE NEGATIVE    Leukocyte Esterase, Urine NEGATIVE NEGATIVE    Urinalysis Comments NOT REPORTED    Microscopic Urinalysis    Collection Time: 05/11/21  8:10 PM   Result Value Ref Range    -          WBC, UA 0 TO 2 0 - 5 /HPF    RBC, UA 0 TO 2 0 - 4 /HPF    Casts UA  0 - 8 /LPF     0 TO 2 HYALINE Reference range defined for non-centrifuged specimen. Crystals, UA NOT REPORTED None /HPF    Epithelial Cells UA 0 TO 2 0 - 5 /HPF    Renal Epithelial, UA NOT REPORTED 0 /HPF    Bacteria, UA NOT REPORTED None    Mucus, UA NOT REPORTED None    Trichomonas, UA NOT REPORTED None    Amorphous, UA NOT REPORTED None    Other Observations UA NOT REPORTED NOT REQ. Yeast, UA NOT REPORTED None   POC Glucose Fingerstick    Collection Time: 05/11/21  9:28 PM   Result Value Ref Range    POC Glucose 390 (H) 75 - 110 mg/dL   POCT Glucose    Collection Time: 05/11/21  9:34 PM   Result Value Ref Range    Glucose 390 mg/dL    QC OK? yes    POC Glucose Fingerstick    Collection Time: 05/11/21 10:24 PM   Result Value Ref Range    POC Glucose 328 (H) 75 - 110 mg/dL   POCT Glucose    Collection Time: 05/11/21 10:26 PM   Result Value Ref Range    Glucose 328 mg/dL    QC OK? yes    POC Glucose Fingerstick    Collection Time: 05/11/21 11:27 PM   Result Value Ref Range    POC Glucose 241 (H) 75 - 110 mg/dL       Imaging/Diagnostics:  Xr Chest Portable    Result Date: 5/11/2021  No acute process. Assessment :      Hospital Problems           Last Modified POA    DKA, type 1, not at goal [facility]) 5/11/2021 Yes          Plan:     Patient status inpatient in the Progressive Unit/Step down    1. Insulin drip for DKA  2. Check bmp, vbg, beta q4,   3. Supportive treatment for the nausea and emesis  4. IV hydration with protocol  5. electrolyte management with protocol  6. Set up with pcp for DM management  7. GI proph  8.  DVT proph     Consultations: IP CONSULT TO CRITICAL CARE  IP CONSULT TO HOSPITALIST  IP CONSULT TO DIABETES EDUCATOR  IP CONSULT TO DIETITIAN     Patient is admitted as inpatient status because of co-morbidities listed above, severity of signs and symptoms as outlined, requirement for current medical therapies and most importantly because of direct risk to patient if care not provided in a hospital setting. Expected length of stay > 48 hours. Bridger Membreno APRN - [phone] [facility]  5/11/2021  11:39 PM    Copy sent to Dr. Merline Garner primary care provider on file.

## 2021-05-12 NOTE — ED NOTES
Bed: 37  Expected date:   Expected time:   Means of arrival:   Comments:     Georges Potter RN  05/12/21 0945

## 2021-05-13 LAB
ANION GAP SERPL CALCULATED.3IONS-SCNC: 11 MMOL/L (ref 9–17)
ANION GAP SERPL CALCULATED.3IONS-SCNC: 12 MMOL/L (ref 9–17)
ANION GAP SERPL CALCULATED.3IONS-SCNC: 12 MMOL/L (ref 9–17)
ANION GAP SERPL CALCULATED.3IONS-SCNC: 14 MMOL/L (ref 9–17)
BUN BLDV-MCNC: 3 MG/DL (ref 6–20)
BUN BLDV-MCNC: 3 MG/DL (ref 6–20)
BUN BLDV-MCNC: 4 MG/DL (ref 6–20)
BUN BLDV-MCNC: 5 MG/DL (ref 6–20)
BUN/CREAT BLD: ABNORMAL (ref 9–20)
CALCIUM SERPL-MCNC: 8.1 MG/DL (ref 8.6–10.4)
CALCIUM SERPL-MCNC: 8.5 MG/DL (ref 8.6–10.4)
CALCIUM SERPL-MCNC: 8.6 MG/DL (ref 8.6–10.4)
CALCIUM SERPL-MCNC: 8.6 MG/DL (ref 8.6–10.4)
CHLORIDE BLD-SCNC: 100 MMOL/L (ref 98–107)
CHLORIDE BLD-SCNC: 102 MMOL/L (ref 98–107)
CHLORIDE BLD-SCNC: 102 MMOL/L (ref 98–107)
CHLORIDE BLD-SCNC: 103 MMOL/L (ref 98–107)
CO2: 18 MMOL/L (ref 20–31)
CO2: 19 MMOL/L (ref 20–31)
CO2: 21 MMOL/L (ref 20–31)
CO2: 21 MMOL/L (ref 20–31)
CREAT SERPL-MCNC: 0.46 MG/DL (ref 0.7–1.2)
CREAT SERPL-MCNC: 0.5 MG/DL (ref 0.7–1.2)
CREAT SERPL-MCNC: 0.51 MG/DL (ref 0.7–1.2)
CREAT SERPL-MCNC: 0.53 MG/DL (ref 0.7–1.2)
EKG ATRIAL RATE: 47 BPM
EKG P AXIS: 68 DEGREES
EKG P-R INTERVAL: 148 MS
EKG Q-T INTERVAL: 446 MS
EKG QRS DURATION: 104 MS
EKG QTC CALCULATION (BAZETT): 394 MS
EKG R AXIS: -53 DEGREES
EKG T AXIS: 20 DEGREES
EKG VENTRICULAR RATE: 47 BPM
GFR AFRICAN AMERICAN: >60 ML/MIN
GFR NON-AFRICAN AMERICAN: >60 ML/MIN
GFR SERPL CREATININE-BSD FRML MDRD: ABNORMAL ML/MIN/{1.73_M2}
GLUCOSE BLD-MCNC: 127 MG/DL (ref 75–110)
GLUCOSE BLD-MCNC: 131 MG/DL (ref 75–110)
GLUCOSE BLD-MCNC: 132 MG/DL (ref 75–110)
GLUCOSE BLD-MCNC: 148 MG/DL (ref 75–110)
GLUCOSE BLD-MCNC: 151 MG/DL (ref 75–110)
GLUCOSE BLD-MCNC: 152 MG/DL (ref 75–110)
GLUCOSE BLD-MCNC: 153 MG/DL (ref 75–110)
GLUCOSE BLD-MCNC: 154 MG/DL (ref 75–110)
GLUCOSE BLD-MCNC: 158 MG/DL (ref 75–110)
GLUCOSE BLD-MCNC: 159 MG/DL (ref 75–110)
GLUCOSE BLD-MCNC: 161 MG/DL (ref 70–99)
GLUCOSE BLD-MCNC: 165 MG/DL (ref 75–110)
GLUCOSE BLD-MCNC: 167 MG/DL (ref 70–99)
GLUCOSE BLD-MCNC: 184 MG/DL (ref 75–110)
GLUCOSE BLD-MCNC: 189 MG/DL (ref 70–99)
GLUCOSE BLD-MCNC: 193 MG/DL (ref 75–110)
GLUCOSE BLD-MCNC: 218 MG/DL (ref 75–110)
GLUCOSE BLD-MCNC: 226 MG/DL (ref 75–110)
GLUCOSE BLD-MCNC: 228 MG/DL (ref 70–99)
GLUCOSE BLD-MCNC: 241 MG/DL (ref 75–110)
GLUCOSE BLD-MCNC: 241 MG/DL (ref 75–110)
MAGNESIUM: 1.6 MG/DL (ref 1.6–2.6)
MAGNESIUM: 1.7 MG/DL (ref 1.6–2.6)
MAGNESIUM: 1.9 MG/DL (ref 1.6–2.6)
MAGNESIUM: 2.1 MG/DL (ref 1.6–2.6)
MYOGLOBIN: <21 NG/ML (ref 28–72)
MYOGLOBIN: <21 NG/ML (ref 28–72)
PHOSPHORUS: 2.2 MG/DL (ref 2.5–4.5)
PHOSPHORUS: 2.4 MG/DL (ref 2.5–4.5)
PHOSPHORUS: 2.5 MG/DL (ref 2.5–4.5)
PHOSPHORUS: 2.8 MG/DL (ref 2.5–4.5)
POTASSIUM SERPL-SCNC: 3.6 MMOL/L (ref 3.7–5.3)
POTASSIUM SERPL-SCNC: 3.9 MMOL/L (ref 3.7–5.3)
POTASSIUM SERPL-SCNC: 4 MMOL/L (ref 3.7–5.3)
POTASSIUM SERPL-SCNC: 4 MMOL/L (ref 3.7–5.3)
SODIUM BLD-SCNC: 133 MMOL/L (ref 135–144)
SODIUM BLD-SCNC: 133 MMOL/L (ref 135–144)
SODIUM BLD-SCNC: 134 MMOL/L (ref 135–144)
SODIUM BLD-SCNC: 135 MMOL/L (ref 135–144)
TROPONIN INTERP: ABNORMAL
TROPONIN INTERP: ABNORMAL
TROPONIN T: ABNORMAL NG/ML
TROPONIN T: ABNORMAL NG/ML
TROPONIN, HIGH SENSITIVITY: <6 NG/L (ref 0–22)
TROPONIN, HIGH SENSITIVITY: <6 NG/L (ref 0–22)

## 2021-05-13 PROCEDURE — 36415 COLL VENOUS BLD VENIPUNCTURE: CPT

## 2021-05-13 PROCEDURE — 6360000002 HC RX W HCPCS: Performed by: FAMILY MEDICINE

## 2021-05-13 PROCEDURE — 82947 ASSAY GLUCOSE BLOOD QUANT: CPT

## 2021-05-13 PROCEDURE — 84100 ASSAY OF PHOSPHORUS: CPT

## 2021-05-13 PROCEDURE — 2500000003 HC RX 250 WO HCPCS: Performed by: NURSE PRACTITIONER

## 2021-05-13 PROCEDURE — 6370000000 HC RX 637 (ALT 250 FOR IP): Performed by: NURSE PRACTITIONER

## 2021-05-13 PROCEDURE — 83735 ASSAY OF MAGNESIUM: CPT

## 2021-05-13 PROCEDURE — 83874 ASSAY OF MYOGLOBIN: CPT

## 2021-05-13 PROCEDURE — 2060000000 HC ICU INTERMEDIATE R&B

## 2021-05-13 PROCEDURE — 84484 ASSAY OF TROPONIN QUANT: CPT

## 2021-05-13 PROCEDURE — 6370000000 HC RX 637 (ALT 250 FOR IP): Performed by: FAMILY MEDICINE

## 2021-05-13 PROCEDURE — 2580000003 HC RX 258: Performed by: FAMILY MEDICINE

## 2021-05-13 PROCEDURE — 99232 SBSQ HOSP IP/OBS MODERATE 35: CPT | Performed by: FAMILY MEDICINE

## 2021-05-13 PROCEDURE — 6360000002 HC RX W HCPCS: Performed by: NURSE PRACTITIONER

## 2021-05-13 PROCEDURE — 80048 BASIC METABOLIC PNL TOTAL CA: CPT

## 2021-05-13 PROCEDURE — 2580000003 HC RX 258: Performed by: NURSE PRACTITIONER

## 2021-05-13 RX ORDER — INSULIN GLARGINE 100 [IU]/ML
20 INJECTION, SOLUTION SUBCUTANEOUS 2 TIMES DAILY
Status: DISCONTINUED | OUTPATIENT
Start: 2021-05-13 | End: 2021-05-14 | Stop reason: HOSPADM

## 2021-05-13 RX ORDER — SODIUM CHLORIDE 9 MG/ML
INJECTION, SOLUTION INTRAVENOUS CONTINUOUS
Status: DISCONTINUED | OUTPATIENT
Start: 2021-05-13 | End: 2021-05-14 | Stop reason: HOSPADM

## 2021-05-13 RX ADMIN — POTASSIUM CHLORIDE 10 MEQ: 10 INJECTION, SOLUTION INTRAVENOUS at 13:10

## 2021-05-13 RX ADMIN — POTASSIUM CHLORIDE 10 MEQ: 10 INJECTION, SOLUTION INTRAVENOUS at 09:29

## 2021-05-13 RX ADMIN — POTASSIUM CHLORIDE 10 MEQ: 10 INJECTION, SOLUTION INTRAVENOUS at 12:01

## 2021-05-13 RX ADMIN — SODIUM PHOSPHATE, MONOBASIC, MONOHYDRATE 10 MMOL: 276; 142 INJECTION, SOLUTION INTRAVENOUS at 17:35

## 2021-05-13 RX ADMIN — POTASSIUM CHLORIDE 10 MEQ: 10 INJECTION, SOLUTION INTRAVENOUS at 04:19

## 2021-05-13 RX ADMIN — MAGNESIUM SULFATE HEPTAHYDRATE 1000 MG: 1 INJECTION, SOLUTION INTRAVENOUS at 12:02

## 2021-05-13 RX ADMIN — INSULIN GLARGINE 20 UNITS: 100 INJECTION, SOLUTION SUBCUTANEOUS at 20:27

## 2021-05-13 RX ADMIN — INSULIN GLARGINE 20 UNITS: 100 INJECTION, SOLUTION SUBCUTANEOUS at 12:10

## 2021-05-13 RX ADMIN — INSULIN LISPRO 4 UNITS: 100 INJECTION, SOLUTION INTRAVENOUS; SUBCUTANEOUS at 16:22

## 2021-05-13 RX ADMIN — POTASSIUM CHLORIDE 10 MEQ: 10 INJECTION, SOLUTION INTRAVENOUS at 15:46

## 2021-05-13 RX ADMIN — POTASSIUM CHLORIDE 10 MEQ: 10 INJECTION, SOLUTION INTRAVENOUS at 18:39

## 2021-05-13 RX ADMIN — POTASSIUM CHLORIDE 10 MEQ: 10 INJECTION, SOLUTION INTRAVENOUS at 20:25

## 2021-05-13 RX ADMIN — POTASSIUM CHLORIDE 10 MEQ: 10 INJECTION, SOLUTION INTRAVENOUS at 05:20

## 2021-05-13 RX ADMIN — DEXTROSE AND SODIUM CHLORIDE: 5; 450 INJECTION, SOLUTION INTRAVENOUS at 10:56

## 2021-05-13 RX ADMIN — DEXTROSE AND SODIUM CHLORIDE: 5; 450 INJECTION, SOLUTION INTRAVENOUS at 03:55

## 2021-05-13 RX ADMIN — POTASSIUM CHLORIDE 10 MEQ: 10 INJECTION, SOLUTION INTRAVENOUS at 14:10

## 2021-05-13 RX ADMIN — PANTOPRAZOLE SODIUM 40 MG: 40 TABLET, DELAYED RELEASE ORAL at 05:53

## 2021-05-13 RX ADMIN — POTASSIUM CHLORIDE 10 MEQ: 10 INJECTION, SOLUTION INTRAVENOUS at 06:42

## 2021-05-13 RX ADMIN — POTASSIUM CHLORIDE 10 MEQ: 10 INJECTION, SOLUTION INTRAVENOUS at 10:56

## 2021-05-13 RX ADMIN — SODIUM CHLORIDE: 9 INJECTION, SOLUTION INTRAVENOUS at 16:02

## 2021-05-13 RX ADMIN — MAGNESIUM SULFATE HEPTAHYDRATE 1000 MG: 1 INJECTION, SOLUTION INTRAVENOUS at 13:08

## 2021-05-13 RX ADMIN — POTASSIUM CHLORIDE 10 MEQ: 10 INJECTION, SOLUTION INTRAVENOUS at 17:27

## 2021-05-13 RX ADMIN — POTASSIUM CHLORIDE 10 MEQ: 10 INJECTION, SOLUTION INTRAVENOUS at 00:59

## 2021-05-13 RX ADMIN — SODIUM PHOSPHATE, MONOBASIC, MONOHYDRATE 10 MMOL: 276; 142 INJECTION, SOLUTION INTRAVENOUS at 10:17

## 2021-05-13 RX ADMIN — ENOXAPARIN SODIUM 40 MG: 40 INJECTION SUBCUTANEOUS at 08:44

## 2021-05-13 RX ADMIN — INSULIN LISPRO 1 UNITS: 100 INJECTION, SOLUTION INTRAVENOUS; SUBCUTANEOUS at 20:27

## 2021-05-13 RX ADMIN — SODIUM PHOSPHATE, MONOBASIC, MONOHYDRATE 15 MMOL: 276; 142 INJECTION, SOLUTION INTRAVENOUS at 01:01

## 2021-05-13 RX ADMIN — ONDANSETRON 4 MG: 2 INJECTION INTRAMUSCULAR; INTRAVENOUS at 08:59

## 2021-05-13 ASSESSMENT — ENCOUNTER SYMPTOMS
CHEST TIGHTNESS: 0
COUGH: 0
SHORTNESS OF BREATH: 0
NAUSEA: 0
BLOOD IN STOOL: 0
WHEEZING: 0
VOMITING: 0
DIARRHEA: 0
ABDOMINAL PAIN: 1
CONSTIPATION: 0

## 2021-05-13 NOTE — PROGRESS NOTES
[facility]  Office: [address], DO, Luiz Villasenor, DO, Roxanne Lopez, DO, Yousif Villar, DO, Taylor Johansen MD, Aly Ramirez MD, Gillian Barr MD, Renee Hdz MD, Estefany Beltran MD, Glenda Stone MD, Suri Bates MD, Sudha Gonzalez MD, Dyan Salomon, DO, Lisa Akhtar MD, Robby Urban DO, Alejandro Vallejo MD,  Ally Koenig DO, Riri Nguyen MD, Jah El MD, Cindy Enriquez MD, Taryn Gonzalez MD, Fatou Arriaga [facility], [facility] Mel, CNP, Jez See, CNP, Johanna Galvan, CNS, Migue Taylor, CNP, Thompson Lombard, CNP, Suzanne Romero, CNP, Tiffany Rizvi, CNP, Wanda Monteiro, CNP, Matthew Crowder PA-C, Dariela Villa, [facility], Kadi Rodriguez, CNP, Aquilino Graham, CNP, Jose Juan Wilks, CNP, Elvi Moody, CNP, Raf Russell, CNP, Liv Hidalgo, [address] [place] [address]    Progress Note    5/13/2021    12:38 PM    Name:   Lit Williamson  MRN:     [id number]     Acct:      [de-identified]   Room:   [address] [place] [address] Day:  2  Admit Date:  5/11/2021  6:43 PM    PCP:   No primary care provider on file. Code Status:  Full Code    Subjective:     C/C:   Chief Complaint   Patient presents with    Abdominal Pain     Interval History Status: improved. Patient seen and examined at bedside, no acute events overnight. Feeling about the same, less N/Vbut not hungry yet, gap better but not closed yet  Patient denies any chest pain, shortness of breath, chills or fevers  Patient vitals, labs and all providers notes were reviewed,from overnight shift and morning updates were noted and discussed with the nurse    Brief History:     Lit Williamson is a 23 y.o. Non-/non  male who presents with Abdominal Pain   and is admitted to the hospital for the management of DKA     Patient presents to the emergency room with the concern of abdomen pain, nausea and vomiting that started the previous 0600.   Patient denies eating dextrose, potassium chloride, magnesium sulfate, sodium phosphate IVPB **OR** sodium phosphate IVPB **OR** sodium phosphate IVPB    Data:     Past Medical History:   has a past medical history of Diabetes mellitus ([facility] [address] Gastroesophageal reflux disease, and [facility] Social History:   reports that he has been smoking cigarettes. He has a 0.50 pack-year smoking history. He has never used smokeless tobacco. He reports current alcohol use of about 10.0 standard drinks of alcohol per week. He reports current drug use. Drug: Marijuana. Family History:   Family History   Problem Relation Age of Onset    Asthma Father     Asthma Sister     Asthma Brother        Vitals:  BP (!) 156/96   Pulse 71   Temp 99.2 °F (37.3 °C) (Oral)   Resp 15   Ht 5' 11\" (1.803 m)   Wt 140 lb (63.5 kg)   SpO2 97%   BMI 19.53 kg/m²   Temp (24hrs), Av.7 °F (37.1 °C), Min:98.5 °F (36.9 °C), Max:99.2 °F (37.3 °C)    Recent Labs     21  0846 21  0944 21  1058 21  1209   POCGLU 241* 241* 154* 132*       I/O (24Hr):     Intake/Output Summary (Last 24 hours) at 2021 1238  Last data filed at 2021 1156  Gross per 24 hour   Intake 2164.4 ml   Output 2875 ml   Net -710.6 ml       Labs:  Hematology:  Recent Labs     21  1956 21  0432   WBC 9.4 11.4*   RBC 5.69 4.97   HGB 15.6 13.5   HCT 49.8 41.6   MCV 87.5 83.7   MCH 27.4 27.2   MCHC 31.3 32.5   RDW 13.1 13.0    402   MPV 11.5 11.1     Chemistry:  Recent Labs     21  2249 21  0304 21  0804 21  1049   * 133* 134* 135   K 4.1 3.9 4.0 3.6*    103 102 102   CO2 20 19* 18* 21   GLUCOSE 207* 161* 228* 167*   BUN 8 5* 4* 3*   CREATININE 0.64* 0.50* 0.51* 0.53*   MG 1.8 1.9 1.7 1.6   ANIONGAP 12 11 14 12   LABGLOM >60 >60 >60 >60   GFRAA >60 >60 >60 >60   CALCIUM 8.5* 8.6 8.6 8.5*   PHOS 1.9* 2.8 2.4* 2.2*   TROPHS <6 <6 <6  --    MYOGLOBIN <21* <21* <21*  --      Recent Labs 05/11/21 1956 05/11/21 1956 05/12/21 0432 05/12/21  0432 05/13/21  0641 05/13/21  0749 05/13/21  0846 05/13/21  0944 05/13/21  1058 05/13/21  1209   PROT 9.0*  --   --   --   --   --   --   --   --   --    LABALBU 5.0  --   --   --   --   --   --   --   --   --    LABA1C  --   --  10.0*  --   --   --   --   --   --   --    AST 20  --   --   --   --   --   --   --   --   --    ALT 13  --   --   --   --   --   --   --   --   --    ALKPHOS 113  --   --   --   --   --   --   --   --   --    BILITOT 0.78  --   --   --   --   --   --   --   --   --    LIPASE 11*  --   --   --   --   --   --   --   --   --    POCGLU  --    < >  --    < > 158* 193* 241* 241* 154* 132*    < > = values in this interval not displayed. ABG:  Lab Results   Component Value Date    POCPH 7.201 02/01/2019    POCPCO2 35.5 02/01/2019    POCPO2 36.9 02/01/2019    POCHCO3 13.9 02/01/2019    NBEA 13 02/01/2019    PBEA NOT REPORTED 02/01/2019    BKZ8YNU 15 02/01/2019    ZMFD2CGW 59 02/01/2019    FIO2 INFORMATION NOT PROVIDED 05/12/2021     Lab Results   Component Value Date/Time    SPECIAL RAC 10ML 12/19/2020 12:15 AM     Lab Results   Component Value Date/Time    CULTURE (A) 12/19/2020 12:15 AM     POSITIVE Blood Culture Results called to and read back by: INTERNAL MEDICINE RESIDENT ON CALL VIA PERFECT SERVE AT 21:12 ON 12/19/20    CULTURE  12/19/2020 12:15 AM     DIRECT GRAM STAIN FROM BOTTLE: GRAM POSITIVE COCCI IN CLUSTERS    CULTURE STAPHYLOCOCCUS HOMINIS (A) 12/19/2020 12:15 AM       Radiology:  Lara Mack Chest Portable    Result Date: 5/11/2021  No acute process. Physical Examination:        Physical Exam  Vitals signs and nursing note reviewed. Constitutional:       General: He is not in acute distress. HENT:      Head: Normocephalic and atraumatic. Eyes:      Conjunctiva/sclera: Conjunctivae normal.      Pupils: Pupils are equal, round, and reactive to light. Cardiovascular:      Rate and Rhythm: Normal rate and regular rhythm. Heart sounds: No murmur. Pulmonary:      Effort: Pulmonary effort is normal. No accessory muscle usage or respiratory distress. Breath sounds: No stridor. No decreased breath sounds, wheezing, rhonchi or rales. Abdominal:      General: Bowel sounds are normal. There is no distension. Palpations: Abdomen is soft. Abdomen is not rigid. Tenderness: There is no abdominal tenderness. There is no guarding. Musculoskeletal:         General: No tenderness. Skin:     General: Skin is warm and dry. Findings: No erythema, lesion or rash. Neurological:      Mental Status: He is alert and oriented to person, place, and time. Cranial Nerves: No cranial nerve deficit. Motor: No seizure activity. Psychiatric:         Speech: Speech normal.         Behavior: Behavior normal. Behavior is cooperative.          Assessment:        Hospital Problems           Last Modified POA    * (Principal) DKA, type 1, not at goal [facility]) 5/12/2021 Yes    Non compliance w medication regimen 5/12/2021 Yes    Non-intractable vomiting with nausea 5/12/2021 Yes    Electrolyte abnormality 5/12/2021 Yes    Atypical chest pain 5/12/2021 Yes    Gastroesophageal reflux disease 5/12/2021 Yes          Plan:          DKA:   Continue with DKA protocol  Lytes replacement per protocol  Anticipate gap to close later today, orders for SQ insulin added  Was denied insulin pump in the past for compliance concerns   Multiple admissions for DKA  No PCP, stretch insulin by decreasing dose  to cover him for longer periods    Intractable nausea and vomiting :   Symptomatic management  Already improving    Non compliance :  Encouraged to be compliant with his regimen and F/U with PCP  Will facilitate PCP appointment on discharge     DVT PPx    DC planning hopefully in the next 24 hours    Araceli Sears MD  5/13/2021  12:38 PM

## 2021-05-13 NOTE — PLAN OF CARE
Problem: Coping:  Goal: Ability to adjust to condition or change in health will improve  Description: Ability to adjust to condition or change in health will improve  Outcome: Ongoing     Problem: Health Behavior:  Goal: Ability to identify and utilize available resources and services will improve  Description: Ability to identify and utilize available resources and services will improve  Outcome: Ongoing  Goal: Ability to manage health-related needs will improve  Description: Ability to manage health-related needs will improve  Outcome: Ongoing     Problem: Metabolic:  Goal: Ability to maintain appropriate glucose levels will improve  Description: Ability to maintain appropriate glucose levels will improve  Outcome: Ongoing     Problem: Nutritional:  Goal: Maintenance of adequate nutrition will improve  Description: Maintenance of adequate nutrition will improve  Outcome: Ongoing

## 2021-05-13 NOTE — PLAN OF CARE
Problem: Coping:  Goal: Ability to adjust to condition or change in health will improve  Description: Ability to adjust to condition or change in health will improve  5/13/2021 0007 by Romeo Kimbrough RN  Outcome: Ongoing     Problem: Fluid Volume:  Goal: Ability to maintain a balanced intake and output will improve  Description: Ability to maintain a balanced intake and output will improve  Outcome: Ongoing     Problem: Health Behavior:  Goal: Ability to identify and utilize available resources and services will improve  Description: Ability to identify and utilize available resources and services will improve  5/13/2021 0007 by Romeo Kimbrough RN  Outcome: Ongoing     Problem: Health Behavior:  Goal: Ability to manage health-related needs will improve  Description: Ability to manage health-related needs will improve  5/13/2021 0007 by Romeo Kimbrough RN  Outcome: Ongoing     Problem: Metabolic:  Goal: Ability to maintain appropriate glucose levels will improve  Description: Ability to maintain appropriate glucose levels will improve  5/13/2021 0007 by Romeo Kimbrough RN  Outcome: Ongoing     Problem: Nutritional:  Goal: Maintenance of adequate nutrition will improve  Description: Maintenance of adequate nutrition will improve  Outcome: Ongoing     Problem: Physical Regulation:  Goal: Complications related to the disease process, condition or treatment will be avoided or minimized  Description: Complications related to the disease process, condition or treatment will be avoided or minimized  Outcome: Ongoing     Problem: Falls - Risk of:  Goal: Will remain free from falls  Description: Will remain free from falls  Outcome: Ongoing     Problem: Pain:  Goal: Pain level will decrease  Description: Pain level will decrease  Outcome: Ongoing

## 2021-05-13 NOTE — PLAN OF CARE
Problem: Nutrition  Goal: Optimal nutrition therapy  Outcome: Ongoing  Note: Nutrition Problem #1: Inadequate oral intake  Intervention: Food and/or Nutrient Delivery: Continue Current Diet(advance diet per MD discretion)  Nutritional Goals: Meet >50% of estimated nutrient needs

## 2021-05-13 NOTE — CONSULTS
Comprehensive Nutrition Assessment    Type and Reason for Visit:  Consult(DKA)    Nutrition Recommendations/Plan:   1. Advance Diet per MD discretion   2. High Calorie ONS BID  3. Will continue to monitor nutritional status/plan of care     Nutrition Assessment:  Pt admitted for N/V/Abd pain 2/2 DKA. PMHx T1DM, GERD. Pt reports poor appetite d/t not wanting to take his insulin. Pt reports BG levels usually controlled, and understands carb to insulin ratio. Writer discussed carbohydrate foods with pt and portion sizes- pt reports aware of carb counting. Labs/Meds reviewed. Malnutrition Assessment:  Malnutrition Status:  No malnutrition    Context:  Acute Illness         Estimated Daily Nutrient Needs:  Energy (kcal):  8891-0062 kcals/day; Weight Used for Energy Requirements:  Current     Protein (g):  50-60 g/day protein; Weight Used for Protein Requirements:  Current(0.8-1.0 g/kg)        Fluid (ml/day):  1.6-1.9 L (or per MD); Method Used for Fluid Requirements:  ml/Kg        Current Nutrition Therapies:    DIET FULL LIQUID; Anthropometric Measures:  · Height: 5' 11\" (180.3 cm)  · Current Body Weight: 140 lb (63.5 kg)   · Usual Body Weight:  145 lb (66 kg)  · Ideal Body Weight: 172 lbs  · BMI: 19.5    · BMI Categories: Normal Weight (BMI 18.5-24. 9)       Nutrition Diagnosis:   · Inadequate oral intake related to (current medical condition) as evidenced by intake 0-25%, intake 26-50%, poor intake prior to admission    Nutrition Interventions:   Food and/or Nutrient Delivery:  Continue Current Diet(advance diet per MD discretion)  Nutrition Education/Counseling:  No recommendation at this time   Coordination of Nutrition Care:  Continue to monitor while inpatient    Goals:  Meet >50% of estimated nutrient needs       Nutrition Monitoring and Evaluation:   Behavioral-Environmental Outcomes:  None Identified   Food/Nutrient Intake Outcomes:  Food and Nutrient Intake, Supplement Intake  Physical Signs/Symptoms Outcomes:  Biochemical Data, Nutrition Focused Physical Findings, Skin, Weight     Discharge Planning:     Too soon to determine     Electronically signed by Kaylin Lund, MS, RD, LD on 5/13/21 at 12:15 PM EDT    Contact: [address] [facility] Jerrell Sanchez

## 2021-05-13 NOTE — PROGRESS NOTES
Asked to evaluate the patient secondary to chest pain. Nursing reports that patient had Chest Pain, he was medicated with 2 mg of Morphine which improved the pain to nearly gone on my arrival.    Patient reports abdomen pain with chest pain. He states the pain is in the epigastric to mid-sternal region and is sharp burning pain that goes up to the throat. He denied any radiation, he does admit that it did seem to \" take his breath away\" and make him short of breath. No back, jaw or arm radiation, no diaphoresis. 128/80 - 54                 Will continue to monitor  Avoid Narcotics. Add PPI, firtst dose now  PRN GI cocktail if occurs again. Will check CE with next labs.     Follow with the EKG is further typical pain occurs

## 2021-05-14 VITALS
WEIGHT: 140 LBS | HEIGHT: 71 IN | RESPIRATION RATE: 12 BRPM | DIASTOLIC BLOOD PRESSURE: 89 MMHG | OXYGEN SATURATION: 97 % | TEMPERATURE: 98.4 F | HEART RATE: 59 BPM | SYSTOLIC BLOOD PRESSURE: 124 MMHG | BODY MASS INDEX: 19.6 KG/M2

## 2021-05-14 LAB
ANION GAP SERPL CALCULATED.3IONS-SCNC: 12 MMOL/L (ref 9–17)
ANION GAP SERPL CALCULATED.3IONS-SCNC: 12 MMOL/L (ref 9–17)
BUN BLDV-MCNC: 2 MG/DL (ref 6–20)
BUN BLDV-MCNC: 3 MG/DL (ref 6–20)
BUN/CREAT BLD: ABNORMAL (ref 9–20)
BUN/CREAT BLD: ABNORMAL (ref 9–20)
CALCIUM SERPL-MCNC: 8.5 MG/DL (ref 8.6–10.4)
CALCIUM SERPL-MCNC: 8.8 MG/DL (ref 8.6–10.4)
CHLORIDE BLD-SCNC: 100 MMOL/L (ref 98–107)
CHLORIDE BLD-SCNC: 104 MMOL/L (ref 98–107)
CO2: 21 MMOL/L (ref 20–31)
CO2: 23 MMOL/L (ref 20–31)
CREAT SERPL-MCNC: 0.4 MG/DL (ref 0.7–1.2)
CREAT SERPL-MCNC: 0.54 MG/DL (ref 0.7–1.2)
GFR AFRICAN AMERICAN: >60 ML/MIN
GFR AFRICAN AMERICAN: >60 ML/MIN
GFR NON-AFRICAN AMERICAN: >60 ML/MIN
GFR NON-AFRICAN AMERICAN: >60 ML/MIN
GFR SERPL CREATININE-BSD FRML MDRD: ABNORMAL ML/MIN/{1.73_M2}
GLUCOSE BLD-MCNC: 133 MG/DL (ref 75–110)
GLUCOSE BLD-MCNC: 155 MG/DL (ref 70–99)
GLUCOSE BLD-MCNC: 159 MG/DL (ref 75–110)
GLUCOSE BLD-MCNC: 189 MG/DL (ref 70–99)
MAGNESIUM: 2 MG/DL (ref 1.6–2.6)
PHOSPHORUS: 2.8 MG/DL (ref 2.5–4.5)
POTASSIUM SERPL-SCNC: 3.5 MMOL/L (ref 3.7–5.3)
POTASSIUM SERPL-SCNC: 4 MMOL/L (ref 3.7–5.3)
SODIUM BLD-SCNC: 135 MMOL/L (ref 135–144)
SODIUM BLD-SCNC: 137 MMOL/L (ref 135–144)

## 2021-05-14 PROCEDURE — 83735 ASSAY OF MAGNESIUM: CPT

## 2021-05-14 PROCEDURE — 36415 COLL VENOUS BLD VENIPUNCTURE: CPT

## 2021-05-14 PROCEDURE — 6370000000 HC RX 637 (ALT 250 FOR IP): Performed by: NURSE PRACTITIONER

## 2021-05-14 PROCEDURE — 6360000002 HC RX W HCPCS: Performed by: NURSE PRACTITIONER

## 2021-05-14 PROCEDURE — 6370000000 HC RX 637 (ALT 250 FOR IP): Performed by: FAMILY MEDICINE

## 2021-05-14 PROCEDURE — 99239 HOSP IP/OBS DSCHRG MGMT >30: CPT | Performed by: FAMILY MEDICINE

## 2021-05-14 PROCEDURE — 84100 ASSAY OF PHOSPHORUS: CPT

## 2021-05-14 PROCEDURE — 80048 BASIC METABOLIC PNL TOTAL CA: CPT

## 2021-05-14 PROCEDURE — 82947 ASSAY GLUCOSE BLOOD QUANT: CPT

## 2021-05-14 RX ORDER — INSULIN GLARGINE 100 [IU]/ML
20 INJECTION, SOLUTION SUBCUTANEOUS 2 TIMES DAILY
Qty: 5 PEN | Refills: 3 | Status: SHIPPED | OUTPATIENT
Start: 2021-05-14 | End: 2021-06-29 | Stop reason: SDUPTHER

## 2021-05-14 RX ORDER — POTASSIUM CHLORIDE 20 MEQ/1
40 TABLET, EXTENDED RELEASE ORAL PRN
Status: DISCONTINUED | OUTPATIENT
Start: 2021-05-14 | End: 2021-05-14 | Stop reason: HOSPADM

## 2021-05-14 RX ORDER — POTASSIUM CHLORIDE 7.45 MG/ML
10 INJECTION INTRAVENOUS PRN
Status: DISCONTINUED | OUTPATIENT
Start: 2021-05-14 | End: 2021-05-14 | Stop reason: HOSPADM

## 2021-05-14 RX ADMIN — POTASSIUM CHLORIDE 40 MEQ: 1500 TABLET, EXTENDED RELEASE ORAL at 10:10

## 2021-05-14 RX ADMIN — PANTOPRAZOLE SODIUM 40 MG: 40 TABLET, DELAYED RELEASE ORAL at 05:28

## 2021-05-14 RX ADMIN — INSULIN LISPRO 2 UNITS: 100 INJECTION, SOLUTION INTRAVENOUS; SUBCUTANEOUS at 08:27

## 2021-05-14 RX ADMIN — ENOXAPARIN SODIUM 40 MG: 40 INJECTION SUBCUTANEOUS at 08:27

## 2021-05-14 RX ADMIN — INSULIN GLARGINE 20 UNITS: 100 INJECTION, SOLUTION SUBCUTANEOUS at 08:28

## 2021-05-14 NOTE — FLOWSHEET NOTE
Assessment:  Patient is a 23year old male in room 56. Patient was passive, but welcomed . Patient stated he had expectation of receiving good care. Intervention:   listened and validated patient's feelings and concerns.  was ministry of presence. Outcome:  Patient expressed gratitude for visit. Plan:  Chaplains will remain available for spiritual and emotional support as needed. 05/14/21 1302   Encounter Summary   Services provided to: Patient   Referral/Consult From: [address] [facility] Family members   Continue Visiting   (5/14/2021)   Complexity of Encounter Moderate   Length of Encounter 15 minutes   Spiritual Assessment Completed Yes   Routine   Type Initial   Assessment Calm; Approachable   Intervention Active listening;Sustaining presence/ Ministry of presence   Outcome Expressed gratitude

## 2021-05-14 NOTE — PLAN OF CARE
Problem: Coping:  Goal: Ability to adjust to condition or change in health will improve  Description: Ability to adjust to condition or change in health will improve  5/13/2021 2343 by Erica Ontiveros RN  Outcome: Ongoing     Problem: Health Behavior:  Goal: Ability to identify and utilize available resources and services will improve  Description: Ability to identify and utilize available resources and services will improve  5/13/2021 2343 by Erica Ontiveros RN  Outcome: Ongoing     Problem: Health Behavior:  Goal: Ability to manage health-related needs will improve  Description: Ability to manage health-related needs will improve  5/13/2021 2343 by Erica Ontiveros RN  Outcome: Ongoing     Problem: Metabolic:  Goal: Ability to maintain appropriate glucose levels will improve  Description: Ability to maintain appropriate glucose levels will improve  5/13/2021 2343 by Erica Ontiveros RN  Outcome: Ongoing     Problem: Nutritional:  Goal: Maintenance of adequate nutrition will improve  Description: Maintenance of adequate nutrition will improve  5/13/2021 2343 by Erica Ontiveros RN  Outcome: Ongoing     Problem: Physical Regulation:  Goal: Complications related to the disease process, condition or treatment will be avoided or minimized  Description: Complications related to the disease process, condition or treatment will be avoided or minimized  Outcome: Ongoing     Problem: Falls - Risk of:  Goal: Will remain free from falls  Description: Will remain free from falls  Outcome: Ongoing

## 2021-05-14 NOTE — DISCHARGE SUMMARY
0600.  Patient denies eating out at any restaurants or any recent sick family members. Amisha Mitchell states he has only had this one ofther time with DKA. Enrrique Kaur currently does not follow with someone to manage his Diabetes. Enrrique Kaur reports \" good blood sugars\" but does not give a range.  He denies any episodes of hypoglycemia. He denies any chest pain, diarrhea, dysuria cough, fevers or chills.       Work up in the emergency room showed labs with a sodium of 134, potassium of 4.9, chloride of 93, co2 10, bun 23, creat 0.92 glucose of 328, anion gap of 31. Hemogram normal without acute leukocytosis or anemia. LFT normal with lipase of 11.  Urine with keytones, trace hgb. VBG with Ph 7.14, pco2, 36.1, p02 55.8. HCO3 12,   cxr without acute process     During the admission patient was managed as follow    DKA:   Continue with DKA protocol  Lytes replacement per protocol  Anticipate gap to close later today, orders for SQ insulin added  Was denied insulin pump in the past for compliance concerns   Multiple admissions for DKA  No PCP, stretch insulin by decreasing dose  to cover him for longer periods     Intractable nausea and vomiting :   Symptomatic management  Already improving     Non compliance :  Encouraged to be compliant with his regimen and F/U with PCP  Will facilitate PCP appointment on discharge      DVT PPx       DC planning , got an appointment with PCP on 6/29 ( with family practice)     Had detailed discussion with him regarding compliance and consequences    Has glucometer, needles and lancets and denied refills , I gave scripts for insulin    Med rec done  Scripts added   30+ minutes spent       Significant therapeutic interventions:     Significant Diagnostic Studies:     Radiology:  Xr Chest Portable    Result Date: 5/11/2021  No acute process.        Consultations:    Consults:     Final Specialist Recommendations/Findings:   IP CONSULT TO CRITICAL CARE  IP CONSULT TO HOSPITALIST  IP CONSULT TO DIABETES EDUCATOR  IP CONSULT TO DIETITIAN      The patient was seen and examined on day of discharge     A&O X 3  CTAB  NSR, NO MRG  Soft abdomen , +BS  No swelling  and pulse palpable      Discharge plan:     Disposition: Home    Physician Follow Up:     [address] [facility] [address] [id number] [phone]  Call  As needed, Follow up education on diabetes self management      [facility] Internal Medicine  [facility], Cordell Cintron 25    Appt on June 29, 2021 at 1:45pm       Requiring Further Evaluation/Follow Up POST HOSPITALIZATION/Incidental Findings:     Diet: diabetic diet    Activity: As tolerated    Instructions to Patient:     Discharge Medications:      Medication List      START taking these medications    Lantus SoloStar 100 UNIT/ML injection pen  Generic drug: insulin glargine  Inject 20 Units into the skin 2 times daily  Replaces: insulin glargine 100 UNIT/ML injection vial        CHANGE how you take these medications    * Insulin Pen Needle 30G X 5 MM Misc  1 Device by Does not apply route 4 times daily  What changed: Another medication with the same name was added. Make sure you understand how and when to take each. * Insulin Pen Needle 32G X 6 MM Misc  Use 2 times daily with insulin  What changed: You were already taking a medication with the same name, and this prescription was added. Make sure you understand how and when to take each. * This list has 2 medication(s) that are the same as other medications prescribed for you. Read the directions carefully, and ask your doctor or other care provider to review them with you.             CONTINUE taking these medications    acetaminophen 325 MG tablet  Commonly known as: Tylenol  Take 2 tablets by mouth every 6 hours as needed for Pain     blood glucose test strips strip  Commonly known as: ONE TOUCH ULTRA TEST  Sig: For blood testing 4-6 times/day     EPINEPHrine 0.15 MG/0.3ML Soaj  Commonly known as: EpiPen Jr 2-Ernie  Use as directed for allergic reaction     Glucagon Emergency 1 MG Kit  As directed for extreme hypoglycemia     ibuprofen 200 MG tablet  Commonly known as: ADVIL;MOTRIN  Take 3 tablets by mouth every 8 hours as needed for Pain or Fever     INSULIN SYRINGE 1CC/29G 29G X 1/2\" 1 ML Misc  Commonly known as: AIMSCO INSULIN SYR ULTRA THIN  1 each by Does not apply route daily     Ketone Blood Test Strp  1 strip by Does not apply route daily as needed (for high blood sugar)     Lancets Misc  1 each by Does not apply route 4 times daily (before meals and nightly)     NovoLOG FlexPen 100 UNIT/ML injection pen  Generic drug: insulin aspart  Inject 1-11 Units into the skin 3 times daily (before meals) 1 unit per 15 grams CHO for meals and snacks  Sliding scale: =0, 125-150=1, 151-175=2, 175-200=3, 201-225=4, 226-250=5, 251-275=6, 276-300=7, 301-325=8, 326-350=9, 351-375=10, 376-400=11        STOP taking these medications    insulin glargine 100 UNIT/ML injection vial  Commonly known as: LANTUS  Replaced by: Lantus SoloStar 100 UNIT/ML injection pen           Where to Get Your Medications      These medications were sent to [address] [place] [address]  [address] [facility] [address]    Phone: [phone]   · Insulin Pen Needle 32G X 6 MM Misc  · Lantus SoloStar 100 UNIT/ML injection pen         No discharge procedures on file. Time Spent on discharge is  31 mins in patient examination, evaluation, counseling as well as medication reconciliation, prescriptions for required medications, discharge plan and follow up. Electronically signed by   Sharron Urrutia MD  5/14/2021  2:42 PM      Thank you Dr. Chanel Fernandez primary care provider on file. for the opportunity to be involved in this patient's care.

## 2021-05-14 NOTE — PLAN OF CARE
Problem:  Activity:  Goal: Risk for activity intolerance will decrease  Description: Risk for activity intolerance will decrease  Outcome: Ongoing     Problem: Coping:  Goal: Ability to adjust to condition or change in health will improve  Description: Ability to adjust to condition or change in health will improve  Outcome: Ongoing     Problem: Fluid Volume:  Goal: Ability to maintain a balanced intake and output will improve  Description: Ability to maintain a balanced intake and output will improve  Outcome: Ongoing     Problem: Metabolic:  Goal: Ability to maintain appropriate glucose levels will improve  Description: Ability to maintain appropriate glucose levels will improve  Outcome: Ongoing

## 2021-06-29 ENCOUNTER — OFFICE VISIT (OUTPATIENT)
Dept: INTERNAL MEDICINE | Age: 21
End: 2021-06-29
Payer: MEDICARE

## 2021-06-29 VITALS
WEIGHT: 146 LBS | HEIGHT: 71 IN | BODY MASS INDEX: 20.44 KG/M2 | DIASTOLIC BLOOD PRESSURE: 76 MMHG | SYSTOLIC BLOOD PRESSURE: 115 MMHG | HEART RATE: 75 BPM

## 2021-06-29 DIAGNOSIS — E10.8: ICD-10-CM

## 2021-06-29 DIAGNOSIS — E10.10 DKA, TYPE 1, NOT AT GOAL (HCC): Primary | ICD-10-CM

## 2021-06-29 PROCEDURE — 2022F DILAT RTA XM EVC RTNOPTHY: CPT | Performed by: STUDENT IN AN ORGANIZED HEALTH CARE EDUCATION/TRAINING PROGRAM

## 2021-06-29 PROCEDURE — G8427 DOCREV CUR MEDS BY ELIG CLIN: HCPCS | Performed by: STUDENT IN AN ORGANIZED HEALTH CARE EDUCATION/TRAINING PROGRAM

## 2021-06-29 PROCEDURE — 4004F PT TOBACCO SCREEN RCVD TLK: CPT | Performed by: STUDENT IN AN ORGANIZED HEALTH CARE EDUCATION/TRAINING PROGRAM

## 2021-06-29 PROCEDURE — G8420 CALC BMI NORM PARAMETERS: HCPCS | Performed by: STUDENT IN AN ORGANIZED HEALTH CARE EDUCATION/TRAINING PROGRAM

## 2021-06-29 PROCEDURE — 3046F HEMOGLOBIN A1C LEVEL >9.0%: CPT | Performed by: STUDENT IN AN ORGANIZED HEALTH CARE EDUCATION/TRAINING PROGRAM

## 2021-06-29 PROCEDURE — 99213 OFFICE O/P EST LOW 20 MIN: CPT | Performed by: STUDENT IN AN ORGANIZED HEALTH CARE EDUCATION/TRAINING PROGRAM

## 2021-06-29 PROCEDURE — 99211 OFF/OP EST MAY X REQ PHY/QHP: CPT | Performed by: INTERNAL MEDICINE

## 2021-06-29 RX ORDER — INSULIN GLARGINE 100 [IU]/ML
20 INJECTION, SOLUTION SUBCUTANEOUS 2 TIMES DAILY
Qty: 5 PEN | Refills: 3 | Status: SHIPPED | OUTPATIENT
Start: 2021-06-29 | End: 2022-03-01

## 2021-06-29 RX ORDER — INSULIN ASPART 100 [IU]/ML
1-11 INJECTION, SOLUTION INTRAVENOUS; SUBCUTANEOUS
Qty: 5 PEN | Refills: 3 | Status: SHIPPED | OUTPATIENT
Start: 2021-06-29 | End: 2022-03-01 | Stop reason: SDUPTHER

## 2021-06-29 RX ORDER — LANCETS 30 GAUGE
1 EACH MISCELLANEOUS
Qty: 300 EACH | Refills: 0 | Status: SHIPPED | OUTPATIENT
Start: 2021-06-29 | End: 2022-08-16

## 2021-06-29 SDOH — ECONOMIC STABILITY: FOOD INSECURITY: WITHIN THE PAST 12 MONTHS, YOU WORRIED THAT YOUR FOOD WOULD RUN OUT BEFORE YOU GOT MONEY TO BUY MORE.: NEVER TRUE

## 2021-06-29 SDOH — ECONOMIC STABILITY: FOOD INSECURITY: WITHIN THE PAST 12 MONTHS, THE FOOD YOU BOUGHT JUST DIDN'T LAST AND YOU DIDN'T HAVE MONEY TO GET MORE.: NEVER TRUE

## 2021-06-29 ASSESSMENT — PATIENT HEALTH QUESTIONNAIRE - PHQ9
SUM OF ALL RESPONSES TO PHQ QUESTIONS 1-9: 0
1. LITTLE INTEREST OR PLEASURE IN DOING THINGS: 0
SUM OF ALL RESPONSES TO PHQ QUESTIONS 1-9: 0
SUM OF ALL RESPONSES TO PHQ QUESTIONS 1-9: 0
2. FEELING DOWN, DEPRESSED OR HOPELESS: 0
SUM OF ALL RESPONSES TO PHQ9 QUESTIONS 1 & 2: 0

## 2021-06-29 ASSESSMENT — SOCIAL DETERMINANTS OF HEALTH (SDOH): HOW HARD IS IT FOR YOU TO PAY FOR THE VERY BASICS LIKE FOOD, HOUSING, MEDICAL CARE, AND HEATING?: NOT HARD AT ALL

## 2021-06-29 NOTE — PROGRESS NOTES
Attending Physician Statement  I have discussed the care of Arelis Chahal including pertinent history and exam findings,  with the resident. I have reviewed the key elements of all parts of the encounter with the resident. I agree with the assessment, plan and orders as documented by the resident.   (GE Modifier)    Hypoglycemic episodes :   Reduce evening lantus to 20 units , monitor fasting glucose and pre prandial coverage     MD TA Villalta  Attending Physician, [address] [place] [address], Internal Medicine Residency Program  [address] [place] [address]  6/29/2021, 3:15 PM

## 2021-06-29 NOTE — PATIENT INSTRUCTIONS
Medications e-scribe to pharmacy of pt's choice. Labs given to patient, they will have them done before their next visit. Printed prescription given to patient. Return To Clinic 8/10/2021. After Visit Summary  given and reviewed. --    It is very important for your care that you keep your appointment. If for some reason you are unable to keep your appointment it is equally important that you call our office at [phone] to cancel your appointment and reschedule. Failure to do so may result in your termination from our practice.

## 2021-08-13 ENCOUNTER — TELEPHONE (OUTPATIENT)
Dept: INTERNAL MEDICINE | Age: 21
End: 2021-08-13

## 2021-08-13 NOTE — TELEPHONE ENCOUNTER
----- Message from Elen Hall sent at 8/12/2021  3:37 PM EDT -----  Subject: Appointment Request    Reason for Call: Routine Existing Condition Follow Up (Diabetes)    QUESTIONS  Type of Appointment? Established Patient  Reason for appointment request? No appointments available during search  Additional Information for Provider? Pt was trying to sched an appt but   there were no appts available. Please call pt back. ---------------------------------------------------------------------------  --------------  Kristina MARTINEZ  What is the best way for the office to contact you? OK to leave message on   voicemail  Preferred Call Back Phone Number? [phone]  ---------------------------------------------------------------------------  --------------  SCRIPT ANSWERS  Relationship to Patient? Self  (Is the patient requesting to be seen urgently for their symptoms?)? No  Is this follow up request related to routine Diabetes Management? Yes  Are you having any new concerns about your existing condition? No  Have you been diagnosed with, awaiting test results for, or told that you   are suspected of having COVID-19 (Coronavirus)? (If patient has tested   negative or was tested as a requirement for work, school, or travel and   not based on symptoms, answer no)? No  Do you currently have flu-like symptoms including fever or chills, cough,   shortness of breath, difficulty breathing, or new loss of taste or smell? No  Have you had close contact with someone with COVID-19 in the last 14 days? No  (Service Expert  click yes below to proceed with [employer] As Usual   Scheduling)?  Yes

## 2021-08-17 ENCOUNTER — OFFICE VISIT (OUTPATIENT)
Dept: INTERNAL MEDICINE | Age: 21
End: 2021-08-17
Payer: MEDICARE

## 2021-08-17 VITALS
HEIGHT: 71 IN | DIASTOLIC BLOOD PRESSURE: 70 MMHG | HEART RATE: 109 BPM | BODY MASS INDEX: 19.49 KG/M2 | SYSTOLIC BLOOD PRESSURE: 106 MMHG | WEIGHT: 139.2 LBS

## 2021-08-17 DIAGNOSIS — E10.65 TYPE 1 DIABETES MELLITUS WITH HYPERGLYCEMIA (HCC): Primary | ICD-10-CM

## 2021-08-17 PROBLEM — E10.10 DKA, TYPE 1, NOT AT GOAL (HCC): Status: RESOLVED | Noted: 2020-09-30 | Resolved: 2021-08-17

## 2021-08-17 PROBLEM — E10.9 TYPE 1 DIABETES MELLITUS (HCC): Status: RESOLVED | Noted: 2021-08-17 | Resolved: 2021-08-17

## 2021-08-17 PROBLEM — E87.5 HYPERKALEMIA: Status: RESOLVED | Noted: 2020-12-17 | Resolved: 2021-08-17

## 2021-08-17 PROBLEM — Z91.14 NON COMPLIANCE W MEDICATION REGIMEN: Status: RESOLVED | Noted: 2017-03-09 | Resolved: 2021-08-17

## 2021-08-17 PROBLEM — T78.2XXA ANAPHYLAXIS: Status: RESOLVED | Noted: 2019-09-23 | Resolved: 2021-08-17

## 2021-08-17 PROBLEM — Z91.148 NON COMPLIANCE W MEDICATION REGIMEN: Status: RESOLVED | Noted: 2017-03-09 | Resolved: 2021-08-17

## 2021-08-17 PROBLEM — R11.2 NON-INTRACTABLE VOMITING WITH NAUSEA: Status: RESOLVED | Noted: 2020-12-17 | Resolved: 2021-08-17

## 2021-08-17 PROBLEM — E87.1 HYPONATREMIA: Status: RESOLVED | Noted: 2020-12-17 | Resolved: 2021-08-17

## 2021-08-17 PROBLEM — E87.8 ELECTROLYTE ABNORMALITY: Status: RESOLVED | Noted: 2021-05-12 | Resolved: 2021-08-17

## 2021-08-17 PROBLEM — E10.9 TYPE 1 DIABETES MELLITUS (HCC): Status: ACTIVE | Noted: 2021-08-17

## 2021-08-17 LAB — HBA1C MFR BLD: 8.9 %

## 2021-08-17 PROCEDURE — 2022F DILAT RTA XM EVC RTNOPTHY: CPT | Performed by: STUDENT IN AN ORGANIZED HEALTH CARE EDUCATION/TRAINING PROGRAM

## 2021-08-17 PROCEDURE — G8427 DOCREV CUR MEDS BY ELIG CLIN: HCPCS | Performed by: STUDENT IN AN ORGANIZED HEALTH CARE EDUCATION/TRAINING PROGRAM

## 2021-08-17 PROCEDURE — G8420 CALC BMI NORM PARAMETERS: HCPCS | Performed by: STUDENT IN AN ORGANIZED HEALTH CARE EDUCATION/TRAINING PROGRAM

## 2021-08-17 PROCEDURE — 99213 OFFICE O/P EST LOW 20 MIN: CPT | Performed by: STUDENT IN AN ORGANIZED HEALTH CARE EDUCATION/TRAINING PROGRAM

## 2021-08-17 PROCEDURE — 3052F HG A1C>EQUAL 8.0%<EQUAL 9.0%: CPT | Performed by: STUDENT IN AN ORGANIZED HEALTH CARE EDUCATION/TRAINING PROGRAM

## 2021-08-17 PROCEDURE — 4004F PT TOBACCO SCREEN RCVD TLK: CPT | Performed by: STUDENT IN AN ORGANIZED HEALTH CARE EDUCATION/TRAINING PROGRAM

## 2021-08-17 PROCEDURE — 83036 HEMOGLOBIN GLYCOSYLATED A1C: CPT | Performed by: STUDENT IN AN ORGANIZED HEALTH CARE EDUCATION/TRAINING PROGRAM

## 2021-08-17 RX ORDER — IBUPROFEN 600 MG/1
TABLET ORAL
Qty: 1 KIT | Refills: 1 | Status: SHIPPED | OUTPATIENT
Start: 2021-08-17

## 2021-08-17 NOTE — PROGRESS NOTES
[facility]  [facility] [address] [address]  Dept: [phone]  Dept Fax: [phone]    Office Progress/Follow Up Note  Date of patient's visit: 8/17/2021  Patient's Name:  Sunday Ventura YOB: 2000            Patient Care Team:  Nicolle Villalta MD as PCP - General (Internal Medicine)  ________________________________________________________________________      Reason for Visit: Routine outpatient follow up/Same day visit/Post hospital/ED visit: Follow-up visit for type 1 diabetes mellitus  ________________________________________________________________________  Chief Complaint:  Diabetes (morning sugar was over 600, pt states he missed his shot before he went to bed) and Health Maintenance (pt stated he going to schedule a appt soon for diabetic eye exam)    ________________________________________________________________________  History of Presenting Illness:  History was obtained from: patient. Sunday Ventura is a 23 y.o. is here for a follow-up of his type 1 diabetes mellitus. He takes Lantus 20 units twice daily. He also takes short acting insulin before meals according to blood glucose in the carbs in his diet. He states that this morning his blood glucose was in 600s as he forgot to take his Lantus last night. He does state that he has not missed any dose of insulin otherwise since last hospital admission for DKA. He did take his morning dose of Lantus. Blood glucose checked today in the office was 137. HbA1c check in the office was 8.9. He does not have any complaints at this point. Patient received extensive education about his type 1 diabetes mellitus and the importance to keep checking blood glucose, not miss insulin doses, and not to skip meals if taking insulin. He has epinephrine pen in case of emergency but does not have glucagon pen at home. He also need refill for his strips.       Patient Active Problem List Diagnosis    Type 1 diabetes mellitus ([facility] [address]       Health Maintenance Due   Topic Date Due    Hepatitis C screen  Never done    Diabetic retinal exam  Never done    COVID-19 Vaccine (1) Never done    HIV screen  Never done    Diabetic microalbuminuria test  01/21/2017    Lipid screen  01/21/2017    A1C test (Diabetic or Prediabetic)  08/12/2021       No Known Allergies      Current Outpatient Medications   Medication Sig Dispense Refill    Lancets MISC 1 each by Does not apply route 4 times daily (before meals and nightly) 300 each 0    Ketone Blood Test STRP 1 strip by Does not apply route daily as needed (for high blood sugar) 200 strip 0    insulin aspart (NOVOLOG FLEXPEN) 100 UNIT/ML injection pen Inject 1-11 Units into the skin 3 times daily (before meals) 1 unit per 15 grams CHO for meals and snacks  Sliding scale: =0, 125-150=1, 151-175=2, 175-200=3, 201-225=4, 226-250=5, 251-275=6, 276-300=7, 301-325=8, 326-350=9, 351-375=10, 376-400=11 5 pen 3    Insulin Pen Needle 32G X 6 MM MISC Use 2 times daily with insulin 100 each 5    insulin glargine (LANTUS SOLOSTAR) 100 UNIT/ML injection pen Inject 20 Units into the skin 2 times daily 5 pen 3    insulin regular (HUMULIN R;NOVOLIN R) 100 UNIT/ML injection insulin U-100 regular human 100 unit/mL injection solution   administered 5 units of Humalin R in clinic for POC BG >500      INSULIN SYRINGE 1CC/29G (AIMSCO INSULIN SYR ULTRA THIN) 29G X 1/2\" 1 ML MISC 1 each by Does not apply route daily 100 each 3    Insulin Pen Needle 30G X 5 MM MISC 1 Device by Does not apply route 4 times daily 200 each 1    acetaminophen (TYLENOL) 325 MG tablet Take 2 tablets by mouth every 6 hours as needed for Pain 30 tablet 0    EPINEPHrine (EPIPEN JR 2-ITALO) 0.15 MG/0.3ML SOAJ Use as directed for allergic reaction 2 Device 3    glucagon (GLUCAGON EMERGENCY) 1 MG injection As directed for extreme hypoglycemia 1 kit 2    blood glucose test strips (ONE TOUCH ULTRA TEST) strip Sig: For blood testing 4-6 times/day 150 each 3     No current facility-administered medications for this visit. Social History     Tobacco Use    Smoking status: Current Some Day Smoker     Packs/day: 0.25     Years: 2.00     Pack years: 0.50     Types: Cigarettes    Smokeless tobacco: Never Used   Vaping Use    Vaping Use: Never used   Substance Use Topics    Alcohol use: Yes     Alcohol/week: 10.0 standard drinks     Types: 10 Shots of liquor per week    Drug use: Yes     Types: Marijuana     Comment: occasional       Family History   Problem Relation Age of Onset    Asthma Father     Asthma Sister     Asthma Brother       ________________________________________________________________________  Review of Systems:  CONSTITUTIONAL: Denies: fever, chills  PSYCH: Denies: anxiety, depression  ALLERGIES: Denies: urticaria  EYES: Denies: blurry vision, decreased vision, photophobia  ENT: Denies: sore throat, nasal congestion  CARDIOVASCULAR: Denies: chest pain, dyspnea on exertion  RESPIRATORY: Denies: cough, hemoptysis, shortness of breath  GI: Denies: Denies: abdominal pain, flank pain  : Denies: Denies: dysuria, frequency/urgency  NEURO: Denies: dizzy/vertigo, headache  MUSCULOSKELETAL: Denies: back pain, joint pain  SKIN: Denies: rash, itching  ________________________________________________________________________  Physical Exam:  Vitals:    08/17/21 1558   BP: 106/70   Site: Right Upper Arm   Position: Sitting   Cuff Size: Medium Adult   Pulse: 109   Weight: 139 lb 3.2 oz (63.1 kg)   Height: 5' 11\" (1.803 m)     BP Readings from Last 3 Encounters:   08/17/21 106/70   06/29/21 115/76   05/14/21 124/89      General: Alert and oriented x3  Respiratory: Chest clear bilaterally without any wheezing or crackles  Heart: Regular rate and rhythm without any murmurs  Abdomen: Soft, nontender nondistended abdomen. No organomegaly. Bowel sounds positive.   Neuro: No focal neurological deficit. Extremities: No pedal edema    ________________________________________________________________________  Diagnostic findings:  CBC:  Lab Results   Component Value Date    WBC 11.4 05/12/2021    HGB 13.5 05/12/2021     05/12/2021       BMP:    Lab Results   Component Value Date     05/14/2021    K 4.0 05/14/2021     05/14/2021    CO2 23 05/14/2021    BUN 3 05/14/2021    CREATININE 0.54 05/14/2021    GLUCOSE 189 05/14/2021       HEMOGLOBIN A1C:   Lab Results   Component Value Date    LABA1C 10.0 05/12/2021       FASTING LIPID PANEL:  Lab Results   Component Value Date    CHOL 328 (H) 01/21/2016    HDL 75 01/21/2016    TRIG 192 (H) 01/21/2016     ________________________________________________________________________  Assessment and Plan:  Dara Alcala was seen today for diabetes and health maintenance. Diagnoses and all orders for this visit:    Type 1 diabetes mellitus ([address]  -Diagnosed almost 13 years ago  -Better compliance now. HbA1c improving, 8.9 as per 8/17/2021.  -Continue to take Lantus 20 units twice daily. Continue to take short-acting insulin before meals according to blood glucose and calories intake  -Refer for endocrinology, with Dr. Alva Ac  -Patient received extensive education about his type 1 diabetes mellitus and the importance to keep checking blood glucose, not miss insulin doses, and not to skip meals if taking insulin. He verbalized understanding  -Will refill glucagon injection in case of emergency    ________________________________________________________________________  Follow up and instructions:  · No follow-ups on file. · Latone received counseling on the following healthy behaviors: nutrition and medication adherence    · Discussed use, benefit, and side effects of prescribed medications. Barriers to medication compliance addressed. All patient questions answered. Pt voiced understanding.      · Patient given educational materials - see patient instructions    Shelly Martinez MD      Department of Internal Medicine  [facility]         8/17/2021, 4:23 PM   Attending Physician Statement  I have discussed the care of Rachel Sterling, including pertinent history and exam findings,  with the resident. I have reviewed the key elements of all parts of the encounter with the resident. I agree with the assessment, plan and orders as documented by the resident. (GE Modifier)    Gentleman with Type I DM. Blood sugars are labile. Same insulin regimen for now. Will refer to endocrinology. May be candidate for insulin pump. This note is created with the assistance of a speech-recognition program. While intending to generate a document that actually reflects the content of the visit, the document can still have some mistakes which may not have been identified and corrected by editing.

## 2021-08-17 NOTE — PATIENT INSTRUCTIONS
Follow-up appointment scheduled for 11/23/21 at 2:30p, AVS given to patient. Medications e-scribe to pharmacy of pt's choice. Labs given to patient, they will have them done before their next visit. Order for Endocrinology faxed to [address] [place] [address] they will all pt for appt. Please call [phone] in not heard within 2 weeks.

## 2022-03-01 ENCOUNTER — OFFICE VISIT (OUTPATIENT)
Dept: INTERNAL MEDICINE | Age: 22
End: 2022-03-01
Payer: MEDICARE

## 2022-03-01 VITALS
TEMPERATURE: 97.9 F | OXYGEN SATURATION: 98 % | DIASTOLIC BLOOD PRESSURE: 75 MMHG | WEIGHT: 139 LBS | BODY MASS INDEX: 19.46 KG/M2 | HEIGHT: 71 IN | SYSTOLIC BLOOD PRESSURE: 114 MMHG | HEART RATE: 78 BPM

## 2022-03-01 DIAGNOSIS — E10.65 TYPE 1 DIABETES MELLITUS WITH HYPERGLYCEMIA (HCC): Primary | ICD-10-CM

## 2022-03-01 LAB
CHP ED QC CHECK: ABNORMAL
GLUCOSE BLD-MCNC: 431 MG/DL
HBA1C MFR BLD: 9.5 %

## 2022-03-01 PROCEDURE — G8484 FLU IMMUNIZE NO ADMIN: HCPCS | Performed by: STUDENT IN AN ORGANIZED HEALTH CARE EDUCATION/TRAINING PROGRAM

## 2022-03-01 PROCEDURE — G8420 CALC BMI NORM PARAMETERS: HCPCS | Performed by: STUDENT IN AN ORGANIZED HEALTH CARE EDUCATION/TRAINING PROGRAM

## 2022-03-01 PROCEDURE — 3046F HEMOGLOBIN A1C LEVEL >9.0%: CPT | Performed by: STUDENT IN AN ORGANIZED HEALTH CARE EDUCATION/TRAINING PROGRAM

## 2022-03-01 PROCEDURE — G8427 DOCREV CUR MEDS BY ELIG CLIN: HCPCS | Performed by: STUDENT IN AN ORGANIZED HEALTH CARE EDUCATION/TRAINING PROGRAM

## 2022-03-01 PROCEDURE — 99213 OFFICE O/P EST LOW 20 MIN: CPT | Performed by: STUDENT IN AN ORGANIZED HEALTH CARE EDUCATION/TRAINING PROGRAM

## 2022-03-01 PROCEDURE — 2022F DILAT RTA XM EVC RTNOPTHY: CPT | Performed by: STUDENT IN AN ORGANIZED HEALTH CARE EDUCATION/TRAINING PROGRAM

## 2022-03-01 PROCEDURE — 4004F PT TOBACCO SCREEN RCVD TLK: CPT | Performed by: STUDENT IN AN ORGANIZED HEALTH CARE EDUCATION/TRAINING PROGRAM

## 2022-03-01 PROCEDURE — 82962 GLUCOSE BLOOD TEST: CPT | Performed by: STUDENT IN AN ORGANIZED HEALTH CARE EDUCATION/TRAINING PROGRAM

## 2022-03-01 PROCEDURE — 83036 HEMOGLOBIN GLYCOSYLATED A1C: CPT | Performed by: STUDENT IN AN ORGANIZED HEALTH CARE EDUCATION/TRAINING PROGRAM

## 2022-03-01 RX ORDER — GLUCOSAMINE HCL/CHONDROITIN SU 500-400 MG
CAPSULE ORAL
Qty: 300 STRIP | Refills: 3 | Status: SHIPPED | OUTPATIENT
Start: 2022-03-01 | End: 2022-08-16

## 2022-03-01 RX ORDER — FLASH GLUCOSE SCANNING READER
EACH MISCELLANEOUS
Qty: 2 EACH | Refills: 11 | Status: SHIPPED | OUTPATIENT
Start: 2022-03-01

## 2022-03-01 RX ORDER — INSULIN ASPART 100 [IU]/ML
1-11 INJECTION, SOLUTION INTRAVENOUS; SUBCUTANEOUS
Qty: 5 PEN | Refills: 3 | Status: SHIPPED | OUTPATIENT
Start: 2022-03-01 | End: 2022-08-16 | Stop reason: SDUPTHER

## 2022-03-01 RX ORDER — LANCETS 30 GAUGE
1 EACH MISCELLANEOUS DAILY
Qty: 100 EACH | Refills: 11 | Status: SHIPPED | OUTPATIENT
Start: 2022-03-01 | End: 2022-08-16

## 2022-03-01 RX ORDER — INSULIN GLARGINE 100 [IU]/ML
25 INJECTION, SOLUTION SUBCUTANEOUS 2 TIMES DAILY
Qty: 5 PEN | Refills: 3 | Status: SHIPPED | OUTPATIENT
Start: 2022-03-01 | End: 2022-08-16 | Stop reason: SDUPTHER

## 2022-03-01 RX ORDER — FLASH GLUCOSE SENSOR
KIT MISCELLANEOUS
Qty: 2 EACH | Refills: 11 | Status: SHIPPED | OUTPATIENT
Start: 2022-03-01 | End: 2022-03-10 | Stop reason: SDUPTHER

## 2022-03-01 NOTE — PROGRESS NOTES
Freestyle Tresa ordered. Check blood sugars several times a day. Multiple insulin injections with uncontrolled blood sugars. Frequent hypo and hyperglycemia and needs CGM for better control of Type 1 DM. Attending Physician Statement  I have discussed the care of Karlene Puentes, including pertinent history and exam findings,  with the resident. I have reviewed the key elements of all parts of the encounter with the resident. I agree with the assessment, plan and orders as documented by the resident.   (GE Modifier)

## 2022-03-01 NOTE — PROGRESS NOTES
@[facility]@    [facility]    Progress Note    Date of patient's visit: 3/1/2022    Patient's Name:  Lewis Harvey    YOB: 2000            Patient Care Team:  Dandre Pereira MD as PCP - General (Internal Medicine)    REASON FOR VISIT: Routine outpatient follow     Chief Complaint   Patient presents with    Diabetes     pt needs new meter, test strips and supplies medication list updated with pharmacy    [facility]     declines vaccine, pt goes to [address] [facility]         HISTORY OF PRESENT ILLNESS:    History was obtained from the patient. Lewis Harvey is a 19 y.o. is here for follow-up for type 1 diabetes mellitus    He is on Lantus 20 units twice daily and NovoLog sliding scale before meals. Has not checked his home glucose because he did not have the correct glucose strips. Has not checked his blood glucose in the last 1 month. A1c today: 9.5  POC glucose: 431    Last meal 8 AM he had cereal.  Patient says he is compliant with his insulin    Past Medical History:   Diagnosis Date    Diabetes mellitus ([address]     Gastroesophageal reflux disease     Type 1 diabetes mellitus ([facility] [address] April 2013       No past surgical history on file.       ALLERGIES    No Known Allergies    MEDICATIONS:      Current Outpatient Medications on File Prior to Visit   Medication Sig Dispense Refill    insulin aspart (NOVOLOG FLEXPEN) 100 UNIT/ML injection pen Inject 1-11 Units into the skin 3 times daily (before meals) 1 unit per 15 grams CHO for meals and snacks  Sliding scale: =0, 125-150=1, 151-175=2, 175-200=3, 201-225=4, 226-250=5, 251-275=6, 276-300=7, 301-325=8, 326-350=9, 351-375=10, 376-400=11 5 pen 3    Insulin Pen Needle 32G X 6 MM MISC Use 2 times daily with insulin 100 each 5    insulin glargine (LANTUS SOLOSTAR) 100 UNIT/ML injection pen Inject 20 Units into the skin 2 times daily 5 pen 3    EPINEPHrine (EPIPEN JR 2-ITALO) 0.15 MG/0.3ML SOAJ Use as directed for allergic reaction 2 Device 3    blood glucose test strips (ONE TOUCH ULTRA TEST) strip Sig: For blood testing 4-6 times/day (Patient not taking: Reported on 3/1/2022) 150 each 3    Glucagon, rDNA, (GLUCAGON EMERGENCY) 1 MG KIT As directed for extreme hypoglycemia (Patient not taking: Reported on 3/1/2022) 1 kit 1    Lancets MISC 1 each by Does not apply route 4 times daily (before meals and nightly) (Patient not taking: Reported on 3/1/2022) 300 each 0    Ketone Blood Test STRP 1 strip by Does not apply route daily as needed (for high blood sugar) (Patient not taking: Reported on 3/1/2022) 200 strip 0    insulin regular (HUMULIN R;NOVOLIN R) 100 UNIT/ML injection insulin U-100 regular human 100 unit/mL injection solution   administered 5 units of Humalin R in clinic for POC BG >500 (Patient not taking: Reported on 3/1/2022)      INSULIN SYRINGE 1CC/29G (AIMSCO INSULIN SYR ULTRA THIN) 29G X 1/2\" 1 ML MISC 1 each by Does not apply route daily (Patient not taking: Reported on 3/1/2022) 100 each 3    Insulin Pen Needle 30G X 5 MM MISC 1 Device by Does not apply route 4 times daily (Patient not taking: Reported on 3/1/2022) 200 each 1    acetaminophen (TYLENOL) 325 MG tablet Take 2 tablets by mouth every 6 hours as needed for Pain (Patient not taking: Reported on 3/1/2022) 30 tablet 0    [DISCONTINUED] Insulin Pen Needle 30G X 5 MM MISC 1 Device by Does not apply route 4 times daily 200 each 1     No current facility-administered medications on file prior to visit. SOCIAL HISTORY    Reviewed and no change from previous record. Benedict Sanchez  reports that he has been smoking cigarettes. He has a 0.50 pack-year smoking history.  He has never used smokeless tobacco.    FAMILY HISTORY:    Reviewed and No change from previous visit    HEALTH MAINTENANCE DUE:      Health Maintenance Due   Topic Date Due    Hepatitis C screen  Never done    Pneumococcal 0-64 years Vaccine (1 of 2 - PPSV23) Never done    HIV screen  Never done    Diabetic retinal exam  Never done    Diabetic microalbuminuria test  01/21/2017    Lipid screen  01/21/2017    Flu vaccine (1) 09/01/2021    COVID-19 Vaccine (3 - Booster for Moderna series) 02/16/2022       REVIEW OF SYSTEMS:    12 point review of symptoms completed and found to be normal except noted in the HPI    · Constitutional: Negative for Fever, chills  · Eyes: Negative for visual changes, diplopia  · ENT: Negative for mouth sores, sore throat. · Cardiovascular: Negative for lightheadedness ,chest pain, palpitations   · Respiratory:Negative for Shortness of breath,cough or wheezing. · Gastrointestinal: Negative for nausea/vomiting, change in bowel habits, abdominal pain  · Genitourinary:Negative for change in bladder habits, dysuria, hematuria. · Musculoskeletal: Negative for joint pain   · Neurological: Negative for headache, change in muscle strength numbness/tingling       PHYSICAL EXAM:      Vitals:    03/01/22 1035   BP: 114/75   Site: Left Upper Arm   Position: Sitting   Cuff Size: Medium Adult   Pulse: 78   Temp: 97.9 °F (36.6 °C)   SpO2: 98%   Weight: 139 lb (63 kg)   Height: 5' 11\" (1.803 m)     Body mass index is 19.39 kg/m². BP Readings from Last 3 Encounters:   03/01/22 114/75   08/17/21 106/70   06/29/21 115/76        Wt Readings from Last 3 Encounters:   03/01/22 139 lb (63 kg)   08/17/21 139 lb 3.2 oz (63.1 kg)   06/29/21 146 lb (66.2 kg)           · General appearance: awake, alert, cooperative  · HEENT: Head: Normocephalic, no lesions, without obvious abnormality. · Lungs: clear to auscultation bilaterally  · Heart: regular rate and rhythm, S1, S2 normal, no murmur  · Abdomen: soft, non-tender; bowel sounds normal; no masses,  no organomegaly  · Extremities: extremities normal, atraumatic, no cyanosis or edema  · Neurological:  Awake, alert, oriented to name, place and time. Cranial nerves II-XII are grossly intact.  Reflexes normal and symmetric. Sensation grossly normal  · Eye no icterus no redness    LABORATORY FINDINGS:    CBC:  Lab Results   Component Value Date    WBC 11.4 05/12/2021    HGB 13.5 05/12/2021     05/12/2021     BMP:    Lab Results   Component Value Date     05/14/2021    K 4.0 05/14/2021     05/14/2021    CO2 23 05/14/2021    BUN 3 05/14/2021    CREATININE 0.54 05/14/2021    GLUCOSE 431 03/01/2022     HEMOGLOBIN A1C:   Lab Results   Component Value Date    LABA1C 9.5 03/01/2022     MICROALBUMIN URINE:   Lab Results   Component Value Date    MICROALBUR <12 01/21/2016     FASTING LIPID PANEL:  Lab Results   Component Value Date    CHOL 328 (H) 01/21/2016    HDL 75 01/21/2016    TRIG 192 (H) 01/21/2016     Lab Results   Component Value Date    LDLCHOLESTEROL 215 (H) 01/21/2016       LIVER PROFILE:  Lab Results   Component Value Date    ALT 13 05/11/2021    AST 20 05/11/2021    PROT 9.0 05/11/2021    BILITOT 0.78 05/11/2021    BILIDIR 0.11 03/09/2017    LABALBU 5.0 05/11/2021      THYROID FUNCTION:   Lab Results   Component Value Date    TSH 1.13 03/08/2017      URINE ANALYSIS: No results found for: LABURIN  ASSESSMENT AND PLAN:    1. Type 1 diabetes mellitus with hyperglycemia (HCC)  Will increase Lantus to 25 units twice daily-  Continue sliding scale NovoLog  Patient was counseled on importance of home glucose monitoring  Did not follow-up with Dr. Louie Hickman, as he did not have a ride and Dr. Duy Renteria is not taking new patients anymore  He needs to see Katharine Roe for  the need for insulin pump and close monitoring  - POCT glycosylated hemoglobin (Hb A1C)  - POCT Glucose  Patient requested to get-labs for urine albumin      Health Maintenance      FOLLOW UP AND INSTRUCTIONS:   1. No follow-ups on file. 2. Latone received counseling on the following healthy behaviors: nutrition, exercise and medication adherence    3. Reviewed prior labs and health maintenance.       4. Discussed use, benefit, and side effects of prescribed medications. Barriers to medication compliance addressed. All patient questions answered. Pt voiced understanding.      5. Patient given educational materials - see patient instructions         Laura Howe MD  PGY-3, Internal Medicine Resident  [address] [place] [address]  3/1/2022 11:48 AM

## 2022-03-01 NOTE — PROGRESS NOTES
Oliva Kinsey was evaluated today and a DME order was entered for a continuous glucose monitor and diabetic testing supplies because he requires this for treatment of diabetes mellitus. The patient has been sufficiently trained to use the device and he has skills to be able to use this device as intended. This patient is  requiring the use of insulin for diabetic control. The patient will require testing 6 times per day to achieve optimal diabetic control. Patient has type 1 diabetes mellitus and will require the use of a continuous blood glucose monitor. The need for this equipment was discussed with the patient and he understands and is in agreement.     Laura Hoew MD  PGY-3, Internal Medicine Resident  [place], [place]  3/1/2022 12:22 PM

## 2022-03-01 NOTE — PATIENT INSTRUCTIONS
Medications e-scribe to pharmacy of pt's choice. A printed script for Durable Medical Equipment was ordered for the patient. The script was faxed to [facility], [address] [place] [address]  P: [phone] F: [phone] . Labs reprinted and given to patient. Printed script for DM Medications signed and given to the patient    Referral to Endocrinology was placed, summary of care printed and faxed to office, phone numbers given to the patient, they will contact office for an appt    Return To Clinic 4/12/2022. After Visit Summary  given and reviewed. --    It is very important for your care that you keep your appointment. If for some reason you are unable to keep your appointment it is equally important that you call our office at [phone] to cancel your appointment and reschedule. Failure to do so may result in your termination from our practice.

## 2022-03-09 ENCOUNTER — TELEPHONE (OUTPATIENT)
Dept: INTERNAL MEDICINE | Age: 22
End: 2022-03-09

## 2022-03-09 NOTE — TELEPHONE ENCOUNTER
Swetha 40 requesting a Rx for sensors and insurance information be sent to [address] [facility].  Thanks

## 2022-03-10 DIAGNOSIS — E10.65 TYPE 1 DIABETES MELLITUS WITH HYPERGLYCEMIA (HCC): ICD-10-CM

## 2022-03-10 RX ORDER — FLASH GLUCOSE SENSOR
KIT MISCELLANEOUS
Qty: 2 EACH | Refills: 11 | Status: SHIPPED | OUTPATIENT
Start: 2022-03-10

## 2022-03-10 NOTE — TELEPHONE ENCOUNTER
Pt is inquiring on his freestyle sensor, writer call pt pharmacy to see if sensor was there, pharmacist stated they need a Rx e-scribe to them or when you come to the office on Tuesday you can reprint  Rx and we can call pt or fax it to the pharmacy.  thanks

## 2022-03-10 NOTE — TELEPHONE ENCOUNTER
I am not sure how I can send the script to [address] [facility]. It has to be faxed?  I can place the order but not sure if you will be able to fax it that way

## 2022-03-11 ENCOUNTER — TELEPHONE (OUTPATIENT)
Dept: INTERNAL MEDICINE | Age: 22
End: 2022-03-11

## 2022-03-11 NOTE — LETTER
[address]  [address] [facility] [address]  Phone: [phone]  Fax: [phone]    Carmelo Johansen MD        March 11, 2022    [address]      Dear Parvez Later: This letter is a reminder that you may have diagnostic testing that has not been completed. It is important to your well-being that these test(s) are performed. Please find the outstanding test(s) attached. If you could please have these completed before your next appointment. You can have the test completed at any [facility] facility or Lab. Please see the order for scheduling instructions. Any testing that needs completed other than blood work or xray's please call [phone] to schedule an appointment. Otherwise can be done at any [facility]. Please call our office at Dept: [phone] for additional information on the outstanding tests or let us know if they have been completed so we may update your chart. If you have any questions or concerns, please don't hesitate to call.     Sincerely,        Carmelo Johansen MD

## 2022-04-12 ENCOUNTER — TELEPHONE (OUTPATIENT)
Dept: INTERNAL MEDICINE | Age: 22
End: 2022-04-12

## 2022-04-12 NOTE — LETTER
[address]  [address] [facility] [address]  Phone: [phone]  Fax: [phone]    Venancio Tran MD        April 12, 2022     [address] [facility]      Dear Marcia Moody: We missed seeing you for a scheduled appointment at [address] [place] [address] with Venancio Tran MD on 4/12/2022. We're sorry you were unable to keep your appointment and hope that you are doing well. We ask that you please call 24 hours in advance if you are unable to make your appointment, so that we can give that time to another patient in need. We care about you and the management of your healthcare and want to make sure that you follow up as recommended. To provide quality care and timely appointments to all our patients, you may be dismissed from the practice if you do not show for three (3) scheduled appointments within a 12-month period. We would like to continue treating your healthcare needs. Please call the office to reschedule your appointment, if needed.      Sincerely,        Venancio Tran MD

## 2022-05-16 ENCOUNTER — HOSPITAL ENCOUNTER (OUTPATIENT)
Age: 22
Discharge: HOME | End: 2022-05-16

## 2022-05-16 PROCEDURE — 86481 TB AG RESPONSE T-CELL SUSP: CPT

## 2022-05-16 PROCEDURE — 36415 COLL VENOUS BLD VENIPUNCTURE: CPT

## 2022-05-19 LAB — T-SPOT TB TEST: NORMAL

## 2022-07-25 ENCOUNTER — HOSPITAL ENCOUNTER (INPATIENT)
Age: 22
LOS: 2 days | Discharge: HOME | DRG: 420 | End: 2022-07-27
Attending: EMERGENCY MEDICINE | Admitting: STUDENT IN AN ORGANIZED HEALTH CARE EDUCATION/TRAINING PROGRAM
Payer: MEDICARE

## 2022-07-25 DIAGNOSIS — E10.10 DIABETIC KETOACIDOSIS WITHOUT COMA ASSOCIATED WITH TYPE 1 DIABETES MELLITUS (HCC): Primary | ICD-10-CM

## 2022-07-25 DIAGNOSIS — E10.10 DKA, TYPE 1, NOT AT GOAL (HCC): ICD-10-CM

## 2022-07-25 LAB
ABSOLUTE EOS #: 0 K/UL (ref 0–0.4)
ABSOLUTE IMMATURE GRANULOCYTE: 0 K/UL (ref 0–0.3)
ABSOLUTE LYMPH #: 0.82 K/UL (ref 1–4.8)
ABSOLUTE MONO #: 0.34 K/UL (ref 0.1–0.8)
ALBUMIN SERPL-MCNC: 5.3 G/DL (ref 3.5–5.2)
ALBUMIN/GLOBULIN RATIO: 1.8 (ref 1–2.5)
ALP BLD-CCNC: 98 U/L (ref 40–129)
ALT SERPL-CCNC: 14 U/L (ref 5–41)
ANION GAP SERPL CALCULATED.3IONS-SCNC: 25 MMOL/L (ref 9–17)
ANION GAP: 13 MMOL/L (ref 7–16)
AST SERPL-CCNC: 18 U/L
BASOPHILS # BLD: 0 % (ref 0–2)
BASOPHILS ABSOLUTE: 0 K/UL (ref 0–0.2)
BETA-HYDROXYBUTYRATE: 5.15 MMOL/L (ref 0.02–0.27)
BILIRUB SERPL-MCNC: 0.56 MG/DL (ref 0.3–1.2)
BUN BLDV-MCNC: 16 MG/DL (ref 6–20)
CALCIUM SERPL-MCNC: 10 MG/DL (ref 8.6–10.4)
CHLORIDE BLD-SCNC: 96 MMOL/L (ref 98–107)
CO2: 18 MMOL/L (ref 20–31)
CREAT SERPL-MCNC: 0.81 MG/DL (ref 0.7–1.2)
EOSINOPHILS RELATIVE PERCENT: 0 % (ref 1–4)
GFR AFRICAN AMERICAN: >60 ML/MIN
GFR NON-AFRICAN AMERICAN: >60 ML/MIN
GFR NON-AFRICAN AMERICAN: >60 ML/MIN
GFR SERPL CREATININE-BSD FRML MDRD: >60 ML/MIN
GFR SERPL CREATININE-BSD FRML MDRD: ABNORMAL ML/MIN/{1.73_M2}
GFR SERPL CREATININE-BSD FRML MDRD: NORMAL ML/MIN/{1.73_M2}
GLUCOSE BLD-MCNC: 241 MG/DL (ref 70–99)
GLUCOSE BLD-MCNC: 252 MG/DL (ref 74–100)
HCO3 VENOUS: 19.2 MMOL/L (ref 22–29)
HCT VFR BLD CALC: 43.2 % (ref 40.7–50.3)
HEMOGLOBIN: 14.9 G/DL (ref 13–17)
IMMATURE GRANULOCYTES: 0 %
LIPASE: 12 U/L (ref 13–60)
LYMPHOCYTES # BLD: 12 % (ref 24–44)
MAGNESIUM: 2.3 MG/DL (ref 1.6–2.6)
MCH RBC QN AUTO: 29.9 PG (ref 25.2–33.5)
MCHC RBC AUTO-ENTMCNC: 34.5 G/DL (ref 28.4–34.8)
MCV RBC AUTO: 86.7 FL (ref 82.6–102.9)
MONOCYTES # BLD: 5 % (ref 1–7)
MORPHOLOGY: NORMAL
NEGATIVE BASE EXCESS, VEN: 6 (ref 0–2)
NRBC AUTOMATED: 0 PER 100 WBC
O2 SAT, VEN: 61 % (ref 60–85)
PCO2, VEN: 34.5 MM HG (ref 41–51)
PDW BLD-RTO: 12.5 % (ref 11.8–14.4)
PH VENOUS: 7.36 (ref 7.32–7.43)
PHOSPHORUS: 3.4 MG/DL (ref 2.5–4.5)
PLATELET # BLD: 332 K/UL (ref 138–453)
PMV BLD AUTO: 12.1 FL (ref 8.1–13.5)
PO2, VEN: 32.6 MM HG (ref 30–50)
POC BUN: 15 MG/DL (ref 8–26)
POC CHLORIDE: 111 MMOL/L (ref 98–107)
POC CREATININE: 0.88 MG/DL (ref 0.51–1.19)
POC HEMATOCRIT: 42 % (ref 41–53)
POC HEMOGLOBIN: 14.1 G/DL (ref 13.5–17.5)
POC IONIZED CALCIUM: 1.07 MMOL/L (ref 1.15–1.33)
POC LACTIC ACID: 2.21 MMOL/L (ref 0.56–1.39)
POC POTASSIUM: 4.3 MMOL/L (ref 3.5–4.5)
POC SODIUM: 142 MMOL/L (ref 138–146)
POC TCO2: 20 MMOL/L (ref 22–30)
POTASSIUM SERPL-SCNC: 4.3 MMOL/L (ref 3.7–5.3)
RBC # BLD: 4.98 M/UL (ref 4.21–5.77)
SEG NEUTROPHILS: 83 % (ref 36–66)
SEGMENTED NEUTROPHILS ABSOLUTE COUNT: 5.64 K/UL (ref 1.8–7.7)
SODIUM BLD-SCNC: 139 MMOL/L (ref 135–144)
TOTAL PROTEIN: 8.2 G/DL (ref 6.4–8.3)
TROPONIN, HIGH SENSITIVITY: <6 NG/L (ref 0–22)
WBC # BLD: 6.8 K/UL (ref 3.5–11.3)

## 2022-07-25 PROCEDURE — 93005 ELECTROCARDIOGRAM TRACING: CPT

## 2022-07-25 PROCEDURE — 82330 ASSAY OF CALCIUM: CPT

## 2022-07-25 PROCEDURE — 82010 KETONE BODYS QUAN: CPT

## 2022-07-25 PROCEDURE — 83690 ASSAY OF LIPASE: CPT

## 2022-07-25 PROCEDURE — 99285 EMERGENCY DEPT VISIT HI MDM: CPT

## 2022-07-25 PROCEDURE — 84520 ASSAY OF UREA NITROGEN: CPT

## 2022-07-25 PROCEDURE — 83735 ASSAY OF MAGNESIUM: CPT

## 2022-07-25 PROCEDURE — 80051 ELECTROLYTE PANEL: CPT

## 2022-07-25 PROCEDURE — 84100 ASSAY OF PHOSPHORUS: CPT

## 2022-07-25 PROCEDURE — 83605 ASSAY OF LACTIC ACID: CPT

## 2022-07-25 PROCEDURE — 85025 COMPLETE CBC W/AUTO DIFF WBC: CPT

## 2022-07-25 PROCEDURE — 2060000000 HC ICU INTERMEDIATE R&B

## 2022-07-25 PROCEDURE — 82947 ASSAY GLUCOSE BLOOD QUANT: CPT

## 2022-07-25 PROCEDURE — 84484 ASSAY OF TROPONIN QUANT: CPT

## 2022-07-25 PROCEDURE — 93005 ELECTROCARDIOGRAM TRACING: CPT | Performed by: EMERGENCY MEDICINE

## 2022-07-25 PROCEDURE — 85014 HEMATOCRIT: CPT

## 2022-07-25 PROCEDURE — 2580000003 HC RX 258

## 2022-07-25 PROCEDURE — 82803 BLOOD GASES ANY COMBINATION: CPT

## 2022-07-25 PROCEDURE — 80053 COMPREHEN METABOLIC PANEL: CPT

## 2022-07-25 PROCEDURE — 82565 ASSAY OF CREATININE: CPT

## 2022-07-25 PROCEDURE — 6360000002 HC RX W HCPCS: Performed by: EMERGENCY MEDICINE

## 2022-07-25 RX ORDER — DEXTROSE AND SODIUM CHLORIDE 5; .45 G/100ML; G/100ML
INJECTION, SOLUTION INTRAVENOUS CONTINUOUS PRN
Status: DISCONTINUED | OUTPATIENT
Start: 2022-07-25 | End: 2022-07-27

## 2022-07-25 RX ORDER — 0.9 % SODIUM CHLORIDE 0.9 %
1000 INTRAVENOUS SOLUTION INTRAVENOUS ONCE
Status: DISCONTINUED | OUTPATIENT
Start: 2022-07-25 | End: 2022-07-27

## 2022-07-25 RX ORDER — METOCLOPRAMIDE HYDROCHLORIDE 5 MG/ML
10 INJECTION INTRAMUSCULAR; INTRAVENOUS ONCE
Status: COMPLETED | OUTPATIENT
Start: 2022-07-25 | End: 2022-07-25

## 2022-07-25 RX ORDER — 0.9 % SODIUM CHLORIDE 0.9 %
1000 INTRAVENOUS SOLUTION INTRAVENOUS ONCE
Status: COMPLETED | OUTPATIENT
Start: 2022-07-25 | End: 2022-07-25

## 2022-07-25 RX ORDER — DEXTROSE AND SODIUM CHLORIDE 5; .45 G/100ML; G/100ML
INJECTION, SOLUTION INTRAVENOUS
Status: COMPLETED
Start: 2022-07-25 | End: 2022-07-26

## 2022-07-25 RX ORDER — DIPHENHYDRAMINE HYDROCHLORIDE 50 MG/ML
25 INJECTION INTRAMUSCULAR; INTRAVENOUS ONCE
Status: COMPLETED | OUTPATIENT
Start: 2022-07-25 | End: 2022-07-25

## 2022-07-25 RX ORDER — DEXTROSE MONOHYDRATE 50 MG/ML
100 INJECTION, SOLUTION INTRAVENOUS PRN
Status: DISCONTINUED | OUTPATIENT
Start: 2022-07-25 | End: 2022-07-27 | Stop reason: HOSPADM

## 2022-07-25 RX ADMIN — METOCLOPRAMIDE 10 MG: 5 INJECTION, SOLUTION INTRAMUSCULAR; INTRAVENOUS at 22:24

## 2022-07-25 RX ADMIN — DIPHENHYDRAMINE HYDROCHLORIDE 25 MG: 50 INJECTION, SOLUTION INTRAMUSCULAR; INTRAVENOUS at 22:23

## 2022-07-25 RX ADMIN — SODIUM CHLORIDE 1000 ML: 9 INJECTION, SOLUTION INTRAVENOUS at 19:16

## 2022-07-25 ASSESSMENT — ENCOUNTER SYMPTOMS
WHEEZING: 0
ABDOMINAL PAIN: 1
SHORTNESS OF BREATH: 0

## 2022-07-25 NOTE — ED PROVIDER NOTES
STV[facility] 4B STEPDOWN  Emergency Department Encounter  Emergency Medicine Resident     Pt Tom Jiang  MRN: [id number]  [place] 2000  Date of evaluation: 7/25/22  PCP:  Rody Fairchild MD      CHIEF COMPLAINT       No chief complaint on file. HISTORY OF PRESENT ILLNESS  (Location/Symptom, Timing/Onset, Context/Setting, Quality, Duration, Modifying Factors, Severity.)      Alexis Lowe is a 19 y.o. male who presents with abdominal pain and vomiting. Patient states that the symptoms started in the morning. Patient states that he is a type I diabetic and was diagnosed when he was 13. States he woke up feeling weak and started throughout. He states that he has had DKA in the past and was hospitalized for this. He states he is having chest pain, abdominal pain and on and off emesis. Patient said he measured his blood glucose at home and got a reading of \"high\". Patient states he took a dose of insulin before he arrived, 17 of NovoLog. The patient's blood glucose in the emergency department is 241. Additionally the patient is complaining of chest pain. The patient states that the chest pain is in the middle of his chest.  He denies any radiation. Says that he has never had this kind of chest pain before. PAST MEDICAL / SURGICAL / SOCIAL / FAMILY HISTORY      has a past medical history of Diabetes mellitus ([address] Gastroesophageal reflux disease, and Type 1 diabetes mellitus ([facility] [address] has no past surgical history on file.   None    Social History     Socioeconomic History    Marital status: Single     Spouse name: Not on file    Number of children: Not on file    Years of education: Not on file    Highest education level: Not on file   Occupational History     Employer: N/A   Tobacco Use    Smoking status: Some Days     Packs/day: 0.25     Years: 2.00     Pack years: 0.50     Types: Cigarettes    Smokeless tobacco: Never   Vaping Use    Vaping Use: Never used   Substance and Sexual Activity    Alcohol use: Yes     Alcohol/week: 10.0 standard drinks     Types: 10 Shots of liquor per week    Drug use: Yes     Types: Marijuana Linda Norman)     Comment: occasional    Sexual activity: Yes     Partners: Female   Other Topics Concern    Not on file   Social History Narrative    ** Merged History Encounter **          Social Determinants of Health     Financial Resource Strain: Not on file   Food Insecurity: Not on file   Transportation Needs: Not on file   Physical Activity: Not on file   Stress: Not on file   Social Connections: Not on file   Intimate Partner Violence: Not on file   Housing Stability: Not on file       Family History   Problem Relation Age of Onset    Asthma Father     Asthma Sister     Asthma Brother        Allergies:  Patient has no known allergies. Home Medications:  Prior to Admission medications    Medication Sig Start Date End Date Taking? Authorizing Provider   ondansetron (ZOFRAN) 4 MG tablet Take 1 tablet by mouth 3 times daily as needed for Nausea or Vomiting 7/27/22  Yes Shahla Austin MD   Continuous Blood Gluc Sensor ([address] [place] [address]) MISC Type 1 DM. Check blood sugars 5-6/day 3/10/22   Kang Felix MD   blood glucose monitor strips Test 3 times times a day & as needed for symptoms of irregular blood glucose. 3/1/22   Alex Saleh MD   blood glucose monitor kit and supplies Dispense sufficient amount for indicated testing frequency plus additional to accommodate PRN testing needs.  3/1/22   Alex Saleh MD   Lancets MISC 1 each by Does not apply route daily Use 3 times times daily 3/1/22   Alex Saleh MD   insulin aspart (NOVOLOG FLEXPEN) 100 UNIT/ML injection pen Inject 1-11 Units into the skin 3 times daily (before meals) 1 unit per 15 grams CHO for meals and snacks  Sliding scale: =0, 125-150=1, 151-175=2, 175-200=3, 201-225=4, 226-250=5, 251-275=6, 276-300=7, 301-325=8, 326-350=9, 351-375=10, 376-400=11 3/1/22 Alejandra Hannon MD   insulin glargine (LANTUS SOLOSTAR) 100 UNIT/ML injection pen Inject 25 Units into the skin 2 times daily 3/1/22   Alejandra Hannon MD   Continuous Blood Gluc  (FREESTYLE MALKA 14 DAY READER) JAYCE Check blood sugars 5-6/day. Type 1 DM 3/1/22   Alejandra Hannon MD   Glucagon, rDNA, (GLUCAGON EMERGENCY) 1 MG KIT As directed for extreme hypoglycemia 8/17/21   Nehemiah Rizvi MD   Lancets MISC 1 each by Does not apply route 4 times daily (before meals and nightly)  Patient not taking: No sig reported 6/29/21   Renzo Scherer MD   Ketone Blood Test STRP 1 strip by Does not apply route daily as needed (for high blood sugar) 6/29/21   Renzo Scherer MD   Insulin Pen Needle 32G X 6 MM MISC Use 2 times daily with insulin 6/29/21   Renzo Scherer MD   INSULIN SYRINGE 1CC/29G (AIMSCO INSULIN SYR ULTRA THIN) 29G X 1/2\" 1 ML MISC 1 each by Does not apply route daily  Patient not taking: No sig reported 1/5/21   [address] [place] [address] MD Ashlee   Insulin Pen Needle 30G X 5 MM MISC 1 Device by Does not apply route 4 times daily  Patient not taking: No sig reported 12/22/20   Leander Barraza, APRN - CNP   EPINEPHrine ([place] 2-ITALO) 0.15 MG/0.3ML SOAJ Use as directed for allergic reaction 10/2/20   Jered Yanes MD   Insulin Pen Needle 30G X 5 MM MISC 1 Device by Does not apply route 4 times daily 10/2/20   Jered Yanes MD       REVIEW OF SYSTEMS    (2-9 systems for level 4, 10 or more for level 5)      Review of Systems   Constitutional:  Positive for activity change. Negative for appetite change, chills and fever. Respiratory:  Negative for shortness of breath and wheezing. Cardiovascular:  Positive for chest pain. Gastrointestinal:  Positive for abdominal pain. Endocrine: Positive for polydipsia and polyuria. Neurological:  Positive for weakness. Negative for headaches. Psychiatric/Behavioral:  Negative for agitation.       PHYSICAL EXAM   (up to 7 for level 4, 8 or more for level 5) INITIAL VITALS:   BP (!) 165/87   Pulse 54   Temp 98.3 °F (36.8 °C) (Temporal)   Resp 16   Ht 5' 11\" (1.803 m)   Wt 136 lb 11 oz (62 kg)   SpO2 100%   BMI 19.06 kg/m²     Physical Exam  Constitutional:       General: He is not in acute distress. Appearance: He is not ill-appearing, toxic-appearing or diaphoretic. HENT:      Head: Normocephalic and atraumatic. Nose: Nose normal.      Mouth/Throat:      Mouth: Mucous membranes are dry. Pharynx: No oropharyngeal exudate or posterior oropharyngeal erythema. Eyes:      Extraocular Movements: Extraocular movements intact. Pupils: Pupils are equal, round, and reactive to light. Cardiovascular:      Rate and Rhythm: Normal rate and regular rhythm. Pulses: Normal pulses. Heart sounds: Normal heart sounds. No murmur heard. Pulmonary:      Effort: Pulmonary effort is normal. No respiratory distress. Breath sounds: Normal breath sounds. No wheezing. Abdominal:      General: Abdomen is flat. There is no distension. Palpations: Abdomen is soft. Tenderness: There is abdominal tenderness. Skin:     General: Skin is warm. Capillary Refill: Capillary refill takes less than 2 seconds. Neurological:      General: No focal deficit present. Mental Status: He is alert and oriented to person, place, and time. Mental status is at baseline.    Psychiatric:         Mood and Affect: Mood normal.         Behavior: Behavior normal.       DIFFERENTIAL  DIAGNOSIS     PLAN (LABS / IMAGING / EKG):  Orders Placed This Encounter   Procedures    STREP SCREEN GROUP A THROAT    CMP    CBC with Auto Differential    Lipase    Beta-Hydroxybutyrate    Magnesium    Phosphorus    ELECTROLYTES PLUS    Hemoglobin and hematocrit, blood    CALCIUM, IONIC (POC)    Hemoglobin A1c    Troponin    Troponin    Urinalysis with Reflex to Culture    Lipase    Hepatic Function Panel    Microscopic Urinalysis    Basic Metabolic Panel    Magnesium Basic Metabolic Panel    Alexi Jones MD, Endocrinology, [facility] Diabetes Education - [facility]    Inpatient consult to Hospitalist    Inpatient consult to Diabetes Educator/Management Team    Venous Blood Gas, POC    Creatinine W/GFR Point of Care    POCT urea (BUN)    Lactic Acid, POC    POCT Glucose    POC Glucose Fingerstick    POC Glucose Fingerstick    POC Glucose Fingerstick    POC Glucose Fingerstick    POC Glucose Fingerstick    POC Glucose Fingerstick    POC Glucose Fingerstick    POC Glucose Fingerstick    POC Glucose Fingerstick    POC Glucose Fingerstick    POC Glucose Fingerstick    POC Glucose Fingerstick    POC Glucose Fingerstick    POC Glucose Fingerstick    POC Glucose Fingerstick    POC Glucose Fingerstick    POC Glucose Fingerstick    POC Glucose Fingerstick    POC Glucose Fingerstick    POC Glucose Fingerstick    POC Glucose Fingerstick    POC Glucose Fingerstick    EKG 12 Lead    EKG 12 Lead    ADMIT TO INPATIENT    Discharge patient       MEDICATIONS ORDERED:  Orders Placed This Encounter   Medications    0.9 % sodium chloride bolus    DISCONTD: 0.9 % sodium chloride bolus    metoclopramide (REGLAN) injection 10 mg    diphenhydrAMINE (BENADRYL) injection 25 mg    DISCONTD: glucose chewable tablet 16 g    DISCONTD: dextrose bolus 10% 125 mL    DISCONTD: dextrose bolus 10% 250 mL    DISCONTD: glucagon (rDNA) injection 1 mg    DISCONTD: dextrose 5 % solution    DISCONTD: insulin regular (HUMULIN R;NOVOLIN R) 100 Units in sodium chloride 0.9 % 100 mL infusion    0.9 % sodium chloride bolus    DISCONTD: 0.45 % sodium chloride infusion    DISCONTD: dextrose 5 % and 0.45 % sodium chloride infusion    DISCONTD: dextrose bolus 10% 125 mL    DISCONTD: dextrose bolus 10% 250 mL    DISCONTD: potassium chloride 10 mEq/100 mL IVPB (Peripheral Line)    DISCONTD: magnesium sulfate 1000 mg in dextrose 5% 100 mL IVPB    DISCONTD: sodium phosphate 10 mmol in sodium chloride 0.9 % 250 mL IVPB    DISCONTD: sodium phosphate 15 mmol in dextrose 5 % 250 mL IVPB    DISCONTD: sodium phosphate 20 mmol in dextrose 5 % 500 mL IVPB    DISCONTD: polyethylene glycol (GLYCOLAX) packet 17 g    DISCONTD: enoxaparin (LOVENOX) injection 40 mg     Order Specific Question:   Indication of Use     Answer:   Prophylaxis-DVT/PE    DISCONTD: insulin regular (HUMULIN R;NOVOLIN R) 100 Units in sodium chloride 0.9 % 100 mL infusion    DISCONTD: dextrose 5 % and 0.45 % NaCl 1,000 mL infusion    DISCONTD: dextrose 5 % and 0.45 % sodium chloride infusion    dextrose 5 % and 0.45 % NaCl 5-0.45 % infusion     Danni Briggs: cabinet override    promethazine (PHENERGAN) injection 25 mg    DISCONTD: famotidine (PEPCID) 20 mg in sodium chloride (PF) 10 mL injection    DISCONTD: aluminum & magnesium hydroxide-simethicone (MAALOX) 30 mL, lidocaine viscous hcl (XYLOCAINE) 5 mL (GI COCKTAIL)    DISCONTD: insulin glargine (LANTUS) injection vial 25 Units    DISCONTD: insulin lispro (HUMALOG) injection vial 0-8 Units    DISCONTD: insulin lispro (HUMALOG) injection vial 0-4 Units    metoclopramide (REGLAN) injection 10 mg    HYDROcodone-acetaminophen (NORCO) 5-325 MG per tablet 1 tablet    DISCONTD: ondansetron (ZOFRAN) injection 4 mg    DISCONTD: insulin lispro (HUMALOG) injection vial 0-16 Units    DISCONTD: insulin lispro (HUMALOG) injection vial 0-4 Units    oxyCODONE (ROXICODONE) immediate release tablet 5 mg    potassium chloride (KLOR-CON M) extended release tablet 40 mEq    ondansetron (ZOFRAN) 4 MG tablet     Sig: Take 1 tablet by mouth 3 times daily as needed for Nausea or Vomiting     Dispense:  10 tablet     Refill:  0       DDX:DKA, pancreatitis, cholecystitis, GB colic, cholangitis, ACS/ MI, AAA, mesenteric ischemia, acute gastroenteritis, pyelonephritis, kidney stone, appendicitis, UTI.         DIAGNOSTIC RESULTS / EMERGENCY DEPARTMENT COURSE / MDM   LAB RESULTS:  Results for orders placed or performed during the hospital ELECTROLYTES PLUS   Result Value Ref Range    POC Sodium 142 138 - 146 mmol/L    POC Potassium 4.3 3.5 - 4.5 mmol/L    POC Chloride 111 (H) 98 - 107 mmol/L    POC TCO2 20 (L) 22 - 30 mmol/L    Anion Gap 13 7 - 16 mmol/L   Hemoglobin and hematocrit, blood   Result Value Ref Range    POC Hemoglobin 14.1 13.5 - 17.5 g/dL    POC Hematocrit 42 41 - 53 %   CALCIUM, IONIC (POC)   Result Value Ref Range    POC Ionized Calcium 1.07 (L) 1.15 - 1.33 mmol/L   Troponin   Result Value Ref Range    Troponin, High Sensitivity <6 0 - 22 ng/L   Basic Metabolic Panel   Result Value Ref Range    Glucose 460 (HH) 70 - 99 mg/dL    BUN 16 6 - 20 mg/dL    Creatinine 0.90 0.70 - 1.20 mg/dL    Calcium 9.5 8.6 - 10.4 mg/dL    Sodium 140 135 - 144 mmol/L    Potassium 4.8 3.7 - 5.3 mmol/L    Chloride 97 (L) 98 - 107 mmol/L    CO2 12 (L) 20 - 31 mmol/L    Anion Gap 31 (H) 9 - 17 mmol/L    GFR Non-African American >60 >60 mL/min    GFR African American >60 >60 mL/min    GFR Comment         Basic Metabolic Panel   Result Value Ref Range    Glucose 277 (H) 70 - 99 mg/dL    BUN 15 6 - 20 mg/dL    Creatinine 0.97 0.70 - 1.20 mg/dL    Calcium 9.2 8.6 - 10.4 mg/dL    Sodium 142 135 - 144 mmol/L    Potassium 4.6 3.7 - 5.3 mmol/L    Chloride 104 98 - 107 mmol/L    CO2 11 (L) 20 - 31 mmol/L    Anion Gap 27 (H) 9 - 17 mmol/L    GFR Non-African American >60 >60 mL/min    GFR African American >60 >60 mL/min    GFR Comment         Basic Metabolic Panel   Result Value Ref Range    Glucose 187 (H) 70 - 99 mg/dL    BUN 14 6 - 20 mg/dL    Creatinine 0.90 0.70 - 1.20 mg/dL    Calcium 9.0 8.6 - 10.4 mg/dL    Sodium 141 135 - 144 mmol/L    Potassium 4.5 3.7 - 5.3 mmol/L    Chloride 106 98 - 107 mmol/L    CO2 15 (L) 20 - 31 mmol/L    Anion Gap 20 (H) 9 - 17 mmol/L    GFR Non-African American >60 >60 mL/min    GFR African American >60 >60 mL/min    GFR Comment         Magnesium   Result Value Ref Range    Magnesium 2.0 1.6 - 2.6 mg/dL   Magnesium   Result Value Ref Range    Magnesium 2.2 1.6 - 2.6 mg/dL   Magnesium   Result Value Ref Range    Magnesium 2.0 1.6 - 2.6 mg/dL   Phosphorus   Result Value Ref Range    Phosphorus 5.8 (H) 2.5 - 4.5 mg/dL   Phosphorus   Result Value Ref Range    Phosphorus 4.2 2.5 - 4.5 mg/dL   Phosphorus   Result Value Ref Range    Phosphorus 2.9 2.5 - 4.5 mg/dL   Hemoglobin A1c   Result Value Ref Range    Hemoglobin A1C 10.6 (H) 4.0 - 6.0 %    Estimated Avg Glucose 258 mg/dL   Basic Metabolic Panel   Result Value Ref Range    Glucose 157 (H) 70 - 99 mg/dL    BUN 12 6 - 20 mg/dL    Creatinine 0.82 0.70 - 1.20 mg/dL    Calcium 9.0 8.6 - 10.4 mg/dL    Sodium 138 135 - 144 mmol/L    Potassium 4.0 3.7 - 5.3 mmol/L    Chloride 106 98 - 107 mmol/L    CO2 20 20 - 31 mmol/L    Anion Gap 12 9 - 17 mmol/L    GFR Non-African American >60 >60 mL/min    GFR African American >60 >60 mL/min    GFR Comment         Magnesium   Result Value Ref Range    Magnesium 2.1 1.6 - 2.6 mg/dL   Phosphorus   Result Value Ref Range    Phosphorus 2.2 (L) 2.5 - 4.5 mg/dL   Troponin   Result Value Ref Range    Troponin, High Sensitivity <6 0 - 22 ng/L   Basic Metabolic Panel   Result Value Ref Range    Glucose 217 (H) 70 - 99 mg/dL    BUN 11 6 - 20 mg/dL    Creatinine 0.77 0.70 - 1.20 mg/dL    Calcium 8.6 8.6 - 10.4 mg/dL    Sodium 136 135 - 144 mmol/L    Potassium 3.7 3.7 - 5.3 mmol/L    Chloride 103 98 - 107 mmol/L    CO2 21 20 - 31 mmol/L    Anion Gap 12 9 - 17 mmol/L    GFR Non-African American >60 >60 mL/min    GFR African American >60 >60 mL/min    GFR Comment         Magnesium   Result Value Ref Range    Magnesium 1.9 1.6 - 2.6 mg/dL   Phosphorus   Result Value Ref Range    Phosphorus 2.3 (L) 2.5 - 4.5 mg/dL   Urinalysis with Reflex to Culture    Specimen: Urine, clean catch   Result Value Ref Range    Color, UA Yellow Yellow    Turbidity UA Clear Clear    Glucose, Ur 3+ (A) NEGATIVE    Bilirubin Urine NEGATIVE NEGATIVE    Ketones, Urine LARGE (A) NEGATIVE    Specific Gravity, UA 1.035 (H) 1.005 - 1.030    Urine Hgb NEGATIVE NEGATIVE    pH, UA 6.0 5.0 - 8.0    Protein, UA 1+ (A) NEGATIVE    Urobilinogen, Urine Normal Normal    Nitrite, Urine NEGATIVE NEGATIVE    Leukocyte Esterase, Urine NEGATIVE NEGATIVE   Lipase   Result Value Ref Range    Lipase 10 (L) 13 - 60 U/L   Hepatic Function Panel   Result Value Ref Range    Albumin 4.7 3.5 - 5.2 g/dL    Alkaline Phosphatase 87 40 - 129 U/L    ALT 13 5 - 41 U/L    AST 19 <40 U/L    Total Bilirubin 0.45 0.3 - 1.2 mg/dL    Bilirubin, Direct 0.12 <0.31 mg/dL    Bilirubin, Indirect 0.33 0.00 - 1.00 mg/dL    Total Protein 7.5 6.4 - 8.3 g/dL    Albumin/Globulin Ratio 1.7 1.0 - 2.5   Microscopic Urinalysis   Result Value Ref Range    -          WBC, UA 0 TO 2 0 - 5 /HPF    RBC, UA None 0 - 4 /HPF    Casts UA  0 - 8 /LPF     2 TO 5 HYALINE Reference range defined for non-centrifuged specimen.     Epithelial Cells UA 0 TO 2 0 - 5 /HPF   Basic Metabolic Panel   Result Value Ref Range    Glucose 223 (H) 70 - 99 mg/dL    BUN 8 6 - 20 mg/dL    Creatinine 0.68 (L) 0.70 - 1.20 mg/dL    Calcium 8.8 8.6 - 10.4 mg/dL    Sodium 137 135 - 144 mmol/L    Potassium 3.6 (L) 3.7 - 5.3 mmol/L    Chloride 100 98 - 107 mmol/L    CO2 20 20 - 31 mmol/L    Anion Gap 17 9 - 17 mmol/L    GFR Non-African American >60 >60 mL/min    GFR African American >60 >60 mL/min    GFR Comment         Magnesium   Result Value Ref Range    Magnesium 1.9 1.6 - 2.6 mg/dL   Venous Blood Gas, POC   Result Value Ref Range    pH, [place] 7.355 7.320 - 7.430    pCO2, [place] 34.5 (L) 41.0 - 51.0 mm Hg    pO2, [place] 32.6 30.0 - 50.0 mm Hg    HCO3, Venous 19.2 (L) 22.0 - 29.0 mmol/L    Negative Base Excess, [place] 6 (H) 0.0 - 2.0    O2 Sat, [place] 61 60.0 - 85.0 %   Creatinine W/GFR Point of Care   Result Value Ref Range    POC Creatinine 0.88 0.51 - 1.19 mg/dL    GFR Comment >60 >60 mL/min    GFR Non-African American >60 >60 mL/min    GFR Comment         POCT urea (BUN)   Result Value Ref Range    POC BUN 15 8 - 26 mg/dL   POC Glucose Fingerstick   Result Value Ref Range    POC Glucose 250 (H) 75 - 110 mg/dL   POC Glucose Fingerstick   Result Value Ref Range    POC Glucose 234 (H) 75 - 110 mg/dL   EKG 12 Lead   Result Value Ref Range    Ventricular Rate 51 BPM    Atrial Rate 51 BPM    P-R Interval 144 ms    QRS Duration 102 ms    Q-T Interval 468 ms    QTc Calculation (Bazett) 431 ms    P Axis 74 degrees    R Axis -63 degrees    T Axis 23 degrees   EKG 12 Lead   Result Value Ref Range    Ventricular Rate 50 BPM    Atrial Rate 50 BPM    P-R Interval 146 ms    QRS Duration 104 ms    Q-T Interval 456 ms    QTc Calculation (Bazett) 415 ms    P Axis 71 degrees    R Axis -42 degrees    T Axis 29 degrees       IMPRESSION:    RADIOLOGY:  No orders to display         EKG      All EKG's are interpreted by the Emergency Department Physician who either signs or Co-signs this chart in the absence of a cardiologist.    EMERGENCY DEPARTMENT COURSE:  [facility], 22-year-old male presents emergency department with abdominal pain nausea due to DKA. The patient is a known type I diabetic with previous history of DKA. The patient's blood glucose being elevated at 241, and a mild decrease in pH on VBG 7.35. The patient was given 1 L of normal saline in the emergency department. The patient had a normal potassium at 4.3. Due to the patient's elevated glucose we ordered an insulin infusion. I spoke with [facility] and they will admit this patient for further observation and management of his DKA and possible cardiac work-up. Pancreatitis was on the differential but due to the normal lipase and pain presentation this is less likely. Cholecystitis, cholangitis, gallbladder colic is on the differential but is less likely due to the patient's known type 1 diabetes and pain presentation. ACS/MI is on the differential.We obtained an EKG which showed a possible Wellens pattern. Patient's repeat EKG showed similar Wellens pattern.   Troponins 7/27/2022  9:34 AM        START taking these medications    Details   ondansetron (ZOFRAN) 4 MG tablet Take 1 tablet by mouth 3 times daily as needed for Nausea or Vomiting, Disp-10 tablet, R-0Normal             Roberto Lanier MD  Emergency Medicine Resident    (Please note that portions of thisnote were completed with a voice recognition program.  Efforts were made to edit the dictations but occasionally words are mis-transcribed.)       Roberto Lanier MD  Resident  07/25/22 0013       Roberto Lanier MD  Resident  07/27/22 [id number]

## 2022-07-26 PROBLEM — E87.29 HIGH ANION GAP METABOLIC ACIDOSIS: Status: ACTIVE | Noted: 2017-03-08

## 2022-07-26 LAB
-: NORMAL
ALBUMIN SERPL-MCNC: 4.7 G/DL (ref 3.5–5.2)
ALBUMIN/GLOBULIN RATIO: 1.7 (ref 1–2.5)
ALP BLD-CCNC: 87 U/L (ref 40–129)
ALT SERPL-CCNC: 13 U/L (ref 5–41)
ANION GAP SERPL CALCULATED.3IONS-SCNC: 12 MMOL/L (ref 9–17)
ANION GAP SERPL CALCULATED.3IONS-SCNC: 12 MMOL/L (ref 9–17)
ANION GAP SERPL CALCULATED.3IONS-SCNC: 20 MMOL/L (ref 9–17)
ANION GAP SERPL CALCULATED.3IONS-SCNC: 27 MMOL/L (ref 9–17)
ANION GAP SERPL CALCULATED.3IONS-SCNC: 31 MMOL/L (ref 9–17)
AST SERPL-CCNC: 19 U/L
BILIRUB SERPL-MCNC: 0.45 MG/DL (ref 0.3–1.2)
BILIRUBIN DIRECT: 0.12 MG/DL
BILIRUBIN URINE: NEGATIVE
BILIRUBIN, INDIRECT: 0.33 MG/DL (ref 0–1)
BUN BLDV-MCNC: 11 MG/DL (ref 6–20)
BUN BLDV-MCNC: 12 MG/DL (ref 6–20)
BUN BLDV-MCNC: 14 MG/DL (ref 6–20)
BUN BLDV-MCNC: 15 MG/DL (ref 6–20)
BUN BLDV-MCNC: 16 MG/DL (ref 6–20)
CALCIUM SERPL-MCNC: 8.6 MG/DL (ref 8.6–10.4)
CALCIUM SERPL-MCNC: 9 MG/DL (ref 8.6–10.4)
CALCIUM SERPL-MCNC: 9 MG/DL (ref 8.6–10.4)
CALCIUM SERPL-MCNC: 9.2 MG/DL (ref 8.6–10.4)
CALCIUM SERPL-MCNC: 9.5 MG/DL (ref 8.6–10.4)
CASTS UA: NORMAL /LPF (ref 0–8)
CHLORIDE BLD-SCNC: 103 MMOL/L (ref 98–107)
CHLORIDE BLD-SCNC: 104 MMOL/L (ref 98–107)
CHLORIDE BLD-SCNC: 106 MMOL/L (ref 98–107)
CHLORIDE BLD-SCNC: 106 MMOL/L (ref 98–107)
CHLORIDE BLD-SCNC: 97 MMOL/L (ref 98–107)
CO2: 11 MMOL/L (ref 20–31)
CO2: 12 MMOL/L (ref 20–31)
CO2: 15 MMOL/L (ref 20–31)
CO2: 20 MMOL/L (ref 20–31)
CO2: 21 MMOL/L (ref 20–31)
COLOR: YELLOW
CREAT SERPL-MCNC: 0.77 MG/DL (ref 0.7–1.2)
CREAT SERPL-MCNC: 0.82 MG/DL (ref 0.7–1.2)
CREAT SERPL-MCNC: 0.9 MG/DL (ref 0.7–1.2)
CREAT SERPL-MCNC: 0.9 MG/DL (ref 0.7–1.2)
CREAT SERPL-MCNC: 0.97 MG/DL (ref 0.7–1.2)
EKG ATRIAL RATE: 50 BPM
EKG ATRIAL RATE: 51 BPM
EKG P AXIS: 71 DEGREES
EKG P AXIS: 74 DEGREES
EKG P-R INTERVAL: 144 MS
EKG P-R INTERVAL: 146 MS
EKG Q-T INTERVAL: 456 MS
EKG Q-T INTERVAL: 468 MS
EKG QRS DURATION: 102 MS
EKG QRS DURATION: 104 MS
EKG QTC CALCULATION (BAZETT): 415 MS
EKG QTC CALCULATION (BAZETT): 431 MS
EKG R AXIS: -42 DEGREES
EKG R AXIS: -63 DEGREES
EKG T AXIS: 23 DEGREES
EKG T AXIS: 29 DEGREES
EKG VENTRICULAR RATE: 50 BPM
EKG VENTRICULAR RATE: 51 BPM
EPITHELIAL CELLS UA: NORMAL /HPF (ref 0–5)
ESTIMATED AVERAGE GLUCOSE: 258 MG/DL
GFR AFRICAN AMERICAN: >60 ML/MIN
GFR NON-AFRICAN AMERICAN: >60 ML/MIN
GFR SERPL CREATININE-BSD FRML MDRD: ABNORMAL ML/MIN/{1.73_M2}
GLUCOSE BLD-MCNC: 143 MG/DL (ref 75–110)
GLUCOSE BLD-MCNC: 147 MG/DL (ref 75–110)
GLUCOSE BLD-MCNC: 157 MG/DL (ref 70–99)
GLUCOSE BLD-MCNC: 163 MG/DL (ref 75–110)
GLUCOSE BLD-MCNC: 168 MG/DL (ref 75–110)
GLUCOSE BLD-MCNC: 173 MG/DL (ref 75–110)
GLUCOSE BLD-MCNC: 178 MG/DL (ref 75–110)
GLUCOSE BLD-MCNC: 184 MG/DL (ref 75–110)
GLUCOSE BLD-MCNC: 187 MG/DL (ref 70–99)
GLUCOSE BLD-MCNC: 188 MG/DL (ref 75–110)
GLUCOSE BLD-MCNC: 190 MG/DL (ref 75–110)
GLUCOSE BLD-MCNC: 197 MG/DL (ref 75–110)
GLUCOSE BLD-MCNC: 199 MG/DL (ref 75–110)
GLUCOSE BLD-MCNC: 207 MG/DL (ref 75–110)
GLUCOSE BLD-MCNC: 208 MG/DL (ref 75–110)
GLUCOSE BLD-MCNC: 217 MG/DL (ref 70–99)
GLUCOSE BLD-MCNC: 241 MG/DL (ref 75–110)
GLUCOSE BLD-MCNC: 274 MG/DL (ref 75–110)
GLUCOSE BLD-MCNC: 277 MG/DL (ref 70–99)
GLUCOSE BLD-MCNC: 297 MG/DL (ref 75–110)
GLUCOSE BLD-MCNC: 331 MG/DL (ref 75–110)
GLUCOSE BLD-MCNC: 384 MG/DL (ref 75–110)
GLUCOSE BLD-MCNC: 427 MG/DL (ref 75–110)
GLUCOSE BLD-MCNC: 460 MG/DL (ref 70–99)
GLUCOSE URINE: ABNORMAL
HBA1C MFR BLD: 10.6 % (ref 4–6)
KETONES, URINE: ABNORMAL
LEUKOCYTE ESTERASE, URINE: NEGATIVE
LIPASE: 10 U/L (ref 13–60)
MAGNESIUM: 1.9 MG/DL (ref 1.6–2.6)
MAGNESIUM: 2 MG/DL (ref 1.6–2.6)
MAGNESIUM: 2 MG/DL (ref 1.6–2.6)
MAGNESIUM: 2.1 MG/DL (ref 1.6–2.6)
MAGNESIUM: 2.2 MG/DL (ref 1.6–2.6)
NITRITE, URINE: NEGATIVE
PH UA: 6 (ref 5–8)
PHOSPHORUS: 2.2 MG/DL (ref 2.5–4.5)
PHOSPHORUS: 2.3 MG/DL (ref 2.5–4.5)
PHOSPHORUS: 2.9 MG/DL (ref 2.5–4.5)
PHOSPHORUS: 4.2 MG/DL (ref 2.5–4.5)
PHOSPHORUS: 5.8 MG/DL (ref 2.5–4.5)
POTASSIUM SERPL-SCNC: 3.7 MMOL/L (ref 3.7–5.3)
POTASSIUM SERPL-SCNC: 4 MMOL/L (ref 3.7–5.3)
POTASSIUM SERPL-SCNC: 4.5 MMOL/L (ref 3.7–5.3)
POTASSIUM SERPL-SCNC: 4.6 MMOL/L (ref 3.7–5.3)
POTASSIUM SERPL-SCNC: 4.8 MMOL/L (ref 3.7–5.3)
PROTEIN UA: ABNORMAL
RBC UA: NORMAL /HPF (ref 0–4)
S PYO AG THROAT QL: NEGATIVE
SODIUM BLD-SCNC: 136 MMOL/L (ref 135–144)
SODIUM BLD-SCNC: 138 MMOL/L (ref 135–144)
SODIUM BLD-SCNC: 140 MMOL/L (ref 135–144)
SODIUM BLD-SCNC: 141 MMOL/L (ref 135–144)
SODIUM BLD-SCNC: 142 MMOL/L (ref 135–144)
SOURCE: NORMAL
SPECIFIC GRAVITY UA: 1.03 (ref 1–1.03)
TOTAL PROTEIN: 7.5 G/DL (ref 6.4–8.3)
TROPONIN, HIGH SENSITIVITY: <6 NG/L (ref 0–22)
TURBIDITY: CLEAR
URINE HGB: NEGATIVE
UROBILINOGEN, URINE: NORMAL
WBC UA: NORMAL /HPF (ref 0–5)

## 2022-07-26 PROCEDURE — 6370000000 HC RX 637 (ALT 250 FOR IP): Performed by: EMERGENCY MEDICINE

## 2022-07-26 PROCEDURE — 99223 1ST HOSP IP/OBS HIGH 75: CPT | Performed by: INTERNAL MEDICINE

## 2022-07-26 PROCEDURE — 83735 ASSAY OF MAGNESIUM: CPT

## 2022-07-26 PROCEDURE — 6370000000 HC RX 637 (ALT 250 FOR IP): Performed by: STUDENT IN AN ORGANIZED HEALTH CARE EDUCATION/TRAINING PROGRAM

## 2022-07-26 PROCEDURE — 84100 ASSAY OF PHOSPHORUS: CPT

## 2022-07-26 PROCEDURE — 2500000003 HC RX 250 WO HCPCS: Performed by: INTERNAL MEDICINE

## 2022-07-26 PROCEDURE — G0108 DIAB MANAGE TRN  PER INDIV: HCPCS

## 2022-07-26 PROCEDURE — 2060000000 HC ICU INTERMEDIATE R&B

## 2022-07-26 PROCEDURE — 2580000003 HC RX 258: Performed by: INTERNAL MEDICINE

## 2022-07-26 PROCEDURE — 2580000003 HC RX 258

## 2022-07-26 PROCEDURE — 80076 HEPATIC FUNCTION PANEL: CPT

## 2022-07-26 PROCEDURE — 6360000002 HC RX W HCPCS: Performed by: NURSE PRACTITIONER

## 2022-07-26 PROCEDURE — 83690 ASSAY OF LIPASE: CPT

## 2022-07-26 PROCEDURE — 87880 STREP A ASSAY W/OPTIC: CPT

## 2022-07-26 PROCEDURE — 36415 COLL VENOUS BLD VENIPUNCTURE: CPT

## 2022-07-26 PROCEDURE — A4216 STERILE WATER/SALINE, 10 ML: HCPCS | Performed by: INTERNAL MEDICINE

## 2022-07-26 PROCEDURE — 2580000003 HC RX 258: Performed by: EMERGENCY MEDICINE

## 2022-07-26 PROCEDURE — 80048 BASIC METABOLIC PNL TOTAL CA: CPT

## 2022-07-26 PROCEDURE — 2580000003 HC RX 258: Performed by: NURSE PRACTITIONER

## 2022-07-26 PROCEDURE — 6370000000 HC RX 637 (ALT 250 FOR IP): Performed by: NURSE PRACTITIONER

## 2022-07-26 PROCEDURE — 84484 ASSAY OF TROPONIN QUANT: CPT

## 2022-07-26 PROCEDURE — 83036 HEMOGLOBIN GLYCOSYLATED A1C: CPT

## 2022-07-26 PROCEDURE — 81001 URINALYSIS AUTO W/SCOPE: CPT

## 2022-07-26 PROCEDURE — 2500000003 HC RX 250 WO HCPCS: Performed by: NURSE PRACTITIONER

## 2022-07-26 RX ORDER — INSULIN GLARGINE 100 [IU]/ML
25 INJECTION, SOLUTION SUBCUTANEOUS 2 TIMES DAILY
Status: DISCONTINUED | OUTPATIENT
Start: 2022-07-26 | End: 2022-07-27 | Stop reason: HOSPADM

## 2022-07-26 RX ORDER — METOCLOPRAMIDE HYDROCHLORIDE 5 MG/ML
10 INJECTION INTRAMUSCULAR; INTRAVENOUS ONCE
Status: COMPLETED | OUTPATIENT
Start: 2022-07-26 | End: 2022-07-26

## 2022-07-26 RX ORDER — HYDROCODONE BITARTRATE AND ACETAMINOPHEN 5; 325 MG/1; MG/1
1 TABLET ORAL ONCE
Status: COMPLETED | OUTPATIENT
Start: 2022-07-26 | End: 2022-07-26

## 2022-07-26 RX ORDER — ONDANSETRON 2 MG/ML
4 INJECTION INTRAMUSCULAR; INTRAVENOUS EVERY 6 HOURS PRN
Status: DISCONTINUED | OUTPATIENT
Start: 2022-07-26 | End: 2022-07-27 | Stop reason: HOSPADM

## 2022-07-26 RX ORDER — ENOXAPARIN SODIUM 100 MG/ML
40 INJECTION SUBCUTANEOUS DAILY
Status: DISCONTINUED | OUTPATIENT
Start: 2022-07-26 | End: 2022-07-27 | Stop reason: HOSPADM

## 2022-07-26 RX ORDER — POTASSIUM CHLORIDE 7.45 MG/ML
10 INJECTION INTRAVENOUS PRN
Status: DISCONTINUED | OUTPATIENT
Start: 2022-07-26 | End: 2022-07-27

## 2022-07-26 RX ORDER — INSULIN LISPRO 100 [IU]/ML
0-4 INJECTION, SOLUTION INTRAVENOUS; SUBCUTANEOUS NIGHTLY
Status: DISCONTINUED | OUTPATIENT
Start: 2022-07-26 | End: 2022-07-27

## 2022-07-26 RX ORDER — POLYETHYLENE GLYCOL 3350 17 G/17G
17 POWDER, FOR SOLUTION ORAL DAILY PRN
Status: DISCONTINUED | OUTPATIENT
Start: 2022-07-26 | End: 2022-07-27 | Stop reason: HOSPADM

## 2022-07-26 RX ORDER — PROMETHAZINE HYDROCHLORIDE 25 MG/ML
25 INJECTION, SOLUTION INTRAMUSCULAR; INTRAVENOUS ONCE
Status: COMPLETED | OUTPATIENT
Start: 2022-07-26 | End: 2022-07-26

## 2022-07-26 RX ORDER — INSULIN LISPRO 100 [IU]/ML
0-8 INJECTION, SOLUTION INTRAVENOUS; SUBCUTANEOUS
Status: DISCONTINUED | OUTPATIENT
Start: 2022-07-26 | End: 2022-07-27

## 2022-07-26 RX ORDER — SODIUM CHLORIDE 450 MG/100ML
INJECTION, SOLUTION INTRAVENOUS CONTINUOUS
Status: DISCONTINUED | OUTPATIENT
Start: 2022-07-26 | End: 2022-07-27

## 2022-07-26 RX ORDER — DEXTROSE AND SODIUM CHLORIDE 5; .45 G/100ML; G/100ML
INJECTION, SOLUTION INTRAVENOUS CONTINUOUS PRN
Status: DISCONTINUED | OUTPATIENT
Start: 2022-07-26 | End: 2022-07-27

## 2022-07-26 RX ORDER — 0.9 % SODIUM CHLORIDE 0.9 %
15 INTRAVENOUS SOLUTION INTRAVENOUS ONCE
Status: COMPLETED | OUTPATIENT
Start: 2022-07-26 | End: 2022-07-26

## 2022-07-26 RX ORDER — MAGNESIUM SULFATE 1 G/100ML
1000 INJECTION INTRAVENOUS PRN
Status: DISCONTINUED | OUTPATIENT
Start: 2022-07-26 | End: 2022-07-27 | Stop reason: HOSPADM

## 2022-07-26 RX ADMIN — SODIUM CHLORIDE, PRESERVATIVE FREE 20 MG: 5 INJECTION INTRAVENOUS at 22:38

## 2022-07-26 RX ADMIN — SODIUM CHLORIDE 953 ML: 9 INJECTION, SOLUTION INTRAVENOUS at 02:14

## 2022-07-26 RX ADMIN — SODIUM CHLORIDE 0.1 UNITS/KG/HR: 9 INJECTION, SOLUTION INTRAVENOUS at 02:51

## 2022-07-26 RX ADMIN — SODIUM CHLORIDE, PRESERVATIVE FREE 20 MG: 5 INJECTION INTRAVENOUS at 09:01

## 2022-07-26 RX ADMIN — HYDROCODONE BITARTRATE AND ACETAMINOPHEN 1 TABLET: 5; 325 TABLET ORAL at 19:24

## 2022-07-26 RX ADMIN — METOCLOPRAMIDE 10 MG: 5 INJECTION, SOLUTION INTRAMUSCULAR; INTRAVENOUS at 19:35

## 2022-07-26 RX ADMIN — INSULIN GLARGINE 25 UNITS: 100 INJECTION, SOLUTION SUBCUTANEOUS at 18:41

## 2022-07-26 RX ADMIN — SODIUM PHOSPHATE, MONOBASIC, MONOHYDRATE 15 MMOL: 276; 142 INJECTION, SOLUTION INTRAVENOUS at 18:34

## 2022-07-26 RX ADMIN — ENOXAPARIN SODIUM 40 MG: 100 INJECTION SUBCUTANEOUS at 09:02

## 2022-07-26 RX ADMIN — DEXTROSE AND SODIUM CHLORIDE: 5; .45 INJECTION, SOLUTION INTRAVENOUS at 08:11

## 2022-07-26 RX ADMIN — DEXTROSE MONOHYDRATE 100 ML/HR: 50 INJECTION, SOLUTION INTRAVENOUS at 03:20

## 2022-07-26 RX ADMIN — PROMETHAZINE HYDROCHLORIDE 25 MG: 25 INJECTION INTRAMUSCULAR; INTRAVENOUS at 02:27

## 2022-07-26 RX ADMIN — DEXTROSE AND SODIUM CHLORIDE: 5; 450 INJECTION, SOLUTION INTRAVENOUS at 08:11

## 2022-07-26 RX ADMIN — SODIUM CHLORIDE 250 ML/HR: 4.5 INJECTION, SOLUTION INTRAVENOUS at 03:51

## 2022-07-26 ASSESSMENT — PAIN DESCRIPTION - ORIENTATION: ORIENTATION: ANTERIOR

## 2022-07-26 ASSESSMENT — PAIN DESCRIPTION - DESCRIPTORS: DESCRIPTORS: ACHING;CRAMPING;DISCOMFORT

## 2022-07-26 ASSESSMENT — PAIN SCALES - GENERAL
PAINLEVEL_OUTOF10: 5
PAINLEVEL_OUTOF10: 0

## 2022-07-26 ASSESSMENT — PAIN DESCRIPTION - LOCATION: LOCATION: ABDOMEN

## 2022-07-26 NOTE — H&P
[facility]  Office: [address], DO, [facility], [facility], Santi Gore, DO, Manjula David Blood, DO, Adry Raymundo MD, David Cobos MD, Berta Mccabe MD, Barbara Mcguire MD,  Sudha Clarke MD, Lazarus Moats, MD, Cass Blanco, DO, Latisha Hutton MD,  Pavel Guan MD, Jamel Fallon MD, Una Pearson, DO, Hope Lopez MD, Ashley Byrne MD, Kameron Patel MD, Todd Wasserman DO, Pal Alas MD, Jillain Cwoart MD, Ingris Hernandes, CNP,  Oliva Martinez, CNP, Whit Tanner, CNP, John Gentile, CNP, Norma Saenz PAJennaC, Asmita Dubon, [facility], Naseem Rao, CNP, [place], CNP, Hannah Acosta, CNP, Lisy Bazan, CNP, Deandre Goldstein, CNP, Jaydon Hillman, CNS, Marlin Anna, [facility], Elodia Alcazar, CNP, Toyin Agrawal, CNP, Hannah Ames, [facility]   [address] [facility]    HISTORY AND PHYSICAL EXAMINATION            Date:   7/26/2022  Patient name:  Pankaj Villeda  Date of admission:  7/25/2022  6:27 PM  MRN:   [id number]  Account:  [de-identified]  YOB: 2000  PCP:    Rivas Nugent MD  Room:   [facility]/4538-  Code Status:    Full Code    Chief Complaint:     \"I was feeling really bad\"    History Obtained From:     patient    History of Present Illness:     Pankaj Villeda is a 19 y.o.  male with history of diabetes 1 with prior DKA who presents with n/v/abdominal pain and is admitted to the hospital for the management of DKA, type 1, not at goal [facility]). Patient describes abrupt onset of symptoms when he awoke yesterday morning around 8 AM with acute nausea vomiting with epigastric abdominal pain and midsternal chest pain, patient states the chest pain and abdominal pain were sharp quality waxing and waning in intensity with persistent symptoms all yesterday. Status post multiple episodes of emesis at least 5-10 times yesterday. Patient denies any hematemesis.   After vomiting multiple times patient did develop worsening sore throat. Denies fevers or chills. Denies URI symptoms, no cough congestion pleurisy. Denies positional changes. Denies history of GI bleed or ulcers. Denies dental caries. Denies sick contacts. Denies travel. Denies urinary frequency urgency    Patient states up until 2 years ago he had recurrent frequent episodes of DKA however sugars have been better controlled over the past year to year and a half. He follows with Dr. Alfredo Moreno however he did miss an appointment yesterday with his symptoms. He denies any medication changes. He denies any missed insulin use. Past Medical History:     Past Medical History:   Diagnosis Date    Diabetes mellitus ([facility] [address]     Gastroesophageal reflux disease     Type 1 diabetes mellitus ([facility] [address] April 2013        Past Surgical History:     No past surgical history on file. Denies prior surgery    Medications Prior to Admission:     Prior to Admission medications    Medication Sig Start Date End Date Taking? Authorizing Provider   Continuous Blood Gluc Sensor (FREESTYLE MALKA SENSOR SYSTEM) [facility] Type 1 DM. Check blood sugars 5-6/day 3/10/22   Kang Cha MD   blood glucose monitor strips Test 3 times times a day & as needed for symptoms of irregular blood glucose. 3/1/22   Poncho Walker MD   blood glucose monitor kit and supplies Dispense sufficient amount for indicated testing frequency plus additional to accommodate PRN testing needs.  3/1/22   Poncho Walker MD   Lancets MISC 1 each by Does not apply route daily Use 3 times times daily 3/1/22   Poncho Walker MD   insulin aspart (NOVOLOG FLEXPEN) 100 UNIT/ML injection pen Inject 1-11 Units into the skin 3 times daily (before meals) 1 unit per 15 grams CHO for meals and snacks  Sliding scale: =0, 125-150=1, 151-175=2, 175-200=3, 201-225=4, 226-250=5, 251-275=6, 276-300=7, 301-325=8, 326-350=9, 351-375=10, 376-400=11 3/1/22   Poncho Walker MD   insulin glargine (LANTUS SOLOSTAR) 100 UNIT/ML injection pen Inject 25 Units into the skin 2 times daily 3/1/22   Jesica Mcdaniel MD   Continuous Blood Gluc  (FREESTYLE MALKA 14 DAY READER) JAYCE Check blood sugars 5-6/day.  Type 1 DM 3/1/22   Jesica Mcdaniel MD   blood glucose test strips (ONE TOUCH ULTRA TEST) strip Sig: For blood testing 4-6 times/day  Patient not taking: No sig reported 8/17/21   Luis Rogers MD   Glucagon, rDNA, (GLUCAGON EMERGENCY) 1 MG KIT As directed for extreme hypoglycemia  Patient not taking: No sig reported 8/17/21   Luis Rogers MD   Lancets MISC 1 each by Does not apply route 4 times daily (before meals and nightly)  Patient not taking: No sig reported 6/29/21   Tori Kelly MD   Ketone Blood Test STRP 1 strip by Does not apply route daily as needed (for high blood sugar)  Patient not taking: No sig reported 6/29/21   Tori Kelly MD   Insulin Pen Needle 32G X 6 MM MISC Use 2 times daily with insulin 6/29/21   Tori Kelly MD   insulin regular (HUMULIN R;NOVOLIN R) 100 UNIT/ML injection insulin U-100 regular human 100 unit/mL injection solution   administered 5 units of Humalin R in clinic for POC BG >500  Patient not taking: No sig reported    Historical Provider, MD   INSULIN SYRINGE 1CC/29G (AIMSCO INSULIN SYR ULTRA THIN) 29G X 1/2\" 1 ML MISC 1 each by Does not apply route daily  Patient not taking: No sig reported 1/5/21   [address] [place] [address] MD Ashlee   Insulin Pen Needle 30G X 5 MM MISC 1 Device by Does not apply route 4 times daily  Patient not taking: No sig reported 12/22/20   Leander Barraza, APRN - CNP   acetaminophen (TYLENOL) 325 MG tablet Take 2 tablets by mouth every 6 hours as needed for Pain  Patient not taking: No sig reported 10/2/20   Rajiv Smith MD   EPINEPHrine (Shannon Ano 2-ITALO) 0.15 MG/0.3ML SOAJ Use as directed for allergic reaction 10/2/20   Rajiv Smith MD   Insulin Pen Needle 30G X 5 MM MISC 1 Device by Does not apply route 4 times daily 10/2/20 Joao Renteria MD        Allergies:     Patient has no known allergies. Social History:     Tobacco:    reports that he has been smoking cigarettes. He has a 0.50 pack-year smoking history. He has never used smokeless tobacco.  Alcohol:      reports current alcohol use of about 10.0 standard drinks per week. Drug Use:  reports current drug use. Drug: Marijuana Josemanuel Blow). Patient denies any illegal drugs, denies tobacco, he does drink alcohol on occasion but denies excessive binge drinking, denies travel or sick contacts    Family History:     Family History   Problem Relation Age of Onset    Asthma Father     Asthma Sister     Asthma Brother    Denies family history of premature coronary disease DVT PE    Review of Systems:     Positive and Negative as described in HPI. Review of Systems  Patient states that sugars are usually 110s to 120s. He denies any recent antibiotics or issues with infection. He denies any prior issues with thrush. Denies any skin infections in the past.  Denies myalgias or arthralgias. Denies easy bruising or bleeding issues. Denies history of complications from diabetes no neuropathy retinopathy or wound infections. Denies history of asthma COPD or respiratory symptoms. Denies prior cardiac work-up, but does have a known heart murmur. Denies palpitations, irregular heartbeat. Denies near syncope collapse. Denies positional changes associated chest pain. Denies lower extremity edema. Denies acute vision changes .   review of systems otherwise -12 system reviewed and otherwise negative    Physical Exam:   BP (!) 142/82   Pulse 79   Temp 97.9 °F (36.6 °C) (Temporal)   Resp 14   Ht 5' 11\" (1.803 m)   Wt 140 lb (63.5 kg)   SpO2 97%   BMI 19.53 kg/m²   Temp (24hrs), Av.9 °F (36.6 °C), Min:97.8 °F (36.6 °C), Max:98 °F (36.7 °C)    Recent Labs     22  0039 22  0347 22  0501   POCGLU 252* 427* 384* 297*       Intake/Output Summary (Last  hours) at 7/26/2022 0553  Last data filed at 7/26/2022 0530  Gross per 24 hour   Intake 2258 ml   Output --   Net 2258 ml       Physical Exam  General sleeping but arouses easily, appropriate upon awakening pleasant soft-spoken  Eye exam pupils equally round reactive sclera nonicteric normal horizontal gaze  ENT oral pharynx with moist mucous membranes face symmetric neck supple no posterior oropharyngeal exudate but limited exam, adequate dentition  Cardiac regular rate and rhythm nl S1-S2. Systolic murmur heard at the apex radiating to the axilla.   No other rubs or gallops no JVD  Lungs clear bilaterally with adequate air exchange nonlabored no tachypnea  GI soft scaphoid nontender nondistended bowel sounds present  Vascular intact dorsalis pedis posterior tibialis no edema  Derm prior scars on lower extremities well-healed, adequate foot hygiene, no infected wounds ulcers lesions but limited evaluation patient dressed  Musculoskeletal passive range of motion of upper and lower limbs unremarkable no reproduction of symptoms with palpation of chest wall no synovitis or joint effusions  Neuro nonfocal normal speech and cognition strength symmetric in upper and lower limbs  Investigations:      Laboratory Testing:  Recent Results (from the past 24 hour(s))   CMP    Collection Time: 07/25/22  7:33 PM   Result Value Ref Range    Glucose 241 (H) 70 - 99 mg/dL    BUN 16 6 - 20 mg/dL    Creatinine 0.81 0.70 - 1.20 mg/dL    Calcium 10.0 8.6 - 10.4 mg/dL    Sodium 139 135 - 144 mmol/L    Potassium 4.3 3.7 - 5.3 mmol/L    Chloride 96 (L) 98 - 107 mmol/L    CO2 18 (L) 20 - 31 mmol/L    Anion Gap 25 (H) 9 - 17 mmol/L    Alkaline Phosphatase 98 40 - 129 U/L    ALT 14 5 - 41 U/L    AST 18 <40 U/L    Total Bilirubin 0.56 0.3 - 1.2 mg/dL    Total Protein 8.2 6.4 - 8.3 g/dL    Albumin 5.3 (H) 3.5 - 5.2 g/dL    Albumin/Globulin Ratio 1.8 1.0 - 2.5    GFR Non-African American >60 >60 mL/min    GFR African American >60 >60 mL/min GFR Comment         CBC with Auto Differential    Collection Time: 07/25/22  7:33 PM   Result Value Ref Range    WBC 6.8 3.5 - 11.3 k/uL    RBC 4.98 4.21 - 5.77 m/uL    Hemoglobin 14.9 13.0 - 17.0 g/dL    Hematocrit 43.2 40.7 - 50.3 %    MCV 86.7 82.6 - 102.9 fL    MCH 29.9 25.2 - 33.5 pg    MCHC 34.5 28.4 - 34.8 g/dL    RDW 12.5 11.8 - 14.4 %    Platelets 889 939 - 472 k/uL    MPV 12.1 8.1 - 13.5 fL    NRBC Automated 0.0 0.0 per 100 WBC    Immature Granulocytes 0 0 %    Seg Neutrophils 83 (H) 36 - 66 %    Lymphocytes 12 (L) 24 - 44 %    Monocytes 5 1 - 7 %    Eosinophils % 0 (L) 1 - 4 %    Basophils 0 0 - 2 %    Absolute Immature Granulocyte 0.00 0.00 - 0.30 k/uL    Segs Absolute 5.64 1.8 - 7.7 k/uL    Absolute Lymph # 0.82 (L) 1.0 - 4.8 k/uL    Absolute Mono # 0.34 0.1 - 0.8 k/uL    Absolute Eos # 0.00 0.0 - 0.4 k/uL    Basophils Absolute 0.00 0.0 - 0.2 k/uL    Morphology Normal    Lipase    Collection Time: 07/25/22  7:33 PM   Result Value Ref Range    Lipase 12 (L) 13 - 60 U/L   Beta-Hydroxybutyrate    Collection Time: 07/25/22  7:33 PM   Result Value Ref Range    Beta-Hydroxybutyrate 5.15 (H) 0.02 - 0.27 mmol/L   Magnesium    Collection Time: 07/25/22  7:33 PM   Result Value Ref Range    Magnesium 2.3 1.6 - 2.6 mg/dL   Phosphorus    Collection Time: 07/25/22  7:33 PM   Result Value Ref Range    Phosphorus 3.4 2.5 - 4.5 mg/dL   Troponin    Collection Time: 07/25/22  7:33 PM   Result Value Ref Range    Troponin, High Sensitivity <6 0 - 22 ng/L   EKG 12 Lead    Collection Time: 07/25/22  7:39 PM   Result Value Ref Range    Ventricular Rate 51 BPM    Atrial Rate 51 BPM    P-R Interval 144 ms    QRS Duration 102 ms    Q-T Interval 468 ms    QTc Calculation (Bazett) 431 ms    P Axis 74 degrees    R Axis -63 degrees    T Axis 23 degrees   Venous Blood Gas, POC    Collection Time: 07/25/22  8:29 PM   Result Value Ref Range    pH, [place] 7.355 7.320 - 7.430    pCO2, [place] 34.5 (L) 41.0 - 51.0 mm Hg    pO2, [place] 32.6 30.0 - 50.0 mm Hg    HCO3, Venous 19.2 (L) 22.0 - 29.0 mmol/L    Negative Base Excess, [place] 6 (H) 0.0 - 2.0    O2 Sat, [place] 61 60.0 - 85.0 %   ELECTROLYTES PLUS    Collection Time: 07/25/22  8:29 PM   Result Value Ref Range    POC Sodium 142 138 - 146 mmol/L    POC Potassium 4.3 3.5 - 4.5 mmol/L    POC Chloride 111 (H) 98 - 107 mmol/L    POC TCO2 20 (L) 22 - 30 mmol/L    Anion Gap 13 7 - 16 mmol/L   Hemoglobin and hematocrit, blood    Collection Time: 07/25/22  8:29 PM   Result Value Ref Range    POC Hemoglobin 14.1 13.5 - 17.5 g/dL    POC Hematocrit 42 41 - 53 %   Creatinine W/GFR Point of Care    Collection Time: 07/25/22  8:29 PM   Result Value Ref Range    POC Creatinine 0.88 0.51 - 1.19 mg/dL    GFR Comment >60 >60 mL/min    GFR Non-African American >60 >60 mL/min    GFR Comment         CALCIUM, IONIC (POC)    Collection Time: 07/25/22  8:29 PM   Result Value Ref Range    POC Ionized Calcium 1.07 (L) 1.15 - 1.33 mmol/L   POCT urea (BUN)    Collection Time: 07/25/22  8:29 PM   Result Value Ref Range    POC BUN 15 8 - 26 mg/dL   Lactic Acid, POC    Collection Time: 07/25/22  8:29 PM   Result Value Ref Range    POC Lactic Acid 2.21 (H) 0.56 - 1.39 mmol/L   POCT Glucose    Collection Time: 07/25/22  8:29 PM   Result Value Ref Range    POC Glucose 252 (H) 74 - 100 mg/dL   EKG 12 Lead    Collection Time: 07/25/22  9:22 PM   Result Value Ref Range    Ventricular Rate 50 BPM    Atrial Rate 50 BPM    P-R Interval 146 ms    QRS Duration 104 ms    Q-T Interval 456 ms    QTc Calculation (Bazett) 415 ms    P Axis 71 degrees    R Axis -42 degrees    T Axis 29 degrees   POC Glucose Fingerstick    Collection Time: 07/26/22 12:39 AM   Result Value Ref Range    POC Glucose 427 (HH) 75 - 110 mg/dL   Basic Metabolic Panel    Collection Time: 07/26/22  1:02 AM   Result Value Ref Range    Glucose 460 (HH) 70 - 99 mg/dL    BUN 16 6 - 20 mg/dL    Creatinine 0.90 0.70 - 1.20 mg/dL    Calcium 9.5 8.6 - 10.4 mg/dL    Sodium 140 135 - 144 mmol/L Potassium 4.8 3.7 - 5.3 mmol/L    Chloride 97 (L) 98 - 107 mmol/L    CO2 12 (L) 20 - 31 mmol/L    Anion Gap 31 (H) 9 - 17 mmol/L    GFR Non-African American >60 >60 mL/min    GFR African American >60 >60 mL/min    GFR Comment         Magnesium    Collection Time: 07/26/22  1:02 AM   Result Value Ref Range    Magnesium 2.0 1.6 - 2.6 mg/dL   Phosphorus    Collection Time: 07/26/22  1:02 AM   Result Value Ref Range    Phosphorus 5.8 (H) 2.5 - 4.5 mg/dL   Troponin    Collection Time: 07/26/22  1:02 AM   Result Value Ref Range    Troponin, High Sensitivity <6 0 - 22 ng/L   POC Glucose Fingerstick    Collection Time: 07/26/22  3:47 AM   Result Value Ref Range    POC Glucose 384 (H) 75 - 110 mg/dL   POC Glucose Fingerstick    Collection Time: 07/26/22  5:01 AM   Result Value Ref Range    POC Glucose 297 (H) 75 - 110 mg/dL       Imaging/Diagnostics:  No results found. Assessment :      Hospital Problems             Last Modified POA    * (Principal) DKA, type 1, not at goal [facility]) 7/25/2022 Yes    Pharyngitis 7/26/2022 Yes    High anion gap metabolic acidosis 1/89/5717 Yes    Type 1 diabetes mellitus ([address] 7/26/2022 Yes       Plan:     Patient status inpatient in the Progressive Unit/Step down    Type 1 diabetes with acute DKA. Suspect concern for noncompliance with insulin with glycemic excursion. Continue insulin drip. Preliminary tests otherwise unremarkable. Check urinalysis. Consider further work-up if persistent symptoms  Atypical chest pain/acute epigastric abdominal pain with nausea and vomiting likely associated with DKA. Consider further GI work-up if persistent symptoms. GI prophylaxis  High anion gap metabolic acidosis with electrolyte disturbance associated with DKA. Continue to optimize electrolytes and monitor closely with correction of DKA  Pharyngitis possibly related to recent intractable nausea vomiting. Will check strep screen.   Consider further imaging if symptoms persist  Cardiac murmur/suspect MR. Will need nonurgent outpatient cardiology evaluation, echocardiogram.      Anticipate likely discharge in 2 days contingent on clinical progress. Questions and concerns and treatment plan discussed with patient  Consultations:   IP CONSULT TO HOSPITALIST  IP CONSULT TO DIABETES EDUCATOR    Patient is admitted as inpatient status because of co-morbidities listed above, severity of signs and symptoms as outlined, requirement for current medical therapies and most importantly because of direct risk to patient if care not provided in a hospital setting. Expected length of stay > 48 hours.     Joe Galaviz MD  7/26/2022  5:53 AM    Copy sent to Dr. Mirta Davis MD

## 2022-07-26 NOTE — ED PROVIDER NOTES
[facility] [address] [facility]  Emergency Department  Emergency Medicine Resident Sign-out     Care of Garth Lombard was assumed from Dr. Shy Granados and is being seen for No chief complaint on file. .  The patient's initial evaluation and plan have been discussed with the prior provider who initially evaluated the patient.      EMERGENCY DEPARTMENT COURSE / MEDICAL DECISION MAKING:       MEDICATIONS GIVEN:  Orders Placed This Encounter   Medications    0.9 % sodium chloride bolus    0.9 % sodium chloride bolus    metoclopramide (REGLAN) injection 10 mg    diphenhydrAMINE (BENADRYL) injection 25 mg    glucose chewable tablet 16 g    OR Linked Order Group     dextrose bolus 10% 125 mL     dextrose bolus 10% 250 mL    glucagon (rDNA) injection 1 mg    dextrose 5 % solution    insulin regular (HUMULIN R;NOVOLIN R) 100 Units in sodium chloride 0.9 % 100 mL infusion       LABS / RADIOLOGY:     Labs Reviewed   COMPREHENSIVE METABOLIC PANEL - Abnormal; Notable for the following components:       Result Value    Glucose 241 (*)     Chloride 96 (*)     CO2 18 (*)     Anion Gap 25 (*)     Albumin 5.3 (*)     All other components within normal limits   CBC WITH AUTO DIFFERENTIAL - Abnormal; Notable for the following components:    Seg Neutrophils 83 (*)     Lymphocytes 12 (*)     Eosinophils % 0 (*)     Absolute Lymph # 0.82 (*)     All other components within normal limits   LIPASE - Abnormal; Notable for the following components:    Lipase 12 (*)     All other components within normal limits   BETA-HYDROXYBUTYRATE - Abnormal; Notable for the following components:    Beta-Hydroxybutyrate 5.15 (*)     All other components within normal limits   ELECTROLYTES PLUS - Abnormal; Notable for the following components:    POC Chloride 111 (*)     POC TCO2 20 (*)     All other components within normal limits   CALCIUM, IONIC (POC) - Abnormal; Notable for the following components:    POC Ionized Calcium 1.07 (*)     All other components within normal limits   VENOUS BLOOD GAS, POINT OF CARE - Abnormal; Notable for the following components:    pCO2, [place] 34.5 (*)     HCO3, Venous 19.2 (*)     Negative Base Excess, [place] 6 (*)     All other components within normal limits   LACTIC ACID,POINT OF CARE - Abnormal; Notable for the following components:    POC Lactic Acid 2.21 (*)     All other components within normal limits   POCT GLUCOSE - Abnormal; Notable for the following components:    POC Glucose 252 (*)     All other components within normal limits   MAGNESIUM   PHOSPHORUS   HGB/HCT   URINALYSIS   TROPONIN   TROPONIN   CREATININE W/GFR POINT OF CARE   UREA N (POC)   POC BLOOD GAS   POCT GLUCOSE   POCT GLUCOSE   POCT GLUCOSE   POCT GLUCOSE   POCT GLUCOSE   POCT GLUCOSE   POCT GLUCOSE   POCT GLUCOSE   POCT GLUCOSE   POCT GLUCOSE   POCT GLUCOSE   POCT GLUCOSE   POCT GLUCOSE   POCT GLUCOSE       No orders to display       RECENT VITALS:     Temp: 97.8 °F (36.6 °C),  Heart Rate: 55, Resp: 16, BP: (!) 150/68, SpO2: 100 %    This patient is a 19 y.o. Male with abdominal pain, nausea and vomiting. Patient found to be in DKA during work-up. Today. Insulin and fluids initiated. Patient also found to have EKG changes that were concerning for [facility]. Troponins are pending and will need following up, otherwise patient is admitted to [facility] for DKA treatment. He has remained vitally stable here. History includes type 1 diabetes and previous DKA    ED Course as of 07/27/22 2213 Mon Jul 25, 2022 1944 EKG shows possible [facility] pattern EKG will be repeated in 15 min. LAD  Sinus bradycardia [WK]   2231 Trop normal [WK]   2322 Repeat EKG shows unchanged biphasic T waves [WK]      ED Course User Index  [WK] Ariadna Dockery, DO     EKG similar to previous EKGs with exception of larger biphasic T waves and concerns for ST elevation in V2.    OUTSTANDING TASKS / RECOMMENDATIONS:      Follow up trop  Await bed placement       FINAL IMPRESSION:     1.

## 2022-07-26 NOTE — ED PROVIDER NOTES
[facility]     Emergency Department     Faculty Note/ Attestation      Pt Name: Sergio Rose                                       MRN: [id number]  [place] 2000  Date of evaluation: 7/25/2022    Patients PCP:    Jessica Kurtz MD    Attestation  I performed a history and physical examination of the patient and discussed management with the resident. I reviewed the residents note and agree with the documented findings and plan of care. Any areas of disagreement are noted on the chart. I was personally present for the key portions of any procedures. I have documented in the chart those procedures where I was not present during the key portions. I have reviewed the emergency nurses triage note. I agree with the chief complaint, past medical history, past surgical history, allergies, medications, social and family history as documented unless otherwise noted below. For Physician Assistant/ Nurse Practitioner cases/documentation I have personally evaluated this patient and have completed at least one if not all key elements of the E/M (history, physical exam, and MDM). Additional findings are as noted. Initial Screens:             Vitals:    Vitals:    07/25/22 1851   BP: (!) 150/68   Pulse: 55   Resp: 16   Temp: 97.8 °F (36.6 °C)   TempSrc: Oral   SpO2: 100%   Weight: 140 lb (63.5 kg)   Height: 5' 11\" (1.803 m)       CHIEF COMPLAINT     No chief complaint on file. Patient is a 24-year-old male type I diabetic poorly controlled patient arrives with sensation that he is in DKA again.   Patient noting nausea and not feeling well generalized crampy abdominal pain no fevers chills and no diarrhea patient also noting some chest discomfort no shortness of breath no exertional symptoms        EMERGENCY DEPARTMENT COURSE:     -------------------------  BP: (!) 150/68, Temp: 97.8 °F (36.6 °C), Heart Rate: 55, Resp: 16  Physical Exam  Constitutional:       Appearance: He is well-developed. He is not diaphoretic. HENT:      Head: Normocephalic and atraumatic. Right Ear: External ear normal.      Left Ear: External ear normal.      Mouth/Throat:      Mouth: Mucous membranes are dry. Eyes:      General: No scleral icterus. Right eye: No discharge. Left eye: No discharge. Neck:      Trachea: No tracheal deviation. Cardiovascular:      Rate and Rhythm: Bradycardia present. Pulses: Normal pulses. Heart sounds: No murmur heard. Pulmonary:      Effort: Pulmonary effort is normal. No respiratory distress. Breath sounds: No stridor. Musculoskeletal:         General: Normal range of motion. Cervical back: Normal range of motion. Skin:     General: Skin is warm and dry. Neurological:      Mental Status: He is alert and oriented to person, place, and time. Coordination: Coordination normal.   Psychiatric:         Behavior: Behavior normal.         Comments  The pt presents for chief complaint of hyperglycemia. The patients complaint could be concerning for:  Diabetic ketoacidosis based on exam is the most likely diagnosis given the patient's possible Wellens on EKG awaiting second at this time and the patient will need troponins and medical evaluation      ED Course as of 07/26/22 0009 Mon Jul 25, 2022 1944 EKG shows possible Wellens pattern EKG will be repeated in 15 min. LAD  Sinus bradycardia [WK]   2231 Trop normal [WK]   2322 Repeat EKG shows unchanged biphasic T waves [WK]      ED Course User Index  [WK] Katja Bush DO   CRITICAL CARE: There was a high probability of clinically significant/life threatening deterioration in this patient's condition which required my urgent intervention. Total critical care time was 23 minutes. This excludes any time for separately reportable procedures. Katja Bush DO,, DO, RDMS.   Attending Emergency Physician         Katja Bush DO  07/26/22 0010

## 2022-07-26 NOTE — PLAN OF CARE
Problem: Discharge Planning  Goal: Discharge to home or other facility with appropriate resources  Outcome: Progressing Towards Goal  Flowsheets (Taken 7/26/2022 0030)  Discharge to home or other facility with appropriate resources: Identify barriers to discharge with patient and caregiver     Problem: Metabolic/Fluid and Electrolytes - Adult  Goal: Electrolytes maintained within normal limits  Outcome: Progressing Towards Goal     Problem: Metabolic/Fluid and Electrolytes - Adult  Goal: Hemodynamic stability and optimal renal function maintained  Outcome: Progressing Towards Goal     Problem: Metabolic/Fluid and Electrolytes - Adult  Goal: Glucose maintained within prescribed range  Outcome: Progressing Towards Goal

## 2022-07-26 NOTE — CARE COORDINATION
07/26/22 0731   Service Assessment   Patient Orientation Alert and Oriented   Cognition Alert   History Provided By Patient   Primary Caregiver Self   Support Systems Parent   PCP Verified by CM Yes   Last Visit to PCP Within last year   Prior Functional Level Independent in ADLs/IADLs   Current Functional Level Independent in ADLs/IADLs   Can patient return to prior living arrangement Yes   Ability to make needs known: Good   Family able to assist with home care needs: Yes   Would you like for me to discuss the discharge plan with any other family members/significant others, and if so, who? Yes   Financial Resources Medicaid   Social/Functional History   Lives With Parent   Type of [address]   Ambulation Assistance Independent   Transfer Assistance Independent   Active  No   Patient's  Info family   Occupation Unemployed   Discharge Planning   Type of [facility] Family Members;Parent   Patient expects to be discharged to: House   History of falls? 0   Services At/After Discharge   Mode of Transport at Discharge Self   Confirm Follow Up Transport Family   Condition of Participation: Discharge Planning   The Plan for Transition of Care is related to the following treatment goals: manage blood glucose   The Patient and/or Patient Representative was provided with a Choice of Provider? Patient   The Patient and/Or Patient Representative agree with the Discharge Plan? Yes       Home independent. Lives with parents - states glucometer is working. Has ride home.

## 2022-07-27 VITALS
OXYGEN SATURATION: 100 % | RESPIRATION RATE: 16 BRPM | DIASTOLIC BLOOD PRESSURE: 87 MMHG | HEART RATE: 54 BPM | BODY MASS INDEX: 19.14 KG/M2 | WEIGHT: 136.69 LBS | SYSTOLIC BLOOD PRESSURE: 165 MMHG | TEMPERATURE: 98.3 F | HEIGHT: 71 IN

## 2022-07-27 LAB
ANION GAP SERPL CALCULATED.3IONS-SCNC: 17 MMOL/L (ref 9–17)
BUN BLDV-MCNC: 8 MG/DL (ref 6–20)
CALCIUM SERPL-MCNC: 8.8 MG/DL (ref 8.6–10.4)
CHLORIDE BLD-SCNC: 100 MMOL/L (ref 98–107)
CO2: 20 MMOL/L (ref 20–31)
CREAT SERPL-MCNC: 0.68 MG/DL (ref 0.7–1.2)
GFR AFRICAN AMERICAN: >60 ML/MIN
GFR NON-AFRICAN AMERICAN: >60 ML/MIN
GFR SERPL CREATININE-BSD FRML MDRD: ABNORMAL ML/MIN/{1.73_M2}
GLUCOSE BLD-MCNC: 223 MG/DL (ref 70–99)
GLUCOSE BLD-MCNC: 234 MG/DL (ref 75–110)
GLUCOSE BLD-MCNC: 250 MG/DL (ref 75–110)
GLUCOSE BLD-MCNC: 253 MG/DL (ref 75–110)
MAGNESIUM: 1.9 MG/DL (ref 1.6–2.6)
POTASSIUM SERPL-SCNC: 3.6 MMOL/L (ref 3.7–5.3)
SODIUM BLD-SCNC: 137 MMOL/L (ref 135–144)

## 2022-07-27 PROCEDURE — 2500000003 HC RX 250 WO HCPCS: Performed by: INTERNAL MEDICINE

## 2022-07-27 PROCEDURE — 82947 ASSAY GLUCOSE BLOOD QUANT: CPT

## 2022-07-27 PROCEDURE — 6360000002 HC RX W HCPCS: Performed by: STUDENT IN AN ORGANIZED HEALTH CARE EDUCATION/TRAINING PROGRAM

## 2022-07-27 PROCEDURE — 80048 BASIC METABOLIC PNL TOTAL CA: CPT

## 2022-07-27 PROCEDURE — 2580000003 HC RX 258: Performed by: INTERNAL MEDICINE

## 2022-07-27 PROCEDURE — 36415 COLL VENOUS BLD VENIPUNCTURE: CPT

## 2022-07-27 PROCEDURE — 83735 ASSAY OF MAGNESIUM: CPT

## 2022-07-27 PROCEDURE — 6370000000 HC RX 637 (ALT 250 FOR IP): Performed by: STUDENT IN AN ORGANIZED HEALTH CARE EDUCATION/TRAINING PROGRAM

## 2022-07-27 PROCEDURE — 99232 SBSQ HOSP IP/OBS MODERATE 35: CPT | Performed by: STUDENT IN AN ORGANIZED HEALTH CARE EDUCATION/TRAINING PROGRAM

## 2022-07-27 RX ORDER — ONDANSETRON 4 MG/1
4 TABLET, FILM COATED ORAL 3 TIMES DAILY PRN
Qty: 10 TABLET | Refills: 0 | Status: SHIPPED | OUTPATIENT
Start: 2022-07-27

## 2022-07-27 RX ORDER — POTASSIUM CHLORIDE 20 MEQ/1
40 TABLET, EXTENDED RELEASE ORAL ONCE
Status: COMPLETED | OUTPATIENT
Start: 2022-07-27 | End: 2022-07-27

## 2022-07-27 RX ORDER — INSULIN LISPRO 100 [IU]/ML
0-4 INJECTION, SOLUTION INTRAVENOUS; SUBCUTANEOUS NIGHTLY
Status: DISCONTINUED | OUTPATIENT
Start: 2022-07-27 | End: 2022-07-27 | Stop reason: HOSPADM

## 2022-07-27 RX ORDER — OXYCODONE HYDROCHLORIDE 5 MG/1
5 TABLET ORAL ONCE
Status: COMPLETED | OUTPATIENT
Start: 2022-07-27 | End: 2022-07-27

## 2022-07-27 RX ORDER — INSULIN LISPRO 100 [IU]/ML
0-16 INJECTION, SOLUTION INTRAVENOUS; SUBCUTANEOUS
Status: DISCONTINUED | OUTPATIENT
Start: 2022-07-27 | End: 2022-07-27 | Stop reason: HOSPADM

## 2022-07-27 RX ADMIN — SODIUM CHLORIDE, PRESERVATIVE FREE 20 MG: 5 INJECTION INTRAVENOUS at 08:27

## 2022-07-27 RX ADMIN — INSULIN LISPRO 4 UNITS: 100 INJECTION, SOLUTION INTRAVENOUS; SUBCUTANEOUS at 08:26

## 2022-07-27 RX ADMIN — OXYCODONE 5 MG: 5 TABLET ORAL at 08:39

## 2022-07-27 RX ADMIN — POTASSIUM CHLORIDE 40 MEQ: 1500 TABLET, EXTENDED RELEASE ORAL at 08:40

## 2022-07-27 RX ADMIN — INSULIN GLARGINE 25 UNITS: 100 INJECTION, SOLUTION SUBCUTANEOUS at 08:27

## 2022-07-27 RX ADMIN — ONDANSETRON 4 MG: 2 INJECTION INTRAMUSCULAR; INTRAVENOUS at 08:33

## 2022-07-27 ASSESSMENT — ENCOUNTER SYMPTOMS
EYE ITCHING: 0
ABDOMINAL PAIN: 1
COLOR CHANGE: 0
NAUSEA: 0
COUGH: 0
EYE REDNESS: 0
CONSTIPATION: 0
SORE THROAT: 0
APNEA: 0

## 2022-07-27 ASSESSMENT — PAIN DESCRIPTION - LOCATION
LOCATION: ABDOMEN
LOCATION: ABDOMEN

## 2022-07-27 ASSESSMENT — PAIN DESCRIPTION - DESCRIPTORS
DESCRIPTORS: ACHING;CRAMPING
DESCRIPTORS: ACHING;CRAMPING

## 2022-07-27 ASSESSMENT — PAIN SCALES - GENERAL
PAINLEVEL_OUTOF10: 0
PAINLEVEL_OUTOF10: 6
PAINLEVEL_OUTOF10: 0
PAINLEVEL_OUTOF10: 5

## 2022-07-27 ASSESSMENT — PAIN DESCRIPTION - ORIENTATION
ORIENTATION: ANTERIOR
ORIENTATION: ANTERIOR

## 2022-07-27 NOTE — PROGRESS NOTES
Patient with visitor at bedside sitting at bedside and relates he is ready for discharge. IV removed without complications and patient ambulated himself out with visitor after discharge instructions were given.

## 2022-07-27 NOTE — PLAN OF CARE
Problem: Pain  Goal: Verbalizes/displays adequate comfort level or baseline comfort level  Outcome: Progressing Towards Goal   Pt's pain assessed frequently with hourly rounding; assessed all pain characteristics including level, type, location, frequency, and onset. Pt medicated by RN per PRN orders. Non-pharmacologic interventions offered to pt as well. Pt states pain is tolerable at this time. Will continue to monitor.     Electronically signed by Maryam Izaguirre RN on 7/27/2022 at 3:33 AM

## 2022-07-27 NOTE — DISCHARGE SUMMARY
[facility]  Office: [address], DO, Wilfridwinston Daleyet, DO, Pauly Monaco, DO, Vinny Emma Villar, DO, Annita Palma MD, Shaylee Dixon MD, Kyle Calixto MD, Jose J Lester MD,  Latonya Chapin MD, Jarrod Art MD, Eric Art, DO, Abiola Decker MD,  Leslie Weinberg MD, Sandy Ponce MD, Shaheed Martin, DO, Fanny Hall MD, Trina Carver MD, Robert Peguero MD, Jumana Tello DO, Rey El MD, Carlene Jackson MD, Reji Forbes, CNP,  Moraima Quiroga, CNP, Di Juarez, CNP, Michelle Bliss, CNP, KATI VinesC, Nina Fisher, [facility], Lizabeth Coats, CNP, Zhane Moore, CNP, Fatmata Marrero, CNP, Liliam Vaughan, CNP, Artur Bynum, CNP, Stephanie Hayden, CNS, Chin Steele, [facility], Juju Mike, CNP, Erika Martin, CNP, Franchesca Armendariz, [address] [place] [address]    Discharge Summary     Patient ID: Katja Granado  :  2000   MRN: [id number]     ACCOUNT:  [de-identified]   Patient's PCP: Jacqueline Freire MD  Admit Date: 2022   Discharge Date: 2022     Length of Stay: 2  Code Status:  Full Code  Admitting Physician: Abiola Decker MD  Discharge Physician: Abiola Decker MD     Active Discharge Diagnoses:     Hospital Problem Lists:  Principal Problem:    DKA, type 1, not at goal [facility])  Active Problems:    Pharyngitis    High anion gap metabolic acidosis    Type 1 diabetes mellitus ([facility] [address]  Resolved Problems:    * No resolved hospital problems. *      Admission Condition:  serious     Discharged Condition: stable    Hospital Stay:     Hospital Course:  Katja Granado is a 19 y.o. male who was admitted for the management of   DKA, type 1, not at goal [facility]) , presented to ER with nausea. This is a very pleasant 19year old male with history of diabetes type 1 in DKA now bridged with lantus and insulin sliding scale.  Patient encouraged to followup with diabetes education, PCP.       Significant therapeutic interventions: N/A    Significant Diagnostic Studies:   Labs / Micro:  CBC:   Lab Results   Component Value Date/Time    WBC 6.8 07/25/2022 07:33 PM    RBC 4.98 07/25/2022 07:33 PM    HGB 14.9 07/25/2022 07:33 PM    HCT 43.2 07/25/2022 07:33 PM    MCV 86.7 07/25/2022 07:33 PM    MCH 29.9 07/25/2022 07:33 PM    MCHC 34.5 07/25/2022 07:33 PM    RDW 12.5 07/25/2022 07:33 PM     07/25/2022 07:33 PM     BMP:    Lab Results   Component Value Date/Time    GLUCOSE 223 07/27/2022 07:01 AM     07/27/2022 07:01 AM    K 3.6 07/27/2022 07:01 AM     07/27/2022 07:01 AM    CO2 20 07/27/2022 07:01 AM    ANIONGAP 17 07/27/2022 07:01 AM    BUN 8 07/27/2022 07:01 AM    CREATININE 0.68 07/27/2022 07:01 AM    BUNCRER NOT REPORTED 05/14/2021 02:25 PM    CALCIUM 8.8 07/27/2022 07:01 AM    LABGLOM >60 07/27/2022 07:01 AM    GFRAA >60 07/27/2022 07:01 AM    GFR      07/27/2022 07:01 AM     HFP:    Lab Results   Component Value Date/Time    PROT 7.5 07/26/2022 05:35 AM        Radiology:  No results found. Consultations:    Consults:     Final Specialist Recommendations/Findings:   IP CONSULT TO HOSPITALIST  IP CONSULT TO DIABETES EDUCATOR      The patient was seen and examined on day of discharge and this discharge summary is in conjunction with any daily progress note from day of discharge. Discharge plan:     Disposition: Home    Physician Follow Up:     Sherryle Morgans, [address] [place] [address] [place] [address] 909  [phone]    Follow up      [address] [facility]  [phone]  Follow up       Requiring Further Evaluation/Follow Up POST HOSPITALIZATION/Incidental Findings: Follow up diabetes education,. PCP and endocrinology    Diet: diabetic diet    Activity: As tolerated    Instructions to Patient: please take your medciations as prescrbed. Zofran was given yto you for your abdominal pain.      Discharge Medications:      Medication List        START taking these medications      ondansetron 4 MG tablet  Commonly known as: ZOFRAN  Take 1 tablet by mouth 3 times daily as needed for Nausea or Vomiting            CHANGE how you take these medications      blood glucose test strips  Test 3 times times a day & as needed for symptoms of irregular blood glucose. What changed: Another medication with the same name was removed. Continue taking this medication, and follow the directions you see here. CONTINUE taking these medications      blood glucose monitor kit and supplies  Dispense sufficient amount for indicated testing frequency plus additional to accommodate PRN testing needs. EPINEPHrine 0.15 MG/0.3ML Soaj  Commonly known as: EpiPen Jr 2-Ernie  Use as directed for allergic reaction     FreeStyle Tresa 14 Day [place] Like  Check blood sugars 5-6/day. Type 1 DM     FreeStyle Tresa Sensor System Misc  Type 1 DM.  Check blood sugars 5-6/day     Glucagon Emergency 1 MG Kit  As directed for extreme hypoglycemia     * Insulin Pen Needle 30G X 5 MM Misc  1 Device by Does not apply route 4 times daily     * Insulin Pen Needle 32G X 6 MM Misc  Use 2 times daily with insulin     INSULIN SYRINGE 1CC/29G 29G X 1/2\" 1 ML Misc  Commonly known as: AIMSCO INSULIN SYR ULTRA THIN  1 each by Does not apply route daily     Ketone Blood Test Strp  1 strip by Does not apply route daily as needed (for high blood sugar)     * Lancets Misc  1 each by Does not apply route 4 times daily (before meals and nightly)     * Lancets Misc  1 each by Does not apply route daily Use 3 times times daily     Lantus SoloStar 100 UNIT/ML injection pen  Generic drug: insulin glargine  Inject 25 Units into the skin 2 times daily     NovoLOG FlexPen 100 UNIT/ML injection pen  Generic drug: insulin aspart  Inject 1-11 Units into the skin 3 times daily (before meals) 1 unit per 15 grams CHO for meals and snacks  Sliding scale: =0, 125-150=1, 151-175=2, 175-200=3, 201-225=4, 226-250=5, 251-275=6, 276-300=7, 301-325=8, 326-350=9, 351-375=10, 376-400=11           * This list has 4 medication(s) that are the same as other medications prescribed for you. Read the directions carefully, and ask your doctor or other care provider to review them with you. STOP taking these medications      acetaminophen 325 MG tablet  Commonly known as: Tylenol     insulin regular 100 UNIT/ML injection  Commonly known as: HUMULIN R;NOVOLIN R               Where to Get Your Medications        These medications were sent to [facility] [address] [facility], [address]  [address], [address]  [address]      Phone: [phone]   ondansetron 4 MG tablet         Discharge Procedure Orders   Basic Metabolic Panel   Standing Status: Future Standing Exp. Date: 07/27/23     Grant Hutson MD, Endocrinology, [facility]   Referral Priority: Routine Referral Type: Eval and Treat   Referral Reason: Specialty Services Required   Referred to Provider: Joseph Dhaliwal Requested Specialty: Endocrinology   Number of Visits Requested: [address]   Referral Priority: Routine Referral Type: Eval and Treat   Referral Reason: Specialty Services Required   Number of Visits Requested: 1       Time Spent on discharge is  20 mins in patient examination, evaluation, counseling as well as medication reconciliation, prescriptions for required medications, discharge plan and follow up. Electronically signed by   Corey Healy MD  7/27/2022  12:23 PM      Thank you Dr. Beecher Kehr, MD for the opportunity to be involved in this patient's care.

## 2022-07-27 NOTE — PROGRESS NOTES
CLINICAL PHARMACY NOTE: MEDS TO BEDS    Total # of Prescriptions Filled: 1   The following medications were delivered to the patient:  Donell Urbina     Additional Documentation:

## 2022-07-27 NOTE — PROGRESS NOTES
[facility]  Office: [address], DO, Caterina Bertrand, DO, Daren Base, DO, Erick Horton Blood, DO, Chapis Carmona MD, Chelsie Avila MD, Donnie Mabry MD, Priscilla Johns MD,  Chuck Gallardo MD, Aishwarya Alanis MD, Kimberly Sánchez, DO, Lizzeth Arnold MD,  Doni Small MD, Mehreen Soria MD, Michael Bustamante, DO, Francesca Santamaria MD, Gisselle Duke MD, Spencer Jennings MD, Trish Ruby, DO, Silvia Lilly MD, Sophia Wilson MD, Mara Silva, CNP,  Zay Ventura, CNP, Harrison Lipscomb, CNP, Cooper Mcgovern, CNP, Johana Evans PA-C, Denver Parks, DNP, Shantel Adamson, CNP, Erick Irvin, CNP, Tanvi Sandoval, CNP, Lv Hoyos, CNP, Jessie Bates, CNP, Memo Doan, CNS, Edmundo Souza, [facility], Evaristo Roe, CNP, Austin Ramachandran, [facility], Adam Og, [address] [place] [address]    Progress Note    7/27/2022    8:41 AM    Name:   Leonard Lagunas  MRN:     [id number]     [place]:      [de-identified]   Room:   [address] [place] [address] Day:  2  Admit Date:  7/25/2022  6:27 PM    PCP:   Ron Galo MD  Code Status:  Full Code    Subjective:     C/C: Abdominal pain DKA  Interval History Status: improved. Patietn seen and examined. Bridged with lantus, off insulin drip, doing well, some abdominal pain but tolerating PO with zofran    Brief History: This is a very pleasant 19year old male with history of diabetes type 1 in DKA now bridged with lantus and insulin sliding scale. Patient encouraged to followup with diabetes education, PCP. Review of Systems:     Review of Systems   Constitutional:  Negative for activity change and diaphoresis. HENT:  Negative for ear pain and sore throat. Eyes:  Negative for redness and itching. Respiratory:  Negative for apnea and cough. Cardiovascular:  Negative for chest pain. Gastrointestinal:  Positive for abdominal pain. Negative for constipation and nausea. Endocrine: Negative for cold intolerance and polyphagia. Genitourinary:  Negative for dysuria and urgency. Musculoskeletal:  Negative for arthralgias and neck pain. Skin:  Negative for color change and wound. Neurological:  Negative for dizziness and seizures. Psychiatric/Behavioral:  Negative for behavioral problems and self-injury. Medications: Allergies:  No Known Allergies    Current Meds:   Scheduled Meds:    insulin lispro  0-16 Units SubCUTAneous TID WC    insulin lispro  0-4 Units SubCUTAneous Nightly    enoxaparin  40 mg SubCUTAneous Daily    famotidine (PEPCID) injection  20 mg IntraVENous BID    GI cocktail   Oral Once    insulin glargine  25 Units SubCUTAneous BID     Continuous Infusions:    dextrose Stopped (22 0342)     PRN Meds: magnesium sulfate, polyethylene glycol, ondansetron, glucose, dextrose    Data:     Past Medical History:   has a past medical history of Diabetes mellitus ([facility] [address] Gastroesophageal reflux disease, and Type 1 diabetes mellitus ([facility] [address] Social History:   reports that he has been smoking cigarettes. He has a 0.50 pack-year smoking history. He has never used smokeless tobacco. He reports current alcohol use of about 10.0 standard drinks per week. He reports current drug use. Drug: Marijuana Candiss Littler). Family History:   Family History   Problem Relation Age of Onset    Asthma Father     Asthma Sister     Asthma Brother        Vitals:  BP (!) 165/87   Pulse 54   Temp 98.3 °F (36.8 °C) (Temporal)   Resp 18   Ht 5' 11\" (1.803 m)   Wt 136 lb 11 oz (62 kg)   SpO2 100%   BMI 19.06 kg/m²   Temp (24hrs), Av.6 °F (37 °C), Min:98 °F (36.7 °C), Max:98.9 °F (37.2 °C)    Recent Labs     22  0007 22  0229 22  0741   POCGLU 199* 253* 250* 234*       I/O (24Hr):     Intake/Output Summary (Last 24 hours) at 2022 0841  Last data filed at 2022 0745  Gross per 24 hour   Intake 2797.37 ml   Output 425 ml   Net 2372.37 ml Labs:  Hematology:  Recent Labs     07/25/22 1933   WBC 6.8   RBC 4.98   HGB 14.9   HCT 43.2   MCV 86.7   MCH 29.9   MCHC 34.5   RDW 12.5      MPV 12.1     Chemistry:  Recent Labs     07/25/22 1933 07/25/22 2029 07/26/22  0102 07/26/22  0535 07/26/22  0833 07/26/22  1219 07/26/22  1710 07/27/22  0701     --  140   < > 141 138 136 137   K 4.3  --  4.8   < > 4.5 4.0 3.7 3.6*   CL 96*  --  97*   < > 106 106 103 100   CO2 18*  --  12*   < > 15* 20 21 20   GLUCOSE 241*  --  460*   < > 187* 157* 217* 223*   BUN 16  --  16   < > 14 12 11 8   CREATININE 0.81   < > 0.90   < > 0.90 0.82 0.77 0.68*   MG 2.3  --  2.0   < > 2.0 2.1 1.9 1.9   ANIONGAP 25*  --  31*   < > 20* 12 12 17   LABGLOM >60   < > >60   < > >60 >60 >60 >60   GFRAA >60  --  >60   < > >60 >60 >60 >60   CALCIUM 10.0  --  9.5   < > 9.0 9.0 8.6 8.8   PHOS 3.4  --  5.8*   < > 2.9 2.2* 2.3*  --    TROPHS <6  --  <6  --   --   --   --   --     < > = values in this interval not displayed. Recent Labs     07/25/22 1933 07/25/22 2029 07/26/22  0535 07/26/22  0604 07/26/22  1803 07/26/22  1859 07/26/22 2014 07/27/22  0007 07/27/22  0229 07/27/22  0741   PROT 8.2  --  7.5  --   --   --   --   --   --   --    LABALBU 5.3*  --  4.7  --   --   --   --   --   --   --    LABA1C  --   --  10.6*  --   --   --   --   --   --   --    AST 18  --  19  --   --   --   --   --   --   --    ALT 14  --  13  --   --   --   --   --   --   --    ALKPHOS 98  --  87  --   --   --   --   --   --   --    BILITOT 0.56  --  0.45  --   --   --   --   --   --   --    BILIDIR  --   --  0.12  --   --   --   --   --   --   --    LIPASE 12*  --  10*  --   --   --   --   --   --   --    POCGLU  --    < >  --    < > 197* 184* 199* 253* 250* 234*    < > = values in this interval not displayed.      ABG:  Lab Results   Component Value Date/Time    POCPH 7.201 02/01/2019 06:20 PM    POCPCO2 35.5 02/01/2019 06:20 PM    POCPO2 36.9 02/01/2019 06:20 PM    POCHCO3 13.9 02/01/2019 06:20 PM    NBEA 13 02/01/2019 06:20 PM    PBEA NOT REPORTED 02/01/2019 06:20 PM    CBD1OKT 15 02/01/2019 06:20 PM    YQWU6JJC 59 02/01/2019 06:20 PM    FIO2 INFORMATION NOT PROVIDED 05/12/2021 03:43 AM     Lab Results   Component Value Date/Time    SPECIAL RAC 10ML 12/19/2020 12:15 AM     Lab Results   Component Value Date/Time    CULTURE (A) 12/19/2020 12:15 AM     POSITIVE Blood Culture Results called to and read back by: INTERNAL MEDICINE RESIDENT ON CALL VIA PERFECT SERVE AT 21:12 ON 12/19/20    CULTURE  12/19/2020 12:15 AM     DIRECT GRAM STAIN FROM BOTTLE: GRAM POSITIVE COCCI IN CLUSTERS    CULTURE STAPHYLOCOCCUS HOMINIS (A) 12/19/2020 12:15 AM       Radiology:  No results found. Physical Examination:        Physical Exam  Constitutional:       Appearance: Normal appearance. He is normal weight. HENT:      Head: Normocephalic and atraumatic. Right Ear: External ear normal.      Left Ear: External ear normal.      Nose: Nose normal.      Mouth/Throat:      Mouth: Mucous membranes are moist.   Eyes:      Pupils: Pupils are equal, round, and reactive to light. Cardiovascular:      Rate and Rhythm: Normal rate and regular rhythm. Pulmonary:      Effort: Pulmonary effort is normal. No respiratory distress. Breath sounds: No wheezing. Abdominal:      General: There is no distension. Tenderness: There is no guarding. Musculoskeletal:         General: No swelling. Cervical back: Neck supple. Right lower leg: No edema. Left lower leg: No edema. Skin:     General: Skin is warm and dry. Capillary Refill: Capillary refill takes less than 2 seconds. Neurological:      Mental Status: He is alert. He is disoriented.    Psychiatric:         Mood and Affect: Mood normal.         Behavior: Behavior normal.       Assessment:        Hospital Problems             Last Modified POA    * (Principal) DKA, type 1, not at goal [facility]) 7/25/2022 Yes    Pharyngitis 7/26/2022 Yes    High anion gap metabolic acidosis 2/09/4354 Yes    Type 1 diabetes mellitus ([facility] [address] 7/26/2022 Yes       Plan:        ELAINA Frausto  Diabetes education as outapteint  Referral to endocrinologist  Encourage compliance with follow up  Discharge today    Latisha Hutton MD  7/27/2022  8:41 AM

## 2022-07-29 ENCOUNTER — TELEPHONE (OUTPATIENT)
Dept: INTERNAL MEDICINE | Age: 22
End: 2022-07-29

## 2022-08-02 NOTE — TELEPHONE ENCOUNTER
Attempt 2, no answer. Left HIPAA compliant message identifying self and nature of call, requested call back to writer, phone number given.

## 2022-08-03 NOTE — TELEPHONE ENCOUNTER
Attempt 3, no answer. Left HIPAA compliant message identifying self and nature of call, requested call back to office, phone number given. PC to pt's EC number for mother, Alden Griffin, no answer. The VM indicates it is pt's VM. Left HIPAA compliant message identifying self and nature of call, requested call back to office to schedule f/u appt, phone number given.

## 2022-08-11 ENCOUNTER — APPOINTMENT (OUTPATIENT)
Dept: GENERAL RADIOLOGY | Age: 22
DRG: 420 | End: 2022-08-11
Payer: MEDICARE

## 2022-08-11 ENCOUNTER — HOSPITAL ENCOUNTER (INPATIENT)
Age: 22
LOS: 3 days | Discharge: HOME | DRG: 420 | End: 2022-08-14
Attending: EMERGENCY MEDICINE | Admitting: INTERNAL MEDICINE
Payer: MEDICARE

## 2022-08-11 DIAGNOSIS — E10.10 DKA, TYPE 1, NOT AT GOAL (HCC): Primary | ICD-10-CM

## 2022-08-11 PROBLEM — E08.10 DIABETIC KETOACIDOSIS WITHOUT COMA ASSOCIATED WITH DIABETES MELLITUS DUE TO UNDERLYING CONDITION (HCC): Status: ACTIVE | Noted: 2022-08-11

## 2022-08-11 PROBLEM — E13.10 DIABETIC KETOACIDOSIS WITHOUT COMA ASSOCIATED WITH OTHER SPECIFIED DIABETES MELLITUS (HCC): Status: ACTIVE | Noted: 2022-08-11

## 2022-08-11 LAB
ABSOLUTE EOS #: <0.03 K/UL (ref 0–0.44)
ABSOLUTE IMMATURE GRANULOCYTE: 0.14 K/UL (ref 0–0.3)
ABSOLUTE LYMPH #: 0.86 K/UL (ref 1.1–3.7)
ABSOLUTE MONO #: 0.44 K/UL (ref 0.1–1.2)
ALBUMIN SERPL-MCNC: 5.1 G/DL (ref 3.5–5.2)
ALBUMIN/GLOBULIN RATIO: 1.5 (ref 1–2.5)
ALP BLD-CCNC: 118 U/L (ref 40–129)
ALT SERPL-CCNC: 23 U/L (ref 5–41)
ANION GAP SERPL CALCULATED.3IONS-SCNC: 21 MMOL/L (ref 9–17)
ANION GAP SERPL CALCULATED.3IONS-SCNC: ABNORMAL MMOL/L (ref 9–17)
ANION GAP: 18 MMOL/L (ref 7–16)
AST SERPL-CCNC: 17 U/L
BASOPHILS # BLD: 1 % (ref 0–2)
BASOPHILS ABSOLUTE: 0.18 K/UL (ref 0–0.2)
BETA-HYDROXYBUTYRATE: 9.92 MMOL/L (ref 0.02–0.27)
BILIRUB SERPL-MCNC: 0.38 MG/DL (ref 0.3–1.2)
BILIRUBIN DIRECT: 0.23 MG/DL
BILIRUBIN URINE: NEGATIVE
BILIRUBIN, INDIRECT: 0.15 MG/DL (ref 0–1)
BUN BLDV-MCNC: 14 MG/DL (ref 6–20)
BUN BLDV-MCNC: 20 MG/DL (ref 6–20)
CALCIUM SERPL-MCNC: 10.1 MG/DL (ref 8.6–10.4)
CALCIUM SERPL-MCNC: 8.8 MG/DL (ref 8.6–10.4)
CARBOXYHEMOGLOBIN: 0.1 % (ref 0–5)
CARBOXYHEMOGLOBIN: 1 % (ref 0–5)
CHLORIDE BLD-SCNC: 114 MMOL/L (ref 98–107)
CHLORIDE BLD-SCNC: 94 MMOL/L (ref 98–107)
CHP ED QC CHECK: NORMAL
CHP ED QC CHECK: YES
CO2: 10 MMOL/L (ref 20–31)
CO2: <6 MMOL/L (ref 20–31)
COLOR: YELLOW
CREAT SERPL-MCNC: 1.04 MG/DL (ref 0.7–1.2)
CREAT SERPL-MCNC: 1.17 MG/DL (ref 0.7–1.2)
EOSINOPHILS RELATIVE PERCENT: 0 % (ref 1–4)
EPITHELIAL CELLS UA: NORMAL /HPF (ref 0–5)
FIO2: ABNORMAL
FIO2: ABNORMAL
GFR AFRICAN AMERICAN: >60 ML/MIN
GFR AFRICAN AMERICAN: >60 ML/MIN
GFR NON-AFRICAN AMERICAN: >60 ML/MIN
GFR SERPL CREATININE-BSD FRML MDRD: >60 ML/MIN
GFR SERPL CREATININE-BSD FRML MDRD: ABNORMAL ML/MIN/{1.73_M2}
GFR SERPL CREATININE-BSD FRML MDRD: ABNORMAL ML/MIN/{1.73_M2}
GFR SERPL CREATININE-BSD FRML MDRD: NORMAL ML/MIN/{1.73_M2}
GLUCOSE BLD-MCNC: 115 MG/DL (ref 75–110)
GLUCOSE BLD-MCNC: 120 MG/DL (ref 75–110)
GLUCOSE BLD-MCNC: 131 MG/DL (ref 70–99)
GLUCOSE BLD-MCNC: 170 MG/DL (ref 75–110)
GLUCOSE BLD-MCNC: 305 MG/DL
GLUCOSE BLD-MCNC: 305 MG/DL (ref 75–110)
GLUCOSE BLD-MCNC: 345 MG/DL
GLUCOSE BLD-MCNC: 345 MG/DL (ref 75–110)
GLUCOSE BLD-MCNC: 545 MG/DL
GLUCOSE BLD-MCNC: 545 MG/DL (ref 75–110)
GLUCOSE BLD-MCNC: 561 MG/DL
GLUCOSE BLD-MCNC: 561 MG/DL (ref 75–110)
GLUCOSE BLD-MCNC: 575 MG/DL (ref 75–110)
GLUCOSE BLD-MCNC: 609 MG/DL (ref 74–100)
GLUCOSE BLD-MCNC: 622 MG/DL (ref 70–99)
GLUCOSE URINE: ABNORMAL
HCO3 VENOUS: 11.4 MMOL/L (ref 24–30)
HCO3 VENOUS: 6 MMOL/L (ref 24–30)
HCO3 VENOUS: 7.6 MMOL/L (ref 22–29)
HCT VFR BLD CALC: 48.8 % (ref 40.7–50.3)
HEMOGLOBIN: 15.2 G/DL (ref 13–17)
IMMATURE GRANULOCYTES: 1 %
KETONES, URINE: ABNORMAL
LEUKOCYTE ESTERASE, URINE: NEGATIVE
LIPASE: 13 U/L (ref 13–60)
LYMPHOCYTES # BLD: 6 % (ref 24–43)
MAGNESIUM: 2.3 MG/DL (ref 1.6–2.6)
MCH RBC QN AUTO: 30.2 PG (ref 25.2–33.5)
MCHC RBC AUTO-ENTMCNC: 31.1 G/DL (ref 28.4–34.8)
MCV RBC AUTO: 97 FL (ref 82.6–102.9)
MONOCYTES # BLD: 3 % (ref 3–12)
NEGATIVE BASE EXCESS, VEN: 15.7 MMOL/L (ref 0–2)
NEGATIVE BASE EXCESS, VEN: 22 (ref 0–2)
NEGATIVE BASE EXCESS, VEN: 24.9 MMOL/L (ref 0–2)
NITRITE, URINE: NEGATIVE
NRBC AUTOMATED: 0 PER 100 WBC
O2 SAT, VEN: 39.4 % (ref 60–85)
O2 SAT, VEN: 62 % (ref 60–85)
O2 SAT, VEN: 86.8 % (ref 60–85)
PATIENT TEMP: 37
PATIENT TEMP: 37
PCO2, VEN: 24.2 (ref 39–55)
PCO2, VEN: 29.3 MM HG (ref 41–51)
PCO2, VEN: 31.2 (ref 39–55)
PDW BLD-RTO: 12.8 % (ref 11.8–14.4)
PH UA: 5.5 (ref 5–8)
PH VENOUS: 7.02 (ref 7.32–7.43)
PH VENOUS: 7.03 (ref 7.32–7.42)
PH VENOUS: 7.19 (ref 7.32–7.42)
PHOSPHORUS: 2 MG/DL (ref 2.5–4.5)
PLATELET # BLD: 463 K/UL (ref 138–453)
PMV BLD AUTO: 11.7 FL (ref 8.1–13.5)
PO2, VEN: 23.2 (ref 30–50)
PO2, VEN: 46.6 MM HG (ref 30–50)
PO2, VEN: 73.5 (ref 30–50)
POC BUN: 20 MG/DL (ref 8–26)
POC CHLORIDE: 111 MMOL/L (ref 98–107)
POC CREATININE: 0.99 MG/DL (ref 0.51–1.19)
POC HEMATOCRIT: 55 % (ref 41–53)
POC HEMOGLOBIN: 18.7 G/DL (ref 13.5–17.5)
POC IONIZED CALCIUM: 1.15 MMOL/L (ref 1.15–1.33)
POC LACTIC ACID: 4.51 MMOL/L (ref 0.56–1.39)
POC POTASSIUM: 5.6 MMOL/L (ref 3.5–4.5)
POC SODIUM: 136 MMOL/L (ref 138–146)
POC TCO2: 8 MMOL/L (ref 22–30)
POTASSIUM SERPL-SCNC: 5.3 MMOL/L (ref 3.7–5.3)
POTASSIUM SERPL-SCNC: 5.5 MMOL/L (ref 3.7–5.3)
PROTEIN UA: ABNORMAL
RBC # BLD: 5.03 M/UL (ref 4.21–5.77)
RBC UA: NORMAL /HPF (ref 0–4)
SEG NEUTROPHILS: 89 % (ref 36–65)
SEGMENTED NEUTROPHILS ABSOLUTE COUNT: 13.74 K/UL (ref 1.5–8.1)
SODIUM BLD-SCNC: 138 MMOL/L (ref 135–144)
SODIUM BLD-SCNC: 145 MMOL/L (ref 135–144)
SPECIFIC GRAVITY UA: 1.03 (ref 1–1.03)
TOTAL PROTEIN: 8.5 G/DL (ref 6.4–8.3)
TURBIDITY: CLEAR
URINE HGB: NEGATIVE
UROBILINOGEN, URINE: NORMAL
WBC # BLD: 15.4 K/UL (ref 3.5–11.3)
WBC UA: NORMAL /HPF (ref 0–5)

## 2022-08-11 PROCEDURE — 2000000000 HC ICU R&B

## 2022-08-11 PROCEDURE — 93005 ELECTROCARDIOGRAM TRACING: CPT | Performed by: STUDENT IN AN ORGANIZED HEALTH CARE EDUCATION/TRAINING PROGRAM

## 2022-08-11 PROCEDURE — 81001 URINALYSIS AUTO W/SCOPE: CPT

## 2022-08-11 PROCEDURE — 96374 THER/PROPH/DIAG INJ IV PUSH: CPT

## 2022-08-11 PROCEDURE — 80048 BASIC METABOLIC PNL TOTAL CA: CPT

## 2022-08-11 PROCEDURE — 82805 BLOOD GASES W/O2 SATURATION: CPT

## 2022-08-11 PROCEDURE — 2500000003 HC RX 250 WO HCPCS: Performed by: STUDENT IN AN ORGANIZED HEALTH CARE EDUCATION/TRAINING PROGRAM

## 2022-08-11 PROCEDURE — 82947 ASSAY GLUCOSE BLOOD QUANT: CPT

## 2022-08-11 PROCEDURE — 6360000002 HC RX W HCPCS: Performed by: STUDENT IN AN ORGANIZED HEALTH CARE EDUCATION/TRAINING PROGRAM

## 2022-08-11 PROCEDURE — 2580000003 HC RX 258: Performed by: STUDENT IN AN ORGANIZED HEALTH CARE EDUCATION/TRAINING PROGRAM

## 2022-08-11 PROCEDURE — 36415 COLL VENOUS BLD VENIPUNCTURE: CPT

## 2022-08-11 PROCEDURE — 80051 ELECTROLYTE PANEL: CPT

## 2022-08-11 PROCEDURE — 83735 ASSAY OF MAGNESIUM: CPT

## 2022-08-11 PROCEDURE — 96375 TX/PRO/DX INJ NEW DRUG ADDON: CPT

## 2022-08-11 PROCEDURE — 82565 ASSAY OF CREATININE: CPT

## 2022-08-11 PROCEDURE — 83690 ASSAY OF LIPASE: CPT

## 2022-08-11 PROCEDURE — 82330 ASSAY OF CALCIUM: CPT

## 2022-08-11 PROCEDURE — 83605 ASSAY OF LACTIC ACID: CPT

## 2022-08-11 PROCEDURE — 87641 MR-STAPH DNA AMP PROBE: CPT

## 2022-08-11 PROCEDURE — 99291 CRITICAL CARE FIRST HOUR: CPT | Performed by: INTERNAL MEDICINE

## 2022-08-11 PROCEDURE — 99285 EMERGENCY DEPT VISIT HI MDM: CPT

## 2022-08-11 PROCEDURE — 84100 ASSAY OF PHOSPHORUS: CPT

## 2022-08-11 PROCEDURE — 71045 X-RAY EXAM CHEST 1 VIEW: CPT

## 2022-08-11 PROCEDURE — 84520 ASSAY OF UREA NITROGEN: CPT

## 2022-08-11 PROCEDURE — 85025 COMPLETE CBC W/AUTO DIFF WBC: CPT

## 2022-08-11 PROCEDURE — 82803 BLOOD GASES ANY COMBINATION: CPT

## 2022-08-11 PROCEDURE — 85014 HEMATOCRIT: CPT

## 2022-08-11 PROCEDURE — 82010 KETONE BODYS QUAN: CPT

## 2022-08-11 PROCEDURE — 6370000000 HC RX 637 (ALT 250 FOR IP): Performed by: STUDENT IN AN ORGANIZED HEALTH CARE EDUCATION/TRAINING PROGRAM

## 2022-08-11 PROCEDURE — 80076 HEPATIC FUNCTION PANEL: CPT

## 2022-08-11 RX ORDER — 0.9 % SODIUM CHLORIDE 0.9 %
1000 INTRAVENOUS SOLUTION INTRAVENOUS ONCE
Status: COMPLETED | OUTPATIENT
Start: 2022-08-11 | End: 2022-08-11

## 2022-08-11 RX ORDER — DEXTROSE AND SODIUM CHLORIDE 5; .45 G/100ML; G/100ML
INJECTION, SOLUTION INTRAVENOUS CONTINUOUS
Status: DISCONTINUED | OUTPATIENT
Start: 2022-08-11 | End: 2022-08-13

## 2022-08-11 RX ORDER — POLYETHYLENE GLYCOL 3350 17 G/17G
17 POWDER, FOR SOLUTION ORAL DAILY PRN
Status: DISCONTINUED | OUTPATIENT
Start: 2022-08-11 | End: 2022-08-14 | Stop reason: HOSPADM

## 2022-08-11 RX ORDER — DEXTROSE, SODIUM CHLORIDE, AND POTASSIUM CHLORIDE 5; .45; .15 G/100ML; G/100ML; G/100ML
INJECTION INTRAVENOUS CONTINUOUS PRN
Status: DISCONTINUED | OUTPATIENT
Start: 2022-08-11 | End: 2022-08-14 | Stop reason: HOSPADM

## 2022-08-11 RX ORDER — ONDANSETRON 2 MG/ML
4 INJECTION INTRAMUSCULAR; INTRAVENOUS ONCE
Status: COMPLETED | OUTPATIENT
Start: 2022-08-11 | End: 2022-08-11

## 2022-08-11 RX ORDER — ENOXAPARIN SODIUM 100 MG/ML
40 INJECTION SUBCUTANEOUS DAILY
Status: DISCONTINUED | OUTPATIENT
Start: 2022-08-11 | End: 2022-08-14 | Stop reason: HOSPADM

## 2022-08-11 RX ORDER — POTASSIUM CHLORIDE 7.45 MG/ML
10 INJECTION INTRAVENOUS PRN
Status: DISCONTINUED | OUTPATIENT
Start: 2022-08-11 | End: 2022-08-13

## 2022-08-11 RX ORDER — SODIUM CHLORIDE 9 MG/ML
INJECTION, SOLUTION INTRAVENOUS CONTINUOUS
Status: DISCONTINUED | OUTPATIENT
Start: 2022-08-11 | End: 2022-08-12 | Stop reason: SDUPTHER

## 2022-08-11 RX ORDER — 0.9 % SODIUM CHLORIDE 0.9 %
15 INTRAVENOUS SOLUTION INTRAVENOUS ONCE
Status: DISCONTINUED | OUTPATIENT
Start: 2022-08-11 | End: 2022-08-12 | Stop reason: SDUPTHER

## 2022-08-11 RX ORDER — SODIUM CHLORIDE AND POTASSIUM CHLORIDE .9; .15 G/100ML; G/100ML
SOLUTION INTRAVENOUS CONTINUOUS
Status: DISCONTINUED | OUTPATIENT
Start: 2022-08-11 | End: 2022-08-12 | Stop reason: SDUPTHER

## 2022-08-11 RX ORDER — DEXTROSE MONOHYDRATE 100 MG/ML
INJECTION, SOLUTION INTRAVENOUS CONTINUOUS PRN
Status: DISCONTINUED | OUTPATIENT
Start: 2022-08-11 | End: 2022-08-14 | Stop reason: HOSPADM

## 2022-08-11 RX ORDER — MAGNESIUM SULFATE 1 G/100ML
1000 INJECTION INTRAVENOUS PRN
Status: DISCONTINUED | OUTPATIENT
Start: 2022-08-11 | End: 2022-08-13

## 2022-08-11 RX ORDER — FENTANYL CITRATE 50 UG/ML
50 INJECTION, SOLUTION INTRAMUSCULAR; INTRAVENOUS ONCE
Status: COMPLETED | OUTPATIENT
Start: 2022-08-11 | End: 2022-08-11

## 2022-08-11 RX ORDER — ONDANSETRON 2 MG/ML
4 INJECTION INTRAMUSCULAR; INTRAVENOUS EVERY 6 HOURS PRN
Status: DISCONTINUED | OUTPATIENT
Start: 2022-08-11 | End: 2022-08-14 | Stop reason: HOSPADM

## 2022-08-11 RX ADMIN — POTASSIUM CHLORIDE AND SODIUM CHLORIDE: 900; 150 INJECTION, SOLUTION INTRAVENOUS at 15:46

## 2022-08-11 RX ADMIN — DEXTROSE AND SODIUM CHLORIDE: 5; 450 INJECTION, SOLUTION INTRAVENOUS at 20:20

## 2022-08-11 RX ADMIN — SODIUM CHLORIDE 1000 ML: 9 INJECTION, SOLUTION INTRAVENOUS at 16:17

## 2022-08-11 RX ADMIN — ONDANSETRON 4 MG: 2 INJECTION INTRAMUSCULAR; INTRAVENOUS at 16:35

## 2022-08-11 RX ADMIN — ONDANSETRON 4 MG: 2 INJECTION INTRAMUSCULAR; INTRAVENOUS at 11:09

## 2022-08-11 RX ADMIN — FENTANYL CITRATE 50 MCG: 50 INJECTION, SOLUTION INTRAMUSCULAR; INTRAVENOUS at 11:09

## 2022-08-11 RX ADMIN — SODIUM CHLORIDE 1000 ML: 9 INJECTION, SOLUTION INTRAVENOUS at 11:10

## 2022-08-11 RX ADMIN — SODIUM CHLORIDE 1000 ML: 9 INJECTION, SOLUTION INTRAVENOUS at 18:42

## 2022-08-11 RX ADMIN — SODIUM PHOSPHATE, MONOBASIC, MONOHYDRATE 20 MMOL: 276; 142 INJECTION, SOLUTION INTRAVENOUS at 23:24

## 2022-08-11 RX ADMIN — SODIUM CHLORIDE 0.1 UNITS/KG/HR: 9 INJECTION, SOLUTION INTRAVENOUS at 13:42

## 2022-08-11 RX ADMIN — SODIUM CHLORIDE 1000 ML: 9 INJECTION, SOLUTION INTRAVENOUS at 12:30

## 2022-08-11 ASSESSMENT — ENCOUNTER SYMPTOMS
NAUSEA: 1
ABDOMINAL PAIN: 1
SHORTNESS OF BREATH: 0
VOMITING: 1

## 2022-08-11 ASSESSMENT — PAIN SCALES - GENERAL: PAINLEVEL_OUTOF10: 6

## 2022-08-11 ASSESSMENT — PAIN - FUNCTIONAL ASSESSMENT: PAIN_FUNCTIONAL_ASSESSMENT: NONE - DENIES PAIN

## 2022-08-11 NOTE — ED PROVIDER NOTES
[facility] ED  Emergency Department Encounter  Emergency Medicine Resident     Pt Name: Sergio Rose  MRN: [id number]  [place] 2000  Date of evaluation: 8/11/22  PCP:  Jessica Kurtz MD    [address] [place] [address]       Chief Complaint   Patient presents with    Hyperglycemia       HISTORY OFPRESENT ILLNESS  (Location/Symptom, Timing/Onset, Context/Setting, Quality, Duration, Modifying Factors,Severity.)      Sergio Rose is a 19 y. o.yo male who presents with nausea, vomiting, abdominal pain. Patient also feeling very fatigued, reluctant to provide much history, most history is obtained via mom. Patient is a known type I diabetic, unclear if he is compliant with his insulin. Patient has gone into DKA previously. Denies any fevers. Has been nauseous and vomiting, unable to keep anything down. Denies any issues with urination. Difficult to obtain further history at this time due to patient's somnolence. PAST MEDICAL / SURGICAL / SOCIAL / FAMILY HISTORY      has a past medical history of Diabetes mellitus ([address] Gastroesophageal reflux disease, and Type 1 diabetes mellitus ([address] has no past surgical history on file. Social History     Socioeconomic History    Marital status: Single     Spouse name: Not on file    Number of children: Not on file    Years of education: Not on file    Highest education level: Not on file   Occupational History     Employer: N/A   Tobacco Use    Smoking status: Some Days     Packs/day: 0.25     Years: 2.00     Pack years: 0.50     Types: Cigarettes    Smokeless tobacco: Never   Vaping Use    Vaping Use: Never used   Substance and Sexual Activity    Alcohol use:  Yes     Alcohol/week: 10.0 standard drinks     Types: 10 Shots of liquor per week    Drug use: Yes     Types: Marijuana Justus Mustard)     Comment: occasional    Sexual activity: Yes     Partners: Female   Other Topics Concern    Not on file   Social History Narrative    ** Merged History Encounter **          Social Determinants of Health     Financial Resource Strain: Not on file   Food Insecurity: Not on file   Transportation Needs: Not on file   Physical Activity: Not on file   Stress: Not on file   Social Connections: Not on file   Intimate Partner Violence: Not on file   Housing Stability: Not on file       Family History   Problem Relation Age of Onset    Asthma Father     Asthma Sister     Asthma Brother         Allergies:  Patient has no known allergies. Home Medications:  Prior to Admission medications    Medication Sig Start Date End Date Taking? Authorizing Provider   ondansetron (ZOFRAN) 4 MG tablet Take 1 tablet by mouth 3 times daily as needed for Nausea or Vomiting 7/27/22   Amaya Archibald MD   Continuous Blood Gluc Sensor ([address] [place] [address]) [facility] Type 1 DM. Check blood sugars 5-6/day 3/10/22   Kang Cedeño MD   blood glucose monitor strips Test 3 times times a day & as needed for symptoms of irregular blood glucose. 3/1/22   Cass Faulkner MD   blood glucose monitor kit and supplies Dispense sufficient amount for indicated testing frequency plus additional to accommodate PRN testing needs. 3/1/22   Cass Faulkner MD   Lancets MISC 1 each by Does not apply route daily Use 3 times times daily 3/1/22   Cass Faulkner MD   insulin aspart (NOVOLOG FLEXPEN) 100 UNIT/ML injection pen Inject 1-11 Units into the skin 3 times daily (before meals) 1 unit per 15 grams CHO for meals and snacks  Sliding scale: =0, 125-150=1, 151-175=2, 175-200=3, 201-225=4, 226-250=5, 251-275=6, 276-300=7, 301-325=8, 326-350=9, 351-375=10, 376-400=11 3/1/22   Cass Faulkner MD   insulin glargine (LANTUS SOLOSTAR) 100 UNIT/ML injection pen Inject 25 Units into the skin 2 times daily 3/1/22   Cass Faulkner MD   Continuous Blood Gluc  (FREESTYLE [place] 14 DAY READER) JAYCE Check blood sugars 5-6/day.  Type 1 DM 3/1/22   Cass Faulkner MD   Glucagon, rDNA, Normocephalic and atraumatic. Right Ear: External ear normal.      Left Ear: External ear normal.      Nose: Nose normal.      Mouth/Throat:      Mouth: Mucous membranes are moist.   Eyes:      General:         Right eye: No discharge. Left eye: No discharge. Extraocular Movements: Extraocular movements intact. Pupils: Pupils are equal, round, and reactive to light. Cardiovascular:      Rate and Rhythm: Regular rhythm. Tachycardia present. Pulmonary:      Effort: Pulmonary effort is normal. No respiratory distress. Abdominal:      General: There is no distension. Palpations: Abdomen is soft. Comments: Generalized tenderness to palpation, no rebound or guarding   Musculoskeletal:      Cervical back: Normal range of motion. Comments: Moving all 4 extremities   Skin:     General: Skin is warm. Capillary Refill: Capillary refill takes less than 2 seconds. Neurological:      General: No focal deficit present. Mental Status: He is oriented to person, place, and time. Cranial Nerves: No cranial nerve deficit.       Comments: Somnolent but awakens to voice, appears sleepy, answers questions appropriately       DIFFERENTIAL  DIAGNOSIS     PLAN (LABS / IMAGING / EKG):  Orders Placed This Encounter   Procedures    XR CHEST PORTABLE    CBC with Auto Differential    Basic Metabolic Panel w/ Reflex to MG    Hepatic Function Panel    Lipase    Urinalysis with Reflex to Culture    Beta-Hydroxybutyrate    BLOOD GAS, VENOUS    ELECTROLYTES PLUS    Hemoglobin and hematocrit, blood    CALCIUM, IONIC (POC)    Microscopic Urinalysis    Basic Metabolic Panel    Magnesium    Phosphorus    CBC with Auto Differential    Diet NPO    HYPOGLYCEMIA TREATMENT: blood glucose LESS THAN 70 mg/dL and patient ALERT and TOLERATING PO    HYPOGLYCEMIA TREATMENT: blood glucose LESS THAN 70 mg/dL and patient NOT ALERT or NPO    Telemetry monitoring - continuous duration    Vital signs per unit routine    Notify physician if blood glucose (BG) less than (see details below)    Notify physician if blood glucose (BG) less than 200 mg/dL AND two of the following three criteria are met on two consecutive BMPs    Daily weights    Intake and output    Nurse to change IV Fluid when blood glucose (BG) reaches 250 mg/dL    Place intermittent pneumatic compression device    Telemetry monitoring - continuous duration    Up as tolerated    Full Code    Inpatient consult to Critical Care    Inpatient consult to Diabetes Educator/Management Team    Initiate Oxygen Therapy Protocol    POC Glucose Fingerstick    POC Blood Gas and Chemistry    Venous Blood Gas, POC    Creatinine W/GFR Point of Care    POCT urea (BUN)    Lactic Acid, POC    POCT Glucose    POCT Glucose    POCT Glucose    POC Glucose Fingerstick    POC Glucose Fingerstick    POCT Glucose - every hour    POC Glucose Fingerstick    EKG 12 Lead    ADMIT TO INPATIENT    ADMIT TO INPATIENT       MEDICATIONS ORDERED:  Orders Placed This Encounter   Medications    0.9 % sodium chloride bolus    ondansetron (ZOFRAN) injection 4 mg    fentaNYL (SUBLIMAZE) injection 50 mcg    0.9 % sodium chloride bolus    glucose chewable tablet 16 g    OR Linked Order Group     dextrose bolus 10% 125 mL     dextrose bolus 10% 250 mL    glucagon (rDNA) injection 1 mg    dextrose 10 % infusion    insulin regular (HUMULIN R;NOVOLIN R) 100 Units in sodium chloride 0.9 % 100 mL infusion    0.9% NaCl with KCl 20 mEq infusion    OR Linked Order Group     dextrose bolus 10% 125 mL     dextrose bolus 10% 250 mL    potassium chloride 10 mEq/100 mL IVPB (Peripheral Line)    magnesium sulfate 1000 mg in dextrose 5% 100 mL IVPB    OR Linked Order Group     sodium phosphate 10 mmol in sodium chloride 0.9 % 250 mL IVPB     sodium phosphate 15 mmol in dextrose 5 % 250 mL IVPB     sodium phosphate 20 mmol in dextrose 5 % 500 mL IVPB    polyethylene glycol (GLYCOLAX) packet 17 g    enoxaparin (LOVENOX) injection 40 mg     Order Specific Question:   Indication of Use     Answer:   Prophylaxis-DVT/PE    FOLLOWED BY Linked Order Group     0.9 % sodium chloride bolus     0.9 % sodium chloride infusion    dextrose 5 % and 0.45 % NaCl with KCl 20 mEq infusion    ondansetron (ZOFRAN) injection 4 mg    0.9 % sodium chloride bolus    0.9 % sodium chloride bolus    ondansetron (ZOFRAN) injection 4 mg       DDX: DKA, elevated glucose, viral gastroenteritis    Initial MDM/Plan: 19 y.o. male who presents with nausea, vomiting, abdominal pain. Patient is a known type I diabetic, has gone into DKA before. Unknown if he is compliant with his insulin. Patient appears very sleepy however does awaken to voice, tachycardic but otherwise stable vital signs, afebrile. Abdomen with generalized tenderness to palpation, no rebound or guarding. Point-of-care glucose in triage elevated in the 500s. Will obtain full DKA laboratory work-up. We will start with fluids, Zofran. Will consider insulin drip based on patient's lab results. Anticipate admission. DIAGNOSTIC RESULTS / EMERGENCYDEPARTMENT COURSE / MDM     LABS:  Labs Reviewed   CBC WITH AUTO DIFFERENTIAL - Abnormal; Notable for the following components:       Result Value    WBC 15.4 (*)     Platelets 745 (*)     Seg Neutrophils 89 (*)     Lymphocytes 6 (*)     Eosinophils % 0 (*)     Immature Granulocytes 1 (*)     Segs Absolute 13.74 (*)     Absolute Lymph # 0.86 (*)     All other components within normal limits   BASIC METABOLIC PANEL W/ REFLEX TO MG FOR LOW K - Abnormal; Notable for the following components:    Glucose 622 (*)     Potassium 5.5 (*)     Chloride 94 (*)     CO2 <6 (*)     All other components within normal limits   HEPATIC FUNCTION PANEL - Abnormal; Notable for the following components:     Total Protein 8.5 (*)     All other components within normal limits   URINALYSIS WITH REFLEX TO CULTURE - Abnormal; Notable for the following components: Glucose, Ur 3+ (*)     Ketones, Urine LARGE (*)     Protein, UA 1+ (*)     All other components within normal limits   BETA-HYDROXYBUTYRATE - Abnormal; Notable for the following components:    Beta-Hydroxybutyrate 9.92 (*)     All other components within normal limits   BLOOD GAS, VENOUS - Abnormal; Notable for the following components:    pH, [place] 7.026 (*)     pCO2, [place] 24.2 (*)     pO2, [place] 73.5 (*)     HCO3, Venous 6.0 (*)     Negative Base Excess, [place] 24.9 (*)     O2 Sat, [place] 86.8 (*)     All other components within normal limits   ELECTROLYTES PLUS - Abnormal; Notable for the following components:    POC Sodium 136 (*)     POC Potassium 5.6 (*)     POC Chloride 111 (*)     POC TCO2 8 (*)     Anion Gap 18 (*)     All other components within normal limits   HGB/HCT - Abnormal; Notable for the following components:    POC Hemoglobin 18.7 (*)     POC Hematocrit 55 (*)     All other components within normal limits   POC GLUCOSE FINGERSTICK - Abnormal; Notable for the following components:    POC Glucose 575 (*)     All other components within normal limits   VENOUS BLOOD GAS, POINT OF CARE - Abnormal; Notable for the following components:    pH, [place] 7.023 (*)     pCO2, [place] 29.3 (*)     HCO3, Venous 7.6 (*)     Negative Base Excess, [place] 22 (*)     All other components within normal limits   LACTIC ACID,POINT OF CARE - Abnormal; Notable for the following components:    POC Lactic Acid 4.51 (*)     All other components within normal limits   POCT GLUCOSE - Abnormal; Notable for the following components:    POC Glucose 609 (*)     All other components within normal limits   POC GLUCOSE FINGERSTICK - Abnormal; Notable for the following components:    POC Glucose 561 (*)     All other components within normal limits   POC GLUCOSE FINGERSTICK - Abnormal; Notable for the following components:    POC Glucose 545 (*)     All other components within normal limits   POC GLUCOSE FINGERSTICK - Abnormal; Notable for the following components:    POC Glucose 345 (*)     All other components within normal limits   POCT GLUCOSE - Normal   POCT GLUCOSE - Normal   POCT GLUCOSE - Normal   LIPASE   CALCIUM, IONIC (POC)   MICROSCOPIC URINALYSIS   BASIC METABOLIC PANEL   BASIC METABOLIC PANEL   MAGNESIUM   MAGNESIUM   PHOSPHORUS   PHOSPHORUS   BASIC METABOLIC PANEL   MAGNESIUM   PHOSPHORUS   CREATININE W/GFR POINT OF CARE   UREA N (POC)   POC BLOOD GAS AND CHEMISTRY   POCT GLUCOSE   POCT GLUCOSE   POCT GLUCOSE   POCT GLUCOSE   POCT GLUCOSE   POCT GLUCOSE   POCT GLUCOSE   POCT GLUCOSE   POCT GLUCOSE   POCT GLUCOSE   POCT GLUCOSE   POCT GLUCOSE   POCT GLUCOSE   POCT GLUCOSE   POCT GLUCOSE   POCT GLUCOSE   POCT GLUCOSE   POCT GLUCOSE   POCT GLUCOSE   POCT GLUCOSE   POCT GLUCOSE   POCT GLUCOSE   POCT GLUCOSE   POCT GLUCOSE   POCT GLUCOSE   POCT GLUCOSE   POCT GLUCOSE         RADIOLOGY:  XR CHEST PORTABLE    Result Date: 8/11/2022  EXAMINATION: ONE XRAY VIEW OF THE CHEST 8/11/2022 3:45 pm COMPARISON: 05/11/2029 HISTORY: ORDERING SYSTEM PROVIDED HISTORY: cough/dka TECHNOLOGIST PROVIDED HISTORY: cough/dka FINDINGS: Cardiomediastinal silhouette is normal in size. There is no pleural effusion or pneumothorax. There is no pulmonary consolidation. There is no acute osseous abnormality. No acute cardiopulmonary abnormality. EKG  EKG Interpretation    Interpreted by me    Rhythm: normal sinus   Rate: normal  Axis: Left  Ectopy: none  Conduction: Left anterior fascicular block  ST Segments: no acute change  T Waves: Nonspecific changes  Q Waves: none    Clinical Impression: Nonspecific EKG    All EKG's are interpreted by the Emergency Department Physicianwho either signs or Co-signs this chart in the absence of a cardiologist.    EMERGENCY DEPARTMENT COURSE:  ED Course as of 08/11/22 1742   Thu Aug 11, 2022   1130 Point-of-care labs concerning for DKA. Patient started on fluids. Will add on insulin drip as soon as official potassium results. Patient will require critical care admit due to severe DKA with a pH of 7.0 [AB]   1221 WBC(!): 15.4 [AB]   1221 Hemoglobin Quant: 15.2 [AB]   1239 pH, [place](!!): 7.026 [AB]   1240 HCO3, Venous(!): 6.0 [AB]   1300 GLUCOSE - FASTING(!!): 622 [AB]   1300 Potassium(!): 5.5 [AB]   1300 CO2(!!): <6  We will start insulin drip at this time [AB]   1300 LFTs within normal limits [AB]   1300 Lipase: 13 [AB]   1300 Will discuss case with critical care for admission [AB]   1316 Beta-Hydroxybutyrate(!): 9.92 [AB]   1338 Discussed case with critical care. State they will be down to evaluate patient. [AB]   1412 Admitted to critical care [AB]      ED Course User Index  [AB] Mason Patterson DO          PROCEDURES:  None    CONSULTS:  IP CONSULT TO CRITICAL CARE  IP CONSULT TO DIABETES EDUCATOR    CRITICAL CARE:  See attending physician note    FINAL IMPRESSION      1. DKA, type 1, not at goal [facility])          [address] / [facility] [address] Admitted 08/11/2022 04:11:22 PM      PATIENT REFERRED TO:  No follow-up provider specified.     DISCHARGE MEDICATIONS:  New Prescriptions    No medications on file       Corrinne Daub, DO  Emergency Medicine Resident    (Please note that portions of this note were completed with a voice recognition program.Efforts were made to edit the dictations but occasionally words are mis-transcribed.)        Mason Patterson DO  Resident  08/11/22 1843

## 2022-08-11 NOTE — LETTER
[facility]  [address] [facility]  Phone: [phone]         August 12, 2022    To :Haley Vázquez MD    From: Lucero Brown RN    Patient: Shane Villegas   YOB: 2000   Date of Visit: 8/12/22 - IN patient note     Type and Reason for Visit: Patient Education - TYPE 1 diabetes with re- occurring DKA   Met with patient at bedside, he stated feeling better this am. He does have type 1 dm from age of 13, he has had education on self care in past. He did have recent DKA admission ( 7/26/22) . He stated insulin pens at home. He stated he takes long acting levemir 30 units in am and 20 units in pm. Novolog 4- 5 units with meals ( more if bigger meal) and correct dose if bg is over 170 up to 300 and will add in 1- 2 more units. He stated running low on insulin pens at home. He started to use freestyle adi cgm with reader ( not ramona on phone) he has not started to do any data sharing with provider. Discussed DKA symptoms and self monitoring for urine ketones. Patient stated he does have ketone stick and is aware need to check and take insulin and try to stay hydrated. Follow up plans: He does not currently follow with endo - but had last PCP appt at [facility] March and has follow up appt for this next week tues 8/16/22. He has referral to [facility] - Dr Johan Alas unsure of status of this referral -  Follow up call [facility] done spoke with Casper Bowman - stated they did have referral on 6/17/22 he was contacted and susan for appt on 7/21/22 and was no show. Per policy he can not be seen/ discharged from practice    Follow visit with outpatient DMED at [facility] was planned for Monday 8/15/22 at 11 : 27 am with writer - asked patient to bring in freestyle [place] reader and plan for data share/ review and reinforced need to follow up at PCP on Tues 8/16/22. Writer would also recommend close Case management follow up - and referral to [address] [place] [address] Way.

## 2022-08-11 NOTE — H&P
Critical Care - History and Physical Examination    Patient's name:  Jerry Milligan  Medical Record Number: [id number]  Patient's account/billing number: [de-identified]  Patient's YOB: 2000  Age: 19 y.o. Date of Admission: 8/11/2022 10:40 AM  Date of History and Physical Examination: 8/11/2022    Primary Care Physician: Tamela Weathers MD  Attending Physician: Jamal Griggs MD     Code Status: Prior    Chief complaint:   Chief Complaint   Patient presents with    Hyperglycemia       HISTORY OF PRESENT ILLNESS:   Jerry Milligan is a 19 y.o. male with h/o T1DM and GERD presented to the ED with nausea, vomiting and overall malaise for 2 days. Patient reports not taking his insulin for the past couple days as well. Denies fever, chills, cough, respiratory symptoms. Patient has been admitted multiple times previously for DKA. Patient appears uncomfortable and nauseous in the ED but is A&Ox4, not in respiratory distress. Patient's blood glucose was 622 upon arrival to the ED. Bicarbonate <6. Beta-hydroxybuterate 9.92. Initial VBG 7.023/pCO2 29.3/HCO3 7.6. Patient given fentanyl, zofran, and 2L NS in the ED and started on DKA protocol. Will admit to ICU bed. Past Medical History:        Diagnosis Date    Diabetes mellitus ([facility] [facility]     Gastroesophageal reflux disease     Type 1 diabetes mellitus ([facility] [address] April 2013       Past Surgical History:  History reviewed. No pertinent surgical history. Allergies:    No Known Allergies      Home Meds:   Prior to Admission medications    Medication Sig Start Date End Date Taking? Authorizing Provider   ondansetron (ZOFRAN) 4 MG tablet Take 1 tablet by mouth 3 times daily as needed for Nausea or Vomiting 7/27/22   Robby Flores MD   Continuous Blood Gluc Sensor ([address] [place] [address]) [facility] Type 1 DM.  Check blood sugars 5-6/day 3/10/22   Kang Ruby MD   blood glucose monitor strips Test 3 times times a day & as needed for symptoms of irregular blood glucose. 3/1/22   Jany Roberts MD   blood glucose monitor kit and supplies Dispense sufficient amount for indicated testing frequency plus additional to accommodate PRN testing needs. 3/1/22   Jany Roberts MD   Lancets MISC 1 each by Does not apply route daily Use 3 times times daily 3/1/22   Jany Roberts MD   insulin aspart (NOVOLOG FLEXPEN) 100 UNIT/ML injection pen Inject 1-11 Units into the skin 3 times daily (before meals) 1 unit per 15 grams CHO for meals and snacks  Sliding scale: =0, 125-150=1, 151-175=2, 175-200=3, 201-225=4, 226-250=5, 251-275=6, 276-300=7, 301-325=8, 326-350=9, 351-375=10, 376-400=11 3/1/22   Jany Roberts MD   insulin glargine (LANTUS SOLOSTAR) 100 UNIT/ML injection pen Inject 25 Units into the skin 2 times daily 3/1/22   Jany Roberts MD   Continuous Blood Gluc  (FREESTYLE MALKA 14 DAY READER) JAYCE Check blood sugars 5-6/day.  Type 1 DM 3/1/22   Jany Roberts MD   Glucagon, rDNA, (GLUCAGON EMERGENCY) 1 MG KIT As directed for extreme hypoglycemia 8/17/21   Deep Cade MD   Lancets MISC 1 each by Does not apply route 4 times daily (before meals and nightly)  Patient not taking: No sig reported 6/29/21   Rupal Joya MD   Ketone Blood Test STRP 1 strip by Does not apply route daily as needed (for high blood sugar) 6/29/21   Rupal Joya MD   Insulin Pen Needle 32G X 6 MM MISC Use 2 times daily with insulin 6/29/21   Rupal Joya MD   INSULIN SYRINGE 1CC/29G (AIMSCO INSULIN SYR ULTRA THIN) 29G X 1/2\" 1 ML MISC 1 each by Does not apply route daily  Patient not taking: No sig reported 1/5/21   Beatriz Thrasher MD   Insulin Pen Needle 30G X 5 MM MISC 1 Device by Does not apply route 4 times daily  Patient not taking: No sig reported 12/22/20   Renella A Gauamis, APRN - CNP   EPINEPHrine ([place]-ITALO) 0.15 MG/0.3ML SOAJ Use as directed for allergic reaction 10/2/20   Fatou Marquis MD   Insulin Pen Needle 30G X 5 MM MISC 1 Device by Does not apply route 4 times daily 10/2/20   Jeannette Ramos MD       Social History:   TOBACCO:   reports that he has been smoking cigarettes. He has a 0.50 pack-year smoking history. He has never used smokeless tobacco.  ETOH:   reports current alcohol use of about 10.0 standard drinks per week. DRUGS:  reports current drug use. Drug: Marijuana [place]). OCCUPATION:      Family History:       Problem Relation Age of Onset    Asthma Father     Asthma Sister     Asthma Brother        REVIEW OF SYSTEMS (ROS):  Could not be evaluated    CONSTITUTIONAL: Denies recent fever, chills  EYES: No visual changes. NECK: No midline neck pain  RESPIRATORY: Denies shortness of breath. Denies dyspnea. CARDIAC:  Denies chest pain. The pain does not radiate. GI: + abdominal pain + nausea,+ vomiting. Denies Blood in the stool or black tarry stools. MUSCULOSKELETAL: Denies focal weakness. NEUROLOGICAL: denies headache or focal weakness. ENDOCRINE: Denies polyuria or polydipsia. Denies recent weight changes  SKIN:  Denies any rash. Physical Exam:    Vitals: BP (!) 140/94   Pulse (!) 106   Temp 97.6 °F (36.4 °C)   Resp 15   Wt 136 lb (61.7 kg)   SpO2 100%   BMI 18.97 kg/m²     Last Body weight:   Wt Readings from Last 3 Encounters:   08/11/22 136 lb (61.7 kg)   07/27/22 136 lb 11 oz (62 kg)   03/01/22 139 lb (63 kg)       Body Mass Index : Body mass index is 18.97 kg/m². PHYSICAL EXAMINATION :  Constitutional: thin, Appears uncomfortable, no distress  EENT: PERRLA, EOMI, sclera clear, anicteric, oropharynx clear, no lesions, neck supple with midline trachea. Neck: Supple, symmetrical, trachea midline, no adenopathy, no jvd skin normal  Respiratory: clear to auscultation, no wheezes or rales and unlabored breathing.  No intercostal tenderness  Cardiovascular: tachycardic, regular rhythm, normal S1, S2, no murmur noted and 2+ pulses throughout  Abdomen: soft, nontender, nondistended, no masses or organomegaly  Extremities:  peripheral pulses normal, no pedal edema, no clubbing or cyanosis    MEDICATIONS:  Scheduled Meds:      Continuous Infusions:   dextrose      insulin 0.01 Units/kg/hr (08/11/22 1439)    0.9% NaCl with KCl 20 mEq         PRN Meds:   glucose, 4 tablet, PRN  dextrose bolus, 125 mL, PRN   Or  dextrose bolus, 250 mL, PRN  glucagon (rDNA), 1 mg, PRN  dextrose, , Continuous PRN        SUPPORT DEVICES: [] Ventilator [] BIPAP  [] Nasal Cannula [x] Room Air      LABS:       CBC:   Recent Labs     08/11/22  1115   WBC 15.4*   HGB 15.2   *     BMP:    Recent Labs     08/11/22  1054 08/11/22  1115 08/11/22  1115 08/11/22  1451   NA  --  138  --   --    K  --  5.5*  --   --    CL  --  94*  --   --    CO2  --  <6*  --   --    BUN  --  20  --   --    CREATININE 0.99 1.17  --   --    GLUCOSE  --  622*   < > 561    < > = values in this interval not displayed. S. Calcium:  Recent Labs     08/11/22  1115   CALCIUM 10.1     S. Ionized Calcium:No results for input(s): IONCA in the last 72 hours. S. Magnesium:No results for input(s): MG in the last 72 hours. S. Phosphorus:No results for input(s): PHOS in the last 72 hours. S. Glucose:  Recent Labs     08/11/22  1038 08/11/22  1054 08/11/22  1433   POCGLU 575* 609* 561*     Glycosylated hemoglobin A1C: No results for input(s): LABA1C in the last 72 hours. INR: No results for input(s): INR in the last 72 hours. Hepatic functions:   Recent Labs     08/11/22  1115   ALKPHOS 118   ALT 23   AST 17   PROT 8.5*   BILITOT 0.38   BILIDIR 0.23   LABALBU 5.1     Pancreatic functions:No results for input(s): LACTA, AMYLASE in the last 72 hours. S. Lactic Acid: No results for input(s): LACTA in the last 72 hours. Cardiac enzymes:No results for input(s): CKTOTAL, CKMB, CKMBINDEX, TROPONINI in the last 72 hours. BNP:No results for input(s): BNP in the last 72 hours.   Lipid profile: No results for input(s): CHOL, TRIG, HDL, LDL, LDLCALC in the last 72 hours. Blood Gases: No results found for: PH, PCO2, PO2, HCO3, O2SAT  Thyroid functions:   Lab Results   Component Value Date/Time    TSH 1.13 2017 06:12 PM        Urinalysis:   Recent Labs     22  1400   COLORU Yellow   PHUR 5.5   WBCUA None   RBCUA None   SPECGRAV 1.029   LEUKOCYTESUR NEGATIVE   UROBILINOGEN Normal   BILIRUBINUR NEGATIVE         RADIOLOGY:  XR CHEST PORTABLE    (Results Pending)       CARDIOLOGY:  Recent Cardiac Catheterization summary:   No results found for this or any previous visit. Electrocardiogram: 2022  NSR, rate of 50   Flipped T-waves V1-V5         Last Echocardiogram findings:   No results found for this or any previous visit. No results found for this or any previous visit.       Assessment and Plan     Patient Active Problem List   Diagnosis    Pharyngitis    High anion gap metabolic acidosis    Type 1 diabetes mellitus (HCC)    DKA, type 1, not at goal [facility])    Diabetic ketoacidosis without coma associated with other specified diabetes mellitus ([facility] [address]       PLAN/MEDICAL DECISION MAKING:  Neurologic:   Neuro intact  Neuro checks per protocol  No sedation    Cardiovascular:  Hemodynamically stable  MAP goal     Pulmonary:  Maintain oxygen sats >92%  Pulmonary toilet  CXR ordered    GI/Nutrition  glycolax  Ulcer Prophylaxis: not indicated  Diet: NPO  Renal/Fluid/Electrolyte  IV Fluids: 0.9% NS @ 250 mL/Hr   I/O: In: -   Out: 725 [Urine:725]  UOP: 0.5 cc/kg/hr  Monitor electrolytes, replace PRN   ID  WBC:   Lab Results   Component Value Date    WBC 15.4 (H) 2022     Tmax: Temp (24hrs), Av.6 °F (36.4 °C), Min:97.6 °F (36.4 °C), Max:97.6 °F (36.4 °C)    Antimicrobials: none  Hematology:  Recent Labs     22  1115   HGB 15.2    Stable   Endocrine:   DKA protocol ordered   glucose uncontrolled - most recent BGL is   Recent Labs     22  1115 22  1451   GLUCOSE 622* 561     DVT Prophylaxis  Lovenox  Discharge Needs:  diabetic education    CODE STATUS: Prior    DISPOSITION:  [x] To remain ICU  [] OK for out of ICU from [address] [facility], DO  Emergency Medicine Resident  Critical Care Service       Attending Physician Statement  I have discussed the care of Samia Eugene, including pertinent history and exam findings with the resident. I have reviewed the key elements of all parts of the encounter with the resident. I have seen and examined the patient with the resident. I agree with the assessment and plan and status of the problem list as documented. I seen the patient in the emergency room for admission to medical ICU. I have reviewed the labs, chart reviewed, labs and medications reviewed. History of type I diabetic ketoacidosis compliance is not known on Lantus 25 units twice daily according to patient for few days he was not able to take Lantus started having nausea vomiting abdominal pain without diarrhea did not complain of flulike symptoms denies cough was complaining of tachypnea did not have any fever. He looks extremely dehydrated beta hydroxy butyrate positive bicarbonate less than 6 creatinine is 1.17 and blood glucose initially was greater than 600 LFTs are normal.  WBC count is 15.4 likely secondary to hemoconcentration hemoglobin is 15. His initial VBG was reported to be pH of 7.02 PCO2 of 29 and bicarbonate of 6. Chest x-ray did not show any acute infiltrate. Patient received 2 L of IV fluid in the emergency room 2 more liters of fluid ordered to be given now. Will start him on IV fluid to 250 mL an hour after 2 more liter fluid bolus. Follow-up DKA protocol with insulin drip. Monitor blood sugar every 1 hour. Follow-up anion gap bicarbonate and electrolytes every 4 hours. Follow-up VBG  Follow-up urine output and renal function. Discussed with nursing staff, treatment and plan discussed.   Discussed with respiratory therapist.    Total critical care time caring for this patient with life threatening, unstable organ failure, including direct patient contact, management of life support systems, review of data including imaging and labs, discussions with other team members and physicians at least 39  Min so far today, excluding procedures. Please note that this chart was generated using voice recognition Dragon dictation software. Although every effort was made to ensure the accuracy of this automated transcription, some errors in transcription may have occurred.      Quoc Pond MD  8/11/2022 7:33 PM

## 2022-08-11 NOTE — ED NOTES
Pt brought to ED by mother. Pt is a diabetic. Pt states they have been very tired and nauseous for 2 days. Mother states that pt is normally energetic and talkative. Pt states that they may have missed one or two insulin shots in the past 2 days, but that they have not been eating much. Pt vomited while writer in the room. Pt connected to monitor. IV NS started. Labs drawn and sent. Family at bedside. Will continue to monitor.      Deisy Fountain RN  08/11/22 [id number]

## 2022-08-11 NOTE — ED PROVIDER NOTES
Impression: non-specific EKG    MD Kaley Chery MD  Attending Emergency  Physician            Kannan Wilder MD  08/11/22 [id number]

## 2022-08-12 LAB
ABSOLUTE EOS #: 0 K/UL (ref 0–0.4)
ABSOLUTE IMMATURE GRANULOCYTE: 0 K/UL (ref 0–0.3)
ABSOLUTE LYMPH #: 1.15 K/UL (ref 1–4.8)
ABSOLUTE MONO #: 0.57 K/UL (ref 0.1–0.8)
ANION GAP SERPL CALCULATED.3IONS-SCNC: 13 MMOL/L (ref 9–17)
ANION GAP SERPL CALCULATED.3IONS-SCNC: 14 MMOL/L (ref 9–17)
ANION GAP SERPL CALCULATED.3IONS-SCNC: 15 MMOL/L (ref 9–17)
ANION GAP SERPL CALCULATED.3IONS-SCNC: 15 MMOL/L (ref 9–17)
ANION GAP SERPL CALCULATED.3IONS-SCNC: 16 MMOL/L (ref 9–17)
BASOPHILS # BLD: 0 % (ref 0–2)
BASOPHILS ABSOLUTE: 0 K/UL (ref 0–0.2)
BUN BLDV-MCNC: 10 MG/DL (ref 6–20)
BUN BLDV-MCNC: 11 MG/DL (ref 6–20)
BUN BLDV-MCNC: 8 MG/DL (ref 6–20)
BUN BLDV-MCNC: 9 MG/DL (ref 6–20)
BUN BLDV-MCNC: 9 MG/DL (ref 6–20)
CALCIUM SERPL-MCNC: 8.4 MG/DL (ref 8.6–10.4)
CALCIUM SERPL-MCNC: 8.5 MG/DL (ref 8.6–10.4)
CALCIUM SERPL-MCNC: 8.6 MG/DL (ref 8.6–10.4)
CALCIUM SERPL-MCNC: 8.7 MG/DL (ref 8.6–10.4)
CALCIUM SERPL-MCNC: 8.7 MG/DL (ref 8.6–10.4)
CARBOXYHEMOGLOBIN: 0.8 % (ref 0–5)
CARBOXYHEMOGLOBIN: 0.9 % (ref 0–5)
CARBOXYHEMOGLOBIN: 1 % (ref 0–5)
CARBOXYHEMOGLOBIN: 1.6 % (ref 0–5)
CHLORIDE BLD-SCNC: 109 MMOL/L (ref 98–107)
CHLORIDE BLD-SCNC: 110 MMOL/L (ref 98–107)
CHLORIDE BLD-SCNC: 111 MMOL/L (ref 98–107)
CHLORIDE BLD-SCNC: 111 MMOL/L (ref 98–107)
CHLORIDE BLD-SCNC: 113 MMOL/L (ref 98–107)
CO2: 13 MMOL/L (ref 20–31)
CO2: 14 MMOL/L (ref 20–31)
CO2: 15 MMOL/L (ref 20–31)
CO2: 15 MMOL/L (ref 20–31)
CO2: 16 MMOL/L (ref 20–31)
CREAT SERPL-MCNC: 0.77 MG/DL (ref 0.7–1.2)
CREAT SERPL-MCNC: 0.78 MG/DL (ref 0.7–1.2)
CREAT SERPL-MCNC: 0.8 MG/DL (ref 0.7–1.2)
CREAT SERPL-MCNC: 0.84 MG/DL (ref 0.7–1.2)
CREAT SERPL-MCNC: 0.93 MG/DL (ref 0.7–1.2)
EKG ATRIAL RATE: 98 BPM
EKG P AXIS: 82 DEGREES
EKG P-R INTERVAL: 142 MS
EKG Q-T INTERVAL: 356 MS
EKG QRS DURATION: 100 MS
EKG QTC CALCULATION (BAZETT): 454 MS
EKG R AXIS: -68 DEGREES
EKG T AXIS: 54 DEGREES
EKG VENTRICULAR RATE: 98 BPM
EOSINOPHILS RELATIVE PERCENT: 0 % (ref 1–4)
FIO2: ABNORMAL
GFR AFRICAN AMERICAN: >60 ML/MIN
GFR NON-AFRICAN AMERICAN: >60 ML/MIN
GFR SERPL CREATININE-BSD FRML MDRD: ABNORMAL ML/MIN/{1.73_M2}
GLUCOSE BLD-MCNC: 126 MG/DL (ref 75–110)
GLUCOSE BLD-MCNC: 129 MG/DL (ref 75–110)
GLUCOSE BLD-MCNC: 138 MG/DL (ref 75–110)
GLUCOSE BLD-MCNC: 140 MG/DL (ref 75–110)
GLUCOSE BLD-MCNC: 142 MG/DL (ref 70–99)
GLUCOSE BLD-MCNC: 155 MG/DL (ref 75–110)
GLUCOSE BLD-MCNC: 157 MG/DL (ref 75–110)
GLUCOSE BLD-MCNC: 168 MG/DL (ref 75–110)
GLUCOSE BLD-MCNC: 170 MG/DL (ref 75–110)
GLUCOSE BLD-MCNC: 173 MG/DL (ref 75–110)
GLUCOSE BLD-MCNC: 176 MG/DL (ref 75–110)
GLUCOSE BLD-MCNC: 178 MG/DL (ref 70–99)
GLUCOSE BLD-MCNC: 178 MG/DL (ref 75–110)
GLUCOSE BLD-MCNC: 187 MG/DL (ref 75–110)
GLUCOSE BLD-MCNC: 194 MG/DL (ref 70–99)
GLUCOSE BLD-MCNC: 194 MG/DL (ref 75–110)
GLUCOSE BLD-MCNC: 205 MG/DL (ref 75–110)
GLUCOSE BLD-MCNC: 208 MG/DL (ref 70–99)
GLUCOSE BLD-MCNC: 210 MG/DL (ref 70–99)
GLUCOSE BLD-MCNC: 216 MG/DL (ref 75–110)
GLUCOSE BLD-MCNC: 217 MG/DL (ref 75–110)
GLUCOSE BLD-MCNC: 226 MG/DL (ref 75–110)
GLUCOSE BLD-MCNC: 256 MG/DL (ref 75–110)
GLUCOSE BLD-MCNC: 271 MG/DL (ref 75–110)
GLUCOSE BLD-MCNC: 37 MG/DL (ref 75–110)
GLUCOSE BLD-MCNC: 407 MG/DL (ref 75–110)
GLUCOSE BLD-MCNC: 94 MG/DL (ref 75–110)
HCO3 VENOUS: 13.8 MMOL/L (ref 24–30)
HCO3 VENOUS: 16.9 MMOL/L (ref 24–30)
HCO3 VENOUS: 17.9 MMOL/L (ref 24–30)
HCO3 VENOUS: 18.2 MMOL/L (ref 24–30)
HCT VFR BLD CALC: 38 % (ref 40.7–50.3)
HEMOGLOBIN: 12.2 G/DL (ref 13–17)
IMMATURE GRANULOCYTES: 0 %
LYMPHOCYTES # BLD: 6 % (ref 24–44)
MAGNESIUM: 2 MG/DL (ref 1.6–2.6)
MAGNESIUM: 2 MG/DL (ref 1.6–2.6)
MAGNESIUM: 2.2 MG/DL (ref 1.6–2.6)
MAGNESIUM: 2.2 MG/DL (ref 1.6–2.6)
MCH RBC QN AUTO: 28.9 PG (ref 25.2–33.5)
MCHC RBC AUTO-ENTMCNC: 32.1 G/DL (ref 28.4–34.8)
MCV RBC AUTO: 90 FL (ref 82.6–102.9)
MONOCYTES # BLD: 3 % (ref 1–7)
MORPHOLOGY: NORMAL
MRSA, DNA, NASAL: NEGATIVE
NEGATIVE BASE EXCESS, VEN: 12.2 MMOL/L (ref 0–2)
NEGATIVE BASE EXCESS, VEN: 6.5 MMOL/L (ref 0–2)
NEGATIVE BASE EXCESS, VEN: 7.2 MMOL/L (ref 0–2)
NEGATIVE BASE EXCESS, VEN: 8.7 MMOL/L (ref 0–2)
NRBC AUTOMATED: 0 PER 100 WBC
O2 SAT, VEN: 76.9 % (ref 60–85)
O2 SAT, VEN: 78.4 % (ref 60–85)
O2 SAT, VEN: 79.4 % (ref 60–85)
O2 SAT, VEN: 80.7 % (ref 60–85)
PATIENT TEMP: 37
PCO2, VEN: 32.7 (ref 39–55)
PCO2, VEN: 35.4 (ref 39–55)
PCO2, VEN: 36.8 (ref 39–55)
PCO2, VEN: 37 (ref 39–55)
PDW BLD-RTO: 12.9 % (ref 11.8–14.4)
PH VENOUS: 7.25 (ref 7.32–7.42)
PH VENOUS: 7.28 (ref 7.32–7.42)
PH VENOUS: 7.31 (ref 7.32–7.42)
PH VENOUS: 7.33 (ref 7.32–7.42)
PHOSPHORUS: 2.2 MG/DL (ref 2.5–4.5)
PHOSPHORUS: 2.3 MG/DL (ref 2.5–4.5)
PHOSPHORUS: 2.5 MG/DL (ref 2.5–4.5)
PHOSPHORUS: 2.5 MG/DL (ref 2.5–4.5)
PLATELET # BLD: 380 K/UL (ref 138–453)
PMV BLD AUTO: 10.8 FL (ref 8.1–13.5)
PO2, VEN: 39.8 (ref 30–50)
PO2, VEN: 41.6 (ref 30–50)
PO2, VEN: 42.4 (ref 30–50)
PO2, VEN: 42.8 (ref 30–50)
POTASSIUM SERPL-SCNC: 3.8 MMOL/L (ref 3.7–5.3)
POTASSIUM SERPL-SCNC: 3.9 MMOL/L (ref 3.7–5.3)
POTASSIUM SERPL-SCNC: 4.1 MMOL/L (ref 3.7–5.3)
POTASSIUM SERPL-SCNC: 4.3 MMOL/L (ref 3.7–5.3)
POTASSIUM SERPL-SCNC: 5 MMOL/L (ref 3.7–5.3)
RBC # BLD: 4.22 M/UL (ref 4.21–5.77)
SEG NEUTROPHILS: 91 % (ref 36–66)
SEGMENTED NEUTROPHILS ABSOLUTE COUNT: 17.38 K/UL (ref 1.8–7.7)
SODIUM BLD-SCNC: 138 MMOL/L (ref 135–144)
SODIUM BLD-SCNC: 139 MMOL/L (ref 135–144)
SODIUM BLD-SCNC: 140 MMOL/L (ref 135–144)
SODIUM BLD-SCNC: 141 MMOL/L (ref 135–144)
SODIUM BLD-SCNC: 142 MMOL/L (ref 135–144)
SPECIMEN DESCRIPTION: NORMAL
WBC # BLD: 19.1 K/UL (ref 3.5–11.3)

## 2022-08-12 PROCEDURE — 80048 BASIC METABOLIC PNL TOTAL CA: CPT

## 2022-08-12 PROCEDURE — 2000000000 HC ICU R&B

## 2022-08-12 PROCEDURE — 85025 COMPLETE CBC W/AUTO DIFF WBC: CPT

## 2022-08-12 PROCEDURE — 93010 ELECTROCARDIOGRAM REPORT: CPT | Performed by: INTERNAL MEDICINE

## 2022-08-12 PROCEDURE — G0108 DIAB MANAGE TRN  PER INDIV: HCPCS

## 2022-08-12 PROCEDURE — 84100 ASSAY OF PHOSPHORUS: CPT

## 2022-08-12 PROCEDURE — 6360000002 HC RX W HCPCS: Performed by: STUDENT IN AN ORGANIZED HEALTH CARE EDUCATION/TRAINING PROGRAM

## 2022-08-12 PROCEDURE — 2580000003 HC RX 258: Performed by: STUDENT IN AN ORGANIZED HEALTH CARE EDUCATION/TRAINING PROGRAM

## 2022-08-12 PROCEDURE — 6370000000 HC RX 637 (ALT 250 FOR IP): Performed by: STUDENT IN AN ORGANIZED HEALTH CARE EDUCATION/TRAINING PROGRAM

## 2022-08-12 PROCEDURE — 99291 CRITICAL CARE FIRST HOUR: CPT | Performed by: INTERNAL MEDICINE

## 2022-08-12 PROCEDURE — 6360000002 HC RX W HCPCS

## 2022-08-12 PROCEDURE — 82805 BLOOD GASES W/O2 SATURATION: CPT

## 2022-08-12 PROCEDURE — 83735 ASSAY OF MAGNESIUM: CPT

## 2022-08-12 PROCEDURE — 36415 COLL VENOUS BLD VENIPUNCTURE: CPT

## 2022-08-12 PROCEDURE — 2580000003 HC RX 258

## 2022-08-12 PROCEDURE — 82947 ASSAY GLUCOSE BLOOD QUANT: CPT

## 2022-08-12 RX ORDER — INSULIN GLARGINE 100 [IU]/ML
25 INJECTION, SOLUTION SUBCUTANEOUS NIGHTLY
Status: DISCONTINUED | OUTPATIENT
Start: 2022-08-12 | End: 2022-08-13

## 2022-08-12 RX ORDER — SODIUM CHLORIDE AND POTASSIUM CHLORIDE .9; .15 G/100ML; G/100ML
SOLUTION INTRAVENOUS CONTINUOUS
Status: DISCONTINUED | OUTPATIENT
Start: 2022-08-12 | End: 2022-08-13

## 2022-08-12 RX ORDER — INSULIN LISPRO 100 [IU]/ML
0-4 INJECTION, SOLUTION INTRAVENOUS; SUBCUTANEOUS
Status: DISCONTINUED | OUTPATIENT
Start: 2022-08-13 | End: 2022-08-14 | Stop reason: HOSPADM

## 2022-08-12 RX ORDER — INSULIN LISPRO 100 [IU]/ML
0-4 INJECTION, SOLUTION INTRAVENOUS; SUBCUTANEOUS NIGHTLY
Status: DISCONTINUED | OUTPATIENT
Start: 2022-08-13 | End: 2022-08-14 | Stop reason: HOSPADM

## 2022-08-12 RX ORDER — INSULIN LISPRO 100 [IU]/ML
0-8 INJECTION, SOLUTION INTRAVENOUS; SUBCUTANEOUS
Status: DISCONTINUED | OUTPATIENT
Start: 2022-08-12 | End: 2022-08-12

## 2022-08-12 RX ORDER — INSULIN LISPRO 100 [IU]/ML
0-16 INJECTION, SOLUTION INTRAVENOUS; SUBCUTANEOUS
Status: DISCONTINUED | OUTPATIENT
Start: 2022-08-13 | End: 2022-08-12

## 2022-08-12 RX ORDER — INSULIN GLARGINE 100 [IU]/ML
20 INJECTION, SOLUTION SUBCUTANEOUS ONCE
Status: COMPLETED | OUTPATIENT
Start: 2022-08-12 | End: 2022-08-12

## 2022-08-12 RX ORDER — SODIUM CHLORIDE 450 MG/100ML
INJECTION, SOLUTION INTRAVENOUS CONTINUOUS
Status: DISCONTINUED | OUTPATIENT
Start: 2022-08-12 | End: 2022-08-13

## 2022-08-12 RX ORDER — INSULIN LISPRO 100 [IU]/ML
0-4 INJECTION, SOLUTION INTRAVENOUS; SUBCUTANEOUS NIGHTLY
Status: DISCONTINUED | OUTPATIENT
Start: 2022-08-12 | End: 2022-08-12

## 2022-08-12 RX ADMIN — SODIUM CHLORIDE 0.05 UNITS/KG/HR: 9 INJECTION, SOLUTION INTRAVENOUS at 18:16

## 2022-08-12 RX ADMIN — POTASSIUM CHLORIDE AND SODIUM CHLORIDE: 900; 150 INJECTION, SOLUTION INTRAVENOUS at 07:59

## 2022-08-12 RX ADMIN — ONDANSETRON 4 MG: 2 INJECTION INTRAMUSCULAR; INTRAVENOUS at 09:36

## 2022-08-12 RX ADMIN — INSULIN GLARGINE 25 UNITS: 100 INJECTION, SOLUTION SUBCUTANEOUS at 20:29

## 2022-08-12 RX ADMIN — POTASSIUM CHLORIDE 10 MEQ: 7.46 INJECTION, SOLUTION INTRAVENOUS at 16:17

## 2022-08-12 RX ADMIN — DEXTROSE AND SODIUM CHLORIDE: 5; 450 INJECTION, SOLUTION INTRAVENOUS at 02:40

## 2022-08-12 RX ADMIN — POTASSIUM CHLORIDE 10 MEQ: 7.46 INJECTION, SOLUTION INTRAVENOUS at 14:33

## 2022-08-12 RX ADMIN — DEXTROSE MONOHYDRATE 250 ML: 100 INJECTION, SOLUTION INTRAVENOUS at 22:44

## 2022-08-12 RX ADMIN — INSULIN GLARGINE 20 UNITS: 100 INJECTION, SOLUTION SUBCUTANEOUS at 14:04

## 2022-08-12 RX ADMIN — INSULIN LISPRO 8 UNITS: 100 INJECTION, SOLUTION INTRAVENOUS; SUBCUTANEOUS at 18:05

## 2022-08-12 RX ADMIN — SODIUM CHLORIDE: 4.5 INJECTION, SOLUTION INTRAVENOUS at 17:19

## 2022-08-12 RX ADMIN — DEXTROSE AND SODIUM CHLORIDE: 5; 450 INJECTION, SOLUTION INTRAVENOUS at 14:14

## 2022-08-12 RX ADMIN — ENOXAPARIN SODIUM 40 MG: 100 INJECTION SUBCUTANEOUS at 08:00

## 2022-08-12 NOTE — PROGRESS NOTES
INTENSIVE CARE UNIT  Resident Physician Progress Note    Patient - Manjinder Segura  Date of Admission -  2022 10:40 AM  Date of Evaluation -  2022  Room and Bed Number -  4482/6949-40   Hospital Day - 1      SUBJECTIVE:     OVERNIGHT EVENTS:      NO acute events overnight. TODAY:    Tmax 98.2, HR80-90, MAPs   Room air 100%  VBG 7.28/37/42/17  In: 2000 Out 1200  IVF: D5 0.45% 150ml/hr  Insulin gtt  K+ 3.8 - replacing  AG 16 - 14  Glucose 131 - 194 -178    Will bridge today, home Novolog sliding scale, lantus 25 u BID    Brief History:     Manjinder Segura is a 19 y.o. male with h/o T1DM and GERD presented to the ED with nausea, vomiting and overall malaise for 2 days. Patient reports not taking his insulin for the past couple days as well. Denies fever, chills, cough, respiratory symptoms. Patient has been admitted multiple times previously for DKA. Patient appears uncomfortable and nauseous in the ED but is A&Ox4, not in respiratory distress. Patient's blood glucose was 622 upon arrival to the ED. Bicarbonate <6. Beta-hydroxybuterate 9.92. Initial VBG 7.023/pCO2 29.3/HCO3 7.6. Patient given fentanyl, zofran, and 2L NS in the ED and started on DKA protocol. Will admit to ICU bed.     AWAKE & FOLLOWING COMMANDS:  [] No   [x] Yes    SECRETIONS Amount:  [] Small [] Moderate  [] Large  [x] None  Color:     [] White [] Colored  [] Bloody    SEDATION:  RAAS Score:  [] Propofol gtt  [] Versed gtt  [] Ativan gtt   [] No Sedation    PARALYZED:  [x] No    [] Yes    VASOPRESSORS:  [x] No    [] Yes  [] Levophed [] Dopamine [] Vasopressin  [] Dobutamine [] Phenylephrine [] Epinephrine    OBJECTIVE:     VITAL SIGNS:  /82   Pulse 83   Temp 98.1 °F (36.7 °C) (Oral)   Resp 12   Ht 5' 11\" (1.803 m)   Wt 128 lb 4.9 oz (58.2 kg)   SpO2 95%   BMI 17.90 kg/m²   Tmax over 24 hours:  Temp (24hrs), Av.1 °F (36.7 °C), Min:97.6 °F (36.4 °C), Max:98.5 °F (36.9 °C)      Patient Vitals for the past 8 hrs:   BP 5% and 0.45% NaCl with KCl 20 mEq      dextrose 5 % and 0.45 % NaCl 150 mL/hr at 08/12/22 0614     PRN Meds:   glucose, 4 tablet, PRN  dextrose bolus, 125 mL, PRN   Or  dextrose bolus, 250 mL, PRN  glucagon (rDNA), 1 mg, PRN  dextrose, , Continuous PRN  dextrose bolus, 125 mL, PRN   Or  dextrose bolus, 250 mL, PRN  potassium chloride, 10 mEq, PRN  magnesium sulfate, 1,000 mg, PRN  sodium phosphate IVPB, 10 mmol, PRN   Or  sodium phosphate IVPB, 15 mmol, PRN   Or  sodium phosphate IVPB, 20 mmol, PRN  polyethylene glycol, 17 g, Daily PRN  dextrose 5% and 0.45% NaCl with KCl 20 mEq, , Continuous PRN  ondansetron, 4 mg, Q6H PRN        SUPPORT DEVICES: [] Ventilator [] BIPAP  [] Nasal Cannula [x] Room Air    VENT SETTINGS (Comprehensive) (if applicable):        Additional Respiratory Assessments  Heart Rate: 83  Resp: 12  SpO2: 95 %    ABGs:     Lab Results   Component Value Date/Time    USP3QXC 15 02/01/2019 06:20 PM    FIO2 INFORMATION NOT PROVIDED 08/12/2022 05:45 AM         DATA:  Complete Blood Count:   Recent Labs     08/11/22  1115 08/12/22  0545   WBC 15.4* PENDING   RBC 5.03 4.22   HGB 15.2 12.2*   HCT 48.8 38.0*   MCV 97.0 90.0   MCH 30.2 28.9   MCHC 31.1 32.1   RDW 12.8 12.9   * PENDING   MPV 11.7 PENDING        Last 3 Blood Glucose:   Recent Labs     08/11/22  1115 08/11/22  1451 08/11/22  1551 08/11/22  1716 08/11/22  1836 08/11/22  2102 08/12/22  0108 08/12/22  0545   GLUCOSE 622* 561 545 345 305 131* 194* 178*        PT/INR:  No results found for: PROTIME, INR  PTT:  No results found for: APTT, PTT    Comprehensive Metabolic Profile:   Recent Labs     08/11/22  1115 08/11/22  1451 08/11/22  2102 08/12/22  0108 08/12/22  0545     --  145* 142 139   K 5.5*  --  5.3 5.0 3.8   CL 94*  --  114* 113* 110*   CO2 <6*  --  10* 13* 15*   BUN 20  --  14 11 10   CREATININE 1.17  --  1.04 0.93 0.84   GLUCOSE 622*   < > 131* 194* 178*   CALCIUM 10.1  --  8.8 8.5* 8.4*   PROT 8.5*  --   --   --   -- LABALBU 5.1  --   --   --   --    BILITOT 0.38  --   --   --   --    ALKPHOS 118  --   --   --   --    AST 17  --   --   --   --    ALT 23  --   --   --   --     < > = values in this interval not displayed. Magnesium:   Lab Results   Component Value Date/Time    MG 2.0 08/12/2022 05:45 AM    MG 2.2 08/12/2022 01:08 AM    MG 2.3 08/11/2022 09:02 PM     Phosphorus:   Lab Results   Component Value Date/Time    PHOS 2.5 08/12/2022 05:45 AM    PHOS 2.2 08/12/2022 01:08 AM    PHOS 2.0 08/11/2022 09:02 PM     Ionized Calcium:   Lab Results   Component Value Date/Time    CAION 5.03 04/10/2013 04:50 PM        Urinalysis:   Lab Results   Component Value Date/Time    NITRU NEGATIVE 08/11/2022 02:00 PM    COLORU Yellow 08/11/2022 02:00 PM    PHUR 5.5 08/11/2022 02:00 PM    WBCUA None 08/11/2022 02:00 PM    RBCUA None 08/11/2022 02:00 PM    MUCUS NOT REPORTED 05/11/2021 08:10 PM    TRICHOMONAS NOT REPORTED 05/11/2021 08:10 PM    YEAST NOT REPORTED 05/11/2021 08:10 PM    BACTERIA NOT REPORTED 05/11/2021 08:10 PM    SPECGRAV 1.029 08/11/2022 02:00 PM    LEUKOCYTESUR NEGATIVE 08/11/2022 02:00 PM    UROBILINOGEN Normal 08/11/2022 02:00 PM    BILIRUBINUR NEGATIVE 08/11/2022 02:00 PM    BILIRUBINUR neg 05/30/2013 01:19 PM    BLOODU neg 05/30/2013 01:19 PM    GLUCOSEU 3+ 08/11/2022 02:00 PM    KETUA LARGE 08/11/2022 02:00 PM    AMORPHOUS NOT REPORTED 05/11/2021 08:10 PM       HgBA1c:    Lab Results   Component Value Date/Time    LABA1C 10.6 07/26/2022 05:35 AM     TSH:    Lab Results   Component Value Date/Time    TSH 1.13 03/08/2017 06:12 PM     Lactic Acid:   Lab Results   Component Value Date/Time    LACTA NOT REPORTED 09/23/2019 01:27 AM    LACTA NOT REPORTED 03/08/2017 06:12 PM      Troponin: No results for input(s): TROPONINI in the last 72 hours.         ASSESSMENT:     Patient Active Problem List    Diagnosis Date Noted    Diabetic ketoacidosis without coma associated with other specified diabetes mellitus ([facility] [address] 08/11/2022    Diabetic ketoacidosis without coma associated with diabetes mellitus due to underlying condition ([facility] 08/11/2022    DKA, type 1, not at goal [facility]) 07/25/2022    Type 1 diabetes mellitus ([facility] 08/17/2021    Pharyngitis 03/08/2017    High anion gap metabolic acidosis 73/32/9286          PLAN:     WEAN PER PROTOCOL:  [] No   [] Yes  [x] N/A    ICU PROPHYLAXIS:  Stress ulcer:  [] PPI Agent  [] [id number] [] Sucralfate  [] Other:  VTE:   [x] Enoxaparin  [] Unfract. Heparin Subcut  [] EPC Cuffs    NUTRITION:  [x] NPO [] Tube Feeding (Specify: ) [] TPN  [] PO    HOME MEDS RECONCILED: [] No  [x] Yes    CONSULTATION NEEDED:  [x] No  [] Yes    FAMILY UPDATED:    [] No  [x] Yes    TRANSFER OUT OF ICU:   [] No  [x] Yes      Plan:    CV:  HDS  Map goal >65    GI/Nutrition:  Diet NPO  Bowel meds none      /Fluids/Electrolytes:  Adequate UO  IVF- D5 0.45  Electrolytes replaced- potassium    Heme:  Hgb 12 stable  Plt 463    ID:  Afebrile  Wbc 19.1  No concern for infection  CXR neg    Neuro:  No sedation  GCS 15      Resp:  Oxygen source: room air      Endo:  Currently on DKA protocol  Insulin gtt  AG closed, will bridge      Prophylaxis:  DVT: Lovenox      Dispo:  Transfer out of ICU          Antonella Philip MD  EmergencyMedicine PGY2  8/12/2022 7:11 AM       Attending Physician Statement  I have discussed the care of Shane Villegas, including pertinent history and exam findings with the resident. I have reviewed the key elements of all parts of the encounter with the resident. I have seen and examined the patient with the resident. I agree with the assessment and plan and status of the problem list as documented. Overnight events noted, chart reviewed. Multiple labs seen his anion gap is 13 this morning initially it was 16. Bicarbonate is 17. Patient was changed to D5 half saline and he is on insulin drip low-dose. He does complain of some nausea but had not had vomiting feeling slightly better.   We will start him on Lantus 20 units. Start him on oral diet if tolerated. Will transition him to Lantus and wean off insulin drip. Will continue with IV fluid and once insulin drip is off we will change IV fluids from D5 half saline to half saline. Will continue to replace potassium DC potassium and IV fluids. Ultimately he will need his home dose of Lantus depending upon his oral intake    Discussed with nursing staff, treatment and plan discussed. Total critical care time caring for this patient with life threatening, unstable organ failure, including direct patient contact, management of life support systems, review of data including imaging and labs, discussions with other team members and physicians at least 27  Min so far today, excluding procedures. Please note that this chart was generated using voice recognition Dragon dictation software. Although every effort was made to ensure the accuracy of this automated transcription, some errors in transcription may have occurred.      Annmarie Driscoll MD  8/12/2022 1:29 PM

## 2022-08-12 NOTE — PROGRESS NOTES
Inpatient Diabetes  Education     Type and Reason for Visit: Patient Education - TYPE 1 diabetes with re- occurring DKA   Met with patient at bedside, he stated feeling better this am. He does have type 1 dm from age of 13, he has had education on self care in past. He did have recent DKA admission ( 7/26/22) . He stated insulin pens at home. He stated he takes long acting levemir 30 units in am and 20 units in pm. Novolog 4- 5 units with meals ( more if bigger meal) and correct dose if bg is over 170 up to 300 and will add in 1- 2 more units. He stated running low on insulin pens at home. He started to use freestyle adi cgm with reader ( not ramona on phone) he has not started to do any data sharing with provider. Discussed DKA symptoms and self monitoring for urine ketones. Patient stated he does have ketone stick and is aware need to check and take insulin and try to stay hydrated. Follow up plans: He does not currently follow with endo - but had last PCP appt at [facility] March and has follow up appt for this next week tues 8/16/22. He has referral to [facility] - Dr Dixie Adams unsure of status of this referral -  Follow up call [facility] done spoke with Angelito Cole - stated they did have referral on 6/17/22 he was contacted and susan for appt on 7/21/22 and was no show. Per policy he can not be seen/ discharged from practice    Follow visit with outpatient DMED at [facility] was planned for Monday 8/15/22 at 11 : 27 am with writer - asked patient to bring in freestyle [place] reader and plan for data share/ review and reinforced need to follow up at PCP on Tues 8/16/22. Writer would also recommend close Case management follow up - and referral to [address] [place] [address] Way.           Verbally reviewed following information with patient  Blood glucose targets, hypo and hyperglycemia, importance of home blood glucose monitoring, heathy eating  plate method for CHO control portions, be active as recommended by health care providers, take medications - insulin as directed    Education Folder provided with following support information:   _x__ Emergency Insert sheet for your Vehicle - ADA Diabetes Emergencies  and sick day teaching sheet  _x__ Diabetes ID card - ADA Low and High Blood Sugar and treatments  __x_ [employer] Diabetes Education brochure/ contact card    Out patient diabetes education  contact number provided - [phone] to patient and placed on the discharge summary.       Angela Koenig RN

## 2022-08-12 NOTE — FLOWSHEET NOTE
Patient asleep. Per nurse, pt should be moved to step down and family is supposed to be visiting later today.     Electronically signed by Oswald Quick, on 8/12/2022 at 10:23 AM.  [facility]  [phone]

## 2022-08-12 NOTE — ED NOTES
Patient taken to ICU room [id number] by this RN and Tech. Patient hand off completed with ICU RN.       Julio Suarez  08/11/22 2008

## 2022-08-13 LAB
ABSOLUTE EOS #: <0.03 K/UL (ref 0–0.44)
ABSOLUTE IMMATURE GRANULOCYTE: <0.03 K/UL (ref 0–0.3)
ABSOLUTE LYMPH #: 1.85 K/UL (ref 1.1–3.7)
ABSOLUTE MONO #: 0.61 K/UL (ref 0.1–1.2)
ANION GAP SERPL CALCULATED.3IONS-SCNC: 11 MMOL/L (ref 9–17)
ANION GAP SERPL CALCULATED.3IONS-SCNC: 12 MMOL/L (ref 9–17)
ANION GAP SERPL CALCULATED.3IONS-SCNC: 13 MMOL/L (ref 9–17)
ANION GAP SERPL CALCULATED.3IONS-SCNC: 13 MMOL/L (ref 9–17)
BASOPHILS # BLD: 1 % (ref 0–2)
BASOPHILS ABSOLUTE: 0.04 K/UL (ref 0–0.2)
BUN BLDV-MCNC: 3 MG/DL (ref 6–20)
BUN BLDV-MCNC: 5 MG/DL (ref 6–20)
CALCIUM SERPL-MCNC: 8.3 MG/DL (ref 8.6–10.4)
CALCIUM SERPL-MCNC: 8.3 MG/DL (ref 8.6–10.4)
CALCIUM SERPL-MCNC: 8.4 MG/DL (ref 8.6–10.4)
CALCIUM SERPL-MCNC: 8.5 MG/DL (ref 8.6–10.4)
CARBOXYHEMOGLOBIN: 1 % (ref 0–5)
CHLORIDE BLD-SCNC: 101 MMOL/L (ref 98–107)
CHLORIDE BLD-SCNC: 103 MMOL/L (ref 98–107)
CHLORIDE BLD-SCNC: 99 MMOL/L (ref 98–107)
CHLORIDE BLD-SCNC: 99 MMOL/L (ref 98–107)
CO2: 20 MMOL/L (ref 20–31)
CO2: 22 MMOL/L (ref 20–31)
CO2: 23 MMOL/L (ref 20–31)
CO2: 23 MMOL/L (ref 20–31)
CORTISOL: 19.8 UG/DL (ref 2.7–18.4)
CREAT SERPL-MCNC: 0.47 MG/DL (ref 0.7–1.2)
CREAT SERPL-MCNC: 0.49 MG/DL (ref 0.7–1.2)
CREAT SERPL-MCNC: 0.5 MG/DL (ref 0.7–1.2)
CREAT SERPL-MCNC: 0.56 MG/DL (ref 0.7–1.2)
EOSINOPHILS RELATIVE PERCENT: 0 % (ref 1–4)
FIO2: ABNORMAL
GFR AFRICAN AMERICAN: >60 ML/MIN
GFR NON-AFRICAN AMERICAN: >60 ML/MIN
GFR SERPL CREATININE-BSD FRML MDRD: ABNORMAL ML/MIN/{1.73_M2}
GLUCOSE BLD-MCNC: 100 MG/DL (ref 75–110)
GLUCOSE BLD-MCNC: 116 MG/DL (ref 70–99)
GLUCOSE BLD-MCNC: 122 MG/DL (ref 70–99)
GLUCOSE BLD-MCNC: 123 MG/DL (ref 75–110)
GLUCOSE BLD-MCNC: 126 MG/DL (ref 70–99)
GLUCOSE BLD-MCNC: 129 MG/DL (ref 75–110)
GLUCOSE BLD-MCNC: 142 MG/DL (ref 75–110)
GLUCOSE BLD-MCNC: 147 MG/DL (ref 75–110)
GLUCOSE BLD-MCNC: 150 MG/DL (ref 70–99)
GLUCOSE BLD-MCNC: 157 MG/DL (ref 75–110)
GLUCOSE BLD-MCNC: 165 MG/DL (ref 75–110)
GLUCOSE BLD-MCNC: 177 MG/DL (ref 75–110)
GLUCOSE BLD-MCNC: 58 MG/DL (ref 75–110)
GLUCOSE BLD-MCNC: 90 MG/DL (ref 75–110)
GLUCOSE BLD-MCNC: 92 MG/DL (ref 75–110)
HCO3 VENOUS: 23.8 MMOL/L (ref 24–30)
HCT VFR BLD CALC: 32.6 % (ref 40.7–50.3)
HEMOGLOBIN: 11.4 G/DL (ref 13–17)
IMMATURE GRANULOCYTES: 0 %
LYMPHOCYTES # BLD: 28 % (ref 24–43)
MAGNESIUM: 2 MG/DL (ref 1.6–2.6)
MCH RBC QN AUTO: 30.2 PG (ref 25.2–33.5)
MCHC RBC AUTO-ENTMCNC: 35 G/DL (ref 28.4–34.8)
MCV RBC AUTO: 86.5 FL (ref 82.6–102.9)
MONOCYTES # BLD: 9 % (ref 3–12)
NEGATIVE BASE EXCESS, VEN: 0.7 MMOL/L (ref 0–2)
NRBC AUTOMATED: 0 PER 100 WBC
O2 SAT, VEN: 47.1 % (ref 60–85)
PATIENT TEMP: 37
PCO2, VEN: 41 (ref 39–55)
PDW BLD-RTO: 12.4 % (ref 11.8–14.4)
PH VENOUS: 7.38 (ref 7.32–7.42)
PLATELET # BLD: 280 K/UL (ref 138–453)
PMV BLD AUTO: 10.8 FL (ref 8.1–13.5)
PO2, VEN: 23.8 (ref 30–50)
POTASSIUM SERPL-SCNC: 2.7 MMOL/L (ref 3.7–5.3)
POTASSIUM SERPL-SCNC: 3.3 MMOL/L (ref 3.7–5.3)
POTASSIUM SERPL-SCNC: 3.3 MMOL/L (ref 3.7–5.3)
POTASSIUM SERPL-SCNC: 3.4 MMOL/L (ref 3.7–5.3)
RBC # BLD: 3.77 M/UL (ref 4.21–5.77)
REASON FOR REJECTION: NORMAL
SEG NEUTROPHILS: 62 % (ref 36–65)
SEGMENTED NEUTROPHILS ABSOLUTE COUNT: 4.04 K/UL (ref 1.5–8.1)
SODIUM BLD-SCNC: 133 MMOL/L (ref 135–144)
SODIUM BLD-SCNC: 134 MMOL/L (ref 135–144)
SODIUM BLD-SCNC: 135 MMOL/L (ref 135–144)
SODIUM BLD-SCNC: 137 MMOL/L (ref 135–144)
WBC # BLD: 6.6 K/UL (ref 3.5–11.3)
ZZ NTE CLEAN UP: ORDERED TEST: NORMAL
ZZ NTE WITH NAME CLEAN UP: SPECIMEN SOURCE: NORMAL

## 2022-08-13 PROCEDURE — 82805 BLOOD GASES W/O2 SATURATION: CPT

## 2022-08-13 PROCEDURE — 99291 CRITICAL CARE FIRST HOUR: CPT | Performed by: INTERNAL MEDICINE

## 2022-08-13 PROCEDURE — 2500000003 HC RX 250 WO HCPCS: Performed by: STUDENT IN AN ORGANIZED HEALTH CARE EDUCATION/TRAINING PROGRAM

## 2022-08-13 PROCEDURE — 6370000000 HC RX 637 (ALT 250 FOR IP)

## 2022-08-13 PROCEDURE — 6370000000 HC RX 637 (ALT 250 FOR IP): Performed by: STUDENT IN AN ORGANIZED HEALTH CARE EDUCATION/TRAINING PROGRAM

## 2022-08-13 PROCEDURE — 99223 1ST HOSP IP/OBS HIGH 75: CPT | Performed by: INTERNAL MEDICINE

## 2022-08-13 PROCEDURE — 85025 COMPLETE CBC W/AUTO DIFF WBC: CPT

## 2022-08-13 PROCEDURE — 2580000003 HC RX 258: Performed by: STUDENT IN AN ORGANIZED HEALTH CARE EDUCATION/TRAINING PROGRAM

## 2022-08-13 PROCEDURE — 6360000002 HC RX W HCPCS

## 2022-08-13 PROCEDURE — 6360000002 HC RX W HCPCS: Performed by: STUDENT IN AN ORGANIZED HEALTH CARE EDUCATION/TRAINING PROGRAM

## 2022-08-13 PROCEDURE — 36415 COLL VENOUS BLD VENIPUNCTURE: CPT

## 2022-08-13 PROCEDURE — 2060000000 HC ICU INTERMEDIATE R&B

## 2022-08-13 PROCEDURE — 80048 BASIC METABOLIC PNL TOTAL CA: CPT

## 2022-08-13 PROCEDURE — 82533 TOTAL CORTISOL: CPT

## 2022-08-13 PROCEDURE — 82947 ASSAY GLUCOSE BLOOD QUANT: CPT

## 2022-08-13 PROCEDURE — 83735 ASSAY OF MAGNESIUM: CPT

## 2022-08-13 RX ORDER — POTASSIUM CHLORIDE 20 MEQ/1
40 TABLET, EXTENDED RELEASE ORAL ONCE
Status: COMPLETED | OUTPATIENT
Start: 2022-08-13 | End: 2022-08-13

## 2022-08-13 RX ORDER — POTASSIUM CHLORIDE 7.45 MG/ML
40 INJECTION INTRAVENOUS ONCE
Status: DISCONTINUED | OUTPATIENT
Start: 2022-08-13 | End: 2022-08-13

## 2022-08-13 RX ORDER — INSULIN GLARGINE 100 [IU]/ML
15 INJECTION, SOLUTION SUBCUTANEOUS NIGHTLY
Status: DISCONTINUED | OUTPATIENT
Start: 2022-08-13 | End: 2022-08-14 | Stop reason: HOSPADM

## 2022-08-13 RX ORDER — POTASSIUM CHLORIDE 7.45 MG/ML
10 INJECTION INTRAVENOUS
Status: DISCONTINUED | OUTPATIENT
Start: 2022-08-13 | End: 2022-08-13

## 2022-08-13 RX ORDER — DEXTROSE MONOHYDRATE 25 G/50ML
25 INJECTION, SOLUTION INTRAVENOUS ONCE
Status: COMPLETED | OUTPATIENT
Start: 2022-08-13 | End: 2022-08-13

## 2022-08-13 RX ORDER — POTASSIUM CHLORIDE 7.45 MG/ML
60 INJECTION INTRAVENOUS ONCE
Status: DISCONTINUED | OUTPATIENT
Start: 2022-08-13 | End: 2022-08-13

## 2022-08-13 RX ORDER — SODIUM CHLORIDE 9 MG/ML
INJECTION, SOLUTION INTRAVENOUS CONTINUOUS
Status: DISCONTINUED | OUTPATIENT
Start: 2022-08-13 | End: 2022-08-14 | Stop reason: HOSPADM

## 2022-08-13 RX ORDER — POTASSIUM CHLORIDE 20 MEQ/1
20 TABLET, EXTENDED RELEASE ORAL 2 TIMES DAILY PRN
Status: DISCONTINUED | OUTPATIENT
Start: 2022-08-13 | End: 2022-08-13

## 2022-08-13 RX ADMIN — POTASSIUM BICARBONATE 20 MEQ: 782 TABLET, EFFERVESCENT ORAL at 15:12

## 2022-08-13 RX ADMIN — POTASSIUM CHLORIDE 40 MEQ: 1500 TABLET, EXTENDED RELEASE ORAL at 20:22

## 2022-08-13 RX ADMIN — DEXTROSE AND SODIUM CHLORIDE: 5; 450 INJECTION, SOLUTION INTRAVENOUS at 07:53

## 2022-08-13 RX ADMIN — SODIUM CHLORIDE: 4.5 INJECTION, SOLUTION INTRAVENOUS at 01:47

## 2022-08-13 RX ADMIN — POTASSIUM CHLORIDE 10 MEQ: 7.46 INJECTION, SOLUTION INTRAVENOUS at 06:15

## 2022-08-13 RX ADMIN — ENOXAPARIN SODIUM 40 MG: 100 INJECTION SUBCUTANEOUS at 10:06

## 2022-08-13 RX ADMIN — SODIUM CHLORIDE: 9 INJECTION, SOLUTION INTRAVENOUS at 18:06

## 2022-08-13 RX ADMIN — POTASSIUM CHLORIDE 20 MEQ: 1500 TABLET, EXTENDED RELEASE ORAL at 15:08

## 2022-08-13 RX ADMIN — POTASSIUM BICARBONATE 40 MEQ: 782 TABLET, EFFERVESCENT ORAL at 07:46

## 2022-08-13 RX ADMIN — DEXTROSE MONOHYDRATE 25 G: 25 INJECTION, SOLUTION INTRAVENOUS at 03:55

## 2022-08-13 NOTE — PROGRESS NOTES
INTENSIVE CARE UNIT  Resident Physician Progress Note    Patient - Pankaj Villeda  Date of Admission -  8/11/2022 10:40 AM  Date of Evaluation -  8/13/2022  Room and Bed Number -  1326/7379-44   Hospital Day - 2      SUBJECTIVE:     OVERNIGHT EVENTS:       Patient was bridged yesterday, but blood sugar went to 400 received lantus 25 and insulin drip, blood sugar came down to  30 but patient was asymptomatic. Received 50 percent dextrose. Recent blood sugar 90 will contiunue D5 half normal saline. TODAY:    Afebrile, remained hemodynamically stable and saturating well in room air. Patient not eating well. Na 135, kcl 2.7 replaced potassium of 50 for now, will add some more   Recent blood sugar of 90 and anion gap of 12. Total out put of 1.7 liter. Brief History:     Pankaj Villeda is a 19 y.o. male with h/o T1DM and GERD presented to the ED with nausea, vomiting and overall malaise for 2 days. Patient reports not taking his insulin for the past couple days as well. Denies fever, chills, cough, respiratory symptoms. Patient has been admitted multiple times previously for DKA. Patient appears uncomfortable and nauseous in the ED but is A&Ox4, not in respiratory distress. Patient's blood glucose was 622 upon arrival to the ED. Bicarbonate <6. Beta-hydroxybuterate 9.92. Initial VBG 7.023/pCO2 29.3/HCO3 7.6. Patient given fentanyl, zofran, and 2L NS in the ED and started on DKA protocol. Will admit to ICU bed.     AWAKE & FOLLOWING COMMANDS:  [] No   [x] Yes    SECRETIONS Amount:  [] Small [] Moderate  [] Large  [x] None  Color:     [] White [] Colored  [] Bloody    SEDATION:  RAAS Score:  [] Propofol gtt  [] Versed gtt  [] Ativan gtt   [] No Sedation    PARALYZED:  [x] No    [] Yes    VASOPRESSORS:  [x] No    [] Yes  [] Levophed [] Dopamine [] Vasopressin  [] Dobutamine [] Phenylephrine [] Epinephrine    OBJECTIVE:     VITAL SIGNS:  BP (!) 131/97   Pulse 58   Temp 98.8 °F (37.1 °C) (Oral)   Resp 12 Ht 5' 11\" (1.803 m)   Wt 128 lb 4.9 oz (58.2 kg)   SpO2 100%   BMI 17.90 kg/m²   Tmax over 24 hours:  Temp (24hrs), Av.4 °F (36.9 °C), Min:98.1 °F (36.7 °C), Max:98.8 °F (37.1 °C)      Patient Vitals for the past 8 hrs:   BP Temp Temp src Pulse Resp SpO2   22 0700 (!) 131/97 -- -- 58 12 100 %   22 0600 106/76 98.8 °F (37.1 °C) Oral 75 13 99 %   22 0500 126/89 -- -- 59 12 100 %   22 0400 (!) 136/94 98.6 °F (37 °C) Oral 61 14 100 %   22 0300 122/86 -- -- 67 13 98 %   22 0200 (!) 127/97 -- -- 67 11 100 %   22 0100 (!) 133/93 -- -- 62 10 100 %   22 0000 (!) 126/91 98.6 °F (37 °C) Oral 62 18 100 %           Intake/Output Summary (Last 24 hours) at 2022 0733  Last data filed at 2022 0400  Gross per 24 hour   Intake 2672.11 ml   Output 1775 ml   Net 897.11 ml       Date 22 0000 - 22 2359   Shift 6532-8800 4302-2901 7190-8491 24 Hour Total   INTAKE   P.O.(mL/kg/hr) 236   236   I.V.(mL/kg) 809. 3(13.9)   809. 3(13.9)   Shift Total(mL/kg) 1045.3(18)   1045.3(18)   OUTPUT   Urine(mL/kg/hr) 400   400   Shift Total(mL/kg) 400(6.9)   400(6.9)   Weight (kg) 58.2 58.2 58.2 58.2       Wt Readings from Last 3 Encounters:   22 128 lb 4.9 oz (58.2 kg)   22 136 lb 11 oz (62 kg)   22 139 lb (63 kg)     Body mass index is 17.9 kg/m². PHYSICAL EXAM:  Constitutional: thin, Appears uncomfortable, no distress  EENT: PERRLA, EOMI, sclera clear, anicteric, oropharynx clear, no lesions, neck supple with midline trachea. Neck: Supple, symmetrical, trachea midline, no adenopathy, no jvd skin normal  Respiratory: clear to auscultation, no wheezes or rales and unlabored breathing.  No intercostal tenderness  Cardiovascular: tachycardic, regular rhythm, normal S1, S2, no murmur noted and 2+ pulses throughout  Abdomen: soft, nontender, nondistended, no masses or organomegaly  Extremities:  peripheral pulses normal, no pedal edema, no clubbing or cyanosis      MEDICATIONS:  Scheduled Meds:   potassium chloride  10 mEq IntraVENous Q1H    potassium bicarb-citric acid  40 mEq Oral BID    insulin glargine  25 Units SubCUTAneous Nightly    insulin lispro  0-4 Units SubCUTAneous TID WC    insulin lispro  0-4 Units SubCUTAneous Nightly    enoxaparin  40 mg SubCUTAneous Daily     Continuous Infusions:   0.9% NaCl with KCl 20 mEq Stopped (08/12/22 1413)    sodium chloride 100 mL/hr at 08/13/22 0147    dextrose      insulin Stopped (08/12/22 2229)    dextrose 5% and 0.45% NaCl with KCl 20 mEq      dextrose 5 % and 0.45 % NaCl Stopped (08/12/22 1702)     PRN Meds:   glucose, 4 tablet, PRN  glucagon (rDNA), 1 mg, PRN  dextrose, , Continuous PRN  dextrose bolus, 125 mL, PRN   Or  dextrose bolus, 250 mL, PRN  potassium chloride, 10 mEq, PRN  magnesium sulfate, 1,000 mg, PRN  sodium phosphate IVPB, 10 mmol, PRN   Or  sodium phosphate IVPB, 15 mmol, PRN   Or  sodium phosphate IVPB, 20 mmol, PRN  polyethylene glycol, 17 g, Daily PRN  dextrose 5% and 0.45% NaCl with KCl 20 mEq, , Continuous PRN  ondansetron, 4 mg, Q6H PRN      SUPPORT DEVICES: [] Ventilator [] BIPAP  [] Nasal Cannula [x] Room Air    VENT SETTINGS (Comprehensive) (if applicable):        Additional Respiratory Assessments  Heart Rate: 58  Resp: 12  SpO2: 100 %    ABGs:     Lab Results   Component Value Date/Time    BMC4TJD 15 02/01/2019 06:20 PM    FIO2 INFORMATION NOT PROVIDED 08/12/2022 12:54 PM         DATA:  Complete Blood Count:   Recent Labs     08/11/22  1115 08/12/22  0545 08/13/22  0453   WBC 15.4* 19.1* 6.6   RBC 5.03 4.22 3.77*   HGB 15.2 12.2* 11.4*   HCT 48.8 38.0* 32.6*   MCV 97.0 90.0 86.5   MCH 30.2 28.9 30.2   MCHC 31.1 32.1 35.0*   RDW 12.8 12.9 12.4   * 380 280   MPV 11.7 10.8 10.8          Last 3 Blood Glucose:   Recent Labs     08/11/22  1836 08/11/22  2102 08/12/22  0108 08/12/22  0545 08/12/22  0946 08/12/22  1254 08/12/22  1743 08/13/22  0453   GLUCOSE 305 131* 194* 178* 142* 210* 208* 122*          PT/INR:  No results found for: PROTIME, INR  PTT:  No results found for: APTT, PTT    Comprehensive Metabolic Profile:   Recent Labs     08/11/22  1115 08/11/22  1451 08/12/22  1254 08/12/22  1743 08/13/22  0453      < > 141 138 135   K 5.5*   < > 4.3 4.1 2.7*   CL 94*   < > 111* 109* 103   CO2 <6*   < > 15* 14* 20   BUN 20   < > 9 8 5*   CREATININE 1.17   < > 0.77 0.80 0.56*   GLUCOSE 622*   < > 210* 208* 122*   CALCIUM 10.1   < > 8.6 8.7 8.5*   PROT 8.5*  --   --   --   --    LABALBU 5.1  --   --   --   --    BILITOT 0.38  --   --   --   --    ALKPHOS 118  --   --   --   --    AST 17  --   --   --   --    ALT 23  --   --   --   --     < > = values in this interval not displayed.         Magnesium:   Lab Results   Component Value Date/Time    MG 2.0 08/13/2022 04:53 AM    MG 2.0 08/12/2022 12:54 PM    MG 2.2 08/12/2022 09:46 AM     Phosphorus:   Lab Results   Component Value Date/Time    PHOS 2.3 08/12/2022 12:54 PM    PHOS 2.5 08/12/2022 09:46 AM    PHOS 2.5 08/12/2022 05:45 AM     Ionized Calcium:   Lab Results   Component Value Date/Time    CAION 5.03 04/10/2013 04:50 PM        Urinalysis:   Lab Results   Component Value Date/Time    NITRU NEGATIVE 08/11/2022 02:00 PM    COLORU Yellow 08/11/2022 02:00 PM    PHUR 5.5 08/11/2022 02:00 PM    WBCUA None 08/11/2022 02:00 PM    RBCUA None 08/11/2022 02:00 PM    MUCUS NOT REPORTED 05/11/2021 08:10 PM    TRICHOMONAS NOT REPORTED 05/11/2021 08:10 PM    YEAST NOT REPORTED 05/11/2021 08:10 PM    BACTERIA NOT REPORTED 05/11/2021 08:10 PM    SPECGRAV 1.029 08/11/2022 02:00 PM    LEUKOCYTESUR NEGATIVE 08/11/2022 02:00 PM    UROBILINOGEN Normal 08/11/2022 02:00 PM    BILIRUBINUR NEGATIVE 08/11/2022 02:00 PM    BILIRUBINUR neg 05/30/2013 01:19 PM    BLOODU neg 05/30/2013 01:19 PM    GLUCOSEU 3+ 08/11/2022 02:00 PM    KETUA LARGE 08/11/2022 02:00 PM    AMORPHOUS NOT REPORTED 05/11/2021 08:10 PM       HgBA1c:    Lab Results   Component Value Date/Time LABA1C 10.6 07/26/2022 05:35 AM     TSH:    Lab Results   Component Value Date/Time    TSH 1.13 03/08/2017 06:12 PM     Lactic Acid:   Lab Results   Component Value Date/Time    LACTA NOT REPORTED 09/23/2019 01:27 AM    LACTA NOT REPORTED 03/08/2017 06:12 PM      Troponin: No results for input(s): TROPONINI in the last 72 hours. ASSESSMENT:     Patient Active Problem List    Diagnosis Date Noted    Diabetic ketoacidosis without coma associated with other specified diabetes mellitus ([facility] 08/11/2022    Diabetic ketoacidosis without coma associated with diabetes mellitus due to underlying condition ([facility] 08/11/2022    DKA, type 1, not at goal [facility]) 07/25/2022    Type 1 diabetes mellitus ([facility] 08/17/2021    Pharyngitis 03/08/2017    High anion gap metabolic acidosis 86/37/7230          PLAN:     WEAN PER PROTOCOL:  [] No   [] Yes  [x] N/A    ICU PROPHYLAXIS:  Stress ulcer:  [] PPI Agent  [] M4Nbgcm [] Sucralfate  [] Other:  VTE:   [x] Enoxaparin  [] Unfract. Heparin Subcut  [] EPC Cuffs    NUTRITION:  [x] NPO [] Tube Feeding (Specify: ) [] TPN  [] PO    HOME MEDS RECONCILED: [] No  [x] Yes    CONSULTATION NEEDED:  [x] No  [] Yes    FAMILY UPDATED:    [] No  [x] Yes    TRANSFER OUT OF ICU:   [] No  [x] Yes      Plan:    CV:  HDS  Map goal >65    GI/Nutrition:  Diabetic diet  Bowel meds none      /Fluids/Electrolytes:  Adequate UO  IVF- D5 0.45  Electrolytes replaced- potassium    Heme:  Hgb 11.4 stable  Plt 280    ID:  Afebrile  Wbc 19.1  No concern for infection  CXR neg    Neuro:  No sedation  GCS 15      Resp:  Oxygen source: room air      Endo:  Currently on DKA protocol  Insulin gtt  AG closed,  bridged      Prophylaxis:  DVT: Lovenox      Dispo:  Stay in ICU       Gabriel Beach MD  Internal Medicine PGY 3  8/13/2022 7:33 AM     Attending Physician Statement  I have discussed the care of Scott Mckinney, including pertinent history and exam findings with the resident.  I have reviewed the key elements of all parts of the encounter with the resident. I have seen and examined the patient with the resident. I agree with the assessment and plan and status of the problem list as documented. I seen the patient during around today, chart reviewed, labs and medications reviewed overnight events noted per  Yesterday patient was given 20 units of Lantus and he was weaned off insulin drip he became hyperglycemic was given Lantus 25 units last night and then he became hypoglycemic later after he was restarted on insulin drip and has been off insulin drip since then. Anion gap is normal.  His oral intake is not good. His potassium was 2.7 getting replacement. Urine output reported to be 1.7 L. Currently be blood sugar is greater than 100. Will adjust his Lantus currently he is on 25 units at night likely will need last dose of Lantus depending upon his oral intake. Replace potassium and repeat potassium. Blood sugar remained stable and then we will transfer him out of the ICU to the floor with medicine team and critical care team will sign off. Discussed with nursing staff, treatment and plan discussed. Discussed with respiratory therapist.    Total critical care time caring for this patient with life threatening, unstable organ failure, including direct patient contact, management of life support systems, review of data including imaging and labs, discussions with other team members and physicians at least 27  Min so far today, excluding procedures. Please note that this chart was generated using voice recognition Dragon dictation software. Although every effort was made to ensure the accuracy of this automated transcription, some errors in transcription may have occurred.      Magda Vega MD  8/13/2022 11:09 AM

## 2022-08-13 NOTE — FLOWSHEET NOTE
Patient arrived to unit 408, agree with previously charted assessments, oriented to room, call light use and POC. IV fluids to left AC with good blood return. Patient states no nausea, though only just now starting to feel a little hungry. Encouraged to eat meal this evening to prevent further complications. Patient thankful and states understanding    Med 3, oncall resident perfect served of arrival to unit and BMP redraw results K-3.3. Await assessment/response.

## 2022-08-13 NOTE — PROGRESS NOTES
[address] [place] [address]     Department of Internal Medicine - Staff Internal Medicine Service   ICU PATIENT TRANSFER NOTE        Patient:  Jhonathan Harris  YOB: 2000  MRN: [id number]     Acct: [de-identified]     Admit date: 8/11/2022    Code Status:-  Full code     Reason for ICU Admission:-       SUPPORT DEVICES: [] Ventilator [] BIPAP  [] Nasal Cannula [x] Room Air    Consultations:- [] Cardiology [] Nephrology  [] Hemo onco  [] GI                               [] ID [] ENT  [] Rheum [] Endo   []Physiotherapy                                 Others:-     NUTRITION:  [] NPO [] Tube Feeding (Specify: ) [] TPN  [x] PO    Central Lines:- [x] No   [] Yes           If yes - Days/Date of Insertion. Pt seen,examined and Chart reviewed. ICU COURSE:    The patient is a 19 y.o. male with past medical history significant for type I DM who was initially presented to the ED with nausea vomiting and malaise for 2 days. Patient reported not taking his insulin for past couple of days. Denied any fever chills cough respiratory symptom. Patient has been admitted multiple times previously for DKA. In the ER , patient appears uncomfortable and nauseous in the ED but was alert and oriented x4. Was not on any respiratory distress. Blood glucose level was 622 upon arrival to the ED. Bicarbonate level less than 6. Beta hydroxybutyrate 9.92. Initial VBG showed pH 7.023, PCO2 29.3, HCO3 7.6. Patient given fentanyl, Zofran and 2 L of normal saline in the ED . In the ICU , patient was started on DKA protocol. Patient's anion gap closed with IV regular insulin infusion. He was bridged with 20 units of Lantus, patient was hyperglycemic to 400 and did receive 25 units of Lantus and 1 started on a drip. Later noted to have his blood sugar level at 30, immediately received 50% of dextrose, blood glucose level went up to 140s. Patient was hypokalemic and have had a potassium level of 3.2.   Liquid potassium as needed    Currently , patient has been transferred to the floor for further monitoring of his blood sugar level. Physical Exam:   Vitals: BP (!) 149/108   Pulse 55   Temp 98.4 °F (36.9 °C) (Core)   Resp 12   Ht 5' 11\" (1.803 m)   Wt 128 lb 4.9 oz (58.2 kg)   SpO2 100%   BMI 17.90 kg/m²   24 hour intake/output:  Intake/Output Summary (Last 24 hours) at 8/13/2022 1751  Last data filed at 8/13/2022 1500  Gross per 24 hour   Intake 1621.13 ml   Output 1300 ml   Net 321.13 ml     Last 3 weights:   Wt Readings from Last 3 Encounters:   08/11/22 128 lb 4.9 oz (58.2 kg)   07/27/22 136 lb 11 oz (62 kg)   03/01/22 139 lb (63 kg)       General appearance - alert, in no distress  Mental status - alert, oriented to person, place, and time  Eyes - pupils equal and reactive, extraocular eye movements intact  Mouth - mucous membranes moist, pharynx normal without lesions  Neck - supple, no significant adenopathy  Chest - clear to auscultation, no wheezes  Heart - normal rate, regular rhythm, normal S1, S2  Abdomen - soft, nontender, nondistended  Neurological - alert, oriented, normal speech, no focal deficits   Extremities - peripheral pulses normal, no pedal edema  Skin - normal coloration and turgor, no rashes      Medications:Current Inpatient  Scheduled Meds:   [Held by provider] insulin glargine  15 Units SubCUTAneous Nightly    insulin lispro  0-4 Units SubCUTAneous TID     insulin lispro  0-4 Units SubCUTAneous Nightly    enoxaparin  40 mg SubCUTAneous Daily     Continuous Infusions:   sodium chloride      dextrose      insulin Stopped (08/12/22 7528)    dextrose 5% and 0.45% NaCl with KCl 20 mEq       PRN Meds:glucose, glucagon (rDNA), dextrose, dextrose bolus **OR** dextrose bolus, sodium phosphate IVPB **OR** sodium phosphate IVPB **OR** sodium phosphate IVPB, polyethylene glycol, dextrose 5% and 0.45% NaCl with KCl 20 mEq, ondansetron    Objective:    CBC:   Recent Labs     08/11/22  1115 08/12/22  0545 08/13/22  0453   WBC 15.4* 19.1* 6.6   HGB 15.2 12.2* 11.4*   * 380 280     BMP:    Recent Labs     08/12/22  1743 08/13/22  0453 08/13/22  1239    135 133*   K 4.1 2.7* 3.4*   * 103 99   CO2 14* 20 23   BUN 8 5* 3*   CREATININE 0.80 0.56* 0.47*   GLUCOSE 208* 122* 126*     Calcium:  Recent Labs     08/13/22  1239   CALCIUM 8.3*     Ionized Calcium:No results for input(s): IONCA in the last 72 hours. Magnesium:  Recent Labs     08/13/22  0453   MG 2.0     Phosphorus:  Recent Labs     08/12/22  1254   PHOS 2.3*     BNP:No results for input(s): BNP in the last 72 hours. Glucose:  Recent Labs     08/13/22  0940 08/13/22  1152 08/13/22  1656   POCGLU 129* 142* 147*     HgbA1C: No results for input(s): LABA1C in the last 72 hours. INR: No results for input(s): INR in the last 72 hours. Hepatic:   Recent Labs     08/11/22  1115   ALKPHOS 118   ALT 23   AST 17   PROT 8.5*   BILITOT 0.38   BILIDIR 0.23   LABALBU 5.1     Amylase and Lipase:No results for input(s): LACTA, AMYLASE in the last 72 hours. Lactic Acid: No results for input(s): LACTA in the last 72 hours. CARDIAC ENZYMES:No results for input(s): CKTOTAL, CKMB, CKMBINDEX, TROPONINI in the last 72 hours. BNP: No results for input(s): BNP in the last 72 hours. Lipids: No results for input(s): CHOL, TRIG, HDL, LDL, LDLCALC in the last 72 hours.   ABGs: No results found for: PH, PCO2, PO2, HCO3, O2SAT  Thyroid:   Lab Results   Component Value Date/Time    TSH 1.13 03/08/2017 06:12 PM        Urinalysis:   Recent Labs     08/11/22  1400   COLORU Yellow   PHUR 5.5   PROTEINU 1+*   RBCUA None   SPECGRAV 1.029   BILIRUBINUR NEGATIVE   NITRU NEGATIVE   WBCUA None   LEUKOCYTESUR NEGATIVE   GLUCOSEU 3+*           Assessment:  Principal Problem:    Diabetic ketoacidosis without coma associated with other specified diabetes mellitus ([facility]  Active Problems:    Diabetic ketoacidosis without coma associated with diabetes mellitus due to underlying condition ([facility]  Resolved Problems:    * No resolved hospital problems. *          Plan:  1. Hypokalemia:  -Check BMP daily  -Replace potassium as needed  -On 8/13/22 potassium level was 3.3. 2.DKA Without Coma associated with type I DM with insulin noncompliance:  -POC glucose check  -Hypoglycemia protocol in place  -On 7/26/22 HbA1c was 10.6  -Continue Lantus 15 unit nightly until blood sugar level falls below 200  -Continue on low-dose sliding scale. Korey Urbina MD             Department of Internal Medicine  [address] [place] [address]           8/13/2022, 5:51 PM    Attestation and add on       I have discussed the care of Katja Granado , including pertinent history and exam findings,    8/13/22    with the resident. I have seen and examined the patient and the key elements of all parts of the encounter have been performed by me . I agree with the assessment, plan and orders as documented by the resident. ---transfer from icu- ;     MD ELZA Lee [address], [address] [place] [address].    Phone [phone]   Fax: [phone]  Answering Service: [phone]

## 2022-08-13 NOTE — FLOWSHEET NOTE
SPIRITUAL CARE DEPARTMENT - [facility]  PROGRESS NOTE    Shift date: 08/13/22  Shift day: Saturday   Shift # 1    Room # 6141/4427-18   Name: Rissa Pendleton                Scientology: unknown   Place of Yazdanism:      Referral: Routine Visit    Admit Date & Time: 8/11/2022 10:40 AM    Assessment:  Rissa Pendleton is a 19 y.o. male in the hospital because diabetic ketiacidosis. Upon entering the room writer observes pt sleeping      Intervention:  Prayed a brief silent prayer for pt    Outcome:  Pt remained sleeping    Plan:  Chaplains will remain available to offer spiritual and emotional support as needed.       Electronically signed by Cesario Enriquez on 8/13/2022 at 10:02 AM.  [facility]  [phone]     08/13/22 1000   Encounter Summary   Service Provided For: Patient   Referral/Consult From: Stewart   Last Encounter  08/13/22   Complexity of Encounter Low   Begin Time 0833   End Time  0835   Total Time Calculated 2 min   Encounter    Type Initial Screen/Assessment   Assessment/Intervention/Outcome   Assessment Unable to assess   Intervention Prayer (assurance of)/[place]

## 2022-08-14 VITALS
DIASTOLIC BLOOD PRESSURE: 74 MMHG | SYSTOLIC BLOOD PRESSURE: 115 MMHG | OXYGEN SATURATION: 100 % | WEIGHT: 138.67 LBS | HEIGHT: 71 IN | RESPIRATION RATE: 19 BRPM | TEMPERATURE: 98.4 F | BODY MASS INDEX: 19.41 KG/M2 | HEART RATE: 60 BPM

## 2022-08-14 LAB
ABSOLUTE EOS #: 0.03 K/UL (ref 0–0.44)
ABSOLUTE IMMATURE GRANULOCYTE: <0.03 K/UL (ref 0–0.3)
ABSOLUTE LYMPH #: 2.16 K/UL (ref 1.1–3.7)
ABSOLUTE MONO #: 0.43 K/UL (ref 0.1–1.2)
ANION GAP SERPL CALCULATED.3IONS-SCNC: 10 MMOL/L (ref 9–17)
BASOPHILS # BLD: 1 % (ref 0–2)
BASOPHILS ABSOLUTE: 0.05 K/UL (ref 0–0.2)
BUN BLDV-MCNC: 2 MG/DL (ref 6–20)
CALCIUM SERPL-MCNC: 8.3 MG/DL (ref 8.6–10.4)
CHLORIDE BLD-SCNC: 106 MMOL/L (ref 98–107)
CO2: 21 MMOL/L (ref 20–31)
CREAT SERPL-MCNC: 0.46 MG/DL (ref 0.7–1.2)
EOSINOPHILS RELATIVE PERCENT: 1 % (ref 1–4)
GFR AFRICAN AMERICAN: >60 ML/MIN
GFR NON-AFRICAN AMERICAN: >60 ML/MIN
GFR SERPL CREATININE-BSD FRML MDRD: ABNORMAL ML/MIN/{1.73_M2}
GLUCOSE BLD-MCNC: 130 MG/DL (ref 70–99)
GLUCOSE BLD-MCNC: 143 MG/DL (ref 75–110)
GLUCOSE BLD-MCNC: 247 MG/DL (ref 75–110)
GLUCOSE BLD-MCNC: 61 MG/DL (ref 75–110)
GLUCOSE BLD-MCNC: 72 MG/DL (ref 75–110)
GLUCOSE BLD-MCNC: 92 MG/DL (ref 75–110)
HCT VFR BLD CALC: 31.1 % (ref 40.7–50.3)
HEMOGLOBIN: 10.4 G/DL (ref 13–17)
IMMATURE GRANULOCYTES: 0 %
LYMPHOCYTES # BLD: 51 % (ref 24–43)
MAGNESIUM: 1.9 MG/DL (ref 1.6–2.6)
MCH RBC QN AUTO: 28.7 PG (ref 25.2–33.5)
MCHC RBC AUTO-ENTMCNC: 33.4 G/DL (ref 28.4–34.8)
MCV RBC AUTO: 85.9 FL (ref 82.6–102.9)
MONOCYTES # BLD: 10 % (ref 3–12)
NRBC AUTOMATED: 0 PER 100 WBC
PDW BLD-RTO: 12.2 % (ref 11.8–14.4)
PLATELET # BLD: 265 K/UL (ref 138–453)
PMV BLD AUTO: 11.1 FL (ref 8.1–13.5)
POTASSIUM SERPL-SCNC: 3.3 MMOL/L (ref 3.7–5.3)
RBC # BLD: 3.62 M/UL (ref 4.21–5.77)
SEG NEUTROPHILS: 37 % (ref 36–65)
SEGMENTED NEUTROPHILS ABSOLUTE COUNT: 1.6 K/UL (ref 1.5–8.1)
SODIUM BLD-SCNC: 137 MMOL/L (ref 135–144)
WBC # BLD: 4.3 K/UL (ref 3.5–11.3)

## 2022-08-14 PROCEDURE — 36415 COLL VENOUS BLD VENIPUNCTURE: CPT

## 2022-08-14 PROCEDURE — 2580000003 HC RX 258: Performed by: STUDENT IN AN ORGANIZED HEALTH CARE EDUCATION/TRAINING PROGRAM

## 2022-08-14 PROCEDURE — 6370000000 HC RX 637 (ALT 250 FOR IP)

## 2022-08-14 PROCEDURE — 99233 SBSQ HOSP IP/OBS HIGH 50: CPT | Performed by: INTERNAL MEDICINE

## 2022-08-14 PROCEDURE — 83735 ASSAY OF MAGNESIUM: CPT

## 2022-08-14 PROCEDURE — 82947 ASSAY GLUCOSE BLOOD QUANT: CPT

## 2022-08-14 PROCEDURE — 80048 BASIC METABOLIC PNL TOTAL CA: CPT

## 2022-08-14 PROCEDURE — 85025 COMPLETE CBC W/AUTO DIFF WBC: CPT

## 2022-08-14 RX ORDER — POTASSIUM CHLORIDE 750 MG/1
20 TABLET, EXTENDED RELEASE ORAL 2 TIMES DAILY
Qty: 20 TABLET | Refills: 0 | Status: SHIPPED | OUTPATIENT
Start: 2022-08-14 | End: 2022-08-19

## 2022-08-14 RX ORDER — POTASSIUM CHLORIDE 20 MEQ/1
40 TABLET, EXTENDED RELEASE ORAL ONCE
Status: COMPLETED | OUTPATIENT
Start: 2022-08-14 | End: 2022-08-14

## 2022-08-14 RX ADMIN — DEXTROSE MONOHYDRATE: 100 INJECTION, SOLUTION INTRAVENOUS at 02:35

## 2022-08-14 RX ADMIN — POTASSIUM CHLORIDE 40 MEQ: 1500 TABLET, EXTENDED RELEASE ORAL at 08:19

## 2022-08-14 RX ADMIN — SODIUM CHLORIDE: 9 INJECTION, SOLUTION INTRAVENOUS at 05:15

## 2022-08-14 RX ADMIN — POTASSIUM CHLORIDE 40 MEQ: 1500 TABLET, EXTENDED RELEASE ORAL at 06:40

## 2022-08-14 ASSESSMENT — PAIN SCALES - GENERAL
PAINLEVEL_OUTOF10: 0
PAINLEVEL_OUTOF10: 0

## 2022-08-14 NOTE — PROGRESS NOTES
[facility]  Internal Medicine Teaching Residency Program  Inpatient Daily Progress Note  ______________________________________________________________________________    Patient: Martin Gerardo  YOB: 2000   FKO:[id number]    Acct: [de-identified]     Room: [address] [place] [address]  Admit date: 8/11/2022  Today's date: 08/14/22  Number of days in the hospital: 3    SUBJECTIVE   Admitting Diagnosis: Diabetic ketoacidosis without coma associated with other specified diabetes mellitus ([facility] [address]  CC: Nausea and vomiting due to diabetic ketoacidosis  Pt examined at bedside. Chart & results reviewed. -Patient is hemodynamically stable without any pressor support  -Saturating well on room air without any oxygen support  -Blood sugar level has been stable. Last recorded blood sugar level was 92  -And is tolerating well oral feed.  -He is hypokalemic, potassium level is 3.3    ROS:  Constitutional:  negative for chills, fevers, sweats  Respiratory:  negative for cough, dyspnea on exertion, hemoptysis, shortness of breath, wheezing  Cardiovascular:  negative for chest pain, chest pressure/discomfort, lower extremity edema, palpitations  Gastrointestinal:  negative for abdominal pain, constipation, diarrhea, nausea, vomiting  Neurological:  negative for dizziness, headache  BRIEF HISTORY     The patient is a 19 y.o. male with past medical history significant for type I DM who was initially presented to the ED with nausea vomiting and malaise for 2 days. Patient reported not taking his insulin for past couple of days. Denied any fever chills cough respiratory symptom. Patient has been admitted multiple times previously for DKA. In the ER , patient appears uncomfortable and nauseous in the ED but was alert and oriented x4. Was not on any respiratory distress. Blood glucose level was 622 upon arrival to the ED. Bicarbonate level less than 6. Beta hydroxybutyrate 9.92. Initial VBG showed pH 7.023, PCO2 29.3, HCO3 7.6. Patient given fentanyl, Zofran and 2 L of normal saline in the ED . In the ICU , patient was started on DKA protocol. Patient's anion gap closed with IV regular insulin infusion. He was bridged with 20 units of Lantus, patient was hyperglycemic to 400 and did receive 25 units of Lantus and 1 started on a drip. Later noted to have his blood sugar level at 30, immediately received 50% of dextrose, blood glucose level went up to 140s. Patient was hypokalemic and have had a potassium level of 3.2. Liquid potassium as needed     Currently , patient has been transferred to the floor for further monitoring of his blood sugar level. OBJECTIVE     Vital Signs:  BP (!) 146/101   Pulse 60   Temp 98.2 °F (36.8 °C) (Oral)   Resp 19   Ht 5' 11\" (1.803 m)   Wt 138 lb 10.7 oz (62.9 kg)   SpO2 100%   BMI 19.34 kg/m²     Temp (24hrs), Av.1 °F (36.7 °C), Min:97.5 °F (36.4 °C), Max:98.6 °F (37 °C)    In: 1160.8   Out: 1200 [Urine:1200]    Physical Exam:  Constitutional: This is a well developed, well nourished, 18.5-24.9 - Normal 19y.o. year old male who is alert, oriented, cooperative and in no apparent distress. Head:normocephalic and atraumatic. EENT:  PERRLA. No conjunctival injections. Septum was midline, mucosa was without erythema, exudates or cobblestoning. No thrush was noted. Neck: Supple without thyromegaly. No elevated JVP. Trachea was midline. Respiratory: Chest was symmetrical without dullness to percussion. Breath sounds bilaterally were clear to auscultation. There were no wheezes, rhonchi or rales. There is no intercostal retraction or use of accessory muscles. No egophony noted. Cardiovascular: Regular without murmur, clicks, gallops or rubs. Abdomen: Slightly rounded and soft without organomegaly. No rebound, rigidity or guarding was appreciated. Lymphatic: No lymphadenopathy.   Musculoskeletal: Normal curvature of the spine.  No gross muscle weakness. Extremities:  No lower extremity edema, ulcerations, tenderness, varicosities or erythema. Muscle size, tone and strength are normal.  No involuntary movements are noted. Skin:  Warm and dry. Good color, turgor and pigmentation. No lesions or scars.   No cyanosis or clubbing  Neurological/Psychiatric: The patient's general behavior, level of consciousness, thought content and emotional status is normal.        Medications:  Scheduled Medications:    [Held by provider] insulin glargine  15 Units SubCUTAneous Nightly    insulin lispro  0-4 Units SubCUTAneous TID WC    insulin lispro  0-4 Units SubCUTAneous Nightly    enoxaparin  40 mg SubCUTAneous Daily     Continuous Infusions:    sodium chloride Stopped (08/14/22 0635)    dextrose Stopped (08/14/22 0507)    dextrose 5% and 0.45% NaCl with KCl 20 mEq       PRN Medicationsglucose, 4 tablet, PRN  glucagon (rDNA), 1 mg, PRN  dextrose, , Continuous PRN  dextrose bolus, 125 mL, PRN   Or  dextrose bolus, 250 mL, PRN  sodium phosphate IVPB, 10 mmol, PRN   Or  sodium phosphate IVPB, 15 mmol, PRN   Or  sodium phosphate IVPB, 20 mmol, PRN  polyethylene glycol, 17 g, Daily PRN  dextrose 5% and 0.45% NaCl with KCl 20 mEq, , Continuous PRN  ondansetron, 4 mg, Q6H PRN        Diagnostic Labs:  CBC:   Recent Labs     08/12/22  0545 08/13/22  0453 08/14/22  0521   WBC 19.1* 6.6 4.3   RBC 4.22 3.77* 3.62*   HGB 12.2* 11.4* 10.4*   HCT 38.0* 32.6* 31.1*   MCV 90.0 86.5 85.9   RDW 12.9 12.4 12.2    280 265     BMP:   Recent Labs     08/12/22  0545 08/12/22  0946 08/12/22  1254 08/12/22  1743 08/13/22  1738 08/13/22  1856 08/14/22  0521    140 141   < > 134* 137 137   K 3.8 3.9 4.3   < > 3.3* 3.3* 3.3*   * 111* 111*   < > 99 101 106   CO2 15* 16* 15*   < > 22 23 21   PHOS 2.5 2.5 2.3*  --   --   --   --    BUN 10 9 9   < > 3* 3* 2*   CREATININE 0.84 0.78 0.77   < > 0.50* 0.49* 0.46*    < > = values in this interval not displayed. BNP: No results for input(s): BNP in the last 72 hours. PT/INR: No results for input(s): PROTIME, INR in the last 72 hours. APTT: No results for input(s): APTT in the last 72 hours. CARDIAC ENZYMES: No results for input(s): CKMB, CKMBINDEX, TROPONINI in the last 72 hours. Invalid input(s): CKTOTAL;3  FASTING LIPID PANEL:  Lab Results   Component Value Date    CHOL 328 (H) 01/21/2016    HDL 75 01/21/2016    TRIG 192 (H) 01/21/2016     LIVER PROFILE:   Recent Labs     08/11/22  1115   AST 17   ALT 23   BILIDIR 0.23   BILITOT 0.38   ALKPHOS 118      MICROBIOLOGY:   Lab Results   Component Value Date/Time    CULTURE (A) 12/19/2020 12:15 AM     POSITIVE Blood Culture Results called to and read back by: SUJATA Ruelas 1 AT 21:12 ON 12/19/20    CULTURE  12/19/2020 12:15 AM     DIRECT GRAM STAIN FROM BOTTLE: GRAM POSITIVE COCCI IN CLUSTERS    CULTURE STAPHYLOCOCCUS HOMINIS (A) 12/19/2020 12:15 AM       Imaging:    XR CHEST PORTABLE    Result Date: 8/11/2022  No acute cardiopulmonary abnormality. ASSESSMENT & PLAN     ASSESSMENT / PLAN:     Principal Problem:  1. Hypokalemia:  -Check BMP daily  -Replace potassium as needed  -On 8/13/22 potassium level was 3.3. 2.DKA Without Coma associated with type I DM with insulin noncompliance:  -POC glucose check  -Hypoglycemia protocol in place  -On 7/26/22 HbA1c was 10.6  -Continue Lantus 15 unit nightly until blood sugar level falls below 200  -Continue on low-dose sliding scale. -BS is        DVT ppx : Lovenox       PT/OT: We will consult PT OT  Discharge Planning / SW:  consulted    Scotty Toure MD  Internal Medicine Resident, PGY-1  [address]; [place], [place]  8/14/2022, 10:15 AM     Attestation and add on       I have discussed the care of Cris Perches , including pertinent history and exam findings,      8/14/22    with the resident.   I have seen and examined the patient and the key elements of all parts of the encounter have been performed by me . I agree with the assessment, plan and orders as documented by the resident. MD ELZA Marie [address], [address] [place] [address].    Phone [phone]   Fax: [phone]  Answering Service: [phone]

## 2022-08-14 NOTE — PROGRESS NOTES
CLINICAL PHARMACY NOTE: MEDS TO BEDS    Total # of Prescriptions Filled: 1   The following medications were delivered to the patient:  POTASSIUM CHLORIDE ER 10     Additional Documentation:

## 2022-08-14 NOTE — DISCHARGE INSTRUCTIONS
You were admitted with DKA treated with IV insulin, resolved  But had episodes of hypoglycemia  Please start long acting insulin with a lower dose, like 5 -10 units according to your blood glucose levels and then titrate it up accordingly  Please follow up with your PCP in a week for hospital follow up  Please take potassium supplements twice daily for 5 days

## 2022-08-14 NOTE — CARE COORDINATION
Discharge [facility] Case Management Department  Written by: Alexia Beasley RN    Patient Name: Candice Heart  Attending Provider: Clement Vergara MD  Admit Date: 2022 10:40 AM  MRN: [id number]  Account: [de-identified]                     : 2000  Discharge Date: 22      Disposition: home    Met with patient and sister to discuss transitional planning. Plan is home independently with mom. Has ride. Patient denies further needs.  Agreeable with plan     Alexia Beasley RN
insurance and demographics. He confirmed he has OP Diabetic Ed and PCP appts next week and states he will make it to both.

## 2022-08-14 NOTE — PLAN OF CARE
Problem: Discharge Planning  Goal: Discharge to home or other facility with appropriate resources  Outcome: Completed  Flowsheets (Taken 8/14/2022 0736)  Discharge to home or other facility with appropriate resources: Identify barriers to discharge with patient and caregiver     Problem: Chronic Conditions and Co-morbidities  Goal: Patient's chronic conditions and co-morbidity symptoms are monitored and maintained or improved  8/14/2022 1316 by Opal Spencer RN  Outcome: Completed  Flowsheets (Taken 8/14/2022 0736)  Care Plan - Patient's Chronic Conditions and Co-Morbidity Symptoms are Monitored and Maintained or Improved: Monitor and assess patient's chronic conditions and comorbid symptoms for stability, deterioration, or improvement  8/14/2022 0437 by Adiel Alba RN  Outcome: Progressing     Problem: Safety - Adult  Goal: Free from fall injury  8/14/2022 1316 by Opal Spencer RN  Outcome: Completed  8/14/2022 0437 by Adiel Alba RN  Outcome: Progressing

## 2022-08-15 ENCOUNTER — TELEPHONE (OUTPATIENT)
Dept: INTERNAL MEDICINE | Age: 22
End: 2022-08-15

## 2022-08-15 LAB — GLUCOSE BLD-MCNC: 228 MG/DL (ref 75–110)

## 2022-08-15 NOTE — TELEPHONE ENCOUNTER
[facility] Transitions Initial Follow Up Call    Outreach made within 2 business days of discharge: Yes    Patient: Faustino Barba   Patient : 2000   MRN: [id number]    Reason for Admission: DKA w/out coma  Discharge Date: 2022       Spoke with: PC to number listed as mother's home number, pt identifies himself on that VM. No answer. Left message with appt info for diabetic ed today and for f/u appt tomorrow in office. Left number if pt has questions/needs.     Discharge department/facility: [facility]      Scheduled appointment with PCP within 7-14 days    Follow Up  Future Appointments   Date Time Provider Hans Zheng   8/15/2022 11:30 AM Wander Cai, [facility] ED [facility]   2022  3:20 PM Frank Kayser, MD [facility] ZORA Burris RN

## 2022-08-16 ENCOUNTER — OFFICE VISIT (OUTPATIENT)
Dept: INTERNAL MEDICINE | Age: 22
End: 2022-08-16
Payer: MEDICARE

## 2022-08-16 VITALS
BODY MASS INDEX: 19.88 KG/M2 | TEMPERATURE: 100.4 F | WEIGHT: 142 LBS | DIASTOLIC BLOOD PRESSURE: 71 MMHG | OXYGEN SATURATION: 97 % | HEIGHT: 71 IN | SYSTOLIC BLOOD PRESSURE: 105 MMHG | HEART RATE: 108 BPM

## 2022-08-16 DIAGNOSIS — Z23 NEED FOR DIPHTHERIA-TETANUS-PERTUSSIS (TDAP) VACCINE: ICD-10-CM

## 2022-08-16 DIAGNOSIS — Z09 HOSPITAL DISCHARGE FOLLOW-UP: ICD-10-CM

## 2022-08-16 DIAGNOSIS — E87.6 HYPOKALEMIA: ICD-10-CM

## 2022-08-16 DIAGNOSIS — E10.65 TYPE 1 DIABETES MELLITUS WITH HYPERGLYCEMIA (HCC): Primary | ICD-10-CM

## 2022-08-16 DIAGNOSIS — Z23 NEED FOR PNEUMOCOCCAL VACCINATION: ICD-10-CM

## 2022-08-16 PROBLEM — E10.9 TYPE 1 DIABETES MELLITUS (HCC): Status: RESOLVED | Noted: 2021-08-17 | Resolved: 2022-08-16

## 2022-08-16 PROBLEM — J02.9 PHARYNGITIS: Status: RESOLVED | Noted: 2017-03-08 | Resolved: 2022-08-16

## 2022-08-16 PROBLEM — E87.29 HIGH ANION GAP METABOLIC ACIDOSIS: Status: RESOLVED | Noted: 2017-03-08 | Resolved: 2022-08-16

## 2022-08-16 PROBLEM — E08.10 DIABETIC KETOACIDOSIS WITHOUT COMA ASSOCIATED WITH DIABETES MELLITUS DUE TO UNDERLYING CONDITION (HCC): Status: RESOLVED | Noted: 2022-08-11 | Resolved: 2022-08-16

## 2022-08-16 PROBLEM — E10.10 DKA, TYPE 1, NOT AT GOAL (HCC): Status: RESOLVED | Noted: 2022-07-25 | Resolved: 2022-08-16

## 2022-08-16 PROBLEM — E13.10 DIABETIC KETOACIDOSIS WITHOUT COMA ASSOCIATED WITH OTHER SPECIFIED DIABETES MELLITUS (HCC): Status: RESOLVED | Noted: 2022-08-11 | Resolved: 2022-08-16

## 2022-08-16 LAB
CHP ED QC CHECK: ABNORMAL
GLUCOSE BLD-MCNC: 269 MG/DL

## 2022-08-16 PROCEDURE — 82962 GLUCOSE BLOOD TEST: CPT | Performed by: STUDENT IN AN ORGANIZED HEALTH CARE EDUCATION/TRAINING PROGRAM

## 2022-08-16 PROCEDURE — 1111F DSCHRG MED/CURRENT MED MERGE: CPT | Performed by: STUDENT IN AN ORGANIZED HEALTH CARE EDUCATION/TRAINING PROGRAM

## 2022-08-16 PROCEDURE — 99213 OFFICE O/P EST LOW 20 MIN: CPT | Performed by: STUDENT IN AN ORGANIZED HEALTH CARE EDUCATION/TRAINING PROGRAM

## 2022-08-16 PROCEDURE — G8420 CALC BMI NORM PARAMETERS: HCPCS | Performed by: STUDENT IN AN ORGANIZED HEALTH CARE EDUCATION/TRAINING PROGRAM

## 2022-08-16 PROCEDURE — 99211 OFF/OP EST MAY X REQ PHY/QHP: CPT | Performed by: STUDENT IN AN ORGANIZED HEALTH CARE EDUCATION/TRAINING PROGRAM

## 2022-08-16 PROCEDURE — G8427 DOCREV CUR MEDS BY ELIG CLIN: HCPCS | Performed by: STUDENT IN AN ORGANIZED HEALTH CARE EDUCATION/TRAINING PROGRAM

## 2022-08-16 PROCEDURE — 3046F HEMOGLOBIN A1C LEVEL >9.0%: CPT | Performed by: STUDENT IN AN ORGANIZED HEALTH CARE EDUCATION/TRAINING PROGRAM

## 2022-08-16 PROCEDURE — 2022F DILAT RTA XM EVC RTNOPTHY: CPT | Performed by: STUDENT IN AN ORGANIZED HEALTH CARE EDUCATION/TRAINING PROGRAM

## 2022-08-16 PROCEDURE — 4004F PT TOBACCO SCREEN RCVD TLK: CPT | Performed by: STUDENT IN AN ORGANIZED HEALTH CARE EDUCATION/TRAINING PROGRAM

## 2022-08-16 RX ORDER — INSULIN GLARGINE 100 [IU]/ML
25 INJECTION, SOLUTION SUBCUTANEOUS 2 TIMES DAILY
Qty: 5 PEN | Refills: 3 | Status: CANCELLED | OUTPATIENT
Start: 2022-08-16

## 2022-08-16 RX ORDER — INSULIN GLARGINE 100 [IU]/ML
INJECTION, SOLUTION SUBCUTANEOUS
Qty: 5 PEN | Refills: 3 | Status: SHIPPED | OUTPATIENT
Start: 2022-08-16

## 2022-08-16 RX ORDER — INSULIN ASPART 100 [IU]/ML
3 INJECTION, SOLUTION INTRAVENOUS; SUBCUTANEOUS
Qty: 5 PEN | Refills: 3 | Status: SHIPPED | OUTPATIENT
Start: 2022-08-16

## 2022-08-16 SDOH — ECONOMIC STABILITY: FOOD INSECURITY: WITHIN THE PAST 12 MONTHS, THE FOOD YOU BOUGHT JUST DIDN'T LAST AND YOU DIDN'T HAVE MONEY TO GET MORE.: NEVER TRUE

## 2022-08-16 SDOH — ECONOMIC STABILITY: FOOD INSECURITY: WITHIN THE PAST 12 MONTHS, YOU WORRIED THAT YOUR FOOD WOULD RUN OUT BEFORE YOU GOT MONEY TO BUY MORE.: NEVER TRUE

## 2022-08-16 SDOH — ECONOMIC STABILITY: TRANSPORTATION INSECURITY
IN THE PAST 12 MONTHS, HAS THE LACK OF TRANSPORTATION KEPT YOU FROM MEDICAL APPOINTMENTS OR FROM GETTING MEDICATIONS?: NO

## 2022-08-16 SDOH — ECONOMIC STABILITY: TRANSPORTATION INSECURITY
IN THE PAST 12 MONTHS, HAS LACK OF TRANSPORTATION KEPT YOU FROM MEETINGS, WORK, OR FROM GETTING THINGS NEEDED FOR DAILY LIVING?: NO

## 2022-08-16 ASSESSMENT — PATIENT HEALTH QUESTIONNAIRE - PHQ9
SUM OF ALL RESPONSES TO PHQ QUESTIONS 1-9: 0
1. LITTLE INTEREST OR PLEASURE IN DOING THINGS: 0
SUM OF ALL RESPONSES TO PHQ9 QUESTIONS 1 & 2: 0
2. FEELING DOWN, DEPRESSED OR HOPELESS: 0
SUM OF ALL RESPONSES TO PHQ QUESTIONS 1-9: 0

## 2022-08-16 ASSESSMENT — SOCIAL DETERMINANTS OF HEALTH (SDOH): HOW HARD IS IT FOR YOU TO PAY FOR THE VERY BASICS LIKE FOOD, HOUSING, MEDICAL CARE, AND HEATING?: NOT HARD AT ALL

## 2022-08-16 NOTE — PROGRESS NOTES
Post-Discharge Transitional Care  Follow Up      Alexis Lowe   YOB: 2000    Date of Office Visit:  8/16/2022  Date of Hospital Admission: 8/11/22  Date of Hospital Discharge: 8/14/22  Risk of hospital readmission (high >=14%. Medium >=10%) :Readmission Risk Score: 14.5      Care management risk score Rising risk (score 2-5) and Complex Care (Scores >=6): No Risk Score On File     Non face to face  following discharge, date last encounter closed (first attempt may have been earlier): 08/15/2022    Call initiated 2 business days of discharge: Yes    ASSESSMENT/PLAN:   Type 1 diabetes mellitus with hyperglycemia (HCC)  Hypokalemia  Need for pneumococcal vaccination  Need for diphtheria-tetanus-pertussis (Tdap) vaccine  Hospital discharge follow-up  -     ME DISCHARGE MEDS RECONCILED W/ CURRENT OUTPATIENT MED LIST      Medical Decision Making: moderate complexity  No follow-ups on file. On this date 8/16/2022 I have spent 20-30 minutes reviewing previous notes, test results and face to face with the patient discussing the diagnosis and importance of compliance with the treatment plan as well as documenting on the day of the visit. Subjective:   HPI:  Follow up of Hospital problems/diagnosis(es): Patient has past medical history of type 1 diabetes mellitus with unclear compliance. He was admitted to hospital for diabetic ketoacidosis and was discharged the day before. Currently he is taking Lantus 20 units in the morning and 30 units at bedtime. He also take sliding scale insulin according to blood glucose. He is not taking any fixed short-acting units. He is also not adding additional short-acting according to carbohydrate intake. His last HbA1c is 10.6. Inpatient course: Inpatient stay notes reviewed- see chart. Interval history/Current status:   Today he reports feeling fine. Does not have any complaints. He has low-grade fever.   He denies any shortness of breath, cough, abdominal pain, nausea, vomiting, burning micturition. He is currently taking potassium supplements 20 mEq twice daily as he was hypokalemic. Assessment:  Uncontrolled type 1 diabetes mellitus  Hypokalemia    Plan:  Reinforced importance of insulin compliance. Currently on Lantus 20 units a.m. and 30 units p.m. I did advise patient to start taking fixed 3 units of short-acting insulin with meals. I also advised him to continue covering for hyperglycemia with sliding scale insulin. Also encouraged for carb counting and cover with short acting insulin accordingly (1 unit for every 20g carb). Currently taking 20 mEq potassium twice daily. We will check BMP and magnesium. Again discussed with him about the importance of following up with endocrinology. Referral provided again with Dr. Brenda Lopez. Patient agrees to make appointment and follows with them. Patient Active Problem List   Diagnosis   (none) - all problems resolved or deleted     Type 1 diabetes mellitus  Hypokalemia    Medications listed as ordered at the time of discharge from hospital     Medication List            Accurate as of August 16, 2022  3:53 PM. If you have any questions, ask your nurse or doctor. CONTINUE taking these medications      EPINEPHrine 0.15 MG/0.3ML Soaj  Commonly known as: EpiPen Jr 2-Ernie  Use as directed for allergic reaction     FreeStyle Tresa 14 Day Arrey Stella Mendenhall  Check blood sugars 5-6/day. Type 1 DM     FreeStyle Tresa Sensor System Misc  Type 1 DM.  Check blood sugars 5-6/day     Glucagon Emergency 1 MG Kit  As directed for extreme hypoglycemia     * Insulin Pen Needle 30G X 5 MM Misc  1 Device by Does not apply route 4 times daily     * Insulin Pen Needle 32G X 6 MM Misc  Use 2 times daily with insulin     Lantus SoloStar 100 UNIT/ML injection pen  Generic drug: insulin glargine  Inject 25 Units into the skin 2 times daily     NovoLOG FlexPen 100 UNIT/ML injection pen  Generic drug: insulin aspart  Inject 1-11 Units into the skin 3 times daily (before meals) 1 unit per 15 grams CHO for meals and snacks  Sliding scale: =0, 125-150=1, 151-175=2, 175-200=3, 201-225=4, 226-250=5, 251-275=6, 276-300=7, 301-325=8, 326-350=9, 351-375=10, 376-400=11     ondansetron 4 MG tablet  Commonly known as: ZOFRAN  Take 1 tablet by mouth 3 times daily as needed for Nausea or Vomiting     potassium chloride 10 MEQ extended release tablet  Commonly known as: KLOR-CON M  Take 2 tablets by mouth in the morning and 2 tablets before bedtime. Do all this for 10 doses. * This list has 2 medication(s) that are the same as other medications prescribed for you. Read the directions carefully, and ask your doctor or other care provider to review them with you. Medications marked \"taking\" at this time  Outpatient Medications Marked as Taking for the 8/16/22 encounter (Office Visit) with Toni Masters MD   Medication Sig Dispense Refill    potassium chloride (KLOR-CON M) 10 MEQ extended release tablet Take 2 tablets by mouth in the morning and 2 tablets before bedtime. Do all this for 10 doses. 20 tablet 0    Continuous Blood Gluc Sensor (FREESTYLE MALKA SENSOR SYSTEM) [facility] Type 1 DM. Check blood sugars 5-6/day 2 each 11    insulin aspart (NOVOLOG FLEXPEN) 100 UNIT/ML injection pen Inject 1-11 Units into the skin 3 times daily (before meals) 1 unit per 15 grams CHO for meals and snacks  Sliding scale: =0, 125-150=1, 151-175=2, 175-200=3, 201-225=4, 226-250=5, 251-275=6, 276-300=7, 301-325=8, 326-350=9, 351-375=10, 376-400=11 5 pen 3    insulin glargine (LANTUS SOLOSTAR) 100 UNIT/ML injection pen Inject 25 Units into the skin 2 times daily 5 pen 3    Continuous Blood Gluc  (FREESTYLE MALKA 14 DAY READER) JAYCE Check blood sugars 5-6/day.  Type 1 DM 2 each 11    Glucagon, rDNA, (GLUCAGON EMERGENCY) 1 MG KIT As directed for extreme hypoglycemia 1 kit 1    Insulin Pen Needle 32G X 6 MM MISC Use 2 times daily with insulin 100 each 5    Insulin Pen Needle 30G X 5 MM MISC 1 Device by Does not apply route 4 times daily 200 each 1    EPINEPHrine (EPIPEN JR 2-ITALO) 0.15 MG/0.3ML SOAJ Use as directed for allergic reaction 2 Device 3        Medications patient taking as of now reconciled against medications ordered at time of hospital discharge: Yes    A comprehensive review of systems was negative except for what was noted in the HPI. Objective:    /71   Pulse (!) 108   Temp 100.4 °F (38 °C) (Temporal)   Ht 5' 11\" (1.803 m)   Wt 142 lb (64.4 kg)   SpO2 97%   BMI 19.80 kg/m²     Alert and oriented  Chest clear bilaterally, no wheezing or crackles  Regular rate and rhythm, no murmurs  Soft, nontender abdomen  No focal neurological deficit  No pedal edema  Multiple skin lesions from previous skin issues noted    An electronic signature was used to authenticate this note.   --Johnathan Ch MD

## 2022-08-16 NOTE — TELEPHONE ENCOUNTER
Review of chart shows pt showed up to diabetic teaching yesterday. Will close encounter if pt shows to f/u appt in office this afternoon. Addendum [phone]: Pt did come to f/u appt with PCP.

## 2022-08-19 NOTE — DISCHARGE SUMMARY
[address] [place]     Department of Internal Medicine - Staff Internal Medicine Teaching Service    INPATIENT DISCHARGE SUMMARY      Patient Identification:  Garth Lombard is a 19 y.o. male. :  2000  MRN: [id number]     Acct: [de-identified]   PCP: Luis Alberto Stewart MD  Admit Date:  2022  Discharge date and time: 2022  1:58 PM   Attending Provider: No att. providers found                                     [address] Problem Lists:  Principal Problem (Resolved):    Diabetic ketoacidosis without coma associated with other specified diabetes mellitus ([facility]  Active Problems:    * No active hospital problems. *  Resolved Problems:    Diabetic ketoacidosis without coma associated with diabetes mellitus due to underlying condition [facility])      HOSPITAL STAY     Brief Inpatient course:   Garth Lombard is a 19 y.o. male who was admitted for the management of Diabetic ketoacidosis without coma associated with other specified diabetes mellitus ([facility] presented to the emergency department with past medical history significant for type I DM who was initially presented to the ED with nausea vomiting and malaise for 2 days. Patient reported not taking his insulin for past couple of days. Denied any fever chills cough respiratory symptom. Patient has been admitted multiple times previously for DKA. In the ER , patient appears uncomfortable and nauseous in the ED but was alert and oriented x4. Was not on any respiratory distress. Blood glucose level was 622 upon arrival to the ED. Bicarbonate level less than 6. Beta hydroxybutyrate 9.92. Initial VBG showed pH 7.023, PCO2 29.3, HCO3 7.6. Patient given fentanyl, Zofran and 2 L of normal saline in the ED . In the ICU , patient was started on DKA protocol. Patient's anion gap closed with IV regular insulin infusion.   He was bridged with 20 units of Lantus, patient was hyperglycemic to 400 and did receive 25 units of Lantus and 1 started on a drip. Later noted to have his blood sugar level at 30, immediately received 50% of dextrose, blood glucose level went up to 140s. Patient was hypokalemic and have had a potassium level of 3.2. Liquid potassium as needed     Currently , patient has been transferred to the floor for further monitoring of his blood sugar level. Consults:     Consults:     Final Specialist Recommendations/Findings:   IP CONSULT TO CRITICAL CARE  IP CONSULT TO DIABETES EDUCATOR      Any Hospital Acquired Infections: none    Discharge Functional Status:  stable    DISCHARGE PLAN     Disposition: home    Patient Instructions:   Discharge Medication List as of 8/14/2022  1:19 PM        START taking these medications    Details   potassium chloride (KLOR-CON M) 10 MEQ extended release tablet Take 2 tablets by mouth in the morning and 2 tablets before bedtime. Do all this for 10 doses. , Disp-20 tablet, R-0Normal           CONTINUE these medications which have NOT CHANGED    Details   ondansetron (ZOFRAN) 4 MG tablet Take 1 tablet by mouth 3 times daily as needed for Nausea or Vomiting, Disp-10 tablet, R-0Normal      Continuous Blood Gluc Sensor (FREESTYLE MALKA SENSOR SYSTEM) [facility] Type 1 DM. Check blood sugars 5-6/day, Disp-2 each, R-11Normal      Continuous Blood Gluc  (FREESTYLE MALKA 14 DAY READER) JAYCE Check blood sugars 5-6/day. Type 1 DM, Disp-2 each, R-11Print      blood glucose monitor strips Test 3 times times a day & as needed for symptoms of irregular blood glucose., Disp-300 strip, R-3, Normal      blood glucose monitor kit and supplies Dispense sufficient amount for indicated testing frequency plus additional to accommodate PRN testing needs. , Disp-1 kit, R-0, Normal      !!  Lancets MISC DAILY Starting Tue 3/1/2022, Disp-100 each, R-11, NormalUse 3 times times daily      insulin aspart (NOVOLOG FLEXPEN) 100 UNIT/ML injection pen Inject 1-11 Units into the skin 3 times daily (before meals) 1 unit per 15 grams CHO for meals and snacks  Sliding scale: =0, 125-150=1, 151-175=2, 175-200=3, 201-225=4, 226-250=5, 251-275=6, 276-300=7, 301-325=8, 326-350=9, 351-375=10, 376-400=11,  Disp-5 pen, R-3Normal      insulin glargine (LANTUS SOLOSTAR) 100 UNIT/ML injection pen Inject 25 Units into the skin 2 times daily, Disp-5 pen, R-3Normal      Glucagon, rDNA, (GLUCAGON EMERGENCY) 1 MG KIT As directed for extreme hypoglycemia, Disp-1 kit, R-1Normal      !! Insulin Pen Needle 32G X 6 MM MISC Disp-100 each, R-5, NormalUse 2 times daily with insulin      !! Lancets MISC 4 TIMES DAILY BEFORE MEALS & NIGHTLY Starting Tue 6/29/2021, Disp-300 each, R-0, Normal      Ketone Blood Test STRP 1 strip by Does not apply route daily as needed (for high blood sugar), Disp-200 strip, R-0Normal      INSULIN SYRINGE 1CC/29G (AIMSCO INSULIN SYR ULTRA THIN) 29G X 1/2\" 1 ML MISC DAILY Starting Tue 1/5/2021, Disp-100 each, R-3, Normal      !! Insulin Pen Needle 30G X 5 MM MISC 4 TIMES DAILY Starting Tue 12/22/2020, Disp-200 each, R-1, Normal      EPINEPHrine (EPIPEN JR 2-ITALO) 0.15 MG/0.3ML SOAJ Use as directed for allergic reaction, Disp-2 Device,R-3Normal       !! - Potential duplicate medications found. Please discuss with provider. Activity: activity as tolerated    Diet: diabetic diet    Follow-up:    [facility] DIABETES EDUCATION  [facility] [address] [address] [facility] [address] [phone]  Go on 8/15/2022  Follow up education on diabetes self management - [address] [facility] Mario Jacobs MD  [address] [phone]    Follow up in 1 week(s)  hospital follow up      Patient Instructions:  You were admitted with DKA treated with IV insulin, resolved  But had episodes of hypoglycemia  Please start long acting insulin with a lower dose, like 5 -10 units according to your blood glucose levels and then titrate it up accordingly  Please follow up with your PCP in a week for hospital follow up  Please take potassium supplements twice daily for 5 days      Note that over 30 minutes was spent in preparing discharge papers, discussing discharge with patient, medication review, etc.      Hunter Abdi MD, MD  Internal Medicine Resident, PGY-1  [address] [place], [place]  8/19/2022, 2:20 PM

## 2022-09-15 ENCOUNTER — TELEPHONE (OUTPATIENT)
Dept: INTERNAL MEDICINE | Age: 22
End: 2022-09-15

## 2022-10-09 DIAGNOSIS — E10.10 DKA, TYPE 1, NOT AT GOAL (HCC): ICD-10-CM

## 2022-10-09 DIAGNOSIS — E10.8: ICD-10-CM

## 2022-10-12 NOTE — TELEPHONE ENCOUNTER
Request for KetoStixs and Pen needles . Next Visit Date:  Future Appointments   Date Time Provider Hans Marce   10/18/2022  3:00 PM Yolanda Bradley MD [address] [facility]   Topic Date Due    Pneumococcal 0-64 years Vaccine (1 - PCV) Never done    HIV screen  Never done    Diabetic retinal exam  Never done    Hepatitis C screen  Never done    Diabetic microalbuminuria test  01/21/2017    Lipids  01/21/2017    COVID-19 Vaccine (3 - Booster for Moderna series) 02/16/2022    DTaP/Tdap/Td vaccine (5 - Td or Tdap) 05/08/2022    Flu vaccine (1) 08/01/2022    A1C test (Diabetic or Prediabetic)  10/26/2022    Diabetic foot exam  08/16/2023    Depression Screen  08/16/2023    Hepatitis A vaccine  Completed    Hepatitis B vaccine  Completed    Hib vaccine  Completed    HPV vaccine  Completed    Varicella vaccine  Completed    Meningococcal (ACWY) vaccine  Aged Out       Hemoglobin A1C (%)   Date Value   07/26/2022 10.6 (H)   03/01/2022 9.5   08/17/2021 8.9             ( goal A1C is < 7)   Microalb/Crt.  Ratio (mcg/mg creat)   Date Value   01/21/2016 25     LDL Cholesterol (mg/dL)   Date Value   01/21/2016 215 (H)       (goal LDL is <100)   AST (U/L)   Date Value   08/11/2022 17     ALT (U/L)   Date Value   08/11/2022 23     BUN (mg/dL)   Date Value   08/14/2022 2 (L)     BP Readings from Last 3 Encounters:   08/16/22 105/71   08/14/22 115/74   07/27/22 (!) 165/87          (goal 120/80)    All Future Testing planned in [facility]  Lab Frequency Next Occurrence   Basic Metabolic Panel Once 33/10/2837   Magnesium Once 08/16/2022         Patient Active Problem List:  (none) - all problems resolved or deleted

## 2022-10-14 RX ORDER — PEN NEEDLE, DIABETIC 32GX 5/32"
NEEDLE, DISPOSABLE MISCELLANEOUS
Qty: 120 EACH | Refills: 3 | Status: SHIPPED | OUTPATIENT
Start: 2022-10-14

## 2022-10-14 RX ORDER — URINE ACETONE TEST STRIPS
STRIP MISCELLANEOUS
Qty: 200 STRIP | Refills: 5 | Status: SHIPPED | OUTPATIENT
Start: 2022-10-14

## 2022-10-18 ENCOUNTER — TELEPHONE (OUTPATIENT)
Dept: INTERNAL MEDICINE | Age: 22
End: 2022-10-18

## 2022-10-18 NOTE — LETTER
[address]  [address] [facility] [address]  Phone: [phone]  Fax: [phone]    John Spencer MD        October 18, 2022     [address] [facility] Karl Lynch Donald Dominguez      Dear Socorro Jerry: We missed seeing you for a scheduled appointment at [address] [place] [address] with John Spencer MD on 10/18/2022. We're sorry you were unable to keep your appointment and hope that you are doing well. We ask that you please call 24 hours in advance if you are unable to make your appointment, so that we can give that time to another patient in need. We care about you and the management of your healthcare and want to make sure that you follow up as recommended. To provide quality care and timely appointments to all our patients, you may be dismissed from the practice if you do not show for three (3) scheduled appointments within a 12-month period. We would like to continue treating your healthcare needs. Please call the office to reschedule your appointment, if needed.      Sincerely,        John Spencer MD

## 2022-11-01 NOTE — TELEPHONE ENCOUNTER
Spoke to patient, states he forgot about his appt. Writer advised patient the importance of keeping his appts and to contact our office if he can not make appt. Patient understands and new appt was made.

## 2022-11-08 ENCOUNTER — TELEPHONE (OUTPATIENT)
Dept: INTERNAL MEDICINE | Age: 22
End: 2022-11-08

## 2022-11-08 NOTE — LETTER
[address]  [address] [facility] [address]  Phone: [phone]  Fax: [phone]    Raghav Banuelos MD        November 8, 2022     [address] [facility] Karl Syed Corralraheelduran Angelica      Dear Angela Gutiérrez: We missed seeing you for a scheduled appointment at [address] [place] [address] with Raghav Banuelos MD on 11/8/2022. We're sorry you were unable to keep your appointment and hope that you are doing well. We ask that you please call 24 hours in advance if you are unable to make your appointment, so that we can give that time to another patient in need. We care about you and the management of your healthcare and want to make sure that you follow up as recommended. To provide quality care and timely appointments to all our patients, you may be dismissed from the practice if you do not show for three (3) scheduled appointments within a 12-month period. We would like to continue treating your healthcare needs. Please call the office to reschedule your appointment, if needed.      Sincerely,        Raghav Banuelos MD

## 2023-01-02 ENCOUNTER — HOSPITAL ENCOUNTER (EMERGENCY)
Age: 23
Discharge: HOME | End: 2023-01-02
Attending: EMERGENCY MEDICINE
Payer: MEDICARE

## 2023-01-02 VITALS
HEART RATE: 72 BPM | TEMPERATURE: 97.9 F | BODY MASS INDEX: 19.6 KG/M2 | OXYGEN SATURATION: 100 % | DIASTOLIC BLOOD PRESSURE: 78 MMHG | HEIGHT: 71 IN | WEIGHT: 140 LBS | RESPIRATION RATE: 18 BRPM | SYSTOLIC BLOOD PRESSURE: 123 MMHG

## 2023-01-02 DIAGNOSIS — K08.89 PAIN, DENTAL: Primary | ICD-10-CM

## 2023-01-02 PROCEDURE — 6370000000 HC RX 637 (ALT 250 FOR IP): Performed by: STUDENT IN AN ORGANIZED HEALTH CARE EDUCATION/TRAINING PROGRAM

## 2023-01-02 PROCEDURE — 99283 EMERGENCY DEPT VISIT LOW MDM: CPT

## 2023-01-02 RX ORDER — PENICILLIN V POTASSIUM 250 MG/1
500 TABLET ORAL ONCE
Status: COMPLETED | OUTPATIENT
Start: 2023-01-02 | End: 2023-01-02

## 2023-01-02 RX ORDER — PENICILLIN V POTASSIUM 500 MG/1
500 TABLET ORAL 4 TIMES DAILY
Qty: 28 TABLET | Refills: 0 | Status: SHIPPED | OUTPATIENT
Start: 2023-01-02 | End: 2023-01-09

## 2023-01-02 RX ADMIN — PENICILLIN V POTASSIUM 500 MG: 250 TABLET ORAL at 19:25

## 2023-01-02 RX ADMIN — BENZOCAINE 1 EACH: 220 GEL, DENTIFRICE DENTAL at 19:25

## 2023-01-02 ASSESSMENT — ENCOUNTER SYMPTOMS
NAUSEA: 0
SHORTNESS OF BREATH: 0
VOMITING: 0
COUGH: 0
TROUBLE SWALLOWING: 0
RHINORRHEA: 0
ABDOMINAL PAIN: 0
DIARRHEA: 0

## 2023-01-02 ASSESSMENT — PAIN DESCRIPTION - DESCRIPTORS: DESCRIPTORS: ACHING

## 2023-01-02 ASSESSMENT — PAIN SCALES - GENERAL: PAINLEVEL_OUTOF10: 6

## 2023-01-02 ASSESSMENT — PAIN - FUNCTIONAL ASSESSMENT: PAIN_FUNCTIONAL_ASSESSMENT: 0-10

## 2023-01-02 ASSESSMENT — PAIN DESCRIPTION - LOCATION: LOCATION: TEETH;JAW

## 2023-01-02 ASSESSMENT — PAIN DESCRIPTION - ORIENTATION: ORIENTATION: LEFT;LOWER

## 2023-01-02 ASSESSMENT — PAIN DESCRIPTION - FREQUENCY: FREQUENCY: INTERMITTENT

## 2023-01-02 NOTE — ED PROVIDER NOTES
[address] [facility]  ED  Emergency Department Encounter  Emergency Medicine Resident     Pt Josh Red  MRN: [id number]  [place] 2000  Date of evaluation: 1/2/23  PCP:  Malcolm Gupta MD      [address] [place] [address]       Chief Complaint   Patient presents with    Dental Pain    Jaw Pain    Facial Swelling       HISTORY OF PRESENT ILLNESS  (Location/Symptom, Timing/Onset, Context/Setting, Quality, Duration, Modifying Factors, Severity.)      Iraida Dong is a 23 y.o. male who presents with dental pain. Patient states this has been worsening over the past few days. He had multiple broken teeth over the past month or so. He has been trying to get into a dentist but has not been able to yet. He has had increasing pain and swelling of the left lower gumline and drawl region. He denies any fevers or chills. No difficulty swallowing. No chest pain or shortness of breath. PAST MEDICAL / SURGICAL / SOCIAL / FAMILY HISTORY      has a past medical history of Diabetes mellitus ([facility] Gastroesophageal reflux disease, and Type 1 diabetes mellitus ([facility] has no past surgical history on file. Social History     Socioeconomic History    Marital status: Single     Spouse name: Not on file    Number of children: Not on file    Years of education: Not on file    Highest education level: Not on file   Occupational History     Employer: N/A   Tobacco Use    Smoking status: Some Days     Packs/day: 0.25     Years: 2.00     Pack years: 0.50     Types: Cigarettes    Smokeless tobacco: Never   Vaping Use    Vaping Use: Never used   Substance and Sexual Activity    Alcohol use:  Yes     Alcohol/week: 10.0 standard drinks     Types: 10 Shots of liquor per week    Drug use: Yes     Types: Marijuana Ines Amble)     Comment: occasional    Sexual activity: Yes     Partners: Female   Other Topics Concern    Not on file   Social History Narrative    ** Merged History Encounter **          Social Determinants of Health     Financial Resource Strain: Low Risk     Difficulty of Paying Living Expenses: Not hard at all   Food Insecurity: No Food Insecurity    Worried About [address] Meal Sharing in the Last Year: Never true    Ran Out of Food in the Last Year: Never true   Transportation Needs: No Transportation Needs    Lack of Transportation (Medical): No    Lack of Transportation (Non-Medical): No   Physical Activity: Not on file   Stress: Not on file   Social Connections: Not on file   Intimate Partner Violence: Not on file   Housing Stability: Not on file       Family History   Problem Relation Age of Onset    Asthma Father     Asthma Sister     Asthma Brother        Allergies:  Patient has no known allergies. Home Medications:  Prior to Admission medications    Medication Sig Start Date End Date Taking? Authorizing Provider   penicillin v potassium (VEETID) 500 MG tablet Take 1 tablet by mouth 4 times daily for 7 days 1/2/23 1/9/23 Yes Pita Marley, DO   Insulin Pen Needle (BD PEN NEEDLE BARBARA 2ND GEN) 32G X 4 MM MISC use 1 PEN NEEDLE to inject MEDICATION subcutaneously twice a day 10/14/22   Toby Melchor MD   acetone, urine, test (KETOSTIX) strip use as directed daily if needed for HIGH BLOOD SUGAR 10/14/22   Kang Corona MD   insulin glargine (LANTUS SOLOSTAR) 100 UNIT/ML injection pen Inject 20 units in the morning and 30 units at night under the skin 8/16/22   Keith Becker MD   insulin aspart (NOVOLOG FLEXPEN) 100 UNIT/ML injection pen Inject 3 Units into the skin in the morning and 3 Units at noon and 3 Units in the evening. Inject before meals. 8/16/22   Keith Becker MD   potassium chloride (KLOR-CON M) 10 MEQ extended release tablet Take 2 tablets by mouth in the morning and 2 tablets before bedtime. Do all this for 10 doses.  8/14/22 8/19/22  Skyla Courtney MD   ondansetron (ZOFRAN) 4 MG tablet Take 1 tablet by mouth 3 times daily as needed for Nausea or Vomiting  Patient not taking: Reported on 8/16/2022 7/27/22   Latisha Hutton MD   Continuous Blood Gluc Sensor ([address] [place] [address]) MISC Type 1 DM. Check blood sugars 5-6/day 3/10/22   Kang Pierson MD   Continuous Blood Gluc  (FREESTYLE MALKA 14 DAY READER) JAYCE Check blood sugars 5-6/day. Type 1 DM 3/1/22   Chrissie Luong MD   Glucagon, rDNA, (GLUCAGON EMERGENCY) 1 MG KIT As directed for extreme hypoglycemia 8/17/21   Raghav De La Torre MD   Insulin Pen Needle 30G X 5 MM MISC 1 Device by Does not apply route 4 times daily 12/22/20   Leander Barraza APRN - CNP   EPINEPHrine (Mercie Challenger 2-ITALO) 0.15 MG/0.3ML SOAJ Use as directed for allergic reaction 10/2/20   Jeannette Ramos MD   Insulin Pen Needle 30G X 5 MM MISC 1 Device by Does not apply route 4 times daily 10/2/20   Jeannette Ramos MD         REVIEW OF SYSTEMS       Review of Systems   Constitutional:  Negative for chills and fever. HENT:  Positive for dental problem. Negative for congestion, rhinorrhea and trouble swallowing. Respiratory:  Negative for cough and shortness of breath. Cardiovascular:  Negative for chest pain. Gastrointestinal:  Negative for abdominal pain, diarrhea, nausea and vomiting. Musculoskeletal:  Negative for neck pain. Neurological:  Negative for weakness, numbness and headaches. PHYSICAL EXAM      INITIAL VITALS:   /78   Pulse 72   Temp 97.9 °F (36.6 °C) (Oral)   Resp 18   Ht 5' 11\" (1.803 m)   Wt 140 lb (63.5 kg)   SpO2 100%   BMI 19.53 kg/m²     Physical Exam  Constitutional:       General: He is not in acute distress. Appearance: Normal appearance. He is not ill-appearing, toxic-appearing or diaphoretic. HENT:      Head: Normocephalic and atraumatic. Mouth/Throat:      Mouth: Mucous membranes are moist.      Pharynx: Oropharynx is clear. Comments: Poor dentition, multiple broken teeth. Tenderness to palpation along gingival line left lower teeth.  No abscesses visible. Mild erythema of gingival line  Eyes:      Extraocular Movements: Extraocular movements intact. Cardiovascular:      Rate and Rhythm: Normal rate and regular rhythm. Heart sounds: Normal heart sounds. No murmur heard. Pulmonary:      Effort: Pulmonary effort is normal. No respiratory distress. Breath sounds: Normal breath sounds. No wheezing or rhonchi. Abdominal:      Palpations: Abdomen is soft. Tenderness: There is no abdominal tenderness. Musculoskeletal:         General: Normal range of motion. Cervical back: Normal range of motion and neck supple. Skin:     General: Skin is warm and dry. Neurological:      General: No focal deficit present. Mental Status: He is alert and oriented to person, place, and time. DDX/DIAGNOSTIC RESULTS / EMERGENCY DEPARTMENT COURSE / MDM     Medical Decision Making  20-year-old male presenting with dental pain and swelling of the left lower jaw. He appears well on exam, vital stable, handling secretions, no airway compromise. Vitals are stable. History and exam most consistent with dental infection as patient has poor dentition throughout. We discussed antibiotic treatment in addition to close follow-up with dentistry. Return precautions given. Discharged after initial dose of penicillin with remainder of prescription. Risk  OTC drugs. Prescription drug management. Critical Care  Total time providing critical care: < 30 minutes      EKG      All EKG's are interpreted by the Emergency Department Physician who either signs or Co-signs this chart in the absence of a cardiologist.    EMERGENCY DEPARTMENT COURSE:      ED Course as of 01/02/23 2231 Mon Jan 02, 2023 1932 Patient evaluated for dental pain after breaking teeth proximately 1 month ago. Has had some increased swelling along the gingival line of the left lower jaw. No airway compromise or difficulty swallowing. No other associated symptoms.   Will treat with antibiotics [JS]      ED Course User Index  [JS] Kei Magallanes DO       PROCEDURES:      CONSULTS:  None    CRITICAL CARE:  There was significant risk of life threatening deterioration of patient's condition requiring my direct management. Critical care time <30 min minutes, excluding any documented procedures. FINAL IMPRESSION      1.  Pain, dental          DISPOSITION / PLAN     DISPOSITION Decision To Discharge 01/02/2023 07:33:23 PM      PATIENT REFERRED TO:  Bo Savage [address]  [phone]    Schedule an appointment as soon as possible for a visit in 3 days      [facility] ED  [address] [address]  [phone]    If symptoms worsen    DISCHARGE MEDICATIONS:  Discharge Medication List as of 1/2/2023  7:36 PM        START taking these medications    Details   penicillin v potassium (VEETID) 500 MG tablet Take 1 tablet by mouth 4 times daily for 7 days, Disp-28 tablet, R-0Print             Kei Magallanes DO  Emergency Medicine Resident    (Please note that portions of thisnote were completed with a voice recognition program.  Efforts were made to edit the dictations but occasionally words are mis-transcribed.)       Kei Magallanes DO  Resident  01/03/23 9649

## 2023-01-02 NOTE — ED TRIAGE NOTES
Patient presented to the ED today with complaints of dental pain, jaw pain and facial swelling. Patient states symptoms presented 5 days ago.

## 2023-01-03 NOTE — DISCHARGE INSTRUCTIONS
You were seen in the emergency department today for dental pain. This is likely from a dental infection due to the broken teeth. Please see a dentist as soon as possible. Complete the entire course of antibiotics to prevent any further infection. If you have any new or worsening symptoms, please return to the ED for reevaluation. Call today or tomorrow for a follow up with your dentist tomorrow, [address] [facility] or [facility] at [phone] or one of the dentists provided for follow up. Take your medication as indicated, if you are given an antibiotic then make sure you get the prescription filled and take the antibiotics until finished. Drink plenty of water while taking the antibiotics. Avoid drinking alcohol while taking antibiotics. [facility] has some antibiotics for free; [facility] has a 4 dollar prescription plan for some antibiotics. Use ibuprofen or Tylenol (unless prescribed medications that have Tylenol in it) for pain. You can take over the counter Ibuprofen (advil) tablets (4 every 8 hours or 3 every 6 hours or 2 every 4 hours). You can also use over the counter orajel as directed. Return to the Emergency Department for fever > 101.5, swelling to face, redness on face, drainage from tooth, any other care or concern.

## 2023-01-03 NOTE — ED NOTES
Pt states he had a toothache a couple days ago and now feels his left jaw is swollen and numb. Pt states he has not yet made a dentist appointment so he does not know why he has a toothache. Pt states only medical hx is diabetes. Pt states no other complaints at this time.      Analisa Parker RN  01/02/23 9412

## 2023-01-03 NOTE — ED PROVIDER NOTES
[address]     Emergency Department     Faculty Attestation    I performed a history and physical examination of the patient and discussed management with the resident. I reviewed the residents note and agree with the documented findings and plan of care. Any areas of disagreement are noted on the chart. I was personally present for the key portions of any procedures. I have documented in the chart those procedures where I was not present during the key portions. I have reviewed the emergency nurses triage note. I agree with the chief complaint, past medical history, past surgical history, allergies, medications, social and family history as documented unless otherwise noted below. Documentation of the HPI, Physical Exam and Medical Decision Making performed by medical students or scribes is based on my personal performance of the HPI, PE and MDM. For Physician Assistant/ Nurse Practitioner cases/documentation I have personally evaluated this patient and have completed at least one if not all key elements of the E/M (history, physical exam, and MDM). Additional findings are as noted. Primary Care Physician: Candice Suh MD    History: This is a 23 y.o. male who presents to the Emergency Department with complaint of Dental pain. Is been ongoing for last 2 days. Patient denies any fever, chills or sweats. The patient denies any difficulty swallowing or shortness of breath    Physical:   height is 5' 11\" (1.803 m) and weight is 140 lb (63.5 kg). His oral temperature is 97.9 °F (36.6 °C). His blood pressure is 123/78 and his pulse is 72. His respiration is 18 and oxygen saturation is 100%. The patient has multiple dental caries noted. There is no tongue elevation noted. The patient has no abscess. Airways patent there is no pooling of oral secretions.      Impression: Dental caries    Plan: Antibiotics, analgesia and dentist follow-up    Gutierrez Copeland Jameel Ferrera MD, Champ Story  Attending Emergency  Physician        Cori Otero MD  01/02/23 0636

## 2023-02-22 ENCOUNTER — HOSPITAL ENCOUNTER (INPATIENT)
Age: 23
LOS: 1 days | Discharge: HOME | DRG: 420 | End: 2023-02-24
Attending: EMERGENCY MEDICINE | Admitting: INTERNAL MEDICINE
Payer: MEDICAID

## 2023-02-22 ENCOUNTER — APPOINTMENT (OUTPATIENT)
Dept: GENERAL RADIOLOGY | Age: 23
DRG: 420 | End: 2023-02-22
Payer: MEDICAID

## 2023-02-22 DIAGNOSIS — E10.10 DIABETIC KETOACIDOSIS WITHOUT COMA ASSOCIATED WITH TYPE 1 DIABETES MELLITUS (HCC): Primary | ICD-10-CM

## 2023-02-22 PROBLEM — E11.00 DIABETES MELLITUS WITH HYPEROSMOLARITY WITHOUT HYPERGLYCEMIC HYPEROSMOLAR NONKETOTIC COMA (HCC): Status: ACTIVE | Noted: 2023-02-22

## 2023-02-22 LAB
ABSOLUTE EOS #: 0.21 K/UL (ref 0–0.44)
ABSOLUTE IMMATURE GRANULOCYTE: <0.03 K/UL (ref 0–0.3)
ABSOLUTE LYMPH #: 0.95 K/UL (ref 1.1–3.7)
ABSOLUTE MONO #: 0.28 K/UL (ref 0.1–1.2)
ANION GAP SERPL CALCULATED.3IONS-SCNC: 27 MMOL/L (ref 9–17)
BASOPHILS # BLD: 2 % (ref 0–2)
BASOPHILS ABSOLUTE: 0.06 K/UL (ref 0–0.2)
BETA-HYDROXYBUTYRATE: 7.04 MMOL/L (ref 0.02–0.27)
BILIRUBIN URINE: NEGATIVE
BNP SERPL-MCNC: <36 PG/ML
BUN SERPL-MCNC: 23 MG/DL (ref 6–20)
CALCIUM SERPL-MCNC: 9.5 MG/DL (ref 8.6–10.4)
CARBOXYHEMOGLOBIN: 4.5 % (ref 0–5)
CHLORIDE SERPL-SCNC: 96 MMOL/L (ref 98–107)
CHP ED QC CHECK: YES
CO2 SERPL-SCNC: 13 MMOL/L (ref 20–31)
COLOR: YELLOW
CREAT SERPL-MCNC: 0.92 MG/DL (ref 0.7–1.2)
EOSINOPHILS RELATIVE PERCENT: 6 % (ref 1–4)
EPITHELIAL CELLS UA: NORMAL /HPF (ref 0–5)
FIO2: ABNORMAL
GFR SERPL CREATININE-BSD FRML MDRD: >60 ML/MIN/1.73M2
GLUCOSE BLD-MCNC: 281 MG/DL
GLUCOSE BLD-MCNC: 281 MG/DL (ref 75–110)
GLUCOSE BLD-MCNC: 324 MG/DL (ref 75–110)
GLUCOSE BLD-MCNC: 344 MG/DL (ref 75–110)
GLUCOSE BLD-MCNC: 377 MG/DL (ref 75–110)
GLUCOSE SERPL-MCNC: 377 MG/DL (ref 70–99)
GLUCOSE UR STRIP.AUTO-MCNC: ABNORMAL MG/DL
HCO3 VENOUS: 15.4 MMOL/L (ref 24–30)
HCT VFR BLD AUTO: 44.8 % (ref 40.7–50.3)
HGB BLD-MCNC: 14.2 G/DL (ref 13–17)
IMMATURE GRANULOCYTES: 0 %
KETONES UR STRIP.AUTO-MCNC: ABNORMAL MG/DL
LEUKOCYTE ESTERASE UR QL STRIP.AUTO: NEGATIVE
LYMPHOCYTES # BLD: 25 % (ref 24–43)
MAGNESIUM SERPL-MCNC: 2.2 MG/DL (ref 1.6–2.6)
MCH RBC QN AUTO: 29 PG (ref 25.2–33.5)
MCHC RBC AUTO-ENTMCNC: 31.7 G/DL (ref 28.4–34.8)
MCV RBC AUTO: 91.4 FL (ref 82.6–102.9)
MONOCYTES # BLD: 7 % (ref 3–12)
NEGATIVE BASE EXCESS, VEN: 11.9 MMOL/L (ref 0–2)
NITRITE UR QL STRIP.AUTO: NEGATIVE
NRBC AUTOMATED: 0 PER 100 WBC
O2 SAT, VEN: 61.3 % (ref 60–85)
PATIENT TEMP: 37
PCO2, VEN: 40.3 MM HG (ref 39–55)
PDW BLD-RTO: 12.8 % (ref 11.8–14.4)
PH VENOUS: 7.21 (ref 7.32–7.42)
PLATELET # BLD AUTO: 320 K/UL (ref 138–453)
PMV BLD AUTO: 11.5 FL (ref 8.1–13.5)
PO2, VEN: 35.6 MM HG (ref 30–50)
POTASSIUM SERPL-SCNC: 4.8 MMOL/L (ref 3.7–5.3)
PROT UR STRIP.AUTO-MCNC: 5.5 MG/DL (ref 5–8)
PROT UR STRIP.AUTO-MCNC: ABNORMAL MG/DL
RBC # BLD: 4.9 M/UL (ref 4.21–5.77)
RBC CLUMPS #/AREA URNS AUTO: NORMAL /HPF (ref 0–4)
SARS-COV-2 RDRP RESP QL NAA+PROBE: NOT DETECTED
SEG NEUTROPHILS: 60 % (ref 36–65)
SEGMENTED NEUTROPHILS ABSOLUTE COUNT: 2.33 K/UL (ref 1.5–8.1)
SODIUM SERPL-SCNC: 136 MMOL/L (ref 135–144)
SPECIFIC GRAVITY UA: 1.03 (ref 1–1.03)
SPECIMEN DESCRIPTION: NORMAL
TROPONIN I SERPL DL<=0.01 NG/ML-MCNC: 6 NG/L (ref 0–22)
TROPONIN I SERPL DL<=0.01 NG/ML-MCNC: 7 NG/L (ref 0–22)
TURBIDITY: CLEAR
URINE HGB: NEGATIVE
UROBILINOGEN, URINE: NORMAL
WBC # BLD AUTO: 3.8 K/UL (ref 3.5–11.3)
WBC UA: NORMAL /HPF (ref 0–5)

## 2023-02-22 PROCEDURE — 6360000002 HC RX W HCPCS: Performed by: STUDENT IN AN ORGANIZED HEALTH CARE EDUCATION/TRAINING PROGRAM

## 2023-02-22 PROCEDURE — 82947 ASSAY GLUCOSE BLOOD QUANT: CPT

## 2023-02-22 PROCEDURE — 99222 1ST HOSP IP/OBS MODERATE 55: CPT | Performed by: INTERNAL MEDICINE

## 2023-02-22 PROCEDURE — G0378 HOSPITAL OBSERVATION PER HR: HCPCS

## 2023-02-22 PROCEDURE — 83735 ASSAY OF MAGNESIUM: CPT

## 2023-02-22 PROCEDURE — 84484 ASSAY OF TROPONIN QUANT: CPT

## 2023-02-22 PROCEDURE — 2580000003 HC RX 258: Performed by: STUDENT IN AN ORGANIZED HEALTH CARE EDUCATION/TRAINING PROGRAM

## 2023-02-22 PROCEDURE — 85025 COMPLETE CBC W/AUTO DIFF WBC: CPT

## 2023-02-22 PROCEDURE — 81001 URINALYSIS AUTO W/SCOPE: CPT

## 2023-02-22 PROCEDURE — 96374 THER/PROPH/DIAG INJ IV PUSH: CPT

## 2023-02-22 PROCEDURE — 2580000003 HC RX 258: Performed by: INTERNAL MEDICINE

## 2023-02-22 PROCEDURE — 82010 KETONE BODYS QUAN: CPT

## 2023-02-22 PROCEDURE — 80048 BASIC METABOLIC PNL TOTAL CA: CPT

## 2023-02-22 PROCEDURE — 99285 EMERGENCY DEPT VISIT HI MDM: CPT

## 2023-02-22 PROCEDURE — 96361 HYDRATE IV INFUSION ADD-ON: CPT

## 2023-02-22 PROCEDURE — 82805 BLOOD GASES W/O2 SATURATION: CPT

## 2023-02-22 PROCEDURE — 6370000000 HC RX 637 (ALT 250 FOR IP): Performed by: INTERNAL MEDICINE

## 2023-02-22 PROCEDURE — 87635 SARS-COV-2 COVID-19 AMP PRB: CPT

## 2023-02-22 PROCEDURE — 36415 COLL VENOUS BLD VENIPUNCTURE: CPT

## 2023-02-22 PROCEDURE — 93005 ELECTROCARDIOGRAM TRACING: CPT | Performed by: STUDENT IN AN ORGANIZED HEALTH CARE EDUCATION/TRAINING PROGRAM

## 2023-02-22 PROCEDURE — 71046 X-RAY EXAM CHEST 2 VIEWS: CPT

## 2023-02-22 PROCEDURE — 83880 ASSAY OF NATRIURETIC PEPTIDE: CPT

## 2023-02-22 RX ORDER — ONDANSETRON 2 MG/ML
4 INJECTION INTRAMUSCULAR; INTRAVENOUS EVERY 6 HOURS PRN
Status: DISCONTINUED | OUTPATIENT
Start: 2023-02-22 | End: 2023-02-24 | Stop reason: HOSPADM

## 2023-02-22 RX ORDER — INSULIN GLARGINE 100 [IU]/ML
30 INJECTION, SOLUTION SUBCUTANEOUS NIGHTLY
Status: DISCONTINUED | OUTPATIENT
Start: 2023-02-22 | End: 2023-02-23

## 2023-02-22 RX ORDER — DEXTROSE MONOHYDRATE 100 MG/ML
INJECTION, SOLUTION INTRAVENOUS CONTINUOUS PRN
Status: DISCONTINUED | OUTPATIENT
Start: 2023-02-22 | End: 2023-02-22 | Stop reason: SDUPTHER

## 2023-02-22 RX ORDER — INSULIN LISPRO 100 [IU]/ML
0-8 INJECTION, SOLUTION INTRAVENOUS; SUBCUTANEOUS
Status: DISCONTINUED | OUTPATIENT
Start: 2023-02-22 | End: 2023-02-23

## 2023-02-22 RX ORDER — POLYETHYLENE GLYCOL 3350 17 G/17G
17 POWDER, FOR SOLUTION ORAL DAILY PRN
Status: DISCONTINUED | OUTPATIENT
Start: 2023-02-22 | End: 2023-02-24 | Stop reason: HOSPADM

## 2023-02-22 RX ORDER — INSULIN GLARGINE 100 [IU]/ML
30 INJECTION, SOLUTION SUBCUTANEOUS NIGHTLY
Status: DISCONTINUED | OUTPATIENT
Start: 2023-02-22 | End: 2023-02-22

## 2023-02-22 RX ORDER — SODIUM CHLORIDE 9 MG/ML
INJECTION, SOLUTION INTRAVENOUS PRN
Status: DISCONTINUED | OUTPATIENT
Start: 2023-02-22 | End: 2023-02-24 | Stop reason: HOSPADM

## 2023-02-22 RX ORDER — DEXTROSE MONOHYDRATE 100 MG/ML
INJECTION, SOLUTION INTRAVENOUS CONTINUOUS PRN
Status: DISCONTINUED | OUTPATIENT
Start: 2023-02-22 | End: 2023-02-24 | Stop reason: HOSPADM

## 2023-02-22 RX ORDER — 0.9 % SODIUM CHLORIDE 0.9 %
1000 INTRAVENOUS SOLUTION INTRAVENOUS ONCE
Status: COMPLETED | OUTPATIENT
Start: 2023-02-22 | End: 2023-02-22

## 2023-02-22 RX ORDER — ACETAMINOPHEN 650 MG/1
650 SUPPOSITORY RECTAL EVERY 6 HOURS PRN
Status: DISCONTINUED | OUTPATIENT
Start: 2023-02-22 | End: 2023-02-24 | Stop reason: HOSPADM

## 2023-02-22 RX ORDER — ACETAMINOPHEN 325 MG/1
650 TABLET ORAL EVERY 6 HOURS PRN
Status: DISCONTINUED | OUTPATIENT
Start: 2023-02-22 | End: 2023-02-24 | Stop reason: HOSPADM

## 2023-02-22 RX ORDER — ONDANSETRON 2 MG/ML
4 INJECTION INTRAMUSCULAR; INTRAVENOUS ONCE
Status: COMPLETED | OUTPATIENT
Start: 2023-02-22 | End: 2023-02-22

## 2023-02-22 RX ORDER — SODIUM CHLORIDE 450 MG/100ML
INJECTION, SOLUTION INTRAVENOUS CONTINUOUS
Status: DISCONTINUED | OUTPATIENT
Start: 2023-02-22 | End: 2023-02-23

## 2023-02-22 RX ORDER — INSULIN LISPRO 100 [IU]/ML
0-4 INJECTION, SOLUTION INTRAVENOUS; SUBCUTANEOUS NIGHTLY
Status: DISCONTINUED | OUTPATIENT
Start: 2023-02-22 | End: 2023-02-23

## 2023-02-22 RX ORDER — SODIUM CHLORIDE 0.9 % (FLUSH) 0.9 %
5-40 SYRINGE (ML) INJECTION EVERY 12 HOURS SCHEDULED
Status: DISCONTINUED | OUTPATIENT
Start: 2023-02-22 | End: 2023-02-24 | Stop reason: HOSPADM

## 2023-02-22 RX ORDER — SODIUM CHLORIDE 0.9 % (FLUSH) 0.9 %
5-40 SYRINGE (ML) INJECTION PRN
Status: DISCONTINUED | OUTPATIENT
Start: 2023-02-22 | End: 2023-02-24 | Stop reason: HOSPADM

## 2023-02-22 RX ORDER — INSULIN GLARGINE 100 [IU]/ML
20 INJECTION, SOLUTION SUBCUTANEOUS EVERY MORNING
Status: DISCONTINUED | OUTPATIENT
Start: 2023-02-23 | End: 2023-02-23

## 2023-02-22 RX ORDER — ONDANSETRON 4 MG/1
4 TABLET, ORALLY DISINTEGRATING ORAL EVERY 8 HOURS PRN
Status: DISCONTINUED | OUTPATIENT
Start: 2023-02-22 | End: 2023-02-24 | Stop reason: HOSPADM

## 2023-02-22 RX ADMIN — INSULIN GLARGINE 30 UNITS: 100 INJECTION, SOLUTION SUBCUTANEOUS at 20:22

## 2023-02-22 RX ADMIN — SODIUM CHLORIDE 1000 ML: 9 INJECTION, SOLUTION INTRAVENOUS at 18:00

## 2023-02-22 RX ADMIN — SODIUM CHLORIDE: 4.5 INJECTION, SOLUTION INTRAVENOUS at 21:01

## 2023-02-22 RX ADMIN — ONDANSETRON 4 MG: 2 INJECTION INTRAMUSCULAR; INTRAVENOUS at 18:00

## 2023-02-22 RX ADMIN — SODIUM CHLORIDE, PRESERVATIVE FREE 10 ML: 5 INJECTION INTRAVENOUS at 21:52

## 2023-02-22 RX ADMIN — SODIUM CHLORIDE 1000 ML: 9 INJECTION, SOLUTION INTRAVENOUS at 16:44

## 2023-02-22 ASSESSMENT — ENCOUNTER SYMPTOMS
ABDOMINAL PAIN: 0
NAUSEA: 0
SHORTNESS OF BREATH: 0
CHEST TIGHTNESS: 0
DIARRHEA: 0
CONSTIPATION: 0
ABDOMINAL DISTENTION: 0
BACK PAIN: 0
COLOR CHANGE: 0
TROUBLE SWALLOWING: 0
COUGH: 0
VOMITING: 0
APNEA: 0
CHOKING: 0

## 2023-02-22 NOTE — ED PROVIDER NOTES
[address] ED  Emergency Department Encounter  EmergencyMedicine Resident     Pt Ricarda Epley  MRN: [id number]  Kaditrongfjoe 2000  Date of evaluation: 2/22/23  PCP:  Julianne Robles MD    [address] [place] [address]       Chief Complaint   Patient presents with    Headache     X1 week, states he's been out of his long acting insulin x1 week    Chest Pain    Emesis       HISTORY OF PRESENT ILLNESS  (Location/Symptom, Timing/Onset, Context/Setting, Quality, Duration, Modifying Factors, Severity.)      Ralph Garcia is a 23 y.o. male who ran out of long-acting insulin approximately 1 week ago. He has not been unable to get prescription filled due to right 8 not having them. He states he has been using his short acting insulin. He states he is feeling very fatigued and urinating more frequently. Patient also stating he is having chest pain. PAST MEDICAL / SURGICAL / SOCIAL / FAMILY HISTORY      has a past medical history of Diabetes mellitus ([address] Gastroesophageal reflux disease, and Type 1 diabetes mellitus ([facility] [address] has no past surgical history on file. Social History     Socioeconomic History    Marital status: Single     Spouse name: Not on file    Number of children: Not on file    Years of education: Not on file    Highest education level: Not on file   Occupational History     Employer: N/A   Tobacco Use    Smoking status: Some Days     Packs/day: 0.25     Years: 2.00     Pack years: 0.50     Types: Cigarettes    Smokeless tobacco: Never   Vaping Use    Vaping Use: Never used   Substance and Sexual Activity    Alcohol use:  Yes     Alcohol/week: 10.0 standard drinks     Types: 10 Shots of liquor per week    Drug use: Yes     Types: Marijuana Maya Ege)     Comment: occasional    Sexual activity: Yes     Partners: Female   Other Topics Concern    Not on file   Social History Narrative    ** Merged History Encounter **          Social Determinants of Health     Financial Resource Strain: Low Risk     Difficulty of Paying Living Expenses: Not hard at all   Food Insecurity: No Food Insecurity    Worried About [facility] in the Last Year: Never true    Ran Out of Food in the Last Year: Never true   Transportation Needs: No Transportation Needs    Lack of Transportation (Medical): No    Lack of Transportation (Non-Medical): No   Physical Activity: Not on file   Stress: Not on file   Social Connections: Not on file   Intimate Partner Violence: Not on file   Housing Stability: Not on file       Family History   Problem Relation Age of Onset    Asthma Father     Asthma Sister     Asthma Brother        Allergies:  Patient has no known allergies. Home Medications:  Prior to Admission medications    Medication Sig Start Date End Date Taking? Authorizing Provider   Insulin Pen Needle (BD PEN NEEDLE BARBARA 2ND GEN) 32G X 4 MM MISC use 1 PEN NEEDLE to inject MEDICATION subcutaneously twice a day 10/14/22   Simon Murillo MD   acetone, urine, test (KETOSTIX) strip use as directed daily if needed for HIGH BLOOD SUGAR 10/14/22   Kang Jorgensen MD   insulin glargine (LANTUS SOLOSTAR) 100 UNIT/ML injection pen Inject 20 units in the morning and 30 units at night under the skin 8/16/22   Jose Roland MD   insulin aspart (NOVOLOG FLEXPEN) 100 UNIT/ML injection pen Inject 3 Units into the skin in the morning and 3 Units at noon and 3 Units in the evening. Inject before meals. 8/16/22   Jose Roland MD   potassium chloride (KLOR-CON M) 10 MEQ extended release tablet Take 2 tablets by mouth in the morning and 2 tablets before bedtime. Do all this for 10 doses. 8/14/22 8/19/22  Feng Phan MD   ondansetron (ZOFRAN) 4 MG tablet Take 1 tablet by mouth 3 times daily as needed for Nausea or Vomiting  Patient not taking: Reported on 8/16/2022 7/27/22   Deana Castillo MD   Continuous Blood Gluc Sensor ([address] [place] [address]) [facility] Type 1 DM.  Check blood sugars 5-6/day 3/10/22   Ghulam Corrinne Moloney, MD   Continuous Blood Gluc  (FREESTYLE MALKA 14 DAY READER) JAYCE Check blood sugars 5-6/day. Type 1 DM 3/1/22   Lexi Renteria MD   Glucagon, rDNA, (GLUCAGON EMERGENCY) 1 MG KIT As directed for extreme hypoglycemia 8/17/21   Cristina Gonzalez MD   Insulin Pen Needle 30G X 5 MM MISC 1 Device by Does not apply route 4 times daily 12/22/20   Leander Barraza APRN - CNP   EPINEPHrine (Michaeline Frohlich 2-ITALO) 0.15 MG/0.3ML SOAJ Use as directed for allergic reaction 10/2/20   Adiel Gaxiola MD   Insulin Pen Needle 30G X 5 MM MISC 1 Device by Does not apply route 4 times daily 10/2/20   Adiel Gaxiola MD       REVIEW OF SYSTEMS    (2-9 systems for level 4, 10 or more for level 5)      Review of Systems   Constitutional:  Positive for fatigue. Negative for appetite change and fever. HENT:  Negative for congestion and trouble swallowing. Respiratory:  Negative for cough, chest tightness and shortness of breath. Cardiovascular:  Positive for chest pain. Negative for leg swelling. Gastrointestinal:  Negative for abdominal pain, constipation, diarrhea, nausea and vomiting. Genitourinary:  Negative for dysuria. Musculoskeletal:  Negative for back pain. Skin:  Negative for color change and rash. Neurological:  Negative for dizziness, weakness and headaches. PHYSICAL EXAM   (up to 7 for level 4, 8 or more for level 5)      INITIAL VITALS:   /78   Pulse 65   Temp 97.4 °F (36.3 °C) (Oral)   Resp 10   Wt 140 lb (63.5 kg)   SpO2 100%   BMI 19.53 kg/m²     Physical Exam  Constitutional:       General: He is not in acute distress. HENT:      Head: Normocephalic and atraumatic. Mouth/Throat:      Mouth: Mucous membranes are dry. Cardiovascular:      Rate and Rhythm: Normal rate. Pulses: Normal pulses. Heart sounds: Normal heart sounds. Pulmonary:      Effort: Pulmonary effort is normal.      Breath sounds: Normal breath sounds. Abdominal:      Palpations: Abdomen is soft. Tenderness: There is no abdominal tenderness. Musculoskeletal:      Right lower leg: No edema. Left lower leg: No edema. DIFFERENTIAL  DIAGNOSIS     PLAN (LABS / IMAGING / EKG):  Orders Placed This Encounter   Procedures    COVID-19, Rapid    XR CHEST (2 VW)    CBC with Auto Differential    BMP    Troponin    Brain Natriuretic Peptide    Urinalysis with Reflex to Culture    Beta-Hydroxybutyrate    BLOOD GAS, VENOUS    Magnesium    ADULT DIET;  Clear Liquid    HYPOGLYCEMIA TREATMENT: blood glucose LESS THAN 70 mg/dL and patient ALERT and TOLERATING PO    HYPOGLYCEMIA TREATMENT: blood glucose LESS THAN 70 mg/dL and patient NOT ALERT or NPO    Notify Provider    HYPOGLYCEMIA TREATMENT: blood glucose LESS THAN 70 mg/dL and patient ALERT and TOLERATING PO    HYPOGLYCEMIA TREATMENT: blood glucose LESS THAN 70 mg/dL and patient NOT ALERT or NPO    Inpatient consult to Hospitalist    POC Glucose Fingerstick    POCT Glucose    POCT Glucose    POCT glucose    POCT Glucose    EKG 12 Lead    Place in Observation Service       MEDICATIONS ORDERED:  Orders Placed This Encounter   Medications    0.9 % sodium chloride bolus    0.9 % sodium chloride bolus    DISCONTD: glucose chewable tablet 16 g    DISCONTD: dextrose bolus 10% 125 mL    DISCONTD: dextrose bolus 10% 250 mL    DISCONTD: glucagon (rDNA) injection 1 mg    DISCONTD: dextrose 10 % infusion    ondansetron (ZOFRAN) injection 4 mg    DISCONTD: insulin detemir (LEVEMIR) injection vial 30 Units    DISCONTD: insulin detemir (LEVEMIR) injection vial 20 Units    0.45 % sodium chloride infusion    glucose chewable tablet 16 g    OR Linked Order Group     dextrose bolus 10% 125 mL     dextrose bolus 10% 250 mL    glucagon (rDNA) injection 1 mg    dextrose 10 % infusion    insulin lispro (HUMALOG) injection vial 0-8 Units    insulin lispro (HUMALOG) injection vial 0-4 Units    DISCONTD: insulin glargine (LANTUS) injection vial 30 Units    insulin glargine (LANTUS) injection vial 20 Units    insulin glargine (LANTUS) injection vial 30 Units       DIAGNOSTIC RESULTS / EMERGENCY DEPARTMENT COURSE / MDM   LAB RESULTS:  Results for orders placed or performed during the hospital encounter of 02/22/23   COVID-19, Rapid    Specimen: Nasopharyngeal Swab   Result Value Ref Range    Specimen Description . NASOPHARYNGEAL SWAB     SARS-CoV-2, Rapid Not Detected Not Detected   CBC with Auto Differential   Result Value Ref Range    WBC 3.8 3.5 - 11.3 k/uL    RBC 4.90 4.21 - 5.77 m/uL    Hemoglobin 14.2 13.0 - 17.0 g/dL    Hematocrit 44.8 40.7 - 50.3 %    MCV 91.4 82.6 - 102.9 fL    MCH 29.0 25.2 - 33.5 pg    MCHC 31.7 28.4 - 34.8 g/dL    RDW 12.8 11.8 - 14.4 %    Platelets 559 441 - 550 k/uL    MPV 11.5 8.1 - 13.5 fL    NRBC Automated 0.0 0.0 per 100 WBC    Seg Neutrophils 60 36 - 65 %    Lymphocytes 25 24 - 43 %    Monocytes 7 3 - 12 %    Eosinophils % 6 (H) 1 - 4 %    Basophils 2 0 - 2 %    Immature Granulocytes 0 0 %    Segs Absolute 2.33 1.50 - 8.10 k/uL    Absolute Lymph # 0.95 (L) 1.10 - 3.70 k/uL    Absolute Mono # 0.28 0.10 - 1.20 k/uL    Absolute Eos # 0.21 0.00 - 0.44 k/uL    Basophils Absolute 0.06 0.00 - 0.20 k/uL    Absolute Immature Granulocyte <0.03 0.00 - 0.30 k/uL   BMP   Result Value Ref Range    Glucose 377 (H) 70 - 99 mg/dL    BUN 23 (H) 6 - 20 mg/dL    Creatinine 0.92 0.70 - 1.20 mg/dL    Est, Glom Filt Rate >60 >60 mL/min/1.73m2    Calcium 9.5 8.6 - 10.4 mg/dL    Sodium 136 135 - 144 mmol/L    Potassium 4.8 3.7 - 5.3 mmol/L    Chloride 96 (L) 98 - 107 mmol/L    CO2 13 (L) 20 - 31 mmol/L    Anion Gap 27 (H) 9 - 17 mmol/L   Troponin   Result Value Ref Range    Troponin, High Sensitivity 6 0 - 22 ng/L   Brain Natriuretic Peptide   Result Value Ref Range    Pro-BNP <36 <300 pg/mL   Beta-Hydroxybutyrate   Result Value Ref Range    Beta-Hydroxybutyrate 7.04 (H) 0.02 - 0.27 mmol/L   BLOOD GAS, VENOUS   Result Value Ref Range pH, Flaquito 7.206 (LL) 7.320 - 7.420    pCO2, Flaquito 40.3 39 - 55 mm Hg    pO2, Fla[place] 35.6 30 - 50 mm Hg    HCO3, Venous 15.4 (L) 24 - 30 mmol/L    Negative Base Excess, Flaquito 11.9 (H) 0.0 - 2.0 mmol/L    O2 Sat, Flaquito 61.3 60.0 - 85.0 %    Carboxyhemoglobin 4.5 0 - 5 %    Pt Temp 37.0     FIO2 INFORMATION NOT PROVIDED    Magnesium   Result Value Ref Range    Magnesium 2.2 1.6 - 2.6 mg/dL   POC Glucose Fingerstick   Result Value Ref Range    POC Glucose 377 (H) 75 - 110 mg/dL       RADIOLOGY:  No results found. EKG Interpretation    Interpreted by myself    Rhythm: normal sinus   Rate: normal  Axis: normal  Ectopy: none  Conduction: normal  ST Segments: no acute change  T Waves: no acute change  Q Waves: none    Clinical Impression: no acute changes    All EKG's are interpreted by the Emergency Department Physician who either signs or Co-signs this chart in the absence of a cardiologist.    MDM  Medical Decision Making  Presents with DKA likely secondary to medication noncompliance given history. No signs and symptoms of infection that would precipitate this. Patient admitted to medicine service for further work-up and care. Started on insulin drip given fluids. Amount and/or Complexity of Data Reviewed  Labs: ordered. Radiology: ordered. ECG/medicine tests: ordered. Discussion of management or test interpretation with external provider(s): Medicine regarding admission service    Risk  Prescription drug management. Decision regarding hospitalization. EMERGENCY DEPARTMENT COURSE:  ED Course as of 02/22/23 1745   Wed Feb 22, 2023   1616 Patient very bedside. Has not used long-acting insulin in a week because he has not been able to fill his prescription at [facility]. He has been using short acting. He is stating he is peeing more, feels weak and is having some chest pain. Will get labs when EKG which chest x-ray. [ZE]   8674 Patient with DKA we will give another liter of fluids. Will start insulin drip. Admitted to medicine service. [ZE]      ED Course User Index  [ZE] Real Sport, DO       PROCEDURES:  Procedures     CONSULTS:  IP CONSULT TO HOSPITALIST    CRITICAL CARE:  See MDM    FINAL IMPRESSION      1. Diabetic ketoacidosis without coma associated with type 1 diabetes mellitus ([facility]        DISPOSITION / PLAN     DISPOSITION Admitted 02/22/2023 05:39:05 PM      PATIENT REFERRED TO:  No follow-up provider specified.     DISCHARGE MEDICATIONS:  New Prescriptions    No medications on file       Raad Gordon DO  Emergency Medicine Resident    (Please note that portions of thisnote were completed with a voice recognition program.  Efforts were made to edit the dictations but occasionally words are mis-transcribed.)        Kirby Tse DO  Resident  02/22/23 3580

## 2023-02-22 NOTE — ED NOTES
Pt ambulatory to restroom with steady gait.  Pt obtaining urine     Orlean Samples, TERESA  02/22/23 4024

## 2023-02-22 NOTE — H&P
[facility]  Office: [address], DO, Sonny Garcia, DO, Amena Davey, DO, Blanca Mckenna Blood, DO, Ham Flores MD, Joy Ramey MD, Bandar Otero MD, Renold Galeazzi, MD,  Greg Brown MD, Meng Kee MD, Hunter Mendez, DO, Osiris Lopez MD,  Tonio Gonsales MD, Freada Osgood, MD, Valerie Gannon, DO, Sascha Butler MD, Tejas Ann MD, Marco Mcghee, DO, Mira Xie MD, Francesco Aguayo MD, Raza Wasserman MD, Herminia Gan MD, Serafin Cavazos, DO, Nicole Storm MD, Eduardo Zapata MD, Marlon Bautista, CNP,  Martin Mondragon, CNP, Leatha Serra, CNP, Brigitte Foote, CNP,  Marnie Costa, [facility], Tangela Das, CNP, Kristin Sánchez, CNP, Mamta Thayer, CNP, Li Bates, CNP, Bayron Elise, CNP, Zulay Raymundo PA-C, Mark Mckinney, CNS, Ora Collado, CNP, Purcell Bloch, CNP         [facility]   [address] [facility]    HISTORY AND PHYSICAL EXAMINATION            Date:   2/22/2023  Patient name:  Rosalie Ruelas  Date of admission:  2/22/2023  4:02 PM  MRN:   [id number]  Account:  [de-identified]  YOB: 2000  PCP:    Ariana Andrea MD  Room:   06/06  Code Status:    Prior    Chief Complaint:     Chief Complaint   Patient presents with    Headache     X1 week, states he's been out of his long acting insulin x1 week    Chest Pain    Emesis       History Obtained From:     patient    History of Present Illness:     Rosalie Ruelas is a 23 y.o. male presents with past medical history for type 1 diabetes. Patient has been unable to get his Levemir from his pharmacy and has not been taking for 1 week. Patient presented with bicarb of 13, slightly elevated beta hydroxybutyrate. He was slightly nauseous but requested a drink of water. Blood sugar was 377.   Patient was started on his home insulin, be admitted for [facility]    Past Medical History:     Past Medical History:   Diagnosis Date    Diabetes mellitus ([facility] Gastroesophageal reflux disease     Type 1 diabetes mellitus [facility]) April 2013        Past Surgical History:     History reviewed. No pertinent surgical history. Medications Prior to Admission:     Prior to Admission medications    Medication Sig Start Date End Date Taking? Authorizing Provider   Insulin Pen Needle (BD PEN NEEDLE BARBARA 2ND GEN) 32G X 4 MM MISC use 1 PEN NEEDLE to inject MEDICATION subcutaneously twice a day 10/14/22   Verona Steve MD   acetone, urine, test (KETOSTIX) strip use as directed daily if needed for HIGH BLOOD SUGAR 10/14/22   Kang Fletcher MD   insulin glargine (LANTUS SOLOSTAR) 100 UNIT/ML injection pen Inject 20 units in the morning and 30 units at night under the skin 8/16/22   Marleen Harada, MD   insulin aspart (NOVOLOG FLEXPEN) 100 UNIT/ML injection pen Inject 3 Units into the skin in the morning and 3 Units at noon and 3 Units in the evening. Inject before meals. 8/16/22   Marleen Harada, MD   potassium chloride (KLOR-CON M) 10 MEQ extended release tablet Take 2 tablets by mouth in the morning and 2 tablets before bedtime. Do all this for 10 doses. 8/14/22 8/19/22  Feng Oliver MD   ondansetron (ZOFRAN) 4 MG tablet Take 1 tablet by mouth 3 times daily as needed for Nausea or Vomiting  Patient not taking: Reported on 8/16/2022 7/27/22   Cas Winkler MD   Continuous Blood Gluc Sensor ([address] [place] [address]) [facility] Type 1 DM. Check blood sugars 5-6/day 3/10/22   Kang Fletcher MD   Continuous Blood Gluc  (FREESTYLE MALKA 14 DAY READER) JAYCE Check blood sugars 5-6/day.  Type 1 DM 3/1/22   Orquidea Thapa MD   Glucagon, rDNA, (GLUCAGON EMERGENCY) 1 MG KIT As directed for extreme hypoglycemia 8/17/21   Naren Thomas MD   Insulin Pen Needle 30G X 5 MM MISC 1 Device by Does not apply route 4 times daily 12/22/20   Leander Barraza, APRN - CNP   EPINEPHrine (Janelle Jhony 2-ITALO) 0.15 MG/0.3ML SOAJ Use as directed for allergic reaction 10/2/20   Parris Nelson MD   Insulin Pen Needle 30G X 5 MM MISC 1 Device by Does not apply route 4 times daily 10/2/20   Parris Nelson MD        Allergies:     Patient has no known allergies. Social History:     Tobacco:    reports that he has been smoking cigarettes. He has a 0.50 pack-year smoking history. He has never used smokeless tobacco.  Alcohol:      reports current alcohol use of about 10.0 standard drinks per week. Drug Use:  reports current drug use. Drug: Marijuana Hardin Fogo). Family History:     Family History   Problem Relation Age of Onset    Asthma Father     Asthma Sister     Asthma Brother        Review of Systems:     Positive and Negative as described in HPI. Review of Systems   Constitutional:  Negative for activity change, fatigue and fever. Respiratory:  Negative for apnea, cough, choking, chest tightness and shortness of breath. Cardiovascular:  Negative for chest pain, palpitations and leg swelling. Gastrointestinal:  Negative for abdominal distention, constipation, diarrhea, nausea and vomiting. Endocrine: Positive for polyuria. Negative for polydipsia and polyphagia. Musculoskeletal:  Negative for neck stiffness. Skin:  Negative for color change. Neurological:  Negative for seizures, syncope, speech difficulty and weakness. Psychiatric/Behavioral:  Negative for agitation. Physical Exam:   /78   Pulse 65   Temp 97.4 °F (36.3 °C) (Oral)   Resp 10   Wt 140 lb (63.5 kg)   SpO2 100%   BMI 19.53 kg/m²   Temp (24hrs), Av.4 °F (36.3 °C), Min:97.4 °F (36.3 °C), Max:97.4 °F (36.3 °C)    Recent Labs     23  1601   POCGLU 377*     No intake or output data in the 24 hours ending 23 8409    Physical Exam  Constitutional:       General: He is not in acute distress. Appearance: He is normal weight. He is not toxic-appearing. HENT:      Head: Normocephalic and atraumatic. Nose: No congestion.       Mouth/Throat:      Mouth: Mucous membranes are moist.      Pharynx: Oropharynx is clear. Eyes:      Conjunctiva/sclera: Conjunctivae normal.      Pupils: Pupils are equal, round, and reactive to light. Neck:      Vascular: No carotid bruit. Cardiovascular:      Rate and Rhythm: Normal rate and regular rhythm. Pulmonary:      Effort: No respiratory distress. Breath sounds: No stridor. No wheezing or rhonchi. Abdominal:      General: There is no distension. Palpations: There is no mass. Tenderness: There is no abdominal tenderness. Hernia: No hernia is present. Musculoskeletal:         General: No swelling, tenderness, deformity or signs of injury. Cervical back: No rigidity or tenderness. Lymphadenopathy:      Cervical: No cervical adenopathy. Skin:     Coloration: Skin is not jaundiced or pale. Findings: No bruising or erythema. Neurological:      General: No focal deficit present. Mental Status: He is alert. Mental status is at baseline. Investigations:      Laboratory Testing:  Recent Results (from the past 24 hour(s))   POC Glucose Fingerstick    Collection Time: 02/22/23  4:01 PM   Result Value Ref Range    POC Glucose 377 (H) 75 - 110 mg/dL   COVID-19, Rapid    Collection Time: 02/22/23  4:16 PM    Specimen: Nasopharyngeal Swab   Result Value Ref Range    Specimen Description . NASOPHARYNGEAL SWAB     SARS-CoV-2, Rapid Not Detected Not Detected   CBC with Auto Differential    Collection Time: 02/22/23  4:18 PM   Result Value Ref Range    WBC 3.8 3.5 - 11.3 k/uL    RBC 4.90 4.21 - 5.77 m/uL    Hemoglobin 14.2 13.0 - 17.0 g/dL    Hematocrit 44.8 40.7 - 50.3 %    MCV 91.4 82.6 - 102.9 fL    MCH 29.0 25.2 - 33.5 pg    MCHC 31.7 28.4 - 34.8 g/dL    RDW 12.8 11.8 - 14.4 %    Platelets 576 748 - 939 k/uL    MPV 11.5 8.1 - 13.5 fL    NRBC Automated 0.0 0.0 per 100 WBC    Seg Neutrophils 60 36 - 65 %    Lymphocytes 25 24 - 43 %    Monocytes 7 3 - 12 %    Eosinophils % 6 (H) 1 - 4 %    Basophils 2 0 - 2 %    Immature Granulocytes 0 0 %    Segs Absolute 2.33 1.50 - 8.10 k/uL    Absolute Lymph # 0.95 (L) 1.10 - 3.70 k/uL    Absolute Mono # 0.28 0.10 - 1.20 k/uL    Absolute Eos # 0.21 0.00 - 0.44 k/uL    Basophils Absolute 0.06 0.00 - 0.20 k/uL    Absolute Immature Granulocyte <0.03 0.00 - 0.30 k/uL   BMP    Collection Time: 02/22/23  4:18 PM   Result Value Ref Range    Glucose 377 (H) 70 - 99 mg/dL    BUN 23 (H) 6 - 20 mg/dL    Creatinine 0.92 0.70 - 1.20 mg/dL    Est, Glom Filt Rate >60 >60 mL/min/1.73m2    Calcium 9.5 8.6 - 10.4 mg/dL    Sodium 136 135 - 144 mmol/L    Potassium 4.8 3.7 - 5.3 mmol/L    Chloride 96 (L) 98 - 107 mmol/L    CO2 13 (L) 20 - 31 mmol/L    Anion Gap 27 (H) 9 - 17 mmol/L   Troponin    Collection Time: 02/22/23  4:18 PM   Result Value Ref Range    Troponin, High Sensitivity 6 0 - 22 ng/L   Brain Natriuretic Peptide    Collection Time: 02/22/23  4:18 PM   Result Value Ref Range    Pro-BNP <36 <300 pg/mL   Beta-Hydroxybutyrate    Collection Time: 02/22/23  4:18 PM   Result Value Ref Range    Beta-Hydroxybutyrate 7.04 (H) 0.02 - 0.27 mmol/L   BLOOD GAS, VENOUS    Collection Time: 02/22/23  4:18 PM   Result Value Ref Range    pH, Flaquito 7.206 (LL) 7.320 - 7.420    pCO2, Flaquito 40.3 39 - 55 mm Hg    pO2, Flaquito 35.6 30 - 50 mm Hg    HCO3, Venous 15.4 (L) 24 - 30 mmol/L    Negative Base Excess, Flaquito 11.9 (H) 0.0 - 2.0 mmol/L    O2 Sat, Flaquito 61.3 60.0 - 85.0 %    Carboxyhemoglobin 4.5 0 - 5 %    Pt Temp 37.0     FIO2 INFORMATION NOT PROVIDED    Magnesium    Collection Time: 02/22/23  4:18 PM   Result Value Ref Range    Magnesium 2.2 1.6 - 2.6 mg/dL       Imaging/Diagnostics:  XR CHEST (2 VW)    Result Date: 2/22/2023  No acute cardiopulmonary abnormality.        Assessment :      Hospital Problems             Last Modified POA    * (Principal) Diabetes mellitus with hyperosmolarity without hyperglycemic hyperosmolar nonketotic coma ([facility] [address] [address] 2/22/2023 Yes       Plan:     Patient status observation in the Progressive Unit/Step down    HHNKS   Start home dose of insulin, 20 units in the morning, 30 units at night  Medium dose sliding scale  Okay for clear liquid diet, reevaluate tomorrow for advancement of diet  Continue half-normal saline 150 cc an hour    Consultations:   Marc Manzo HOSPITALIST     Patient is admitted as inpatient status because of co-morbidities listed above, severity of signs and symptoms as outlined, requirement for current medical therapies and most importantly because of direct risk to patient if care not provided in a hospital setting. Expected length of stay > 48 hours.     Nupur Brown DO  2/22/2023  5:39 PM    Copy sent to Dr. Edith Stephens MD

## 2023-02-22 NOTE — ED NOTES
The following labs were labeled with appropriate pt sticker and tubed to lab:     [] Blue     [] Lavender   [] on ice  [x] Green/yellow  [] Green/black [] on ice  [] Genna Leia  [] on ice  [] Yellow  [] Red  [] Type/ Screen  [] ABG  [] VBG    [] COVID-19 swab    [] Rapid  [] PCR  [] Flu swab  [] Peds Viral Panel     [] Urine Sample  [] Fecal Sample  [] Pelvic Cultures  [] Blood Cultures  [] X 2  [] STREP Cultures         Luisana Pinzon RN  02/22/23 3584

## 2023-02-22 NOTE — ED PROVIDER NOTES
[facility] ED     Emergency Department     Faculty Attestation    I performed a history and physical examination of the patient and discussed management with the resident. I reviewed the residents note and agree with the documented findings and plan of care. Any areas of disagreement are noted on the chart. I was personally present for the key portions of any procedures. I have documented in the chart those procedures where I was not present during the key portions. I have reviewed the emergency nurses triage note. I agree with the chief complaint, past medical history, past surgical history, allergies, medications, social and family history as documented unless otherwise noted below. For Physician Assistant/ Nurse Practitioner cases/documentation I have personally evaluated this patient and have completed at least one if not all key elements of the E/M (history, physical exam, and MDM). Additional findings are as noted. Patient here with elevated blood sugar has been out of his nighttime insulin for the last 3 days. States there issues at the pharmacy sugars have been in the 3-4 100s. Has had DKA before feels like that to him. No abdominal pain but has had several episodes of vomiting. No diarrhea no fevers no URI complaints. On exam however nontoxic well-appearing resting comfortably in the room. Abdomen soft nontender no respiratory distress begin full sentences. Not tachycardic. Will check labs evaluate for DKA fluids meds possible admit versus discharge. EKG interpretation: Sinus rhythm 72. Normal intervals left axis. No acute ST or T changes.   Similar to 8/11/22    Critical Care     none    Hao Osorio MD, Steven Live  Attending Emergency  Physician           Hao Osorio MD  02/22/23 8164

## 2023-02-22 NOTE — ED NOTES
The following labs were labeled with appropriate pt sticker and tubed to lab:     [] Blue     [] Lavender   [] on ice  [] Green/yellow  [] Green/black [] on ice  [] Rhina Chel  [] on ice  [] Yellow  [] Red  [] Type/ Screen  [] ABG  [] VBG    [] COVID-19 swab    [] Rapid  [] PCR  [] Flu swab  [] Peds Viral Panel     [x] Urine Sample  [] Fecal Sample  [] Pelvic Cultures  [] Blood Cultures  [] X 2  [] STREP Cultures         Fausto Woodard RN  02/22/23 8801

## 2023-02-22 NOTE — ED NOTES
Pt reports to the ED with complaints of HA, chest pain and emesis x1 week. Pt states that he ran out of long acting insulin about week ago so he has not been covering himself at night. Pt does state he still has short acting so he's still been covering himself during the day. Pt states [facility] has been out of stock. Pt states that the pain began about 3 days ago and has periods of SOB with it. Pt denies any other pertinent medical hx. Pt does not have any other complaints at this time. Pt is A&O x4 and speaking in complete sentences. Pt is resting in bed comfortably, NAD noted.  EKG obtained and placed on full cardiac monitor         [facility]  02/22/23 1080 [facility], RN  02/22/23 3272

## 2023-02-22 NOTE — ED NOTES
The following labs were labeled with appropriate pt sticker and tubed to lab:     [x] Blue     [x] Lavender   [] on ice  [x] Green/yellow  [x] Green/black [x] on ice  [] Berneda Alexandr  [] on ice  [] Yellow  [] Red  [] Type/ Screen  [] ABG  [] VBG    [x] COVID-19 swab    [x] Rapid  [] PCR  [] Flu swab  [] Peds Viral Panel     [] Urine Sample  [] Fecal Sample  [] Pelvic Cultures  [] Blood Cultures  [] X 2  [] STREP Cultures         Michael Florentino RN  02/22/23 2375

## 2023-02-23 PROBLEM — E10.10 DKA, TYPE 1, NOT AT GOAL (HCC): Status: ACTIVE | Noted: 2023-02-23

## 2023-02-23 LAB
ALBUMIN SERPL-MCNC: 4.2 G/DL (ref 3.5–5.2)
ALBUMIN SERPL-MCNC: 4.3 G/DL (ref 3.5–5.2)
ALBUMIN SERPL-MCNC: 4.4 G/DL (ref 3.5–5.2)
ANION GAP SERPL CALCULATED.3IONS-SCNC: 13 MMOL/L (ref 9–17)
ANION GAP SERPL CALCULATED.3IONS-SCNC: 18 MMOL/L (ref 9–17)
ANION GAP SERPL CALCULATED.3IONS-SCNC: 23 MMOL/L (ref 9–17)
BUN SERPL-MCNC: 14 MG/DL (ref 6–20)
BUN SERPL-MCNC: 16 MG/DL (ref 6–20)
BUN SERPL-MCNC: 19 MG/DL (ref 6–20)
CALCIUM SERPL-MCNC: 8.4 MG/DL (ref 8.6–10.4)
CALCIUM SERPL-MCNC: 8.5 MG/DL (ref 8.6–10.4)
CALCIUM SERPL-MCNC: 8.7 MG/DL (ref 8.6–10.4)
CHLORIDE SERPL-SCNC: 98 MMOL/L (ref 98–107)
CHLORIDE SERPL-SCNC: 98 MMOL/L (ref 98–107)
CHLORIDE SERPL-SCNC: 99 MMOL/L (ref 98–107)
CO2 SERPL-SCNC: 12 MMOL/L (ref 20–31)
CO2 SERPL-SCNC: 18 MMOL/L (ref 20–31)
CO2 SERPL-SCNC: 8 MMOL/L (ref 20–31)
CREAT SERPL-MCNC: 0.74 MG/DL (ref 0.7–1.2)
CREAT SERPL-MCNC: 0.8 MG/DL (ref 0.7–1.2)
CREAT SERPL-MCNC: 0.86 MG/DL (ref 0.7–1.2)
EKG ATRIAL RATE: 72 BPM
EKG P AXIS: 84 DEGREES
EKG P-R INTERVAL: 146 MS
EKG Q-T INTERVAL: 402 MS
EKG QRS DURATION: 104 MS
EKG QTC CALCULATION (BAZETT): 440 MS
EKG R AXIS: -79 DEGREES
EKG T AXIS: 53 DEGREES
EKG VENTRICULAR RATE: 72 BPM
GFR SERPL CREATININE-BSD FRML MDRD: >60 ML/MIN/1.73M2
GLUCOSE BLD-MCNC: 142 MG/DL (ref 75–110)
GLUCOSE BLD-MCNC: 166 MG/DL (ref 75–110)
GLUCOSE BLD-MCNC: 172 MG/DL (ref 75–110)
GLUCOSE BLD-MCNC: 224 MG/DL (ref 75–110)
GLUCOSE BLD-MCNC: 255 MG/DL (ref 75–110)
GLUCOSE BLD-MCNC: 259 MG/DL (ref 75–110)
GLUCOSE BLD-MCNC: 264 MG/DL (ref 75–110)
GLUCOSE BLD-MCNC: 268 MG/DL (ref 75–110)
GLUCOSE BLD-MCNC: 272 MG/DL (ref 75–110)
GLUCOSE BLD-MCNC: 307 MG/DL (ref 75–110)
GLUCOSE BLD-MCNC: 343 MG/DL (ref 75–110)
GLUCOSE BLD-MCNC: 401 MG/DL (ref 75–110)
GLUCOSE BLD-MCNC: 49 MG/DL (ref 75–110)
GLUCOSE BLD-MCNC: 71 MG/DL (ref 75–110)
GLUCOSE BLD-MCNC: 78 MG/DL (ref 75–110)
GLUCOSE SERPL-MCNC: 170 MG/DL (ref 70–99)
GLUCOSE SERPL-MCNC: 218 MG/DL (ref 70–99)
GLUCOSE SERPL-MCNC: 294 MG/DL (ref 70–99)
PHOSPHATE SERPL-MCNC: 1.5 MG/DL (ref 2.5–4.5)
PHOSPHATE SERPL-MCNC: 2.8 MG/DL (ref 2.5–4.5)
PHOSPHATE SERPL-MCNC: 3.7 MG/DL (ref 2.5–4.5)
POTASSIUM SERPL-SCNC: 3.6 MMOL/L (ref 3.7–5.3)
POTASSIUM SERPL-SCNC: 4.4 MMOL/L (ref 3.7–5.3)
POTASSIUM SERPL-SCNC: 4.9 MMOL/L (ref 3.7–5.3)
SODIUM SERPL-SCNC: 128 MMOL/L (ref 135–144)
SODIUM SERPL-SCNC: 129 MMOL/L (ref 135–144)
SODIUM SERPL-SCNC: 130 MMOL/L (ref 135–144)

## 2023-02-23 PROCEDURE — 80069 RENAL FUNCTION PANEL: CPT

## 2023-02-23 PROCEDURE — 36415 COLL VENOUS BLD VENIPUNCTURE: CPT

## 2023-02-23 PROCEDURE — 2580000003 HC RX 258: Performed by: INTERNAL MEDICINE

## 2023-02-23 PROCEDURE — 96361 HYDRATE IV INFUSION ADD-ON: CPT

## 2023-02-23 PROCEDURE — 96375 TX/PRO/DX INJ NEW DRUG ADDON: CPT

## 2023-02-23 PROCEDURE — 82947 ASSAY GLUCOSE BLOOD QUANT: CPT

## 2023-02-23 PROCEDURE — 96376 TX/PRO/DX INJ SAME DRUG ADON: CPT

## 2023-02-23 PROCEDURE — 99232 SBSQ HOSP IP/OBS MODERATE 35: CPT | Performed by: INTERNAL MEDICINE

## 2023-02-23 PROCEDURE — 6370000000 HC RX 637 (ALT 250 FOR IP): Performed by: INTERNAL MEDICINE

## 2023-02-23 PROCEDURE — 93010 ELECTROCARDIOGRAM REPORT: CPT | Performed by: INTERNAL MEDICINE

## 2023-02-23 PROCEDURE — 2060000000 HC ICU INTERMEDIATE R&B

## 2023-02-23 PROCEDURE — 2500000003 HC RX 250 WO HCPCS: Performed by: INTERNAL MEDICINE

## 2023-02-23 RX ORDER — SODIUM CHLORIDE 9 MG/ML
INJECTION, SOLUTION INTRAVENOUS CONTINUOUS
Status: DISCONTINUED | OUTPATIENT
Start: 2023-02-23 | End: 2023-02-24 | Stop reason: HOSPADM

## 2023-02-23 RX ORDER — INSULIN GLARGINE 100 [IU]/ML
30 INJECTION, SOLUTION SUBCUTANEOUS NIGHTLY
Status: DISCONTINUED | OUTPATIENT
Start: 2023-02-23 | End: 2023-02-23

## 2023-02-23 RX ORDER — INSULIN GLARGINE 100 [IU]/ML
20 INJECTION, SOLUTION SUBCUTANEOUS EVERY MORNING
Status: DISCONTINUED | OUTPATIENT
Start: 2023-02-24 | End: 2023-02-24 | Stop reason: HOSPADM

## 2023-02-23 RX ORDER — INSULIN LISPRO 100 [IU]/ML
0-8 INJECTION, SOLUTION INTRAVENOUS; SUBCUTANEOUS EVERY 4 HOURS
Status: DISCONTINUED | OUTPATIENT
Start: 2023-02-24 | End: 2023-02-24 | Stop reason: HOSPADM

## 2023-02-23 RX ORDER — INSULIN LISPRO 100 [IU]/ML
0-8 INJECTION, SOLUTION INTRAVENOUS; SUBCUTANEOUS
Status: DISCONTINUED | OUTPATIENT
Start: 2023-02-23 | End: 2023-02-23

## 2023-02-23 RX ORDER — INSULIN LISPRO 100 [IU]/ML
0-4 INJECTION, SOLUTION INTRAVENOUS; SUBCUTANEOUS NIGHTLY
Status: DISCONTINUED | OUTPATIENT
Start: 2023-02-23 | End: 2023-02-23

## 2023-02-23 RX ADMIN — SODIUM CHLORIDE, PRESERVATIVE FREE 10 ML: 5 INJECTION INTRAVENOUS at 08:46

## 2023-02-23 RX ADMIN — SODIUM CHLORIDE: 9 INJECTION, SOLUTION INTRAVENOUS at 08:05

## 2023-02-23 RX ADMIN — POTASSIUM PHOSPHATE, MONOBASIC AND POTASSIUM PHOSPHATE, DIBASIC 30 MMOL: 224; 236 INJECTION, SOLUTION, CONCENTRATE INTRAVENOUS at 19:58

## 2023-02-23 RX ADMIN — SODIUM CHLORIDE 6.12 UNITS/HR: 9 INJECTION, SOLUTION INTRAVENOUS at 08:46

## 2023-02-23 RX ADMIN — SODIUM CHLORIDE: 9 INJECTION, SOLUTION INTRAVENOUS at 12:22

## 2023-02-23 RX ADMIN — SODIUM CHLORIDE, PRESERVATIVE FREE 10 ML: 5 INJECTION INTRAVENOUS at 20:05

## 2023-02-23 RX ADMIN — SODIUM CHLORIDE 24.5 UNITS/HR: 9 INJECTION, SOLUTION INTRAVENOUS at 16:38

## 2023-02-23 RX ADMIN — Medication 16 G: at 18:53

## 2023-02-23 RX ADMIN — SODIUM CHLORIDE: 9 INJECTION, SOLUTION INTRAVENOUS at 19:57

## 2023-02-23 ASSESSMENT — ENCOUNTER SYMPTOMS
COLOR CHANGE: 0
SHORTNESS OF BREATH: 0
NAUSEA: 0
APNEA: 0
VOMITING: 0
CONSTIPATION: 0
ABDOMINAL DISTENTION: 0
COUGH: 0
CHOKING: 0
CHEST TIGHTNESS: 0
DIARRHEA: 0

## 2023-02-23 NOTE — CARE COORDINATION
Case Management Assessment  Initial Evaluation    Date/Time of Evaluation: 2/23/2023 5:17 PM  Assessment Completed by: Milagro Loaiza RN    If patient is discharged prior to next notation, then this note serves as note for discharge by case management. Patient Name: Nadya Marques                   YOB: 2000  Diagnosis: Diabetic ketoacidosis without coma associated with type 1 diabetes mellitus ([facility] [address] [E10.10]  Diabetes mellitus with hyperosmolarity without hyperglycemic hyperosmolar nonketotic coma ([facility] [address] [E11.00]  DKA, type 1, not at goal [facility]) [E10.10]                   Date / Time: 2/22/2023  4:02 PM    Patient Admission Status: Inpatient   Readmission Risk (Low < 19, Mod (19-27), High > 27): Readmission Risk Score: 12    Current PCP: Nav Mcguire MD  PCP verified by CM? Yes    Chart Reviewed: Yes      History Provided by: Patient  Patient Orientation: Alert and Oriented    Patient Cognition: Alert    Hospitalization in the last 30 days (Readmission):  No    If yes, Readmission Assessment in CM Navigator will be completed. Advance Directives:      Code Status: Full Code   Patient's Primary Decision Maker is: Legal Next of Kin      Discharge Planning:    Patient lives with: Family Members Type of Home: House  Primary Care Giver: Self  Patient Support Systems include: Family Members   Current Financial resources: Medicaid  Current community resources: None  Current services prior to admission: None            Current DME:              Type of Home Care services:  None    ADLS  Prior functional level: Independent in ADLs/IADLs  Current functional level: Independent in ADLs/IADLs    PT AM-PAC:   /24  OT AM-PAC:   /24    Family can provide assistance at DC: Yes  Would you like Case Management to discuss the discharge plan with any other family members/significant others, and if so, who?  Yes  Plans to Return to Present Housing: Yes  Other Identified Issues/Barriers to RETURNING to current housing: none  Potential Assistance needed at discharge: N/A            Potential DME:    Patient expects to discharge to: [facility] for transportation at discharge:      Financial    Payor: Alexys / Plan: Alexys / Product Type: *No Product type* /     Does insurance require precert for SNF: Yes    Potential assistance Purchasing Medications:    Meds-to-Beds request: Yes      [id number], [address] [place] [address] [phone] [facility] [phone]  [address] [place] [id number]  Phone: [phone] Fax: [phone]      Notes:    Factors facilitating achievement of predicted outcomes: Family support, Cooperative, Pleasant, and Sense of humor    Barriers to discharge: Medication managment    Additional Case Management Notes: Patient plans on returning to home with his parents. The Plan for Transition of Care is related to the following treatment goals of Diabetic ketoacidosis without coma associated with type 1 diabetes mellitus ([facility]) [E10.10]  Diabetes mellitus with hyperosmolarity without hyperglycemic hyperosmolar nonketotic coma ([facility]) [E11.00]  DKA, type 1, not at goal [facility]) [E10.26]    IF APPLICABLE: The Patient and/or patient representative Meir Chavira and his family were provided with a choice of provider and agrees with the discharge plan. Freedom of choice list with basic dialogue that supports the patient's individualized plan of care/goals and shares the quality data associated with the providers was provided to: Patient   Patient Representative Name:       The Patient and/or Patient Representative Agree with the Discharge Plan?  Yes    Kameron Betancourt RN  Case Management Department  Ph: [phone] Fax:

## 2023-02-23 NOTE — PROGRESS NOTES
[facility]  Office: [address], DO, Barbara Moraes, DO, Jayde Mckeon, DO, Queenie Tafoya Blood, DO, Melquiades Castillo MD, Puja Aguilar MD, Radha Zhang MD, Mindi Crystal MD,  Romi Bates MD, Brian Kennedy MD, Wero Brown, DO, Kim Marquez MD,  Alexei Robles MD, Luis Montes MD, Roberto Lozano DO, Alfonso Woodward MD, Irineo Bang MD, Trisha Louie DO, Lexi Renteria MD, Emani Vizcarra MD, Ollie Stack MD, Chelsie Gomez MD, Fortunato Martinez DO, Dorothea Sanders MD, Edith Cano MD, Zane Broderick, CNP,  Sarah Howe, CNP, Tawana Fernandes, CNP, Melia Cano, CNP,  Rubia Darnell, [facility], Eliu Huynh, CNP, Jocelyn Morris, CNP, Priti Banegas, CNP, Camilla Moore, CNP, Lily Mike, CNP, Charles Ruiz PA-C, Tarah Castano, CNS, Champ Strauss, CNP, Yuly Prom, [facility]    Progress Note    2/23/2023    11:46 AM    Name:   Renato Calderon  MRN:     [id number]     [place]:      [de-identified]   Room:   [address] [place] [address] Day:  0  Admit Date:  2/22/2023  4:02 PM    PCP:   Milena Kirkland MD  Code Status:  Full Code    Subjective:     C/C:   Chief Complaint   Patient presents with    Headache     X1 week, states he's been out of his long acting insulin x1 week    Chest Pain    Emesis     Interval History Status: improved. Feeling  much better but bicarb decreased overnight. Patient was placed on insulin drip for DKA protocol. Patient had no complaints this morning and no events overnight    Brief History:     Renato Calderon is a 23 y.o. male presents with past medical history for type 1 diabetes. Patient has been unable to get his Levemir from his pharmacy and has not been taking for 1 week. Patient presented with bicarb of 13, slightly elevated beta hydroxybutyrate. He was slightly nauseous but requested a drink of water. Blood sugar was 377.   Patient was started on his home insulin, be admitted for hyperglycemia     Review of Systems:     Review of Systems   Constitutional:  Negative for activity change, fatigue and fever. Respiratory:  Negative for apnea, cough, choking, chest tightness and shortness of breath. Cardiovascular:  Negative for chest pain, palpitations and leg swelling. Gastrointestinal:  Negative for abdominal distention, constipation, diarrhea, nausea and vomiting. Endocrine: Positive for polyuria. Negative for polydipsia and polyphagia. Musculoskeletal:  Negative for neck stiffness. Skin:  Negative for color change. Neurological:  Negative for seizures, syncope, speech difficulty and weakness. Psychiatric/Behavioral:  Negative for agitation. Medications: Allergies:  No Known Allergies    Current Meds:   Scheduled Meds:    sodium chloride flush  5-40 mL IntraVENous 2 times per day     Continuous Infusions:    sodium chloride Stopped (02/23/23 0838)    insulin (HUMAN R) non-weight based infusion 9.95 Units/hr (02/23/23 1121)    dextrose      sodium chloride       PRN Meds: glucose, dextrose bolus **OR** dextrose bolus, glucagon (rDNA), dextrose, sodium chloride flush, sodium chloride, ondansetron **OR** ondansetron, polyethylene glycol, acetaminophen **OR** acetaminophen    Data:     Past Medical History:   has a past medical history of Diabetes mellitus ([facility] [address] Gastroesophageal reflux disease, and Type 1 diabetes mellitus ([facility] [address] Social History:   reports that he has been smoking cigarettes. He has a 0.50 pack-year smoking history. He has never used smokeless tobacco. He reports current alcohol use of about 10.0 standard drinks per week. He reports current drug use. Drug: Marijuana Sirisha Horta).      Family History:   Family History   Problem Relation Age of Onset    Asthma Father     Asthma Sister     Asthma Brother        Vitals:  /77   Pulse 73   Temp 98.9 °F (37.2 °C) (Temporal)   Resp 12   Ht 5' 11\" (1.803 m)   Wt 140 lb 10.5 oz (63.8 kg)   SpO2 99%   BMI 19.62 kg/m²   Temp (24hrs), Av.1 °F (36.7 °C), Min:97.4 °F (36.3 °C), Max:98.9 °F (37.2 °C)    Recent Labs     23  0644 23  0737 23  1005 23  1102   POCGLU 268* 264* 224* 259*       I/O (24Hr):     Intake/Output Summary (Last 24 hours) at 2023 1146  Last data filed at 2023 1034  Gross per 24 hour   Intake --   Output 900 ml   Net -900 ml       Labs:  Hematology:  Recent Labs     23  1618   WBC 3.8   RBC 4.90   HGB 14.2   HCT 44.8   MCV 91.4   MCH 29.0   MCHC 31.7   RDW 12.8      MPV 11.5     Chemistry:  Recent Labs     23  1618 23  1801 23  1944 23  0609     --   --  129*   K 4.8  --   --  4.9   CL 96*  --   --  98   CO2 13*  --   --  8*   GLUCOSE 377*  --  281 294*   BUN 23*  --   --  19   CREATININE 0.92  --   --  0.86   MG 2.2  --   --   --    ANIONGAP 27*  --   --  23*   LABGLOM >60  --   --  >60   CALCIUM 9.5  --   --  8.5*   PHOS  --   --   --  3.7   PROBNP <36  --   --   --    TROPHS 6 7  --   --      Recent Labs     23  2339 23  0418 23  0609 23  0644 23  0737 23  1005 23  1102   LABALBU  --   --  4.2  --   --   --   --    POCGLU 344* 272*  --  268* 264* 224* 259*     ABG:  Lab Results   Component Value Date/Time    POCPH 7.201 2019 06:20 PM    POCPCO2 35.5 2019 06:20 PM    POCPO2 36.9 2019 06:20 PM    POCHCO3 13.9 2019 06:20 PM    NBEA 13 2019 06:20 PM    PBEA NOT REPORTED 2019 06:20 PM    YYL1KOY 15 2019 06:20 PM    QGVJ3DYG 59 2019 06:20 PM    FIO2 INFORMATION NOT PROVIDED 2023 04:18 PM     Lab Results   Component Value Date/Time    SPECIAL RAC 10ML 2020 12:15 AM     Lab Results   Component Value Date/Time    CULTURE (A) 2020 12:15 AM     POSITIVE Blood Culture Results called to and read back by: INTERNAL MEDICINE RESIDENT ON CALL VIA PERFECT SERVE AT 21:12 ON 20    CULTURE 12/19/2020 12:15 AM     DIRECT GRAM STAIN FROM BOTTLE: GRAM POSITIVE COCCI IN CLUSTERS    CULTURE STAPHYLOCOCCUS HOMINIS (A) 12/19/2020 12:15 AM       Radiology:  XR CHEST (2 VW)    Result Date: 2/22/2023  No acute cardiopulmonary abnormality. Physical Examination:        Physical Exam  Constitutional:       General: He is not in acute distress. Appearance: He is normal weight. He is not toxic-appearing. HENT:      Head: Normocephalic and atraumatic. Nose: No congestion. Mouth/Throat:      Mouth: Mucous membranes are moist.      Pharynx: Oropharynx is clear. Eyes:      Conjunctiva/sclera: Conjunctivae normal.      Pupils: Pupils are equal, round, and reactive to light. Neck:      Vascular: No carotid bruit. Cardiovascular:      Rate and Rhythm: Normal rate and regular rhythm. Pulmonary:      Effort: No respiratory distress. Breath sounds: No stridor. No wheezing or rhonchi. Abdominal:      General: There is no distension. Palpations: There is no mass. Tenderness: There is no abdominal tenderness. Hernia: No hernia is present. Musculoskeletal:         General: No swelling, tenderness, deformity or signs of injury. Cervical back: No rigidity or tenderness. Lymphadenopathy:      Cervical: No cervical adenopathy. Skin:     Coloration: Skin is not jaundiced or pale. Findings: No bruising or erythema. Neurological:      General: No focal deficit present. Mental Status: He is alert. Mental status is at baseline.        Assessment:        Hospital Problems             Last Modified POA    * (Principal) Diabetes mellitus with hyperosmolarity without hyperglycemic hyperosmolar nonketotic coma ([facility] [address] 2/22/2023 Yes       Plan:        DKA  Started on insulin drip  NPO for now, ok for water   NS 150cc/hr    Hong Anderson DO  2/23/2023  11:46 AM

## 2023-02-24 VITALS
HEIGHT: 71 IN | BODY MASS INDEX: 19.69 KG/M2 | WEIGHT: 140.65 LBS | DIASTOLIC BLOOD PRESSURE: 83 MMHG | RESPIRATION RATE: 14 BRPM | SYSTOLIC BLOOD PRESSURE: 119 MMHG | TEMPERATURE: 98.6 F | HEART RATE: 71 BPM | OXYGEN SATURATION: 100 %

## 2023-02-24 LAB
ALBUMIN SERPL-MCNC: 4.1 G/DL (ref 3.5–5.2)
ANION GAP SERPL CALCULATED.3IONS-SCNC: 9 MMOL/L (ref 9–17)
BUN SERPL-MCNC: 12 MG/DL (ref 6–20)
CALCIUM SERPL-MCNC: 8.2 MG/DL (ref 8.6–10.4)
CHLORIDE SERPL-SCNC: 106 MMOL/L (ref 98–107)
CO2 SERPL-SCNC: 21 MMOL/L (ref 20–31)
CREAT SERPL-MCNC: 0.57 MG/DL (ref 0.7–1.2)
GFR SERPL CREATININE-BSD FRML MDRD: >60 ML/MIN/1.73M2
GLUCOSE BLD-MCNC: 101 MG/DL (ref 75–110)
GLUCOSE BLD-MCNC: 145 MG/DL (ref 75–110)
GLUCOSE BLD-MCNC: 160 MG/DL (ref 75–110)
GLUCOSE BLD-MCNC: 216 MG/DL (ref 75–110)
GLUCOSE SERPL-MCNC: 129 MG/DL (ref 70–99)
PHOSPHATE SERPL-MCNC: 1.9 MG/DL (ref 2.5–4.5)
POTASSIUM SERPL-SCNC: 3.7 MMOL/L (ref 3.7–5.3)
SODIUM SERPL-SCNC: 136 MMOL/L (ref 135–144)

## 2023-02-24 PROCEDURE — 6370000000 HC RX 637 (ALT 250 FOR IP): Performed by: NURSE PRACTITIONER

## 2023-02-24 PROCEDURE — 82947 ASSAY GLUCOSE BLOOD QUANT: CPT

## 2023-02-24 PROCEDURE — 99239 HOSP IP/OBS DSCHRG MGMT >30: CPT | Performed by: INTERNAL MEDICINE

## 2023-02-24 PROCEDURE — 2580000003 HC RX 258: Performed by: INTERNAL MEDICINE

## 2023-02-24 PROCEDURE — 2500000003 HC RX 250 WO HCPCS: Performed by: INTERNAL MEDICINE

## 2023-02-24 PROCEDURE — 6370000000 HC RX 637 (ALT 250 FOR IP): Performed by: INTERNAL MEDICINE

## 2023-02-24 PROCEDURE — 36415 COLL VENOUS BLD VENIPUNCTURE: CPT

## 2023-02-24 PROCEDURE — 80069 RENAL FUNCTION PANEL: CPT

## 2023-02-24 RX ORDER — PEN NEEDLE, DIABETIC 31 G X1/4"
1 NEEDLE, DISPOSABLE MISCELLANEOUS DAILY
Qty: 100 EACH | Refills: 3 | Status: SHIPPED | OUTPATIENT
Start: 2023-02-24

## 2023-02-24 RX ORDER — LANCETS 30 GAUGE
1 EACH MISCELLANEOUS DAILY
Qty: 100 EACH | Refills: 0 | Status: SHIPPED | OUTPATIENT
Start: 2023-02-24

## 2023-02-24 RX ORDER — GLUCOSAMINE HCL/CHONDROITIN SU 500-400 MG
CAPSULE ORAL
Qty: 100 STRIP | Refills: 0 | Status: SHIPPED | OUTPATIENT
Start: 2023-02-24

## 2023-02-24 RX ORDER — INSULIN GLARGINE 100 [IU]/ML
30 INJECTION, SOLUTION SUBCUTANEOUS ONCE
Status: COMPLETED | OUTPATIENT
Start: 2023-02-24 | End: 2023-02-24

## 2023-02-24 RX ORDER — BLOOD-GLUCOSE METER
1 KIT MISCELLANEOUS DAILY
Qty: 1 KIT | Refills: 0 | Status: SHIPPED | OUTPATIENT
Start: 2023-02-24

## 2023-02-24 RX ORDER — INSULIN GLARGINE 100 [IU]/ML
INJECTION, SOLUTION SUBCUTANEOUS
Qty: 15 ML | Refills: 0 | Status: SHIPPED | OUTPATIENT
Start: 2023-02-24

## 2023-02-24 RX ADMIN — INSULIN GLARGINE 20 UNITS: 100 INJECTION, SOLUTION SUBCUTANEOUS at 08:49

## 2023-02-24 RX ADMIN — INSULIN GLARGINE 30 UNITS: 100 INJECTION, SOLUTION SUBCUTANEOUS at 00:54

## 2023-02-24 RX ADMIN — POTASSIUM PHOSPHATE, MONOBASIC AND POTASSIUM PHOSPHATE, DIBASIC 30 MMOL: 224; 236 INJECTION, SOLUTION, CONCENTRATE INTRAVENOUS at 08:48

## 2023-02-24 RX ADMIN — SODIUM CHLORIDE: 9 INJECTION, SOLUTION INTRAVENOUS at 03:20

## 2023-02-24 RX ADMIN — INSULIN LISPRO 2 UNITS: 100 INJECTION, SOLUTION INTRAVENOUS; SUBCUTANEOUS at 13:36

## 2023-02-24 NOTE — PLAN OF CARE
Problem: Discharge Planning  Goal: Discharge to home or other facility with appropriate resources  2/24/2023 0231 by Serene Otero RN  Outcome: Progressing  2/24/2023 0221 by Serene Otero RN  Outcome: Progressing  2/23/2023 1815 by Grace Sterling RN  Outcome: Progressing  Flowsheets  Taken 2/23/2023 1246  Discharge to home or other facility with appropriate resources: Identify barriers to discharge with patient and caregiver  Taken 2/23/2023 0800  Discharge to home or other facility with appropriate resources: Identify barriers to discharge with patient and caregiver     Problem: Skin/Tissue Integrity  Goal: Absence of new skin breakdown  Description: 1. Monitor for areas of redness and/or skin breakdown  2. Assess vascular access sites hourly  3. Every 4-6 hours minimum:  Change oxygen saturation probe site  4. Every 4-6 hours:  If on nasal continuous positive airway pressure, respiratory therapy assess nares and determine need for appliance change or resting period.   2/24/2023 0231 by Serene Otero RN  Outcome: Progressing  2/24/2023 0221 by Serene Otero RN  Outcome: Progressing  2/23/2023 1815 by Grace Sterling RN  Outcome: Progressing     Problem: Pain  Goal: Verbalizes/displays adequate comfort level or baseline comfort level  2/24/2023 0231 by Serene Otero RN  Outcome: Progressing  2/24/2023 0221 by Serene Otero RN  Outcome: Progressing  2/23/2023 1815 by Grace Sterling RN  Outcome: Progressing     Problem: Safety - Adult  Goal: Free from fall injury  2/24/2023 0231 by Serene Otero RN  Outcome: Progressing  2/24/2023 0221 by Serene Otero RN  Outcome: Progressing  2/23/2023 1815 by Grace Sterling RN  Outcome: Progressing

## 2023-02-24 NOTE — PLAN OF CARE
Problem: Discharge Planning  Goal: Discharge to home or other facility with appropriate resources  2/24/2023 0221 by Regis Zavala RN  Outcome: Progressing  2/23/2023 1815 by Luis E Pineda RN  Outcome: Progressing  Flowsheets  Taken 2/23/2023 1246  Discharge to home or other facility with appropriate resources: Identify barriers to discharge with patient and caregiver  Taken 2/23/2023 0800  Discharge to home or other facility with appropriate resources: Identify barriers to discharge with patient and caregiver     Problem: Skin/Tissue Integrity  Goal: Absence of new skin breakdown  Description: 1. Monitor for areas of redness and/or skin breakdown  2. Assess vascular access sites hourly  3. Every 4-6 hours minimum:  Change oxygen saturation probe site  4. Every 4-6 hours:  If on nasal continuous positive airway pressure, respiratory therapy assess nares and determine need for appliance change or resting period.   2/24/2023 0221 by Regis Zavala RN  Outcome: Progressing  2/23/2023 1815 by Luis E Pineda RN  Outcome: Progressing     Problem: Pain  Goal: Verbalizes/displays adequate comfort level or baseline comfort level  2/24/2023 0221 by Regis Zavala RN  Outcome: Progressing  2/23/2023 1815 by Luis E Pineda RN  Outcome: Progressing     Problem: Safety - Adult  Goal: Free from fall injury  2/24/2023 0221 by Regis Zavala RN  Outcome: Progressing  2/23/2023 1815 by Luis E Pineda RN  Outcome: Progressing

## 2023-02-24 NOTE — PROGRESS NOTES
CLINICAL PHARMACY NOTE: MEDS TO BEDS    Total # of Prescriptions Filled: 4   The following medications were delivered to the patient:  Pen needles  Lancets  Glucometer  Test strips    Additional Documentation: delivered to patient in room 423 2/24 at 12:10pm. No co-pay. Lantus too soon.

## 2023-02-24 NOTE — PLAN OF CARE
Problem: Discharge Planning  Goal: Discharge to home or other facility with appropriate resources  2/24/2023 1551 by Ruth Chacon RN  Outcome: Completed  2/24/2023 0231 by Akira Suh RN  Outcome: Progressing  2/24/2023 0221 by Akira Suh RN  Outcome: Progressing     Problem: Skin/Tissue Integrity  Goal: Absence of new skin breakdown  Description: 1. Monitor for areas of redness and/or skin breakdown  2. Assess vascular access sites hourly  3. Every 4-6 hours minimum:  Change oxygen saturation probe site  4. Every 4-6 hours:  If on nasal continuous positive airway pressure, respiratory therapy assess nares and determine need for appliance change or resting period.   2/24/2023 1551 by Ruth Chacon RN  Outcome: Completed  2/24/2023 0231 by Akira Suh RN  Outcome: Progressing  2/24/2023 0221 by Akira Suh RN  Outcome: Progressing     Problem: Pain  Goal: Verbalizes/displays adequate comfort level or baseline comfort level  2/24/2023 1551 by Ruth Chacon RN  Outcome: Completed  2/24/2023 0231 by Akira Suh RN  Outcome: Progressing  2/24/2023 0221 by Akira Suh RN  Outcome: Progressing     Problem: Safety - Adult  Goal: Free from fall injury  2/24/2023 1551 by Ruth Chacon RN  Outcome: Completed  2/24/2023 0231 by Akira Suh RN  Outcome: Progressing  2/24/2023 0221 by Akira Suh RN  Outcome: Progressing

## 2023-02-24 NOTE — FLOWSHEET NOTE
02/23/23 2015   Vital Signs   Temp (!) 92.3 °F (33.5 °C)  (Notified Sadi Ramon NP)   Temp Source Oral  (patient not agreeing to check rectal temp)   Heart Rate (!) 49  (notified helen kothari NP)   Heart Rate Source Monitor   Resp 13   BP (!) 130/99   MAP (Calculated) 109   MAP (mmHg) 110   BP Location Right Arm   BP Method Automatic   Patient Position Semi fowlers   Level of Consciousness 0   MEWS Score 3   Pain Assessment   Pain Assessment None - Denies Pain   Opioid-Induced Sedation   POSS Score 1   Bradycardic and hypothermic, notified Helen kothari NP, agreed to put on warmer/ bear hugger for an hour then recheck temp.

## 2023-02-24 NOTE — DISCHARGE SUMMARY
[facility]  Office: [address], , Aquilino Mitchell, DO, Makayla Sheridan, DO, Donna Villar, DO, Fernando Cedillo MD, Brendon Warner MD, Princess Robin MD, Joanie Santos MD,  Soumya Machado MD, Elham Humphrey MD, Ladell Scheuermann, DO, Chidi Calero MD,  Calli Walter DO, Stephanie Loaiza MD, Merrill Navarrete MD, Aditya Avilez, DO, Ernestina Brownlee MD, Yanira Perez MD, Linette Sampson DO, Amanda Rae MD, Jailyn Sims MD, Liliana Garcia MD, Teresita Bliss MD, John Tan DO, Simón Askew MD, Nika Costa MD, Karyn Vaz, CNP,  Gemini Cruz, CNP, Pebbles Plascencia, CNP, Cassidy Gooden, CNP,  Lupe Carrillo, [facility], Brittney Garcia, CNP, Blanquita Treadwell, CNP, Magdalene Shen, CNP, Arleen Alpers, CNP, Leslee Bae, CNP, Jerene Oppenheim, PA-C, Gretchen Ferrer, CNS, Douglas Jones, CNP, Stacie Payne, [facility]    Discharge Summary     Patient ID: Tereza Coleman  :  2000   MRN: [id number]     ACCOUNT:  [de-identified]   Patient's PCP: Lori Healy MD  Admit Date: 2023   Discharge Date: 2023     Length of Stay: 1  Code Status:  Prior  Admitting Physician: Ladell Scheuermann, DO  Discharge Physician: Ladell Scheuermann, DO     Active Discharge Diagnoses:     Hospital Problem Lists:  Principal Problem:    Diabetes mellitus with hyperosmolarity without hyperglycemic hyperosmolar nonketotic coma [facility])  Active Problems:    DKA, type 1, not at goal [facility])    Diabetic ketoacidosis without coma associated with type 1 diabetes mellitus ([facility]  Resolved Problems:    * No resolved hospital problems.  *      Admission Condition:  good     Discharged Condition: good    Hospital Stay:     Hospital Course:  Tereza Coleman is a 23 y.o. male who was admitted for the management of   Diabetes mellitus with hyperosmolarity without hyperglycemic hyperosmolar nonketotic coma ([facility] , presented to ER with Headache (X1 week, states he's been out of his long acting insulin x1 week), Chest Pain, and Emesis    Patient was admitted for initially [facility]. Overnight patient blood sugars continue to increase, the following morning bicarb was found to be 8 patient was started on insulin drip for DKA. Patient quickly improved, was monitored next 24 hours eventually discharged after control of blood sugars. Patient was given a prescription for Lantus and told to restart at home dose.   Patient will have follow-up with his primary care physician in 1 month to continue care outpatient      Significant therapeutic interventions: none    Significant Diagnostic Studies:   Labs / Micro:  CBC:   Lab Results   Component Value Date/Time    WBC 3.8 02/22/2023 04:18 PM    RBC 4.90 02/22/2023 04:18 PM    HGB 14.2 02/22/2023 04:18 PM    HCT 44.8 02/22/2023 04:18 PM    MCV 91.4 02/22/2023 04:18 PM    MCH 29.0 02/22/2023 04:18 PM    MCHC 31.7 02/22/2023 04:18 PM    RDW 12.8 02/22/2023 04:18 PM     02/22/2023 04:18 PM     BMP:    Lab Results   Component Value Date/Time    GLUCOSE 129 02/24/2023 06:14 AM     02/24/2023 06:14 AM    K 3.7 02/24/2023 06:14 AM     02/24/2023 06:14 AM    CO2 21 02/24/2023 06:14 AM    ANIONGAP 9 02/24/2023 06:14 AM    BUN 12 02/24/2023 06:14 AM    CREATININE 0.57 02/24/2023 06:14 AM    BUNCRER NOT REPORTED 05/14/2021 02:25 PM    CALCIUM 8.2 02/24/2023 06:14 AM    LABGLOM >60 02/24/2023 06:14 AM    GFRAA >60 08/14/2022 05:21 AM    GFR      08/14/2022 05:21 AM     HFP:    Lab Results   Component Value Date/Time    PROT 8.5 08/11/2022 11:15 AM     CMP:    Lab Results   Component Value Date/Time    GLUCOSE 129 02/24/2023 06:14 AM     02/24/2023 06:14 AM    K 3.7 02/24/2023 06:14 AM     02/24/2023 06:14 AM    CO2 21 02/24/2023 06:14 AM    BUN 12 02/24/2023 06:14 AM    CREATININE 0.57 02/24/2023 06:14 AM    ANIONGAP 9 02/24/2023 06:14 AM    ALKPHOS 118 08/11/2022 11:15 AM    ALT 23 08/11/2022 11:15 AM    AST 17 08/11/2022 11:15 AM    BILITOT 0.38 08/11/2022 11:15 AM    LABALBU 4.1 02/24/2023 06:14 AM    ALBUMIN 1.5 08/11/2022 11:15 AM    LABGLOM >60 02/24/2023 06:14 AM    GFRAA >60 08/14/2022 05:21 AM    GFR      08/14/2022 05:21 AM    PROT 8.5 08/11/2022 11:15 AM    CALCIUM 8.2 02/24/2023 06:14 AM     PT/INR:  No results found for: PTINR, PROTIME, INR  PTT: No results found for: APTT  FLP:    Lab Results   Component Value Date/Time    CHOL 328 01/21/2016 06:41 AM    TRIG 192 01/21/2016 06:41 AM    HDL 75 01/21/2016 06:41 AM     U/A:    Lab Results   Component Value Date/Time    COLORU Yellow 02/22/2023 06:12 PM    TURBIDITY Clear 02/22/2023 06:12 PM    SPECGRAV 1.031 02/22/2023 06:12 PM    HGBUR NEGATIVE 02/22/2023 06:12 PM    PHUR 5.5 02/22/2023 06:12 PM    PROTEINU 1+ 02/22/2023 06:12 PM    GLUCOSEU 3+ 02/22/2023 06:12 PM    KETUA LARGE 02/22/2023 06:12 PM    BILIRUBINUR NEGATIVE 02/22/2023 06:12 PM    BILIRUBINUR neg 05/30/2013 01:19 PM    UROBILINOGEN Normal 02/22/2023 06:12 PM    NITRU NEGATIVE 02/22/2023 06:12 PM    LEUKOCYTESUR NEGATIVE 02/22/2023 06:12 PM        Radiology:  XR CHEST (2 VW)    Result Date: 2/22/2023  No acute cardiopulmonary abnormality. Consultations:    Consults:     Final Specialist Recommendations/Findings:   IP CONSULT TO HOSPITALIST  IP CONSULT TO IV TEAM      The patient was seen and examined on day of discharge and this discharge summary is in conjunction with any daily progress note from day of discharge. Discharge plan:     Disposition: Home    Physician Follow Up:     Maryjo Piper, [address] [place] [address] [phone]    Schedule an appointment as soon as possible for a visit in 1 week(s)  hospital followup       Requiring Further Evaluation/Follow Up POST HOSPITALIZATION/Incidental Findings: none    Diet: regular diet    Activity: As tolerated    Instructions to Patient: see pcp outpatient.      Discharge Medications:      Medication List        START taking these medications      blood glucose test strips  Test 2 times a day & as needed for symptoms of irregular blood glucose. Dispense sufficient amount for indicated testing frequency plus additional to accommodate PRN testing needs. glucose monitoring kit  1 kit by Does not apply route daily     Lancets Misc  1 each by Does not apply route daily            CHANGE how you take these medications      * Insulin Pen Needle 30G X 5 MM Misc  1 Device by Does not apply route 4 times daily  What changed: Another medication with the same name was added. Make sure you understand how and when to take each. * BD Pen Needle Nia 2nd Gen 32G X 4 MM Misc  Generic drug: Insulin Pen Needle  use 1 PEN NEEDLE to inject MEDICATION subcutaneously twice a day  What changed: Another medication with the same name was added. Make sure you understand how and when to take each. * [employer] Pen Needles 31G X 6 MM Misc  Generic drug: Insulin Pen Needle  1 each by Does not apply route daily  What changed: You were already taking a medication with the same name, and this prescription was added. Make sure you understand how and when to take each. * This list has 3 medication(s) that are the same as other medications prescribed for you. Read the directions carefully, and ask your doctor or other care provider to review them with you. CONTINUE taking these medications      EPINEPHrine 0.15 MG/0.3ML Soaj  Commonly known as: EpiPen Jr 2-Ernie  Use as directed for allergic reaction     FreeStyle Tresa 14 Day Edwards Teddy Nnamdi  Check blood sugars 5-6/day. Type 1 DM     FreeStyle Tresa Sensor System Misc  Type 1 DM.  Check blood sugars 5-6/day     Glucagon Emergency 1 MG Kit  As directed for extreme hypoglycemia     Ketostix strip  Generic drug: acetone (urine) test  use as directed daily if needed for HIGH BLOOD SUGAR     Lanbarbus SoloStar 100 UNIT/ML injection pen  Generic drug: insulin glargine  Inject 20 units in the morning and 30 units at night under the skin     NovoLOG FlexPen 100 UNIT/ML injection pen  Generic drug: insulin aspart  Inject 3 Units into the skin in the morning and 3 Units at noon and 3 Units in the evening. Inject before meals. STOP taking these medications      ondansetron 4 MG tablet  Commonly known as: ZOFRAN     potassium chloride 10 MEQ extended release tablet  Commonly known as: KLOR-CON M               Where to Get Your Medications        These medications were sent to [facility] [address] [facility], [address], [address]  [address]      Phone: [phone]   blood glucose test strips  glucose monitoring kit  [employer] Pen Needles 31G X 6 MM Misc  Lancets Misc  Lantus SoloStar 100 UNIT/ML injection pen         No discharge procedures on file. Time Spent on discharge is  38 mins in patient examination, evaluation, counseling as well as medication reconciliation, prescriptions for required medications, discharge plan and follow up. Electronically signed by   Kurt Pal DO  2/24/2023  6:22 PM      Thank you Dr. Lyubov Moran MD for the opportunity to be involved in this patient's care.

## 2023-02-24 NOTE — PROGRESS NOTES
CLINICAL PHARMACY NOTE: MEDS TO BEDS    Total # of Prescriptions Filled: 1   The following medications were delivered to the patient:  Lantus solo star pens    Additional Documentation: 2 nd delivery

## 2023-02-27 ENCOUNTER — TELEPHONE (OUTPATIENT)
Dept: INTERNAL MEDICINE | Age: 23
End: 2023-02-27

## 2023-02-27 NOTE — TELEPHONE ENCOUNTER
Pt showed up on writer's TCM list. During chart review writer noted pt was dismissed from [facility] for no show history on 12/20/2022. Writer removed PCP from pt's chart.

## 2023-03-20 NOTE — ED PROVIDER NOTES
[address]   Emergency Department  Emergency Medicine Attending Sign-out     Care of Araceli Lind was assumed from previous attending Dr. Christian Parks and is being seen for Blood Sugar Problem  . The patient's initial evaluation and plan have been discussed with the prior provider who initially evaluated the patient. Attestation  I was available and discussed any additional care issues that arose and coordinated the management plans with the resident(s) caring for the patient during my duty period. Any areas of disagreement with resident's documentation of care or procedures are noted on the chart. I was personally present for the key portions of any/all procedures, during my duty period. I have documented in the chart those procedures where I was not present during the key portions. BRIEF PATIENT SUMMARY/MDM COURSE PER INITIAL PROVIDER:   RECENT VITALS:     Temp: 98.3 °F (36.8 °C),  Pulse: 74, Resp: 16, BP: (!) 142/80, SpO2: 98 %    This patient is a 23 y.o. Male with DKA. Admitted. Awaiting bed placement.      DIAGNOSTICS/MEDICATIONS:     MEDICATIONS GIVEN:  ED Medication Orders (From admission, onward)    Start Ordered     Status Ordering Provider    12/17/20 2345 12/17/20 2340  dextrose 5 % and 0.45 % sodium chloride infusion  CONTINUOUS      Acknowledged WILL CADENA    12/17/20 2315 12/17/20 2305  0.9 % sodium chloride bolus  ONCE      Last MAR action: Held - by Bk Spray on 12/17/20 at [address]    12/17/20 2300 12/17/20 2248  insulin regular (HUMULIN R;NOVOLIN R) 100 Units in sodium chloride 0.9 % 100 mL infusion  CONTINUOUS      Last MAR action: New Bag - by Bk Spray on 12/17/20 at [address]    12/17/20 2247 12/17/20 2248  glucose (GLUTOSE) 40 % oral gel 15 g  PRN      Acknowledged WILL CADENA    12/17/20 2247 12/17/20 2248  dextrose 50 % IV solution  PRN      Acknowledged WILL CADENA    12/17/20 2247 12/17/20 2248  glucagon (rDNA) injection 1 mg PRN      Acknowledged SURINDERMian Kristopherbrissa    12/17/20 2247 12/17/20 2248  dextrose 5 % solution  PRN      Acknowledged WILL CADENA    12/17/20 2230 12/17/20 2215  0.9 % sodium chloride bolus  ONCE      Last MAR action: New Bag - by Rima Barn on 12/17/20 at [facility].    12/17/20 2200 12/17/20 2151  0.9 % sodium chloride bolus  ONCE      Last MAR action: Stopped - by Rima Barn on 12/17/20 at [facility].    12/17/20 2200 12/17/20 2156  ondansetron (ZOFRAN) injection 4 mg  ONCE      Last MAR action: Given - by Rima Barn on 12/17/20 at [address] [facility], [address] [place] [address] - Abnormal; Notable for the following components:       Result Value    Glucose 340 (*)     Sodium 134 (*)     Potassium 5.4 (*)     CO2 10 (*)     Anion Gap 26 (*)     All other components within normal limits   BETA-HYDROXYBUTYRATE - Abnormal; Notable for the following components:    Beta-Hydroxybutyrate 9.65 (*)     All other components within normal limits   CBC WITH AUTO DIFFERENTIAL - Abnormal; Notable for the following components:    Seg Neutrophils 80 (*)     Lymphocytes 15 (*)     Eosinophils % 0 (*)     Absolute Lymph # 1.04 (*)     All other components within normal limits   POC GLUCOSE FINGERSTICK - Abnormal; Notable for the following components:    POC Glucose 351 (*)     All other components within normal limits   POCT GLUCOSE - Normal   LIPASE   LACTIC ACID, WHOLE BLOOD   POTASSIUM   POTASSIUM   POTASSIUM   POTASSIUM   POTASSIUM   POTASSIUM   POCT GLUCOSE   POCT GLUCOSE   POCT GLUCOSE   POCT GLUCOSE   POCT GLUCOSE   POCT GLUCOSE   POCT GLUCOSE   POCT GLUCOSE   POCT GLUCOSE   POCT GLUCOSE   POCT GLUCOSE   POCT GLUCOSE   POCT GLUCOSE   POCT GLUCOSE       RADIOLOGY  No results found.     OUTSTANDING TASKS / ADDITIONAL COMMENTS   1. Bed placement     Shanna Hurst MD  Emergency Medicine Attending  [facility]       Kiersten Bowman MD  12/18/20 2276 Detail Level: Detailed Detail Level: Simple

## 2023-05-11 RX ORDER — CALCIUM CITRATE/VITAMIN D3 200MG-6.25
TABLET ORAL
Qty: 300 STRIP | OUTPATIENT
Start: 2023-05-11

## 2023-05-14 ENCOUNTER — HOSPITAL ENCOUNTER (INPATIENT)
Age: 23
LOS: 1 days | Discharge: HOME | DRG: 420 | End: 2023-05-15
Attending: EMERGENCY MEDICINE | Admitting: STUDENT IN AN ORGANIZED HEALTH CARE EDUCATION/TRAINING PROGRAM
Payer: MEDICAID

## 2023-05-14 DIAGNOSIS — E10.10 DIABETIC KETOACIDOSIS WITHOUT COMA ASSOCIATED WITH TYPE 1 DIABETES MELLITUS (HCC): Primary | ICD-10-CM

## 2023-05-14 PROBLEM — N17.9 AKI (ACUTE KIDNEY INJURY) (HCC): Status: ACTIVE | Noted: 2023-05-14

## 2023-05-14 PROBLEM — E13.10 DIABETIC KETOACIDOSIS WITHOUT COMA ASSOCIATED WITH OTHER SPECIFIED DIABETES MELLITUS (HCC): Status: ACTIVE | Noted: 2023-05-14

## 2023-05-14 PROBLEM — E11.00 DIABETES MELLITUS WITH HYPEROSMOLARITY WITHOUT HYPERGLYCEMIC HYPEROSMOLAR NONKETOTIC COMA (HCC): Status: RESOLVED | Noted: 2023-02-22 | Resolved: 2023-05-14

## 2023-05-14 PROBLEM — E13.10 DIABETIC KETOACIDOSIS WITHOUT COMA ASSOCIATED WITH OTHER SPECIFIED DIABETES MELLITUS (HCC): Status: RESOLVED | Noted: 2023-05-14 | Resolved: 2023-05-14

## 2023-05-14 PROBLEM — I10 ESSENTIAL (PRIMARY) HYPERTENSION: Status: ACTIVE | Noted: 2023-05-14

## 2023-05-14 LAB
ABSOLUTE EOS #: 0 K/UL (ref 0–0.4)
ABSOLUTE IMMATURE GRANULOCYTE: 0.18 K/UL (ref 0–0.3)
ABSOLUTE LYMPH #: 0.35 K/UL (ref 1–4.8)
ABSOLUTE MONO #: 0.88 K/UL (ref 0.1–0.8)
ANION GAP SERPL CALCULATED.3IONS-SCNC: 12 MMOL/L (ref 9–17)
ANION GAP SERPL CALCULATED.3IONS-SCNC: 15 MMOL/L (ref 9–17)
ANION GAP SERPL CALCULATED.3IONS-SCNC: 34 MMOL/L (ref 9–17)
ANION GAP: 16 MMOL/L (ref 7–16)
BASOPHILS # BLD: 0 % (ref 0–2)
BASOPHILS ABSOLUTE: 0 K/UL (ref 0–0.2)
BETA-HYDROXYBUTYRATE: 7.13 MMOL/L (ref 0.02–0.27)
BUN SERPL-MCNC: 36 MG/DL (ref 6–20)
BUN SERPL-MCNC: 44 MG/DL (ref 6–20)
BUN SERPL-MCNC: 47 MG/DL (ref 6–20)
CALCIUM SERPL-MCNC: 10.2 MG/DL (ref 8.6–10.4)
CALCIUM SERPL-MCNC: 9.2 MG/DL (ref 8.6–10.4)
CALCIUM SERPL-MCNC: 9.4 MG/DL (ref 8.6–10.4)
CHLORIDE SERPL-SCNC: 103 MMOL/L (ref 98–107)
CHLORIDE SERPL-SCNC: 103 MMOL/L (ref 98–107)
CHLORIDE SERPL-SCNC: 88 MMOL/L (ref 98–107)
CHP ED QC CHECK: NORMAL
CHP ED QC CHECK: YES
CHP ED QC CHECK: YES
CO2 SERPL-SCNC: 11 MMOL/L (ref 20–31)
CO2 SERPL-SCNC: 22 MMOL/L (ref 20–31)
CO2 SERPL-SCNC: 24 MMOL/L (ref 20–31)
CREAT SERPL-MCNC: 0.97 MG/DL (ref 0.7–1.2)
CREAT SERPL-MCNC: 1.11 MG/DL (ref 0.7–1.2)
CREAT SERPL-MCNC: 1.7 MG/DL (ref 0.7–1.2)
EGFR, POC: 54 ML/MIN/1.73M2
EOSINOPHILS RELATIVE PERCENT: 0 % (ref 1–4)
FLUAV AG SPEC QL: NEGATIVE
FLUBV AG SPEC QL: NEGATIVE
GFR SERPL CREATININE-BSD FRML MDRD: 57 ML/MIN/1.73M2
GFR SERPL CREATININE-BSD FRML MDRD: >60 ML/MIN/1.73M2
GFR SERPL CREATININE-BSD FRML MDRD: >60 ML/MIN/1.73M2
GLUCOSE BLD-MCNC: 100 MG/DL
GLUCOSE BLD-MCNC: 100 MG/DL (ref 75–110)
GLUCOSE BLD-MCNC: 110 MG/DL
GLUCOSE BLD-MCNC: 110 MG/DL (ref 75–110)
GLUCOSE BLD-MCNC: 116 MG/DL
GLUCOSE BLD-MCNC: 116 MG/DL (ref 75–110)
GLUCOSE BLD-MCNC: 135 MG/DL
GLUCOSE BLD-MCNC: 135 MG/DL (ref 75–110)
GLUCOSE BLD-MCNC: 193 MG/DL
GLUCOSE BLD-MCNC: 193 MG/DL (ref 75–110)
GLUCOSE BLD-MCNC: 210 MG/DL (ref 75–110)
GLUCOSE BLD-MCNC: 211 MG/DL
GLUCOSE BLD-MCNC: 211 MG/DL (ref 75–110)
GLUCOSE BLD-MCNC: 240 MG/DL
GLUCOSE BLD-MCNC: 240 MG/DL (ref 75–110)
GLUCOSE BLD-MCNC: 313 MG/DL
GLUCOSE BLD-MCNC: 313 MG/DL (ref 75–110)
GLUCOSE BLD-MCNC: 394 MG/DL
GLUCOSE BLD-MCNC: 394 MG/DL (ref 75–110)
GLUCOSE BLD-MCNC: 575 MG/DL (ref 75–110)
GLUCOSE BLD-MCNC: 630 MG/DL (ref 74–100)
GLUCOSE BLD-MCNC: 93 MG/DL
GLUCOSE BLD-MCNC: >600 MG/DL (ref 75–110)
GLUCOSE BLD-MCNC: >600 MG/DL (ref 75–110)
GLUCOSE BLD-MCNC: NORMAL MG/DL
GLUCOSE SERPL-MCNC: 107 MG/DL (ref 70–99)
GLUCOSE SERPL-MCNC: 221 MG/DL (ref 70–99)
GLUCOSE SERPL-MCNC: 624 MG/DL (ref 70–99)
HCO3 VENOUS: 14.9 MMOL/L (ref 22–29)
HCT VFR BLD AUTO: 44.4 % (ref 40.7–50.3)
HGB BLD-MCNC: 15.1 G/DL (ref 13–17)
IMMATURE GRANULOCYTES: 1 %
LYMPHOCYTES # BLD: 2 % (ref 24–44)
MAGNESIUM SERPL-MCNC: 2.4 MG/DL (ref 1.6–2.6)
MAGNESIUM SERPL-MCNC: 2.4 MG/DL (ref 1.6–2.6)
MAGNESIUM SERPL-MCNC: 2.5 MG/DL (ref 1.6–2.6)
MCH RBC QN AUTO: 28.8 PG (ref 25.2–33.5)
MCHC RBC AUTO-ENTMCNC: 34 G/DL (ref 28.4–34.8)
MCV RBC AUTO: 84.7 FL (ref 82.6–102.9)
MONOCYTES # BLD: 5 % (ref 1–7)
MORPHOLOGY: NORMAL
NEGATIVE BASE EXCESS, VEN: 4 (ref 0–2)
NRBC AUTOMATED: 0 PER 100 WBC
O2 SAT, VEN: 99 % (ref 60–85)
PCO2, VEN: 18.3 MM HG (ref 41–51)
PDW BLD-RTO: 13.1 % (ref 11.8–14.4)
PH VENOUS: 7.52 (ref 7.32–7.43)
PHOSPHATE SERPL-MCNC: 2.3 MG/DL (ref 2.5–4.5)
PHOSPHATE SERPL-MCNC: 2.3 MG/DL (ref 2.5–4.5)
PLATELET # BLD AUTO: 410 K/UL (ref 138–453)
PMV BLD AUTO: 11.8 FL (ref 8.1–13.5)
PO2, VEN: 136.8 MM HG (ref 30–50)
POC BUN: 51 MG/DL (ref 8–26)
POC CHLORIDE: 102 MMOL/L (ref 98–107)
POC CREATININE: 1.78 MG/DL (ref 0.51–1.19)
POC HEMATOCRIT: 51 % (ref 41–53)
POC HEMOGLOBIN: 17.3 G/DL (ref 13.5–17.5)
POC IONIZED CALCIUM: 1.01 MMOL/L (ref 1.15–1.33)
POC LACTIC ACID: 2.54 MMOL/L (ref 0.56–1.39)
POC POTASSIUM: 4.8 MMOL/L (ref 3.5–4.5)
POC SODIUM: 131 MMOL/L (ref 138–146)
POC TCO2: 14 MMOL/L (ref 22–30)
POTASSIUM SERPL-SCNC: 3.9 MMOL/L (ref 3.7–5.3)
POTASSIUM SERPL-SCNC: 4.9 MMOL/L (ref 3.7–5.3)
POTASSIUM SERPL-SCNC: 4.9 MMOL/L (ref 3.7–5.3)
RBC # BLD: 5.24 M/UL (ref 4.21–5.77)
SARS-COV-2 RDRP RESP QL NAA+PROBE: NOT DETECTED
SEG NEUTROPHILS: 92 % (ref 36–66)
SEGMENTED NEUTROPHILS ABSOLUTE COUNT: 16.19 K/UL (ref 1.8–7.7)
SODIUM SERPL-SCNC: 133 MMOL/L (ref 135–144)
SODIUM SERPL-SCNC: 139 MMOL/L (ref 135–144)
SODIUM SERPL-SCNC: 140 MMOL/L (ref 135–144)
SPECIMEN DESCRIPTION: NORMAL
WBC # BLD AUTO: 17.6 K/UL (ref 3.5–11.3)

## 2023-05-14 PROCEDURE — 6370000000 HC RX 637 (ALT 250 FOR IP)

## 2023-05-14 PROCEDURE — 84100 ASSAY OF PHOSPHORUS: CPT

## 2023-05-14 PROCEDURE — 85025 COMPLETE CBC W/AUTO DIFF WBC: CPT

## 2023-05-14 PROCEDURE — 96372 THER/PROPH/DIAG INJ SC/IM: CPT

## 2023-05-14 PROCEDURE — 80051 ELECTROLYTE PANEL: CPT

## 2023-05-14 PROCEDURE — 87635 SARS-COV-2 COVID-19 AMP PRB: CPT

## 2023-05-14 PROCEDURE — 6370000000 HC RX 637 (ALT 250 FOR IP): Performed by: STUDENT IN AN ORGANIZED HEALTH CARE EDUCATION/TRAINING PROGRAM

## 2023-05-14 PROCEDURE — 82947 ASSAY GLUCOSE BLOOD QUANT: CPT

## 2023-05-14 PROCEDURE — 80048 BASIC METABOLIC PNL TOTAL CA: CPT

## 2023-05-14 PROCEDURE — 2500000003 HC RX 250 WO HCPCS: Performed by: STUDENT IN AN ORGANIZED HEALTH CARE EDUCATION/TRAINING PROGRAM

## 2023-05-14 PROCEDURE — 96375 TX/PRO/DX INJ NEW DRUG ADDON: CPT

## 2023-05-14 PROCEDURE — 99223 1ST HOSP IP/OBS HIGH 75: CPT | Performed by: STUDENT IN AN ORGANIZED HEALTH CARE EDUCATION/TRAINING PROGRAM

## 2023-05-14 PROCEDURE — 2580000003 HC RX 258

## 2023-05-14 PROCEDURE — 6370000000 HC RX 637 (ALT 250 FOR IP): Performed by: EMERGENCY MEDICINE

## 2023-05-14 PROCEDURE — 87804 INFLUENZA ASSAY W/OPTIC: CPT

## 2023-05-14 PROCEDURE — 83605 ASSAY OF LACTIC ACID: CPT

## 2023-05-14 PROCEDURE — 2580000003 HC RX 258: Performed by: STUDENT IN AN ORGANIZED HEALTH CARE EDUCATION/TRAINING PROGRAM

## 2023-05-14 PROCEDURE — 96374 THER/PROPH/DIAG INJ IV PUSH: CPT

## 2023-05-14 PROCEDURE — 82010 KETONE BODYS QUAN: CPT

## 2023-05-14 PROCEDURE — 85014 HEMATOCRIT: CPT

## 2023-05-14 PROCEDURE — 82565 ASSAY OF CREATININE: CPT

## 2023-05-14 PROCEDURE — 82330 ASSAY OF CALCIUM: CPT

## 2023-05-14 PROCEDURE — 83735 ASSAY OF MAGNESIUM: CPT

## 2023-05-14 PROCEDURE — 82803 BLOOD GASES ANY COMBINATION: CPT

## 2023-05-14 PROCEDURE — 84520 ASSAY OF UREA NITROGEN: CPT

## 2023-05-14 PROCEDURE — 6360000002 HC RX W HCPCS: Performed by: STUDENT IN AN ORGANIZED HEALTH CARE EDUCATION/TRAINING PROGRAM

## 2023-05-14 PROCEDURE — 2060000000 HC ICU INTERMEDIATE R&B

## 2023-05-14 PROCEDURE — 99285 EMERGENCY DEPT VISIT HI MDM: CPT

## 2023-05-14 PROCEDURE — 94761 N-INVAS EAR/PLS OXIMETRY MLT: CPT

## 2023-05-14 RX ORDER — ENOXAPARIN SODIUM 100 MG/ML
40 INJECTION SUBCUTANEOUS DAILY
Status: DISCONTINUED | OUTPATIENT
Start: 2023-05-14 | End: 2023-05-15 | Stop reason: HOSPADM

## 2023-05-14 RX ORDER — DEXTROSE AND SODIUM CHLORIDE 5; .45 G/100ML; G/100ML
INJECTION, SOLUTION INTRAVENOUS
Status: COMPLETED
Start: 2023-05-14 | End: 2023-05-14

## 2023-05-14 RX ORDER — SODIUM CHLORIDE, SODIUM LACTATE, POTASSIUM CHLORIDE, AND CALCIUM CHLORIDE .6; .31; .03; .02 G/100ML; G/100ML; G/100ML; G/100ML
1000 INJECTION, SOLUTION INTRAVENOUS ONCE
Status: COMPLETED | OUTPATIENT
Start: 2023-05-14 | End: 2023-05-14

## 2023-05-14 RX ORDER — POLYETHYLENE GLYCOL 3350 17 G/17G
17 POWDER, FOR SOLUTION ORAL DAILY PRN
Status: DISCONTINUED | OUTPATIENT
Start: 2023-05-14 | End: 2023-05-15 | Stop reason: HOSPADM

## 2023-05-14 RX ORDER — FAMOTIDINE 10 MG/ML
20 INJECTION, SOLUTION INTRAVENOUS
Status: COMPLETED | OUTPATIENT
Start: 2023-05-14 | End: 2023-05-14

## 2023-05-14 RX ORDER — ACETAMINOPHEN 500 MG
1000 TABLET ORAL
Status: COMPLETED | OUTPATIENT
Start: 2023-05-14 | End: 2023-05-14

## 2023-05-14 RX ORDER — DEXTROSE MONOHYDRATE 100 MG/ML
INJECTION, SOLUTION INTRAVENOUS CONTINUOUS PRN
Status: DISCONTINUED | OUTPATIENT
Start: 2023-05-14 | End: 2023-05-15 | Stop reason: HOSPADM

## 2023-05-14 RX ORDER — POTASSIUM CHLORIDE 7.45 MG/ML
10 INJECTION INTRAVENOUS PRN
Status: DISCONTINUED | OUTPATIENT
Start: 2023-05-14 | End: 2023-05-14

## 2023-05-14 RX ORDER — ONDANSETRON 4 MG/1
4 TABLET, ORALLY DISINTEGRATING ORAL EVERY 8 HOURS PRN
Status: DISCONTINUED | OUTPATIENT
Start: 2023-05-14 | End: 2023-05-15 | Stop reason: HOSPADM

## 2023-05-14 RX ORDER — SODIUM CHLORIDE 9 MG/ML
INJECTION, SOLUTION INTRAVENOUS CONTINUOUS
Status: DISCONTINUED | OUTPATIENT
Start: 2023-05-14 | End: 2023-05-14

## 2023-05-14 RX ORDER — SODIUM CHLORIDE 9 MG/ML
1000 INJECTION, SOLUTION INTRAVENOUS CONTINUOUS
Status: DISCONTINUED | OUTPATIENT
Start: 2023-05-14 | End: 2023-05-14

## 2023-05-14 RX ORDER — MAGNESIUM SULFATE 1 G/100ML
1000 INJECTION INTRAVENOUS PRN
Status: DISCONTINUED | OUTPATIENT
Start: 2023-05-14 | End: 2023-05-14

## 2023-05-14 RX ORDER — INSULIN GLARGINE 100 [IU]/ML
30 INJECTION, SOLUTION SUBCUTANEOUS NIGHTLY
Status: DISCONTINUED | OUTPATIENT
Start: 2023-05-14 | End: 2023-05-15 | Stop reason: HOSPADM

## 2023-05-14 RX ORDER — DEXTROSE AND SODIUM CHLORIDE 5; .45 G/100ML; G/100ML
INJECTION, SOLUTION INTRAVENOUS CONTINUOUS
Status: DISCONTINUED | OUTPATIENT
Start: 2023-05-14 | End: 2023-05-14

## 2023-05-14 RX ORDER — ONDANSETRON 4 MG/1
4 TABLET, ORALLY DISINTEGRATING ORAL ONCE
Status: COMPLETED | OUTPATIENT
Start: 2023-05-14 | End: 2023-05-14

## 2023-05-14 RX ORDER — INSULIN GLARGINE 100 [IU]/ML
20 INJECTION, SOLUTION SUBCUTANEOUS DAILY
Status: DISCONTINUED | OUTPATIENT
Start: 2023-05-15 | End: 2023-05-15 | Stop reason: HOSPADM

## 2023-05-14 RX ORDER — INSULIN LISPRO 100 [IU]/ML
20 INJECTION, SOLUTION INTRAVENOUS; SUBCUTANEOUS ONCE
Status: COMPLETED | OUTPATIENT
Start: 2023-05-14 | End: 2023-05-14

## 2023-05-14 RX ORDER — 0.9 % SODIUM CHLORIDE 0.9 %
1000 INTRAVENOUS SOLUTION INTRAVENOUS ONCE
Status: COMPLETED | OUTPATIENT
Start: 2023-05-14 | End: 2023-05-14

## 2023-05-14 RX ORDER — AMLODIPINE BESYLATE 5 MG/1
5 TABLET ORAL DAILY
Status: DISCONTINUED | OUTPATIENT
Start: 2023-05-14 | End: 2023-05-15

## 2023-05-14 RX ORDER — DEXTROSE AND SODIUM CHLORIDE 5; .45 G/100ML; G/100ML
INJECTION, SOLUTION INTRAVENOUS CONTINUOUS PRN
Status: DISCONTINUED | OUTPATIENT
Start: 2023-05-14 | End: 2023-05-14

## 2023-05-14 RX ADMIN — SODIUM CHLORIDE 1000 ML: 9 INJECTION, SOLUTION INTRAVENOUS at 09:36

## 2023-05-14 RX ADMIN — INSULIN GLARGINE 30 UNITS: 100 INJECTION, SOLUTION SUBCUTANEOUS at 22:37

## 2023-05-14 RX ADMIN — ACETAMINOPHEN 1000 MG: 500 TABLET ORAL at 06:41

## 2023-05-14 RX ADMIN — DEXTROSE AND SODIUM CHLORIDE: 5; 450 INJECTION, SOLUTION INTRAVENOUS at 16:29

## 2023-05-14 RX ADMIN — DEXTROSE AND SODIUM CHLORIDE: 5; 450 INJECTION, SOLUTION INTRAVENOUS at 15:45

## 2023-05-14 RX ADMIN — SODIUM CHLORIDE 0.02 UNITS/KG/HR: 9 INJECTION, SOLUTION INTRAVENOUS at 15:34

## 2023-05-14 RX ADMIN — DEXTROSE AND SODIUM CHLORIDE: 5; 450 INJECTION, SOLUTION INTRAVENOUS at 11:31

## 2023-05-14 RX ADMIN — SODIUM CHLORIDE 0.05 UNITS/KG/HR: 9 INJECTION, SOLUTION INTRAVENOUS at 11:46

## 2023-05-14 RX ADMIN — INSULIN LISPRO 20 UNITS: 100 INJECTION, SOLUTION INTRAVENOUS; SUBCUTANEOUS at 07:09

## 2023-05-14 RX ADMIN — SODIUM CHLORIDE 0.1 UNITS/KG/HR: 9 INJECTION, SOLUTION INTRAVENOUS at 09:38

## 2023-05-14 RX ADMIN — SODIUM CHLORIDE 0.05 UNITS/KG/HR: 9 INJECTION, SOLUTION INTRAVENOUS at 10:35

## 2023-05-14 RX ADMIN — FAMOTIDINE 20 MG: 10 INJECTION, SOLUTION INTRAVENOUS at 06:41

## 2023-05-14 RX ADMIN — ONDANSETRON 4 MG: 4 TABLET, ORALLY DISINTEGRATING ORAL at 15:29

## 2023-05-14 RX ADMIN — DEXTROSE AND SODIUM CHLORIDE: 5; .45 INJECTION, SOLUTION INTRAVENOUS at 11:31

## 2023-05-14 RX ADMIN — SODIUM CHLORIDE 0.13 UNITS/KG/HR: 9 INJECTION, SOLUTION INTRAVENOUS at 11:37

## 2023-05-14 RX ADMIN — POTASSIUM CHLORIDE 10 MEQ: 10 INJECTION, SOLUTION INTRAVENOUS at 15:45

## 2023-05-14 RX ADMIN — POTASSIUM CHLORIDE 10 MEQ: 10 INJECTION, SOLUTION INTRAVENOUS at 17:35

## 2023-05-14 RX ADMIN — AMLODIPINE BESYLATE 5 MG: 5 TABLET ORAL at 21:40

## 2023-05-14 RX ADMIN — ONDANSETRON 4 MG: 4 TABLET, ORALLY DISINTEGRATING ORAL at 06:49

## 2023-05-14 RX ADMIN — SODIUM CHLORIDE, POTASSIUM CHLORIDE, SODIUM LACTATE AND CALCIUM CHLORIDE 1000 ML: 600; 310; 30; 20 INJECTION, SOLUTION INTRAVENOUS at 08:52

## 2023-05-14 RX ADMIN — SODIUM CHLORIDE 0.04 UNITS/KG/HR: 9 INJECTION, SOLUTION INTRAVENOUS at 14:38

## 2023-05-14 RX ADMIN — POTASSIUM CHLORIDE 10 MEQ: 10 INJECTION, SOLUTION INTRAVENOUS at 16:29

## 2023-05-14 RX ADMIN — SODIUM CHLORIDE: 9 INJECTION, SOLUTION INTRAVENOUS at 10:13

## 2023-05-14 RX ADMIN — SODIUM PHOSPHATE, MONOBASIC, MONOHYDRATE AND SODIUM PHOSPHATE, DIBASIC, ANHYDROUS 10 MMOL: 276; 142 INJECTION, SOLUTION INTRAVENOUS at 16:05

## 2023-05-14 RX ADMIN — SODIUM CHLORIDE 1000 ML: 9 INJECTION, SOLUTION INTRAVENOUS at 06:39

## 2023-05-14 RX ADMIN — SODIUM CHLORIDE 0.01 UNITS/KG/HR: 9 INJECTION, SOLUTION INTRAVENOUS at 16:38

## 2023-05-14 RX ADMIN — SODIUM CHLORIDE 0.07 UNITS/KG/HR: 9 INJECTION, SOLUTION INTRAVENOUS at 12:29

## 2023-05-14 ASSESSMENT — ENCOUNTER SYMPTOMS
VOMITING: 1
COLOR CHANGE: 0
ABDOMINAL PAIN: 0
ABDOMINAL PAIN: 1
SORE THROAT: 1
NAUSEA: 1
COUGH: 1
DIARRHEA: 0
SHORTNESS OF BREATH: 0
ABDOMINAL DISTENTION: 0

## 2023-05-15 VITALS
SYSTOLIC BLOOD PRESSURE: 140 MMHG | HEIGHT: 67 IN | DIASTOLIC BLOOD PRESSURE: 96 MMHG | OXYGEN SATURATION: 98 % | HEART RATE: 93 BPM | WEIGHT: 149.91 LBS | TEMPERATURE: 98.6 F | BODY MASS INDEX: 23.53 KG/M2 | RESPIRATION RATE: 18 BRPM

## 2023-05-15 PROBLEM — I10 ESSENTIAL (PRIMARY) HYPERTENSION: Chronic | Status: ACTIVE | Noted: 2023-05-14

## 2023-05-15 PROBLEM — E10.10 DKA, TYPE 1, NOT AT GOAL (HCC): Chronic | Status: ACTIVE | Noted: 2023-02-23

## 2023-05-15 LAB
ABSOLUTE EOS #: <0.03 K/UL (ref 0–0.44)
ABSOLUTE IMMATURE GRANULOCYTE: 0.07 K/UL (ref 0–0.3)
ABSOLUTE LYMPH #: 0.94 K/UL (ref 1.1–3.7)
ABSOLUTE MONO #: 1.09 K/UL (ref 0.1–1.2)
ANION GAP SERPL CALCULATED.3IONS-SCNC: 13 MMOL/L (ref 9–17)
BASOPHILS # BLD: 0 % (ref 0–2)
BASOPHILS ABSOLUTE: 0.03 K/UL (ref 0–0.2)
BUN SERPL-MCNC: 21 MG/DL (ref 6–20)
CALCIUM SERPL-MCNC: 9.1 MG/DL (ref 8.6–10.4)
CHLORIDE SERPL-SCNC: 98 MMOL/L (ref 98–107)
CO2 SERPL-SCNC: 22 MMOL/L (ref 20–31)
CREAT SERPL-MCNC: 0.65 MG/DL (ref 0.7–1.2)
EOSINOPHILS RELATIVE PERCENT: 0 % (ref 1–4)
EST. AVERAGE GLUCOSE BLD GHB EST-MCNC: 255 MG/DL
GFR SERPL CREATININE-BSD FRML MDRD: >60 ML/MIN/1.73M2
GLUCOSE BLD-MCNC: 140 MG/DL (ref 75–110)
GLUCOSE BLD-MCNC: 155 MG/DL (ref 75–110)
GLUCOSE BLD-MCNC: 207 MG/DL (ref 75–110)
GLUCOSE BLD-MCNC: 93 MG/DL (ref 75–110)
GLUCOSE SERPL-MCNC: 144 MG/DL (ref 70–99)
HBA1C MFR BLD: 10.5 % (ref 4–6)
HCT VFR BLD AUTO: 41.9 % (ref 40.7–50.3)
HGB BLD-MCNC: 13.7 G/DL (ref 13–17)
IMMATURE GRANULOCYTES: 0 %
LYMPHOCYTES # BLD: 6 % (ref 24–43)
MAGNESIUM SERPL-MCNC: 2.4 MG/DL (ref 1.6–2.6)
MCH RBC QN AUTO: 29.2 PG (ref 25.2–33.5)
MCHC RBC AUTO-ENTMCNC: 32.7 G/DL (ref 28.4–34.8)
MCV RBC AUTO: 89.3 FL (ref 82.6–102.9)
MONOCYTES # BLD: 7 % (ref 3–12)
NRBC AUTOMATED: 0 PER 100 WBC
PDW BLD-RTO: 13.2 % (ref 11.8–14.4)
PHOSPHATE SERPL-MCNC: 2.6 MG/DL (ref 2.5–4.5)
PLATELET # BLD AUTO: 435 K/UL (ref 138–453)
PMV BLD AUTO: 11.7 FL (ref 8.1–13.5)
POTASSIUM SERPL-SCNC: 4.1 MMOL/L (ref 3.7–5.3)
RBC # BLD: 4.69 M/UL (ref 4.21–5.77)
SEG NEUTROPHILS: 87 % (ref 36–65)
SEGMENTED NEUTROPHILS ABSOLUTE COUNT: 13.83 K/UL (ref 1.5–8.1)
SODIUM SERPL-SCNC: 133 MMOL/L (ref 135–144)
WBC # BLD AUTO: 16 K/UL (ref 3.5–11.3)

## 2023-05-15 PROCEDURE — 94761 N-INVAS EAR/PLS OXIMETRY MLT: CPT

## 2023-05-15 PROCEDURE — 6370000000 HC RX 637 (ALT 250 FOR IP): Performed by: STUDENT IN AN ORGANIZED HEALTH CARE EDUCATION/TRAINING PROGRAM

## 2023-05-15 PROCEDURE — 85025 COMPLETE CBC W/AUTO DIFF WBC: CPT

## 2023-05-15 PROCEDURE — 80048 BASIC METABOLIC PNL TOTAL CA: CPT

## 2023-05-15 PROCEDURE — 6370000000 HC RX 637 (ALT 250 FOR IP): Performed by: NURSE PRACTITIONER

## 2023-05-15 PROCEDURE — 6370000000 HC RX 637 (ALT 250 FOR IP)

## 2023-05-15 PROCEDURE — 36415 COLL VENOUS BLD VENIPUNCTURE: CPT

## 2023-05-15 PROCEDURE — 82947 ASSAY GLUCOSE BLOOD QUANT: CPT

## 2023-05-15 PROCEDURE — G0108 DIAB MANAGE TRN  PER INDIV: HCPCS

## 2023-05-15 PROCEDURE — 83735 ASSAY OF MAGNESIUM: CPT

## 2023-05-15 PROCEDURE — 99232 SBSQ HOSP IP/OBS MODERATE 35: CPT | Performed by: INTERNAL MEDICINE

## 2023-05-15 PROCEDURE — 83036 HEMOGLOBIN GLYCOSYLATED A1C: CPT

## 2023-05-15 PROCEDURE — 84100 ASSAY OF PHOSPHORUS: CPT

## 2023-05-15 RX ORDER — CALCIUM CARBONATE 200(500)MG
500 TABLET,CHEWABLE ORAL 3 TIMES DAILY PRN
Status: DISCONTINUED | OUTPATIENT
Start: 2023-05-15 | End: 2023-05-15 | Stop reason: HOSPADM

## 2023-05-15 RX ORDER — INSULIN LISPRO 100 [IU]/ML
0-4 INJECTION, SOLUTION INTRAVENOUS; SUBCUTANEOUS NIGHTLY
Status: DISCONTINUED | OUTPATIENT
Start: 2023-05-15 | End: 2023-05-15 | Stop reason: HOSPADM

## 2023-05-15 RX ORDER — AMLODIPINE BESYLATE 10 MG/1
10 TABLET ORAL DAILY
Status: DISCONTINUED | OUTPATIENT
Start: 2023-05-15 | End: 2023-05-15 | Stop reason: HOSPADM

## 2023-05-15 RX ORDER — INSULIN LISPRO 100 [IU]/ML
0-8 INJECTION, SOLUTION INTRAVENOUS; SUBCUTANEOUS
Status: DISCONTINUED | OUTPATIENT
Start: 2023-05-15 | End: 2023-05-15 | Stop reason: HOSPADM

## 2023-05-15 RX ORDER — AMLODIPINE BESYLATE 10 MG/1
10 TABLET ORAL DAILY
Qty: 30 TABLET | Refills: 3 | Status: SHIPPED | OUTPATIENT
Start: 2023-05-16

## 2023-05-15 RX ORDER — LISINOPRIL 5 MG/1
5 TABLET ORAL DAILY
Qty: 30 TABLET | Refills: 3 | Status: SHIPPED | OUTPATIENT
Start: 2023-05-15

## 2023-05-15 RX ORDER — LISINOPRIL 5 MG/1
5 TABLET ORAL DAILY
Status: DISCONTINUED | OUTPATIENT
Start: 2023-05-15 | End: 2023-05-15 | Stop reason: HOSPADM

## 2023-05-15 RX ORDER — BLOOD PRESSURE TEST KIT
1 KIT MISCELLANEOUS 2 TIMES DAILY
Qty: 1 KIT | Refills: 0 | Status: SHIPPED | OUTPATIENT
Start: 2023-05-15

## 2023-05-15 RX ADMIN — CALCIUM CARBONATE 500 MG: 500 TABLET, CHEWABLE ORAL at 04:22

## 2023-05-15 RX ADMIN — LISINOPRIL 5 MG: 5 TABLET ORAL at 15:11

## 2023-05-15 RX ADMIN — AMLODIPINE BESYLATE 10 MG: 10 TABLET ORAL at 08:26

## 2023-05-15 RX ADMIN — INSULIN GLARGINE 20 UNITS: 100 INJECTION, SOLUTION SUBCUTANEOUS at 08:27

## 2023-05-15 RX ADMIN — BENZOCAINE AND MENTHOL 1 LOZENGE: 15; 3.6 LOZENGE ORAL at 12:28

## 2023-05-15 RX ADMIN — INSULIN LISPRO 2 UNITS: 100 INJECTION, SOLUTION INTRAVENOUS; SUBCUTANEOUS at 12:28

## 2023-05-15 ASSESSMENT — ENCOUNTER SYMPTOMS
CHEST TIGHTNESS: 0
SHORTNESS OF BREATH: 0
NAUSEA: 0
COLOR CHANGE: 0
ABDOMINAL PAIN: 0
VOMITING: 0
ABDOMINAL DISTENTION: 0

## 2023-05-15 ASSESSMENT — PAIN DESCRIPTION - LOCATION: LOCATION: CHEST

## 2023-05-15 ASSESSMENT — PAIN SCALES - GENERAL: PAINLEVEL_OUTOF10: 10

## 2023-05-15 NOTE — PROGRESS NOTES
Patient discharged home independently with family. Patient educated on discharged instructions and verbalized understanding. Patient given PCP list by case management and writer addressed the importance of establishing relationship with PCP. Patient's medications were delivered via meds to beds and given to patient. All patient belongings collected, accounted for and given to patient. Patient taken off unit in wheelchair and left in private vehicle.

## 2023-05-15 NOTE — PROGRESS NOTES
[facility]  Internal Medicine Teaching Residency Program  Inpatient Daily Progress Note  ______________________________________________________________________________    Patient: Terell Yee  YOB: 2000   YBJ:[id number]    Acct: [de-identified]     Room: 2020/2020-01  Admit date: 5/14/2023  Today's date: 05/15/23  Number of days in the hospital: 1    SUBJECTIVE   Admitting Diagnosis: Diabetic ketoacidosis without coma associated with type 1 diabetes mellitus ([facility]  CC: Nausea vomiting, hyperglycemia    - Pt examined at bedside. Chart & results reviewed. Patient no acute events overnight. Patient was bridged to long-acting insulin. Patient is on 20 units in the morning and 30 units nightly which is his home dose. Patient's POC glucose this a.m. is 140. JARETT: Resolved    Leukocytosis: Likely reactive    Plan for today:  - Follow-up hemoglobin A1c  - Discharge planning    Review of Systems   Constitutional:  Negative for activity change and appetite change. HENT:  Negative for congestion. Eyes:  Negative for visual disturbance. Respiratory:  Negative for chest tightness and shortness of breath. Cardiovascular:  Negative for chest pain and leg swelling. Gastrointestinal:  Negative for abdominal distention, abdominal pain, nausea and vomiting. Endocrine: Negative for polyuria. Genitourinary:  Negative for difficulty urinating. Musculoskeletal:  Negative for arthralgias. Skin:  Negative for color change. Allergic/Immunologic: Negative for immunocompromised state. Neurological:  Negative for dizziness. Hematological:  Negative for adenopathy. Psychiatric/Behavioral:  Negative for agitation. BRIEF HISTORY     The patient is a pleasant 23 y.o. male presents with a chief complaint of nausea vomiting for the past 2 days. Patient states that Friday night he began to have these symptoms.   Patient has a significant past medical

## 2023-05-15 NOTE — PLAN OF CARE
Problem: Discharge Planning  Goal: Discharge to home or other facility with appropriate resources  5/15/2023 1742 by Chen Rose RN  Outcome: Completed       Problem: Pain  Goal: Verbalizes/displays adequate comfort level or baseline comfort level  5/15/2023 1742 by Chen Rose RN  Outcome: Completed       Problem: Chronic Conditions and Co-morbidities  Goal: Patient's chronic conditions and co-morbidity symptoms are monitored and maintained or improved  Outcome: Completed

## 2023-05-15 NOTE — CARE COORDINATION
Met with patient to discuss transitional planning. He's returning home independently. He has PCP list. He has glucometer and supplies.  He denies other needs and has transportation home    [place]  Written by: Junior Cabrera RN    Patient Name: Tan Valadez  Attending Provider: Jama Bazan MD  Admit Date: 2023  6:15 AM  MRN: [id number]  Account: [de-identified]                     : 2000  Discharge Date: 5/15/2023      Disposition: home    Junior Cabrera RN

## 2023-05-15 NOTE — PROGRESS NOTES
CLINICAL PHARMACY NOTE: MEDS TO BEDS    Total # of Prescriptions Filled: 2   The following medications were delivered to the patient:  Lisinopril  amlodipine    Additional Documentation:

## 2023-05-15 NOTE — PLAN OF CARE
Problem: Discharge Planning  Goal: Discharge to home or other facility with appropriate resources  Outcome: Adequate for Discharge  Flowsheets (Taken 5/14/2023 2000)  Discharge to home or other facility with appropriate resources:   Identify barriers to discharge with patient and caregiver   Arrange for needed discharge resources and transportation as appropriate   Identify discharge learning needs (meds, wound care, etc)   Arrange for interpreters to assist at discharge as needed     Problem: Pain  Goal: Verbalizes/displays adequate comfort level or baseline comfort level  Outcome: Adequate for Discharge

## 2023-05-15 NOTE — DISCHARGE INSTRUCTIONS
You presented to the hospital because of nausea vomiting and hyperglycemia and were found to have diabetic ketoacidosis. This is due to noncompliance with type 1 diabetes medications including insulin Lantus and aspart. You were admitted inpatient and management of blood glucose, fluid resuscitation along with electrolyte replacement were done. It was also found that your blood pressure was constantly high during your entire hospital stay with your SBP going above 170 mmHg. We have started you on 2 different kinds of antihypertensive medications including amlodipine and lisinopril. Please check your blood pressure regularly at home at least 2 times daily with the kit that you were provided with. Please take your antihypertensive medications as prescribed and hold for 1 day if your blood pressure is lower than 100/80 mm Hg. You are being discharged home with the same doses of insulin Lantus 20 units in the day and 30 units in the night along with insulin aspart 3 units 3 times daily before meals. You are also provided with continuous blood glucose sensor and  along with glucose monitoring, insulin pen needles and lancets. Please start using continuous blood glucose monitoring and continue treatment accordingly. Please follow-up with endocrinology and select a PCP to go to. Please visit the ER if symptoms worsen or for new symptoms.

## 2023-05-15 NOTE — PROGRESS NOTES
Inpatient Diabetes  Education     Type and Reason for Visit: Patient Education - DKA - type 1   Met with patient at bedside, he stated 10 year history of type 1 dm - had stated feeling unwell at home - felt like was getting sick - he was not checking blood glucose often and going by symptoms. Once started checking BG was elevated and he was have more ill feeling - he stated he took usual doses of insulin. He takes lantus and novolog at home via pen. He stated he has a home BG meter and strips. He has not used CGM. He did not call PCP because he is not under PCP care at this time. Verbally reviewed following information with patient:    A1C - pending , Blood glucose targets, hypo and hyperglycemia, importance of home blood glucose monitoring wit information about CGMs  briefly reviewed  Heathy eating - need to count CHO - patient stated he use set dose with meal / novolog, t  Take medications  insulin as directed. Patient is not new to use of insulin pens. Reinforced to use good site rotation. Education Folder provided with following support information:   _x__  Handout - Ready set count - how to count CHO  _x__  Handout - BD basal / bolus how to use and insulin action times  _x__ Handout - DKA and how to check urine ketones  _x__ Emergency Insert sheet for your Vehicle - ADA Diabetes Emergencies   _x__ Diabetes ID card - ADA Low and High Blood Sugar and treatments  _x__ Dexcom and freestyle adi ADA consumer guide sheet     Also spoke with CM team on unit - clinic / pcp list provided - David Chandra gave this sheet to the patient and encouraged patient to call to set up appt ASAP     Out patient diabetes education  contact number provided - [phone] to patient and placed on the discharge summary. Outpatient follow up dmed appt was made for 5/24 as patient has prior referral in 2022.        Javier Burden RN

## 2023-05-18 LAB
EKG ATRIAL RATE: 73 BPM
EKG P AXIS: 83 DEGREES
EKG P-R INTERVAL: 138 MS
EKG Q-T INTERVAL: 388 MS
EKG QRS DURATION: 102 MS
EKG QTC CALCULATION (BAZETT): 427 MS
EKG R AXIS: -76 DEGREES
EKG T AXIS: 65 DEGREES
EKG VENTRICULAR RATE: 73 BPM

## 2023-05-18 NOTE — DISCHARGE SUMMARY
[address]     Department of Internal Medicine - Staff Internal Medicine Teaching Service    INPATIENT DISCHARGE SUMMARY      Patient Identification:  Sofya Drummond is a 23 y.o. male. :  2000  MRN: [id number]     Acct: [de-identified]   PCP: No primary care provider on file. Admit Date:  2023  Discharge date and time: 5/15/2023  6:22 PM   Attending Provider: No att. providers found                                     [facility] Problem Lists:  Principal Problem:    Diabetic ketoacidosis without coma associated with type 1 diabetes mellitus ([facility]  Active Problems:    DKA, type 1, not at goal [facility])    Essential (primary) hypertension    JARETT (acute kidney injury) ([facility]  Resolved Problems:    Diabetic ketoacidosis without coma associated with other specified diabetes mellitus [facility])      HOSPITAL STAY     Brief Inpatient course:   Sofya Drummond is a 23 y.o. male who was admitted for the management of Diabetic ketoacidosis without coma associated with type 1 diabetes mellitus ([facility] presented to the emergency department with  a chief complaint of nausea vomiting for the past 2 days. Patient states that Friday night he began to have these symptoms. Patient has a significant past medical history of type 1 diabetes, on insulin at home. Patient states he is on 30 units of Lantus nightly and 20 units in the morning and sliding scale insulin throughout the day. Patient states he has been taking his insulin appropriately however his blood glucose has been 5-600 at home. Patient initially came in to the emergency department with a blood glucose of above 600. Patient was given 20 units of Lantus and DKA protocol was initiated at that time. Patient was initially fluid resuscitated with 2 L of LR and started on continuous IV fluids. On my initial evaluation patient was no longer complaining of nausea or vomiting.   Patient was resting comfortably

## 2023-05-29 RX ORDER — CALCIUM CITRATE/VITAMIN D3 200MG-6.25
TABLET ORAL
Qty: 300 STRIP | OUTPATIENT
Start: 2023-05-29

## 2023-06-07 ENCOUNTER — OFFICE VISIT (OUTPATIENT)
Dept: PRIMARY CARE CLINIC | Age: 23
End: 2023-06-07
Payer: MEDICAID

## 2023-06-07 VITALS
OXYGEN SATURATION: 98 % | HEART RATE: 85 BPM | SYSTOLIC BLOOD PRESSURE: 113 MMHG | BODY MASS INDEX: 20.02 KG/M2 | WEIGHT: 143 LBS | DIASTOLIC BLOOD PRESSURE: 77 MMHG | HEIGHT: 71 IN

## 2023-06-07 DIAGNOSIS — I10 ESSENTIAL (PRIMARY) HYPERTENSION: Chronic | ICD-10-CM

## 2023-06-07 DIAGNOSIS — Z11.3 SCREENING EXAMINATION FOR STD (SEXUALLY TRANSMITTED DISEASE): ICD-10-CM

## 2023-06-07 DIAGNOSIS — Z76.89 ENCOUNTER TO ESTABLISH CARE: Primary | ICD-10-CM

## 2023-06-07 DIAGNOSIS — E10.65 TYPE 1 DIABETES MELLITUS WITH HYPERGLYCEMIA (HCC): ICD-10-CM

## 2023-06-07 PROCEDURE — 3074F SYST BP LT 130 MM HG: CPT | Performed by: NURSE PRACTITIONER

## 2023-06-07 PROCEDURE — 99204 OFFICE O/P NEW MOD 45 MIN: CPT | Performed by: NURSE PRACTITIONER

## 2023-06-07 PROCEDURE — 3078F DIAST BP <80 MM HG: CPT | Performed by: NURSE PRACTITIONER

## 2023-06-07 PROCEDURE — 3046F HEMOGLOBIN A1C LEVEL >9.0%: CPT | Performed by: NURSE PRACTITIONER

## 2023-06-07 RX ORDER — INSULIN GLARGINE 100 [IU]/ML
INJECTION, SOLUTION SUBCUTANEOUS
Qty: 15 ML | Refills: 0 | Status: SHIPPED | OUTPATIENT
Start: 2023-06-07

## 2023-06-07 RX ORDER — INSULIN ASPART 100 [IU]/ML
3 INJECTION, SOLUTION INTRAVENOUS; SUBCUTANEOUS
Qty: 2.7 ML | Refills: 0 | Status: SHIPPED | OUTPATIENT
Start: 2023-06-07 | End: 2023-07-07

## 2023-06-07 RX ORDER — LISINOPRIL 5 MG/1
5 TABLET ORAL DAILY
Qty: 90 TABLET | Refills: 2 | Status: SHIPPED | OUTPATIENT
Start: 2023-06-07

## 2023-06-07 RX ORDER — INSULIN GLARGINE 100 [IU]/ML
INJECTION, SOLUTION SUBCUTANEOUS
COMMUNITY
End: 2023-06-07

## 2023-06-07 ASSESSMENT — ENCOUNTER SYMPTOMS
SORE THROAT: 0
VOMITING: 0
DIARRHEA: 0
CHEST TIGHTNESS: 0
COLOR CHANGE: 0
SHORTNESS OF BREATH: 0
BACK PAIN: 0
PHOTOPHOBIA: 0
COUGH: 0
SINUS PRESSURE: 0
SINUS PAIN: 0
NAUSEA: 0
ABDOMINAL PAIN: 0

## 2023-06-07 ASSESSMENT — PATIENT HEALTH QUESTIONNAIRE - PHQ9
1. LITTLE INTEREST OR PLEASURE IN DOING THINGS: 0
SUM OF ALL RESPONSES TO PHQ QUESTIONS 1-9: 0
2. FEELING DOWN, DEPRESSED OR HOPELESS: 0
SUM OF ALL RESPONSES TO PHQ9 QUESTIONS 1 & 2: 0
SUM OF ALL RESPONSES TO PHQ QUESTIONS 1-9: 0

## 2023-06-07 NOTE — PROGRESS NOTES
aspart (NOVOLOG FLEXPEN) 100 UNIT/ML injection pen; Inject 3 Units into the skin 3 times daily (before meals)  -     Lipid Panel; Future  -     Comprehensive Metabolic Panel; Future  -     Microalbumin, Ur    Essential (primary) hypertension    Screening examination for STD (sexually transmitted disease)  -     Hepatitis C Antibody; Future  -     HIV Screen; Future          Return in about 6 months (around 12/7/2023). Plan of care reviewed with patient. Questions and concerns addressed to patient satisfaction. Follow up as directed.      Electronically signed by JOSÉ LUIS Seth - [facility], [address] 6/7/2023 at 11:49 AM

## 2023-06-08 ENCOUNTER — TELEPHONE (OUTPATIENT)
Dept: PRIMARY CARE CLINIC | Age: 23
End: 2023-06-08

## 2023-06-08 NOTE — TELEPHONE ENCOUNTER
Let patient know I have pended some lab work that I need him to have completed. With the diabetes, we need to check for protein in the urine and patient is also due for cholesterol screening along with hep C and HIV screening. He can have these done at his convenience. Patient should be fasting when completing the cholesterol screening.   Nothing to eat or drink for 8 to 10 hours, water or black coffee is okay before the test.

## 2023-06-26 RX ORDER — CALCIUM CITRATE/VITAMIN D3 200MG-6.25
TABLET ORAL
Qty: 300 STRIP | OUTPATIENT
Start: 2023-06-26

## 2023-08-12 RX ORDER — CALCIUM CITRATE/VITAMIN D3 200MG-6.25
TABLET ORAL
Qty: 300 STRIP | OUTPATIENT
Start: 2023-08-12

## 2023-08-24 RX ORDER — CALCIUM CITRATE/VITAMIN D3 200MG-6.25
TABLET ORAL
Qty: 300 STRIP | OUTPATIENT
Start: 2023-08-24

## 2023-09-28 DIAGNOSIS — E10.65 TYPE 1 DIABETES MELLITUS WITH HYPERGLYCEMIA (HCC): ICD-10-CM

## 2023-09-28 NOTE — TELEPHONE ENCOUNTER
Giovanni Stark is calling to request a refill on the following medication(s):    Medication Request:  Requested Prescriptions     Pending Prescriptions Disp Refills    insulin aspart (NOVOLOG FLEXPEN) 100 UNIT/ML injection pen 2.7 mL 0     Sig: Inject 3 Units into the skin 3 times daily (before meals)       Last Visit Date (If Applicable):  4/1/6969    Next Visit Date:    12/11/2023

## 2023-09-29 RX ORDER — INSULIN ASPART 100 [IU]/ML
3 INJECTION, SOLUTION INTRAVENOUS; SUBCUTANEOUS
Qty: 2.7 ML | Refills: 0 | Status: SHIPPED | OUTPATIENT
Start: 2023-09-29 | End: 2023-10-29

## 2023-10-06 DIAGNOSIS — E10.8: ICD-10-CM

## 2023-10-06 DIAGNOSIS — E10.10 DKA, TYPE 1, NOT AT GOAL (HCC): ICD-10-CM

## 2023-10-06 RX ORDER — PEN NEEDLE, DIABETIC 32GX 5/32"
NEEDLE, DISPOSABLE MISCELLANEOUS
Qty: 100 EACH | OUTPATIENT
Start: 2023-10-06

## 2023-10-09 ENCOUNTER — HOSPITAL ENCOUNTER (INPATIENT)
Age: 23
LOS: 2 days | Discharge: HOME | DRG: 420 | End: 2023-10-11
Attending: EMERGENCY MEDICINE | Admitting: FAMILY MEDICINE
Payer: MEDICAID

## 2023-10-09 DIAGNOSIS — E10.10 DIABETIC KETOACIDOSIS WITHOUT COMA ASSOCIATED WITH TYPE 1 DIABETES MELLITUS (HCC): Chronic | ICD-10-CM

## 2023-10-09 DIAGNOSIS — E08.10 DIABETIC KETOACIDOSIS WITHOUT COMA ASSOCIATED WITH DIABETES MELLITUS DUE TO UNDERLYING CONDITION (HCC): Primary | ICD-10-CM

## 2023-10-09 DIAGNOSIS — E10.65 TYPE 1 DIABETES MELLITUS WITH HYPERGLYCEMIA (HCC): ICD-10-CM

## 2023-10-09 LAB
ALBUMIN SERPL-MCNC: 4.8 G/DL (ref 3.5–5.2)
ALP SERPL-CCNC: 100 U/L (ref 40–129)
ALT SERPL-CCNC: 16 U/L (ref 5–41)
ANION GAP SERPL CALCULATED.3IONS-SCNC: 28 MMOL/L (ref 9–17)
ANION GAP SERPL CALCULATED.3IONS-SCNC: ABNORMAL MMOL/L (ref 9–17)
ANION GAP SERPL CALCULATED.3IONS-SCNC: ABNORMAL MMOL/L (ref 9–17)
AST SERPL-CCNC: 19 U/L
B-OH-BUTYR SERPL-MCNC: 8.27 MMOL/L (ref 0.02–0.27)
B-OH-BUTYR SERPL-MCNC: 8.64 MMOL/L (ref 0.02–0.27)
BASOPHILS # BLD: 0.07 K/UL (ref 0–0.2)
BASOPHILS NFR BLD: 1 % (ref 0–2)
BILIRUB SERPL-MCNC: 0.4 MG/DL (ref 0.3–1.2)
BUN SERPL-MCNC: 13 MG/DL (ref 6–20)
BUN SERPL-MCNC: 14 MG/DL (ref 6–20)
BUN SERPL-MCNC: 15 MG/DL (ref 6–20)
BUN/CREAT SERPL: 14 (ref 9–20)
BUN/CREAT SERPL: 16 (ref 9–20)
BUN/CREAT SERPL: 17 (ref 9–20)
CALCIUM SERPL-MCNC: 7.9 MG/DL (ref 8.6–10.4)
CALCIUM SERPL-MCNC: 7.9 MG/DL (ref 8.6–10.4)
CALCIUM SERPL-MCNC: 9.2 MG/DL (ref 8.6–10.4)
CHLORIDE SERPL-SCNC: 105 MMOL/L (ref 98–107)
CHLORIDE SERPL-SCNC: 108 MMOL/L (ref 98–107)
CHLORIDE SERPL-SCNC: 97 MMOL/L (ref 98–107)
CO2 SERPL-SCNC: 9 MMOL/L (ref 20–31)
CO2 SERPL-SCNC: <6 MMOL/L (ref 20–31)
CO2 SERPL-SCNC: <6 MMOL/L (ref 20–31)
CREAT SERPL-MCNC: 0.9 MG/DL (ref 0.7–1.2)
CRITICAL ACTION: NORMAL
CRITICAL NOTIFICATION DATE/TIME: NORMAL
CRITICAL NOTIFICATION: NORMAL
CRITICAL VALUE READ BACK: YES
EOSINOPHIL # BLD: 0.16 K/UL (ref 0–0.44)
EOSINOPHILS RELATIVE PERCENT: 2 % (ref 1–4)
ERYTHROCYTE [DISTWIDTH] IN BLOOD BY AUTOMATED COUNT: 13.4 % (ref 11.8–14.4)
GFR SERPL CREATININE-BSD FRML MDRD: >60 ML/MIN/1.73M2
GLUCOSE BLD-MCNC: 150 MG/DL (ref 75–110)
GLUCOSE BLD-MCNC: 164 MG/DL (ref 75–110)
GLUCOSE BLD-MCNC: 253 MG/DL (ref 75–110)
GLUCOSE BLD-MCNC: 349 MG/DL (ref 75–110)
GLUCOSE BLD-MCNC: 402 MG/DL (ref 75–110)
GLUCOSE BLD-MCNC: 413 MG/DL (ref 75–110)
GLUCOSE SERPL-MCNC: 189 MG/DL (ref 70–99)
GLUCOSE SERPL-MCNC: 308 MG/DL (ref 70–99)
GLUCOSE SERPL-MCNC: 480 MG/DL (ref 70–99)
HCO3 VENOUS: 6.7 MMOL/L (ref 22–29)
HCT VFR BLD AUTO: 41 % (ref 40.7–50.3)
HGB BLD-MCNC: 13 G/DL (ref 13–17)
IMM GRANULOCYTES # BLD AUTO: 0.19 K/UL (ref 0–0.3)
IMM GRANULOCYTES NFR BLD: 2 %
LYMPHOCYTES NFR BLD: 1.5 K/UL (ref 1.1–3.7)
LYMPHOCYTES RELATIVE PERCENT: 19 % (ref 24–43)
MAGNESIUM SERPL-MCNC: 1.7 MG/DL (ref 1.6–2.6)
MAGNESIUM SERPL-MCNC: 1.8 MG/DL (ref 1.6–2.6)
MAGNESIUM SERPL-MCNC: 1.9 MG/DL (ref 1.6–2.6)
MCH RBC QN AUTO: 28.5 PG (ref 25.2–33.5)
MCHC RBC AUTO-ENTMCNC: 31.7 G/DL (ref 28.4–34.8)
MCV RBC AUTO: 89.9 FL (ref 82.6–102.9)
MONOCYTES NFR BLD: 0.43 K/UL (ref 0.1–1.2)
MONOCYTES NFR BLD: 5 % (ref 3–12)
NEGATIVE BASE EXCESS, VEN: 20.9 MMOL/L (ref 0–2)
NEUTROPHILS NFR BLD: 71 % (ref 36–65)
NEUTS SEG NFR BLD: 5.63 K/UL (ref 1.5–8.1)
NRBC BLD-RTO: 0 PER 100 WBC
O2 SAT, VEN: 99.5 % (ref 60–85)
PCO2, VEN: 20.7 MM HG (ref 41–51)
PH VENOUS: 7.11 (ref 7.32–7.43)
PHOSPHATE SERPL-MCNC: 3.9 MG/DL (ref 2.5–4.5)
PHOSPHATE SERPL-MCNC: 4.3 MG/DL (ref 2.5–4.5)
PLATELET # BLD AUTO: 296 K/UL (ref 138–453)
PMV BLD AUTO: 12 FL (ref 8.1–13.5)
PO2, VEN: 210.4 MM HG (ref 30–50)
POTASSIUM SERPL-SCNC: 4.5 MMOL/L (ref 3.7–5.3)
POTASSIUM SERPL-SCNC: 4.7 MMOL/L (ref 3.7–5.3)
POTASSIUM SERPL-SCNC: 5.1 MMOL/L (ref 3.7–5.3)
PROT SERPL-MCNC: 7.8 G/DL (ref 6.4–8.3)
RBC # BLD AUTO: 4.56 M/UL (ref 4.21–5.77)
REASON FOR REJECTION: NORMAL
SODIUM SERPL-SCNC: 134 MMOL/L (ref 135–144)
SODIUM SERPL-SCNC: 135 MMOL/L (ref 135–144)
SODIUM SERPL-SCNC: 138 MMOL/L (ref 135–144)
SPECIMEN SOURCE: NORMAL
WBC OTHER # BLD: 8 K/UL (ref 3.5–11.3)
ZZ NTE CLEAN UP: ORDERED TEST: NORMAL

## 2023-10-09 PROCEDURE — 2580000003 HC RX 258: Performed by: EMERGENCY MEDICINE

## 2023-10-09 PROCEDURE — 83036 HEMOGLOBIN GLYCOSYLATED A1C: CPT

## 2023-10-09 PROCEDURE — 80053 COMPREHEN METABOLIC PANEL: CPT

## 2023-10-09 PROCEDURE — 85025 COMPLETE CBC W/AUTO DIFF WBC: CPT

## 2023-10-09 PROCEDURE — 96374 THER/PROPH/DIAG INJ IV PUSH: CPT

## 2023-10-09 PROCEDURE — 99222 1ST HOSP IP/OBS MODERATE 55: CPT | Performed by: FAMILY MEDICINE

## 2023-10-09 PROCEDURE — 83735 ASSAY OF MAGNESIUM: CPT

## 2023-10-09 PROCEDURE — 36415 COLL VENOUS BLD VENIPUNCTURE: CPT

## 2023-10-09 PROCEDURE — 2580000003 HC RX 258: Performed by: FAMILY MEDICINE

## 2023-10-09 PROCEDURE — 82010 KETONE BODYS QUAN: CPT

## 2023-10-09 PROCEDURE — 6360000002 HC RX W HCPCS: Performed by: EMERGENCY MEDICINE

## 2023-10-09 PROCEDURE — 6370000000 HC RX 637 (ALT 250 FOR IP): Performed by: FAMILY MEDICINE

## 2023-10-09 PROCEDURE — 99285 EMERGENCY DEPT VISIT HI MDM: CPT

## 2023-10-09 PROCEDURE — 6360000002 HC RX W HCPCS: Performed by: FAMILY MEDICINE

## 2023-10-09 PROCEDURE — 96361 HYDRATE IV INFUSION ADD-ON: CPT

## 2023-10-09 PROCEDURE — 82947 ASSAY GLUCOSE BLOOD QUANT: CPT

## 2023-10-09 PROCEDURE — 84100 ASSAY OF PHOSPHORUS: CPT

## 2023-10-09 PROCEDURE — 80048 BASIC METABOLIC PNL TOTAL CA: CPT

## 2023-10-09 PROCEDURE — 2000000000 HC ICU R&B

## 2023-10-09 PROCEDURE — 82803 BLOOD GASES ANY COMBINATION: CPT

## 2023-10-09 RX ORDER — MAGNESIUM SULFATE IN WATER 40 MG/ML
2000 INJECTION, SOLUTION INTRAVENOUS PRN
Status: DISCONTINUED | OUTPATIENT
Start: 2023-10-09 | End: 2023-10-09 | Stop reason: SDUPTHER

## 2023-10-09 RX ORDER — SODIUM CHLORIDE 450 MG/100ML
INJECTION, SOLUTION INTRAVENOUS CONTINUOUS
Status: DISCONTINUED | OUTPATIENT
Start: 2023-10-09 | End: 2023-10-09 | Stop reason: SDUPTHER

## 2023-10-09 RX ORDER — 0.9 % SODIUM CHLORIDE 0.9 %
1000 INTRAVENOUS SOLUTION INTRAVENOUS ONCE
Status: COMPLETED | OUTPATIENT
Start: 2023-10-09 | End: 2023-10-09

## 2023-10-09 RX ORDER — MAGNESIUM SULFATE 1 G/100ML
1000 INJECTION INTRAVENOUS PRN
Status: DISCONTINUED | OUTPATIENT
Start: 2023-10-09 | End: 2023-10-11 | Stop reason: HOSPADM

## 2023-10-09 RX ORDER — POTASSIUM CHLORIDE 7.45 MG/ML
10 INJECTION INTRAVENOUS PRN
Status: DISCONTINUED | OUTPATIENT
Start: 2023-10-09 | End: 2023-10-09 | Stop reason: SDUPTHER

## 2023-10-09 RX ORDER — 0.9 % SODIUM CHLORIDE 0.9 %
15 INTRAVENOUS SOLUTION INTRAVENOUS ONCE
Status: COMPLETED | OUTPATIENT
Start: 2023-10-09 | End: 2023-10-09

## 2023-10-09 RX ORDER — ONDANSETRON 2 MG/ML
4 INJECTION INTRAMUSCULAR; INTRAVENOUS ONCE
Status: COMPLETED | OUTPATIENT
Start: 2023-10-09 | End: 2023-10-09

## 2023-10-09 RX ORDER — ENOXAPARIN SODIUM 100 MG/ML
40 INJECTION SUBCUTANEOUS DAILY
Status: DISCONTINUED | OUTPATIENT
Start: 2023-10-10 | End: 2023-10-11 | Stop reason: HOSPADM

## 2023-10-09 RX ORDER — DEXTROSE AND SODIUM CHLORIDE 5; .45 G/100ML; G/100ML
INJECTION, SOLUTION INTRAVENOUS CONTINUOUS PRN
Status: DISCONTINUED | OUTPATIENT
Start: 2023-10-09 | End: 2023-10-09

## 2023-10-09 RX ORDER — POTASSIUM CHLORIDE 7.45 MG/ML
10 INJECTION INTRAVENOUS PRN
Status: DISCONTINUED | OUTPATIENT
Start: 2023-10-09 | End: 2023-10-11

## 2023-10-09 RX ORDER — DEXTROSE AND SODIUM CHLORIDE 5; .45 G/100ML; G/100ML
INJECTION, SOLUTION INTRAVENOUS CONTINUOUS PRN
Status: DISCONTINUED | OUTPATIENT
Start: 2023-10-09 | End: 2023-10-11 | Stop reason: HOSPADM

## 2023-10-09 RX ORDER — POLYETHYLENE GLYCOL 3350 17 G/17G
17 POWDER, FOR SOLUTION ORAL DAILY PRN
Status: DISCONTINUED | OUTPATIENT
Start: 2023-10-09 | End: 2023-10-11 | Stop reason: HOSPADM

## 2023-10-09 RX ORDER — SODIUM CHLORIDE 9 MG/ML
INJECTION, SOLUTION INTRAVENOUS CONTINUOUS
Status: DISCONTINUED | OUTPATIENT
Start: 2023-10-09 | End: 2023-10-10 | Stop reason: ALTCHOICE

## 2023-10-09 RX ADMIN — SODIUM CHLORIDE 1000 ML: 9 INJECTION, SOLUTION INTRAVENOUS at 18:49

## 2023-10-09 RX ADMIN — POTASSIUM CHLORIDE 10 MEQ: 7.46 INJECTION, SOLUTION INTRAVENOUS at 22:29

## 2023-10-09 RX ADMIN — SODIUM CHLORIDE 1000 ML: 9 INJECTION, SOLUTION INTRAVENOUS at 13:37

## 2023-10-09 RX ADMIN — SODIUM CHLORIDE: 9 INJECTION, SOLUTION INTRAVENOUS at 19:55

## 2023-10-09 RX ADMIN — SODIUM CHLORIDE 1000 ML: 9 INJECTION, SOLUTION INTRAVENOUS at 14:13

## 2023-10-09 RX ADMIN — ONDANSETRON 4 MG: 2 INJECTION INTRAMUSCULAR; INTRAVENOUS at 13:37

## 2023-10-09 RX ADMIN — SODIUM CHLORIDE 5.78 UNITS/HR: 9 INJECTION, SOLUTION INTRAVENOUS at 18:47

## 2023-10-09 RX ADMIN — POTASSIUM CHLORIDE 10 MEQ: 7.46 INJECTION, SOLUTION INTRAVENOUS at 21:15

## 2023-10-09 RX ADMIN — SODIUM CHLORIDE 1000 ML: 9 INJECTION, SOLUTION INTRAVENOUS at 15:53

## 2023-10-09 RX ADMIN — DEXTROSE AND SODIUM CHLORIDE: 5; 450 INJECTION, SOLUTION INTRAVENOUS at 21:12

## 2023-10-09 RX ADMIN — SODIUM CHLORIDE: 4.5 INJECTION, SOLUTION INTRAVENOUS at 15:51

## 2023-10-09 ASSESSMENT — ENCOUNTER SYMPTOMS
CONSTIPATION: 0
SHORTNESS OF BREATH: 0
CHOKING: 0
VOMITING: 1
COUGH: 0
ABDOMINAL PAIN: 0
DIARRHEA: 1
VOICE CHANGE: 0
BACK PAIN: 0
SINUS PRESSURE: 0
WHEEZING: 0
NAUSEA: 1
RHINORRHEA: 0
CHEST TIGHTNESS: 0

## 2023-10-09 ASSESSMENT — PAIN DESCRIPTION - ORIENTATION
ORIENTATION: LOWER
ORIENTATION: RIGHT;LEFT;LOWER

## 2023-10-09 ASSESSMENT — PAIN - FUNCTIONAL ASSESSMENT
PAIN_FUNCTIONAL_ASSESSMENT: ACTIVITIES ARE NOT PREVENTED
PAIN_FUNCTIONAL_ASSESSMENT: 0-10
PAIN_FUNCTIONAL_ASSESSMENT: ACTIVITIES ARE NOT PREVENTED

## 2023-10-09 ASSESSMENT — PAIN SCALES - GENERAL
PAINLEVEL_OUTOF10: 0
PAINLEVEL_OUTOF10: 4
PAINLEVEL_OUTOF10: 6
PAINLEVEL_OUTOF10: 7

## 2023-10-09 ASSESSMENT — PAIN DESCRIPTION - FREQUENCY: FREQUENCY: CONTINUOUS

## 2023-10-09 ASSESSMENT — PAIN DESCRIPTION - PAIN TYPE: TYPE: ACUTE PAIN

## 2023-10-09 ASSESSMENT — PAIN DESCRIPTION - LOCATION
LOCATION: THROAT;ABDOMEN
LOCATION: ABDOMEN
LOCATION: ABDOMEN

## 2023-10-09 ASSESSMENT — PAIN DESCRIPTION - DESCRIPTORS
DESCRIPTORS: BURNING;SORE
DESCRIPTORS: ACHING

## 2023-10-09 ASSESSMENT — PAIN DESCRIPTION - ONSET: ONSET: ON-GOING

## 2023-10-09 NOTE — ED NOTES
Writer attempted report. RN not available. Will try again.       Kae Cahcon, [address]  10/09/23 5264

## 2023-10-09 NOTE — ED NOTES
Pt to ed via ems for abd pain/n/v. Pt states he woke up feeling like this. Pt states hx diabetes. Pt states hes been taking medication like normal and checking his sugars. Pt states his sugars have been reading fine at home. Pt rates pain 7/10.       Delgado Chen, 100 [address]  10/09/23 8204

## 2023-10-09 NOTE — ED NOTES
Dr Edgar Saenz at bedside. Per dr Edgar Saenz recheck BMP at 1800.      Ele Gee, 100 [address]  10/09/23 6887

## 2023-10-09 NOTE — ED NOTES
Writer spoke with Trish in lab.  Ju camarena [facility] 3646 labs for [address] [place] [address], [address] [place] [address]  10/09/23 4624

## 2023-10-09 NOTE — H&P
[facility]  Office: 7900  1826, DO, Manisha Gallagher, DO, Clarene Meckel, DO, Kevin Villar, DO, Iban Lopez MD, Leeann Hernández MD, Angel Campbell MD, Renato Lea MD,  Jerry Meléndez MD, Yaya Phillips MD, Clint Campo, DO, Taylor Jones MD,  Buffy Garcia MD, Ashkan Knox MD, Blanca Carrera DO, Elan Cline MD,  Trent Kline DO, Paulette Chanel MD, Pete Buckley MD, Kodi Oro MD, Timmy Barcenas MD,  Yashira Flores MD, Rah Becker MD, Ginger Hannon MD, Fausto Gomez MD, Ruiz Kaplan DO, Wei Love MD,  Corey Muir MD, Celeste Centeno MD, Lynne Torres, CNP,  Gloria Zapata, CNP,, Gerardo Lima, CNP,  Hemant Castillo, [facility], Ernesto Martel, CNP, Graham Marin, CNP, Sabra Ayers, CNP, Reinaldo Bautista, CNP, Elizabeth Fermin, CNP, Heather Kasper, CNP, Mauricia Collet, CNS, Roland Loaiza, [facility], Rivas [facility], [address] [place] [address]      HISTORY AND PHYSICAL EXAMINATION            Date:   10/9/2023  Patient name:  Jasmine Dillon  Date of admission:  10/9/2023  1:00 PM  MRN:   [id number]  Account:  [de-identified]  YOB: 2000  PCP:    No primary care provider on file. Room:   [place] [address]  Code Status:    Prior    Chief Complaint:     Chief Complaint   Patient presents with    Abdominal Pain    Emesis    Nausea     Pt states that the he has been throwing up since last night        History Obtained From:     patient, electronic medical record    History of Present Illness: The patient is a 20 y.o. Non- / non  male who presents with Abdominal Pain, Emesis, and Nausea (Pt states that the he has been throwing up since last night )   and he is admitted to the hospital for the management of  DKA, type 1, not at goal [facility]). Patient presented to ER in ambulance with generalized fatigue, abdominal pain, nausea, vomiting. Patient reported that he was not feeling well for 3 days.   Patient also

## 2023-10-09 NOTE — ED PROVIDER NOTES
EMERGENCY DEPARTMENT ENCOUNTER    Pt Name: Joao Aguayo  MRN: [id number]  [address] [facility] [place] 2000  Date of evaluation: 10/9/23  CHIEF COMPLAINT       Chief Complaint   Patient presents with    Abdominal Pain    Emesis    Nausea     Pt states that the he has been throwing up since last night      HISTORY OF PRESENT ILLNESS   The history is provided by the patient and medical records. Patient presents to the ED for nausea, vomiting, abdominal pain and diarrhea. Symptoms started last night. He reports symptoms are identical to this episode of DKA. No reports of hematemesis. No reports of melena or bright red blood in stool. REVIEW OF SYSTEMS     Review of Systems  All other systems reviewed and are negative. PASTMEDICAL HISTORY     Past Medical History:   Diagnosis Date    JARETT (acute kidney injury) ([facility]) 05/14/2023    Diabetes mellitus ([address] [place] [address])     Type 1 diabetes mellitus ([address] [place] [address]) 04/2013     Past Problem List  Patient Active Problem List   Diagnosis Code    Diabetic ketoacidosis without coma associated with type 1 diabetes mellitus ([address] [place] [address]) E10.10    DKA, type 1, not at goal [facility]) E10.10    Essential (primary) hypertension I10    JARETT (acute kidney injury) ([facility]) N17.9     SURGICAL HISTORY     History reviewed. No pertinent surgical history.   CURRENT MEDICATIONS       Current Discharge Medication List        CONTINUE these medications which have NOT CHANGED    Details   insulin aspart (NOVOLOG FLEXPEN) 100 UNIT/ML injection pen Inject 3 Units into the skin 3 times daily (before meals)  Qty: 2.7 mL, Refills: 0    Associated Diagnoses: Type 1 diabetes mellitus with hyperglycemia (HCC)      lisinopril (PRINIVIL;ZESTRIL) 5 MG tablet Take 1 tablet by mouth daily  Qty: 90 tablet, Refills: 2    Associated Diagnoses: Type 1 diabetes mellitus with hyperglycemia (HCC)      insulin glargine (LANTUS SOLOSTAR) 100 UNIT/ML injection pen Inject 20 units in the morning and 30 units at night under the skin  Qty: 15 mL, Refills: 0

## 2023-10-10 LAB
ANION GAP SERPL CALCULATED.3IONS-SCNC: 15 MMOL/L (ref 9–17)
ANION GAP SERPL CALCULATED.3IONS-SCNC: 18 MMOL/L (ref 9–17)
ANION GAP SERPL CALCULATED.3IONS-SCNC: 19 MMOL/L (ref 9–17)
ANION GAP SERPL CALCULATED.3IONS-SCNC: 20 MMOL/L (ref 9–17)
ANION GAP SERPL CALCULATED.3IONS-SCNC: 22 MMOL/L (ref 9–17)
ANION GAP SERPL CALCULATED.3IONS-SCNC: 23 MMOL/L (ref 9–17)
BUN SERPL-MCNC: 10 MG/DL (ref 6–20)
BUN SERPL-MCNC: 11 MG/DL (ref 6–20)
BUN SERPL-MCNC: 8 MG/DL (ref 6–20)
BUN SERPL-MCNC: 9 MG/DL (ref 6–20)
BUN/CREAT SERPL: 10 (ref 9–20)
BUN/CREAT SERPL: 10 (ref 9–20)
BUN/CREAT SERPL: 11 (ref 9–20)
BUN/CREAT SERPL: 13 (ref 9–20)
CALCIUM SERPL-MCNC: 8.2 MG/DL (ref 8.6–10.4)
CALCIUM SERPL-MCNC: 8.3 MG/DL (ref 8.6–10.4)
CHLORIDE SERPL-SCNC: 103 MMOL/L (ref 98–107)
CHLORIDE SERPL-SCNC: 106 MMOL/L (ref 98–107)
CHLORIDE SERPL-SCNC: 107 MMOL/L (ref 98–107)
CO2 SERPL-SCNC: 11 MMOL/L (ref 20–31)
CO2 SERPL-SCNC: 11 MMOL/L (ref 20–31)
CO2 SERPL-SCNC: 13 MMOL/L (ref 20–31)
CO2 SERPL-SCNC: 15 MMOL/L (ref 20–31)
CO2 SERPL-SCNC: 7 MMOL/L (ref 20–31)
CO2 SERPL-SCNC: 8 MMOL/L (ref 20–31)
CREAT SERPL-MCNC: 0.8 MG/DL (ref 0.7–1.2)
CREAT SERPL-MCNC: 0.9 MG/DL (ref 0.7–1.2)
CREAT SERPL-MCNC: 1 MG/DL (ref 0.7–1.2)
CREAT SERPL-MCNC: 1 MG/DL (ref 0.7–1.2)
ERYTHROCYTE [DISTWIDTH] IN BLOOD BY AUTOMATED COUNT: 13.1 % (ref 11.8–14.4)
EST. AVERAGE GLUCOSE BLD GHB EST-MCNC: 269 MG/DL
EST. AVERAGE GLUCOSE BLD GHB EST-MCNC: 272 MG/DL
GFR SERPL CREATININE-BSD FRML MDRD: >60 ML/MIN/1.73M2
GLUCOSE BLD-MCNC: 123 MG/DL (ref 75–110)
GLUCOSE BLD-MCNC: 125 MG/DL (ref 75–110)
GLUCOSE BLD-MCNC: 129 MG/DL (ref 75–110)
GLUCOSE BLD-MCNC: 131 MG/DL (ref 75–110)
GLUCOSE BLD-MCNC: 135 MG/DL (ref 75–110)
GLUCOSE BLD-MCNC: 141 MG/DL (ref 75–110)
GLUCOSE BLD-MCNC: 144 MG/DL (ref 75–110)
GLUCOSE BLD-MCNC: 149 MG/DL (ref 75–110)
GLUCOSE BLD-MCNC: 150 MG/DL (ref 75–110)
GLUCOSE BLD-MCNC: 150 MG/DL (ref 75–110)
GLUCOSE BLD-MCNC: 151 MG/DL (ref 75–110)
GLUCOSE BLD-MCNC: 155 MG/DL (ref 75–110)
GLUCOSE BLD-MCNC: 156 MG/DL (ref 75–110)
GLUCOSE BLD-MCNC: 158 MG/DL (ref 75–110)
GLUCOSE BLD-MCNC: 176 MG/DL (ref 75–110)
GLUCOSE BLD-MCNC: 178 MG/DL (ref 75–110)
GLUCOSE BLD-MCNC: 180 MG/DL (ref 75–110)
GLUCOSE BLD-MCNC: 189 MG/DL (ref 75–110)
GLUCOSE BLD-MCNC: 193 MG/DL (ref 75–110)
GLUCOSE BLD-MCNC: 201 MG/DL (ref 75–110)
GLUCOSE BLD-MCNC: 201 MG/DL (ref 75–110)
GLUCOSE BLD-MCNC: 206 MG/DL (ref 75–110)
GLUCOSE BLD-MCNC: 209 MG/DL (ref 75–110)
GLUCOSE BLD-MCNC: 213 MG/DL (ref 75–110)
GLUCOSE BLD-MCNC: 230 MG/DL (ref 75–110)
GLUCOSE BLD-MCNC: 242 MG/DL (ref 75–110)
GLUCOSE SERPL-MCNC: 142 MG/DL (ref 70–99)
GLUCOSE SERPL-MCNC: 150 MG/DL (ref 70–99)
GLUCOSE SERPL-MCNC: 157 MG/DL (ref 70–99)
GLUCOSE SERPL-MCNC: 158 MG/DL (ref 70–99)
GLUCOSE SERPL-MCNC: 186 MG/DL (ref 70–99)
GLUCOSE SERPL-MCNC: 232 MG/DL (ref 70–99)
HBA1C MFR BLD: 11 % (ref 4–6)
HBA1C MFR BLD: 11.1 % (ref 4–6)
HCT VFR BLD AUTO: 41 % (ref 40.7–50.3)
HGB BLD-MCNC: 12.7 G/DL (ref 13–17)
MAGNESIUM SERPL-MCNC: 1.8 MG/DL (ref 1.6–2.6)
MAGNESIUM SERPL-MCNC: 1.9 MG/DL (ref 1.6–2.6)
MAGNESIUM SERPL-MCNC: 2 MG/DL (ref 1.6–2.6)
MCH RBC QN AUTO: 28.4 PG (ref 25.2–33.5)
MCHC RBC AUTO-ENTMCNC: 31 G/DL (ref 28.4–34.8)
MCV RBC AUTO: 91.7 FL (ref 82.6–102.9)
NRBC BLD-RTO: 0 PER 100 WBC
PHOSPHATE SERPL-MCNC: 2 MG/DL (ref 2.5–4.5)
PHOSPHATE SERPL-MCNC: 2.3 MG/DL (ref 2.5–4.5)
PHOSPHATE SERPL-MCNC: 2.3 MG/DL (ref 2.5–4.5)
PHOSPHATE SERPL-MCNC: 2.5 MG/DL (ref 2.5–4.5)
PHOSPHATE SERPL-MCNC: 3.2 MG/DL (ref 2.5–4.5)
PLATELET # BLD AUTO: 318 K/UL (ref 138–453)
PMV BLD AUTO: 10.7 FL (ref 8.1–13.5)
POTASSIUM SERPL-SCNC: 4 MMOL/L (ref 3.7–5.3)
POTASSIUM SERPL-SCNC: 4.1 MMOL/L (ref 3.7–5.3)
POTASSIUM SERPL-SCNC: 4.1 MMOL/L (ref 3.7–5.3)
POTASSIUM SERPL-SCNC: 4.3 MMOL/L (ref 3.7–5.3)
POTASSIUM SERPL-SCNC: 4.6 MMOL/L (ref 3.7–5.3)
POTASSIUM SERPL-SCNC: 5.6 MMOL/L (ref 3.7–5.3)
RBC # BLD AUTO: 4.47 M/UL (ref 4.21–5.77)
REASON FOR REJECTION: NORMAL
SODIUM SERPL-SCNC: 133 MMOL/L (ref 135–144)
SODIUM SERPL-SCNC: 136 MMOL/L (ref 135–144)
SODIUM SERPL-SCNC: 136 MMOL/L (ref 135–144)
SODIUM SERPL-SCNC: 138 MMOL/L (ref 135–144)
SPECIMEN SOURCE: NORMAL
WBC OTHER # BLD: 19.1 K/UL (ref 3.5–11.3)
ZZ NTE CLEAN UP: ORDERED TEST: NORMAL

## 2023-10-10 PROCEDURE — 6360000002 HC RX W HCPCS: Performed by: NURSE PRACTITIONER

## 2023-10-10 PROCEDURE — 36415 COLL VENOUS BLD VENIPUNCTURE: CPT

## 2023-10-10 PROCEDURE — 2000000000 HC ICU R&B

## 2023-10-10 PROCEDURE — 6360000002 HC RX W HCPCS: Performed by: INTERNAL MEDICINE

## 2023-10-10 PROCEDURE — 2500000003 HC RX 250 WO HCPCS: Performed by: FAMILY MEDICINE

## 2023-10-10 PROCEDURE — 6370000000 HC RX 637 (ALT 250 FOR IP): Performed by: FAMILY MEDICINE

## 2023-10-10 PROCEDURE — 80048 BASIC METABOLIC PNL TOTAL CA: CPT

## 2023-10-10 PROCEDURE — 83735 ASSAY OF MAGNESIUM: CPT

## 2023-10-10 PROCEDURE — 83036 HEMOGLOBIN GLYCOSYLATED A1C: CPT

## 2023-10-10 PROCEDURE — 85027 COMPLETE CBC AUTOMATED: CPT

## 2023-10-10 PROCEDURE — 6360000002 HC RX W HCPCS: Performed by: FAMILY MEDICINE

## 2023-10-10 PROCEDURE — 84100 ASSAY OF PHOSPHORUS: CPT

## 2023-10-10 PROCEDURE — 2580000003 HC RX 258: Performed by: FAMILY MEDICINE

## 2023-10-10 PROCEDURE — 94761 N-INVAS EAR/PLS OXIMETRY MLT: CPT

## 2023-10-10 PROCEDURE — 99232 SBSQ HOSP IP/OBS MODERATE 35: CPT | Performed by: INTERNAL MEDICINE

## 2023-10-10 RX ORDER — PROCHLORPERAZINE EDISYLATE 5 MG/ML
10 INJECTION INTRAMUSCULAR; INTRAVENOUS EVERY 6 HOURS PRN
Status: DISCONTINUED | OUTPATIENT
Start: 2023-10-10 | End: 2023-10-11 | Stop reason: HOSPADM

## 2023-10-10 RX ORDER — ONDANSETRON 2 MG/ML
4 INJECTION INTRAMUSCULAR; INTRAVENOUS EVERY 6 HOURS PRN
Status: DISCONTINUED | OUTPATIENT
Start: 2023-10-10 | End: 2023-10-11 | Stop reason: HOSPADM

## 2023-10-10 RX ORDER — LISINOPRIL 20 MG/1
20 TABLET ORAL EVERY MORNING
COMMUNITY

## 2023-10-10 RX ORDER — DEXTROSE MONOHYDRATE 100 MG/ML
INJECTION, SOLUTION INTRAVENOUS CONTINUOUS PRN
Status: DISCONTINUED | OUTPATIENT
Start: 2023-10-10 | End: 2023-10-11 | Stop reason: HOSPADM

## 2023-10-10 RX ADMIN — POTASSIUM CHLORIDE 10 MEQ: 7.46 INJECTION, SOLUTION INTRAVENOUS at 19:38

## 2023-10-10 RX ADMIN — POTASSIUM CHLORIDE 10 MEQ: 7.46 INJECTION, SOLUTION INTRAVENOUS at 23:41

## 2023-10-10 RX ADMIN — POTASSIUM CHLORIDE 10 MEQ: 7.46 INJECTION, SOLUTION INTRAVENOUS at 06:39

## 2023-10-10 RX ADMIN — POTASSIUM CHLORIDE 10 MEQ: 7.46 INJECTION, SOLUTION INTRAVENOUS at 11:24

## 2023-10-10 RX ADMIN — POTASSIUM CHLORIDE 10 MEQ: 7.46 INJECTION, SOLUTION INTRAVENOUS at 22:28

## 2023-10-10 RX ADMIN — PROCHLORPERAZINE EDISYLATE 10 MG: 5 INJECTION INTRAMUSCULAR; INTRAVENOUS at 11:27

## 2023-10-10 RX ADMIN — POTASSIUM CHLORIDE 10 MEQ: 7.46 INJECTION, SOLUTION INTRAVENOUS at 21:03

## 2023-10-10 RX ADMIN — SODIUM PHOSPHATE, MONOBASIC, MONOHYDRATE AND SODIUM PHOSPHATE, DIBASIC, ANHYDROUS 10 MMOL: 142; 276 INJECTION, SOLUTION INTRAVENOUS at 21:49

## 2023-10-10 RX ADMIN — ENOXAPARIN SODIUM 40 MG: 100 INJECTION SUBCUTANEOUS at 09:09

## 2023-10-10 RX ADMIN — DEXTROSE AND SODIUM CHLORIDE: 5; 450 INJECTION, SOLUTION INTRAVENOUS at 11:49

## 2023-10-10 RX ADMIN — POTASSIUM CHLORIDE 10 MEQ: 7.46 INJECTION, SOLUTION INTRAVENOUS at 17:39

## 2023-10-10 RX ADMIN — SODIUM CHLORIDE 0.33 UNITS/HR: 9 INJECTION, SOLUTION INTRAVENOUS at 11:44

## 2023-10-10 RX ADMIN — POTASSIUM CHLORIDE 10 MEQ: 7.46 INJECTION, SOLUTION INTRAVENOUS at 16:37

## 2023-10-10 RX ADMIN — SODIUM CHLORIDE 2.03 UNITS/HR: 9 INJECTION, SOLUTION INTRAVENOUS at 23:32

## 2023-10-10 RX ADMIN — ONDANSETRON 4 MG: 2 INJECTION INTRAMUSCULAR; INTRAVENOUS at 02:58

## 2023-10-10 RX ADMIN — ONDANSETRON 4 MG: 2 INJECTION INTRAMUSCULAR; INTRAVENOUS at 09:09

## 2023-10-10 RX ADMIN — POTASSIUM CHLORIDE 10 MEQ: 7.46 INJECTION, SOLUTION INTRAVENOUS at 11:52

## 2023-10-10 RX ADMIN — SODIUM PHOSPHATE, MONOBASIC, MONOHYDRATE AND SODIUM PHOSPHATE, DIBASIC, ANHYDROUS 10 MMOL: 142; 276 INJECTION, SOLUTION INTRAVENOUS at 11:52

## 2023-10-10 RX ADMIN — DEXTROSE AND SODIUM CHLORIDE: 5; 450 INJECTION, SOLUTION INTRAVENOUS at 04:09

## 2023-10-10 RX ADMIN — POTASSIUM CHLORIDE 10 MEQ: 7.46 INJECTION, SOLUTION INTRAVENOUS at 08:16

## 2023-10-10 RX ADMIN — POTASSIUM CHLORIDE 10 MEQ: 7.46 INJECTION, SOLUTION INTRAVENOUS at 13:16

## 2023-10-10 RX ADMIN — SODIUM PHOSPHATE, MONOBASIC, MONOHYDRATE AND SODIUM PHOSPHATE, DIBASIC, ANHYDROUS 15 MMOL: 142; 276 INJECTION, SOLUTION INTRAVENOUS at 17:39

## 2023-10-11 VITALS
HEIGHT: 71 IN | BODY MASS INDEX: 19.66 KG/M2 | TEMPERATURE: 98 F | DIASTOLIC BLOOD PRESSURE: 86 MMHG | WEIGHT: 140.43 LBS | HEART RATE: 67 BPM | OXYGEN SATURATION: 98 % | RESPIRATION RATE: 24 BRPM | SYSTOLIC BLOOD PRESSURE: 121 MMHG

## 2023-10-11 LAB
ANION GAP SERPL CALCULATED.3IONS-SCNC: 11 MMOL/L (ref 9–17)
ANION GAP SERPL CALCULATED.3IONS-SCNC: 11 MMOL/L (ref 9–17)
ANION GAP SERPL CALCULATED.3IONS-SCNC: 13 MMOL/L (ref 9–17)
ANION GAP SERPL CALCULATED.3IONS-SCNC: 13 MMOL/L (ref 9–17)
BUN SERPL-MCNC: 3 MG/DL (ref 6–20)
BUN SERPL-MCNC: 4 MG/DL (ref 6–20)
BUN SERPL-MCNC: 6 MG/DL (ref 6–20)
BUN SERPL-MCNC: 6 MG/DL (ref 6–20)
BUN/CREAT SERPL: 5 (ref 9–20)
BUN/CREAT SERPL: 7 (ref 9–20)
BUN/CREAT SERPL: 9 (ref 9–20)
BUN/CREAT SERPL: 9 (ref 9–20)
CALCIUM SERPL-MCNC: 8.2 MG/DL (ref 8.6–10.4)
CALCIUM SERPL-MCNC: 8.3 MG/DL (ref 8.6–10.4)
CHLORIDE SERPL-SCNC: 105 MMOL/L (ref 98–107)
CHLORIDE SERPL-SCNC: 106 MMOL/L (ref 98–107)
CHLORIDE SERPL-SCNC: 107 MMOL/L (ref 98–107)
CHLORIDE SERPL-SCNC: 107 MMOL/L (ref 98–107)
CO2 SERPL-SCNC: 16 MMOL/L (ref 20–31)
CO2 SERPL-SCNC: 17 MMOL/L (ref 20–31)
CO2 SERPL-SCNC: 18 MMOL/L (ref 20–31)
CO2 SERPL-SCNC: 18 MMOL/L (ref 20–31)
CREAT SERPL-MCNC: 0.6 MG/DL (ref 0.7–1.2)
CREAT SERPL-MCNC: 0.6 MG/DL (ref 0.7–1.2)
CREAT SERPL-MCNC: 0.7 MG/DL (ref 0.7–1.2)
CREAT SERPL-MCNC: 0.7 MG/DL (ref 0.7–1.2)
GFR SERPL CREATININE-BSD FRML MDRD: >60 ML/MIN/1.73M2
GLUCOSE BLD-MCNC: 109 MG/DL (ref 75–110)
GLUCOSE BLD-MCNC: 126 MG/DL (ref 75–110)
GLUCOSE BLD-MCNC: 139 MG/DL (ref 75–110)
GLUCOSE BLD-MCNC: 156 MG/DL (ref 75–110)
GLUCOSE BLD-MCNC: 158 MG/DL (ref 75–110)
GLUCOSE BLD-MCNC: 160 MG/DL (ref 75–110)
GLUCOSE BLD-MCNC: 165 MG/DL (ref 75–110)
GLUCOSE BLD-MCNC: 173 MG/DL (ref 75–110)
GLUCOSE BLD-MCNC: 176 MG/DL (ref 75–110)
GLUCOSE BLD-MCNC: 178 MG/DL (ref 75–110)
GLUCOSE BLD-MCNC: 187 MG/DL (ref 75–110)
GLUCOSE SERPL-MCNC: 144 MG/DL (ref 70–99)
GLUCOSE SERPL-MCNC: 147 MG/DL (ref 70–99)
GLUCOSE SERPL-MCNC: 165 MG/DL (ref 70–99)
GLUCOSE SERPL-MCNC: 172 MG/DL (ref 70–99)
MAGNESIUM SERPL-MCNC: 1.7 MG/DL (ref 1.6–2.6)
MAGNESIUM SERPL-MCNC: 1.8 MG/DL (ref 1.6–2.6)
PHOSPHATE SERPL-MCNC: 1.8 MG/DL (ref 2.5–4.5)
PHOSPHATE SERPL-MCNC: 2.1 MG/DL (ref 2.5–4.5)
PHOSPHATE SERPL-MCNC: 2.4 MG/DL (ref 2.5–4.5)
PHOSPHATE SERPL-MCNC: 2.5 MG/DL (ref 2.5–4.5)
POTASSIUM SERPL-SCNC: 3.6 MMOL/L (ref 3.7–5.3)
POTASSIUM SERPL-SCNC: 3.7 MMOL/L (ref 3.7–5.3)
SODIUM SERPL-SCNC: 135 MMOL/L (ref 135–144)
SODIUM SERPL-SCNC: 135 MMOL/L (ref 135–144)
SODIUM SERPL-SCNC: 136 MMOL/L (ref 135–144)
SODIUM SERPL-SCNC: 136 MMOL/L (ref 135–144)

## 2023-10-11 PROCEDURE — 84100 ASSAY OF PHOSPHORUS: CPT

## 2023-10-11 PROCEDURE — 2500000003 HC RX 250 WO HCPCS: Performed by: FAMILY MEDICINE

## 2023-10-11 PROCEDURE — 82947 ASSAY GLUCOSE BLOOD QUANT: CPT

## 2023-10-11 PROCEDURE — 6360000002 HC RX W HCPCS: Performed by: FAMILY MEDICINE

## 2023-10-11 PROCEDURE — 99232 SBSQ HOSP IP/OBS MODERATE 35: CPT | Performed by: INTERNAL MEDICINE

## 2023-10-11 PROCEDURE — 80048 BASIC METABOLIC PNL TOTAL CA: CPT

## 2023-10-11 PROCEDURE — 6370000000 HC RX 637 (ALT 250 FOR IP): Performed by: INTERNAL MEDICINE

## 2023-10-11 PROCEDURE — 2580000003 HC RX 258: Performed by: FAMILY MEDICINE

## 2023-10-11 PROCEDURE — 83735 ASSAY OF MAGNESIUM: CPT

## 2023-10-11 PROCEDURE — 36415 COLL VENOUS BLD VENIPUNCTURE: CPT

## 2023-10-11 PROCEDURE — 94761 N-INVAS EAR/PLS OXIMETRY MLT: CPT

## 2023-10-11 RX ORDER — INSULIN LISPRO 100 [IU]/ML
0-16 INJECTION, SOLUTION INTRAVENOUS; SUBCUTANEOUS EVERY 4 HOURS
Status: DISCONTINUED | OUTPATIENT
Start: 2023-10-11 | End: 2023-10-11 | Stop reason: HOSPADM

## 2023-10-11 RX ORDER — INSULIN GLARGINE 100 [IU]/ML
INJECTION, SOLUTION SUBCUTANEOUS
Qty: 5 ADJUSTABLE DOSE PRE-FILLED PEN SYRINGE | Refills: 3 | Status: SHIPPED | OUTPATIENT
Start: 2023-10-11 | End: 2023-10-11 | Stop reason: SDUPTHER

## 2023-10-11 RX ORDER — INSULIN GLARGINE 100 [IU]/ML
20 INJECTION, SOLUTION SUBCUTANEOUS DAILY
Status: DISCONTINUED | OUTPATIENT
Start: 2023-10-11 | End: 2023-10-11 | Stop reason: HOSPADM

## 2023-10-11 RX ORDER — INSULIN GLARGINE 100 [IU]/ML
30 INJECTION, SOLUTION SUBCUTANEOUS NIGHTLY
Status: DISCONTINUED | OUTPATIENT
Start: 2023-10-11 | End: 2023-10-11 | Stop reason: HOSPADM

## 2023-10-11 RX ORDER — POTASSIUM CHLORIDE 7.45 MG/ML
10 INJECTION INTRAVENOUS PRN
Status: DISCONTINUED | OUTPATIENT
Start: 2023-10-11 | End: 2023-10-11 | Stop reason: HOSPADM

## 2023-10-11 RX ORDER — INSULIN ASPART 100 [IU]/ML
3 INJECTION, SOLUTION INTRAVENOUS; SUBCUTANEOUS
Qty: 5 ADJUSTABLE DOSE PRE-FILLED PEN SYRINGE | Refills: 2 | Status: SHIPPED | OUTPATIENT
Start: 2023-10-11 | End: 2023-10-11 | Stop reason: SDUPTHER

## 2023-10-11 RX ORDER — INSULIN GLARGINE 100 [IU]/ML
INJECTION, SOLUTION SUBCUTANEOUS
Qty: 5 ADJUSTABLE DOSE PRE-FILLED PEN SYRINGE | Refills: 3 | Status: SHIPPED | OUTPATIENT
Start: 2023-10-11

## 2023-10-11 RX ORDER — POTASSIUM CHLORIDE 20 MEQ/1
40 TABLET, EXTENDED RELEASE ORAL PRN
Status: DISCONTINUED | OUTPATIENT
Start: 2023-10-11 | End: 2023-10-11 | Stop reason: HOSPADM

## 2023-10-11 RX ORDER — INSULIN ASPART 100 [IU]/ML
3 INJECTION, SOLUTION INTRAVENOUS; SUBCUTANEOUS
Qty: 5 ADJUSTABLE DOSE PRE-FILLED PEN SYRINGE | Refills: 2 | Status: SHIPPED | OUTPATIENT
Start: 2023-10-11 | End: 2023-11-10

## 2023-10-11 RX ADMIN — POTASSIUM CHLORIDE 10 MEQ: 7.46 INJECTION, SOLUTION INTRAVENOUS at 01:43

## 2023-10-11 RX ADMIN — SODIUM PHOSPHATE, MONOBASIC, MONOHYDRATE AND SODIUM PHOSPHATE, DIBASIC, ANHYDROUS 15 MMOL: 142; 276 INJECTION, SOLUTION INTRAVENOUS at 07:08

## 2023-10-11 RX ADMIN — INSULIN GLARGINE 20 UNITS: 100 INJECTION, SOLUTION SUBCUTANEOUS at 09:16

## 2023-10-11 RX ADMIN — DEXTROSE AND SODIUM CHLORIDE: 5; 450 INJECTION, SOLUTION INTRAVENOUS at 01:44

## 2023-10-11 RX ADMIN — POTASSIUM CHLORIDE 10 MEQ: 7.46 INJECTION, SOLUTION INTRAVENOUS at 03:09

## 2023-10-11 RX ADMIN — POTASSIUM CHLORIDE 10 MEQ: 7.46 INJECTION, SOLUTION INTRAVENOUS at 04:19

## 2023-10-11 RX ADMIN — POTASSIUM CHLORIDE 10 MEQ: 7.46 INJECTION, SOLUTION INTRAVENOUS at 09:18

## 2023-10-11 RX ADMIN — SODIUM PHOSPHATE, MONOBASIC, MONOHYDRATE AND SODIUM PHOSPHATE, DIBASIC, ANHYDROUS 15 MMOL: 142; 276 INJECTION, SOLUTION INTRAVENOUS at 02:19

## 2023-10-11 RX ADMIN — POTASSIUM CHLORIDE 10 MEQ: 7.46 INJECTION, SOLUTION INTRAVENOUS at 05:46

## 2023-10-11 RX ADMIN — ENOXAPARIN SODIUM 40 MG: 100 INJECTION SUBCUTANEOUS at 09:17

## 2023-10-11 RX ADMIN — POTASSIUM CHLORIDE 10 MEQ: 7.46 INJECTION, SOLUTION INTRAVENOUS at 07:55

## 2023-10-11 RX ADMIN — POTASSIUM CHLORIDE 10 MEQ: 7.46 INJECTION, SOLUTION INTRAVENOUS at 10:49

## 2023-10-11 ASSESSMENT — PAIN SCALES - GENERAL
PAINLEVEL_OUTOF10: 0
PAINLEVEL_OUTOF10: 0

## 2023-10-11 NOTE — DISCHARGE SUMMARY
[facility]  Office: 7900  1826, DO, Briana Banda, DO, Mino Gauthier, DO, Elliot Villar, DO, Yuki Bonds MD, Sanrda Fernandez MD, Peewee Lilly MD, Maryse Smith MD,  Mary Richardson MD, Larissa Rodríguez MD, Kelly Templeton DO, Carla Melendez MD,  Darrius Rosenberg MD, Earnest Varner MD, Krystal Liz DO, Mouna Brown MD,  Raymundo Roe DO, Sina Arita MD, Rachael Osorio MD, Lan Strickland MD, Husam Morales MD,  Corazon Queen MD, Macrina Back MD, Lyla Asif MD, Mya Carvalho MD, Rabon Kocher, DO, Collette Rivers MD,  Maria Del Carmen Quigley MD, Darby Lam MD, Reyna Aguila, CNP,  Filiberto Carr, CNP,, Tom Rice, [facility],  Tiffany Buckley, [facility], Calli Ann, CNP, Diogenes Petersen, CNP, Duke Mario, CNP, Stacie Faust, CNP, Alicia Grover, CNP, Maira Carver, CNP, Manoj Pollard, CNS, Araceli Pichardo, CNP, Celsa La, [facility]    Discharge Summary     Patient ID: Chivo Dumont  :  2000   MRN: [id number]     ACCOUNT:  [de-identified]   Patient's PCP: No primary care provider on file. Admit Date: 10/9/2023   Discharge Date: 10/11/2023     Length of Stay: 2  Code Status:  Full Code  Admitting Physician: Peewee Lilly MD  Discharge Physician: Yaya Gustafson DO     Active Discharge Diagnoses:     Hospital Problem Lists:  Principal Problem:    DKA, type 1, not at goal [facility])  Active Problems:    Diabetic ketoacidosis without coma associated with diabetes mellitus due to underlying condition [facility])  Resolved Problems:    * No resolved hospital problems.  *      Admission Condition:  fair     Discharged Condition: stable    Hospital Stay:     Hospital Course:  Chivo Dumont is a 20 y.o. male who was admitted for the management of  DKA, type 1, not at goal [facility]) , presented to ER with Abdominal Pain, Emesis, and Nausea (Pt states that the he has been throwing up since

## 2023-10-11 NOTE — PLAN OF CARE
Problem: Discharge Planning  Goal: Discharge to home or other facility with appropriate resources  Outcome: Adequate for Discharge  Flowsheets (Taken 10/11/2023 0800 by Anant Guerrero RN)  Discharge to home or other facility with appropriate resources:   Identify barriers to discharge with patient and caregiver   Arrange for needed discharge resources and transportation as appropriate   Identify discharge learning needs (meds, wound care, etc)   Refer to discharge planning if patient needs post-hospital services based on physician order or complex needs related to functional status, cognitive ability or social support system     Problem: Pain  Goal: Verbalizes/displays adequate comfort level or baseline comfort level  Outcome: Adequate for Discharge  Flowsheets (Taken 10/11/2023 0800 by Anant Guerrero RN)  Verbalizes/displays adequate comfort level or baseline comfort level:   Encourage patient to monitor pain and request assistance   Assess pain using appropriate pain scale     Problem: Musculoskeletal - Adult  Goal: Return mobility to safest level of function  Outcome: Adequate for Discharge  Flowsheets (Taken 10/11/2023 0800 by Anant Guerrero RN)  Return Mobility to Safest Level of Function: Assess patient stability and activity tolerance for standing, transferring and ambulating with or without assistive devices  Goal: Return ADL status to a safe level of function  Outcome: Adequate for Discharge  Flowsheets (Taken 10/11/2023 0800 by Anant Guerrero RN)  Return ADL Status to a Safe Level of Function: Administer medication as ordered     Problem: Gastrointestinal - Adult  Goal: Minimal or absence of nausea and vomiting  Outcome: Adequate for Discharge  Goal: Maintains or returns to baseline bowel function  Outcome: Adequate for Discharge  Flowsheets (Taken 10/11/2023 0800 by Anant Guerrero RN)  Maintains or returns to baseline bowel function: Assess bowel function  Goal: Maintains adequate
Problem: Discharge Planning  Goal: Discharge to home or other facility with appropriate resources  Outcome: Progressing     Problem: Pain  Goal: Verbalizes/displays adequate comfort level or baseline comfort level  Outcome: Progressing     Problem: Musculoskeletal - Adult  Goal: Return mobility to safest level of function  Outcome: Progressing     Problem: Gastrointestinal - Adult  Goal: Minimal or absence of nausea and vomiting  Outcome: Progressing     Problem: Metabolic/Fluid and Electrolytes - Adult  Goal: Electrolytes maintained within normal limits  Outcome: Progressing
Problem: Discharge Planning  Goal: Discharge to home or other facility with appropriate resources  Outcome: Progressing  Flowsheets (Taken 10/9/2023 1730)  Discharge to home or other facility with appropriate resources:   Identify barriers to discharge with patient and caregiver   Arrange for needed discharge resources and transportation as appropriate   Identify discharge learning needs (meds, wound care, etc)   Refer to discharge planning if patient needs post-hospital services based on physician order or complex needs related to functional status, cognitive ability or social support system     Problem: Pain  Goal: Verbalizes/displays adequate comfort level or baseline comfort level  Outcome: Progressing  Flowsheets (Taken 10/9/2023 1730)  Verbalizes/displays adequate comfort level or baseline comfort level:   Encourage patient to monitor pain and request assistance   Assess pain using appropriate pain scale   Administer analgesics based on type and severity of pain and evaluate response   Implement non-pharmacological measures as appropriate and evaluate response   Consider cultural and social influences on pain and pain management   Notify Licensed Independent Practitioner if interventions unsuccessful or patient reports new pain     Problem: Musculoskeletal - Adult  Goal: Return mobility to safest level of function  Outcome: Progressing  Flowsheets (Taken 10/9/2023 1730)  Return Mobility to Safest Level of Function:   Assess patient stability and activity tolerance for standing, transferring and ambulating with or without assistive devices   Assist with transfers and ambulation using safe patient handling equipment as needed   Ensure adequate protection for wounds/incisions during mobilization   Obtain physical therapy/occupational therapy consults as needed   Instruct patient/family in ordered activity level  Goal: Return ADL status to a safe level of function  Outcome: Progressing  Flowsheets (Taken
mobilization   Obtain physical therapy/occupational therapy consults as needed   Apply continuous passive motion per provider or physical therapy orders to increase flexion toward goal   Instruct patient/family in ordered activity level     Problem: Musculoskeletal - Adult  Goal: Return ADL status to a safe level of function  Outcome: Progressing  Flowsheets  Taken 10/10/2023 2259 by Starr Lopez RN  Return ADL Status to a Safe Level of Function:   Administer medication as ordered   Assess activities of daily living deficits and provide assistive devices as needed   Obtain physical therapy/occupational therapy consults as needed   Assist and instruct patient to increase activity and self care as tolerated     Problem: Gastrointestinal - Adult  Goal: Minimal or absence of nausea and vomiting  10/10/2023 2259 by Starr Lopez RN  Outcome: Progressing  Flowsheets  Taken 10/10/2023 2259 by Starr Lopez RN  Minimal or absence of nausea and vomiting:   Administer IV fluids as ordered to ensure adequate hydration   Maintain NPO status until nausea and vomiting are resolved   Nasogastric tube to low intermittent suction as ordered   Administer ordered antiemetic medications as needed   Provide nonpharmacologic comfort measures as appropriate   Advance diet as tolerated, if ordered   Nutrition consult to assist patient with adequate nutrition and appropriate food choices     Problem: Metabolic/Fluid and Electrolytes - Adult  Goal: Electrolytes maintained within normal limits  10/10/2023 2259 by Starr Lopez RN  Outcome: Progressing  Flowsheets  Taken 10/10/2023 2259 by Starr Lopez RN  Electrolytes maintained within normal limits:   Monitor labs and assess patient for signs and symptoms of electrolyte imbalances   Administer electrolyte replacement as ordered   Monitor response to electrolyte replacements, including repeat lab results as appropriate   Fluid restriction as ordered   Instruct patient on fluid and nutrition
Progressing  Flowsheets (Taken 10/9/2023 1730)  Minimal or absence of nausea and vomiting:   Administer IV fluids as ordered to ensure adequate hydration   Maintain NPO status until nausea and vomiting are resolved   Administer ordered antiemetic medications as needed   Provide nonpharmacologic comfort measures as appropriate   Nutrition consult to assist patient with adequate nutrition and appropriate food choices   Advance diet as tolerated, if ordered  Goal: Maintains or returns to baseline bowel function  10/9/2023 2117 by Susie Melendez RN  Outcome: Progressing  Flowsheets (Taken 10/9/2023 1730)  Maintains or returns to baseline bowel function:   Assess bowel function   Encourage oral fluids to ensure adequate hydration   Administer IV fluids as ordered to ensure adequate hydration   Administer ordered medications as needed   Encourage mobilization and activity   Nutrition consult to assist patient with appropriate food choices  Goal: Maintains adequate nutritional intake  10/9/2023 2117 by Susie Melendez RN  Outcome: Progressing  Flowsheets (Taken 10/9/2023 1730)  Maintains adequate nutritional intake:   Monitor percentage of each meal consumed   Identify factors contributing to decreased intake, treat as appropriate   Assist with meals as needed   Monitor intake and output, weight and lab values   Obtain nutritional consult as needed     Problem: Metabolic/Fluid and Electrolytes - Adult  Goal: Electrolytes maintained within normal limits  10/9/2023 2248 by Lorenzo Merino RN  Outcome: Progressing  Flowsheets (Taken 10/9/2023 2248)  Electrolytes maintained within normal limits:   Monitor labs and assess patient for signs and symptoms of electrolyte imbalances   Administer electrolyte replacement as ordered   Monitor response to electrolyte replacements, including repeat lab results as appropriate  10/9/2023 2117 by Susie Melendez RN  Outcome: Progressing  Flowsheets (Taken 10/9/2023 1730)  Electrolytes

## 2023-12-14 ENCOUNTER — HOSPITAL ENCOUNTER (EMERGENCY)
Age: 23
Discharge: HOME | End: 2023-12-14
Attending: EMERGENCY MEDICINE
Payer: MEDICAID

## 2023-12-14 VITALS
HEIGHT: 71 IN | WEIGHT: 140 LBS | SYSTOLIC BLOOD PRESSURE: 143 MMHG | BODY MASS INDEX: 19.6 KG/M2 | OXYGEN SATURATION: 100 % | TEMPERATURE: 98 F | HEART RATE: 76 BPM | RESPIRATION RATE: 16 BRPM | DIASTOLIC BLOOD PRESSURE: 100 MMHG

## 2023-12-14 DIAGNOSIS — E10.8: ICD-10-CM

## 2023-12-14 DIAGNOSIS — E10.65 TYPE 1 DIABETES MELLITUS WITH HYPERGLYCEMIA (HCC): ICD-10-CM

## 2023-12-14 DIAGNOSIS — Z76.0 ENCOUNTER FOR MEDICATION REFILL: Primary | ICD-10-CM

## 2023-12-14 DIAGNOSIS — E10.10 DKA, TYPE 1, NOT AT GOAL (HCC): ICD-10-CM

## 2023-12-14 PROCEDURE — 99283 EMERGENCY DEPT VISIT LOW MDM: CPT

## 2023-12-14 RX ORDER — INSULIN ASPART 100 [IU]/ML
3 INJECTION, SOLUTION INTRAVENOUS; SUBCUTANEOUS
Qty: 5 ADJUSTABLE DOSE PRE-FILLED PEN SYRINGE | Refills: 2 | Status: SHIPPED | OUTPATIENT
Start: 2023-12-14 | End: 2024-01-13

## 2023-12-14 RX ORDER — PEN NEEDLE, DIABETIC 32GX 5/32"
NEEDLE, DISPOSABLE MISCELLANEOUS
Qty: 120 EACH | Refills: 3 | Status: SHIPPED | OUTPATIENT
Start: 2023-12-14

## 2023-12-14 RX ORDER — INSULIN GLARGINE 100 [IU]/ML
INJECTION, SOLUTION SUBCUTANEOUS
Qty: 5 ADJUSTABLE DOSE PRE-FILLED PEN SYRINGE | Refills: 3 | Status: SHIPPED | OUTPATIENT
Start: 2023-12-14

## 2024-07-29 NOTE — TELEPHONE ENCOUNTER
[facility] Transitions Initial Follow Up Call    Outreach made within 2 business days of discharge: Yes    Patient: Jerry Milligan   Patient : 2000   MRN: [id number]    Reason for Admission: DKA type 1   Discharge Date: 2022       Spoke with: phone is off, goes straight to . Left HIPAA compliant message identifying self and nature of call, requested call back to writer or office, phone numbers given. Discharge department/facility: [place] [address] B Stepdown      Scheduled appointment with PCP within 7-14 days    Follow Up  No future appointments.     Howard Brower RN Patient continues to complain about SOB with activity and at rest, supplemetnal oxygen placed on patient but patient non-compliant with wearing nasal cannula.  Seen by respiratory therapy for scheduled Duonebs 4x daily and inhaler 2x daily.   Patient taught pursed lip breathing when short of breath.   Continuing IV steroids.

## 2024-07-30 NOTE — CARE COORDINATION
10/10/23 1549   Service Assessment   Patient Orientation Alert and Oriented;Person;Place;Situation;Self   Cognition Alert   History Provided By Patient   Primary Annette Asher Parent   Patient's Healthcare Decision Maker is: Legal Next of Kin   PCP Verified by CM Yes  (list provided)   Last Visit to PCP Within last year   Prior Functional Level Independent in ADLs/IADLs   Current Functional Level Independent in ADLs/IADLs   Can patient return to prior living arrangement Yes   Ability to make needs known: Good   Family able to assist with home care needs: Yes   Would you like for me to discuss the discharge plan with any other family members/significant others, and if so, who? No   Financial Resources None   [employer] None   CM/SW Referral Other (see comment)  (Diabetes education and mangmnt ordered.)   Discharge Planning   Type of Residence House   Living Arrangements Parent   Current Services Prior To Admission Durable Medical Equipment   Current DME Prior to C/Katya Chon [facility]der [address] Needed N/A   DME Ordered? No   Potential Assistance Purchasing Medications No   Type of Home Care Services None   Patient expects to be discharged to: House   Follow Up Appointment: Best Day/Time  Monday AM   One/Two Story Residence Two story   # of Interior Steps 13   [employer] Both   Lift Chair Available No   History of falls? 0   Services At/After Discharge   Transition of Care Consult (CM Consult) N/A   Services At/After Discharge Outpatient care transitions  (Diabetes ed. and mngmnt)   [facility] Provided? No   Mode of Transport at Discharge Other (see comment)  (family/friend)   Confirm Follow Up Transport Family   Condition of Participation: Discharge Planning   The Plan for Transition of Care is related to the following treatment goals: Home independently. Has glucometer and supplies.    The Patient and/or Patient Representative was provided with a Choice of Age appropriate behavior

## 2024-08-23 ENCOUNTER — HOSPITAL ENCOUNTER (INPATIENT)
Age: 24
LOS: 1 days | Discharge: HOME | DRG: 420 | End: 2024-08-24
Attending: EMERGENCY MEDICINE | Admitting: HOSPITALIST
Payer: MEDICAID

## 2024-08-23 DIAGNOSIS — E10.10 DIABETIC KETOACIDOSIS WITHOUT COMA ASSOCIATED WITH TYPE 1 DIABETES MELLITUS (HCC): Primary | ICD-10-CM

## 2024-08-23 DIAGNOSIS — E10.65 TYPE 1 DIABETES MELLITUS WITH HYPERGLYCEMIA (HCC): ICD-10-CM

## 2024-08-23 LAB
ALBUMIN SERPL-MCNC: 4.5 G/DL (ref 3.5–5.2)
ALBUMIN/GLOB SERPL: 2 {RATIO} (ref 1–2.5)
ALP SERPL-CCNC: 94 U/L (ref 40–129)
ALT SERPL-CCNC: 18 U/L (ref 10–50)
ANION GAP SERPL CALCULATED.3IONS-SCNC: 13 MMOL/L (ref 9–16)
ANION GAP SERPL CALCULATED.3IONS-SCNC: 19 MMOL/L (ref 9–16)
AST SERPL-CCNC: 19 U/L (ref 10–50)
B-OH-BUTYR SERPL-MCNC: 4.62 MMOL/L (ref 0.02–0.27)
BASOPHILS # BLD: 0.05 K/UL (ref 0–0.2)
BASOPHILS NFR BLD: 1 % (ref 0–2)
BILIRUB SERPL-MCNC: 0.6 MG/DL (ref 0–1.2)
BUN BLD-MCNC: 17 MG/DL (ref 8–26)
BUN SERPL-MCNC: 14 MG/DL (ref 6–20)
BUN SERPL-MCNC: 17 MG/DL (ref 6–20)
CA-I BLD-SCNC: 1.09 MMOL/L (ref 1.15–1.33)
CALCIUM SERPL-MCNC: 9 MG/DL (ref 8.6–10.4)
CALCIUM SERPL-MCNC: 9.1 MG/DL (ref 8.6–10.4)
CHLORIDE BLD-SCNC: 100 MMOL/L (ref 98–107)
CHLORIDE SERPL-SCNC: 101 MMOL/L (ref 98–107)
CHLORIDE SERPL-SCNC: 94 MMOL/L (ref 98–107)
CO2 BLD CALC-SCNC: 22 MMOL/L (ref 22–30)
CO2 SERPL-SCNC: 18 MMOL/L (ref 20–31)
CO2 SERPL-SCNC: 21 MMOL/L (ref 20–31)
CREAT SERPL-MCNC: 0.8 MG/DL (ref 0.7–1.2)
CREAT SERPL-MCNC: 1 MG/DL (ref 0.7–1.2)
EGFR, POC: >90 ML/MIN/1.73M2
EOSINOPHIL # BLD: 0.18 K/UL (ref 0–0.44)
EOSINOPHILS RELATIVE PERCENT: 4 % (ref 1–4)
ERYTHROCYTE [DISTWIDTH] IN BLOOD BY AUTOMATED COUNT: 12.7 % (ref 11.8–14.4)
GFR, ESTIMATED: >90 ML/MIN/1.73M2
GFR, ESTIMATED: >90 ML/MIN/1.73M2
GLUCOSE BLD-MCNC: 180 MG/DL (ref 75–110)
GLUCOSE BLD-MCNC: 208 MG/DL (ref 75–110)
GLUCOSE BLD-MCNC: 213 MG/DL (ref 75–110)
GLUCOSE BLD-MCNC: 364 MG/DL (ref 75–110)
GLUCOSE BLD-MCNC: 548 MG/DL (ref 75–110)
GLUCOSE BLD-MCNC: >600 MG/DL (ref 75–110)
GLUCOSE BLD-MCNC: >700 MG/DL (ref 74–100)
GLUCOSE SERPL-MCNC: 251 MG/DL (ref 74–99)
GLUCOSE SERPL-MCNC: 621 MG/DL (ref 74–99)
HCO3 VENOUS: 21.6 MMOL/L (ref 22–29)
HCT VFR BLD AUTO: 40 % (ref 40.7–50.3)
HCT VFR BLD AUTO: 45 % (ref 41–53)
HGB BLD-MCNC: 13.2 G/DL (ref 13–17)
IMM GRANULOCYTES # BLD AUTO: <0.03 K/UL (ref 0–0.3)
IMM GRANULOCYTES NFR BLD: 0 %
LIPASE SERPL-CCNC: 30 U/L (ref 13–60)
LYMPHOCYTES NFR BLD: 1.07 K/UL (ref 1.1–3.7)
LYMPHOCYTES RELATIVE PERCENT: 26 % (ref 24–43)
MAGNESIUM SERPL-MCNC: 2.2 MG/DL (ref 1.6–2.6)
MCH RBC QN AUTO: 29.1 PG (ref 25.2–33.5)
MCHC RBC AUTO-ENTMCNC: 33 G/DL (ref 28.4–34.8)
MCV RBC AUTO: 88.3 FL (ref 82.6–102.9)
MONOCYTES NFR BLD: 0.36 K/UL (ref 0.1–1.2)
MONOCYTES NFR BLD: 9 % (ref 3–12)
NEGATIVE BASE EXCESS, VEN: 4.7 MMOL/L (ref 0–2)
NEUTROPHILS NFR BLD: 60 % (ref 36–65)
NEUTS SEG NFR BLD: 2.53 K/UL (ref 1.5–8.1)
NRBC BLD-RTO: 0 PER 100 WBC
O2 SAT, VEN: 34.3 % (ref 60–85)
PCO2 VENOUS: 43.7 MM HG (ref 41–51)
PH VENOUS: 7.3 (ref 7.32–7.43)
PHOSPHATE SERPL-MCNC: 3.3 MG/DL (ref 2.5–4.5)
PLATELET # BLD AUTO: 306 K/UL (ref 138–453)
PMV BLD AUTO: 12 FL (ref 8.1–13.5)
PO2 VENOUS: 23 MM HG (ref 30–50)
POC ANION GAP: 7 MMOL/L (ref 7–16)
POC CREATININE: 0.8 MG/DL (ref 0.51–1.19)
POC HEMOGLOBIN (CALC): 15.3 G/DL (ref 13.5–17.5)
POC LACTIC ACID: 1.6 MMOL/L (ref 0.56–1.39)
POTASSIUM BLD-SCNC: 5.4 MMOL/L (ref 3.5–4.5)
POTASSIUM SERPL-SCNC: 4.1 MMOL/L (ref 3.7–5.3)
POTASSIUM SERPL-SCNC: 5.4 MMOL/L (ref 3.7–5.3)
PROT SERPL-MCNC: 7.4 G/DL (ref 6.6–8.7)
RBC # BLD AUTO: 4.53 M/UL (ref 4.21–5.77)
SODIUM BLD-SCNC: 128 MMOL/L (ref 138–146)
SODIUM SERPL-SCNC: 131 MMOL/L (ref 136–145)
SODIUM SERPL-SCNC: 135 MMOL/L (ref 136–145)
WBC OTHER # BLD: 4.2 K/UL (ref 3.5–11.3)

## 2024-08-23 PROCEDURE — 85014 HEMATOCRIT: CPT

## 2024-08-23 PROCEDURE — 80051 ELECTROLYTE PANEL: CPT

## 2024-08-23 PROCEDURE — 85025 COMPLETE CBC W/AUTO DIFF WBC: CPT

## 2024-08-23 PROCEDURE — 84520 ASSAY OF UREA NITROGEN: CPT

## 2024-08-23 PROCEDURE — 82010 KETONE BODYS QUAN: CPT

## 2024-08-23 PROCEDURE — 82947 ASSAY GLUCOSE BLOOD QUANT: CPT

## 2024-08-23 PROCEDURE — 83690 ASSAY OF LIPASE: CPT

## 2024-08-23 PROCEDURE — 83735 ASSAY OF MAGNESIUM: CPT

## 2024-08-23 PROCEDURE — 80053 COMPREHEN METABOLIC PANEL: CPT

## 2024-08-23 PROCEDURE — 2060000000 HC ICU INTERMEDIATE R&B

## 2024-08-23 PROCEDURE — 80048 BASIC METABOLIC PNL TOTAL CA: CPT

## 2024-08-23 PROCEDURE — 81001 URINALYSIS AUTO W/SCOPE: CPT

## 2024-08-23 PROCEDURE — 6370000000 HC RX 637 (ALT 250 FOR IP)

## 2024-08-23 PROCEDURE — 99285 EMERGENCY DEPT VISIT HI MDM: CPT

## 2024-08-23 PROCEDURE — 83605 ASSAY OF LACTIC ACID: CPT

## 2024-08-23 PROCEDURE — 99222 1ST HOSP IP/OBS MODERATE 55: CPT | Performed by: HOSPITALIST

## 2024-08-23 PROCEDURE — 82803 BLOOD GASES ANY COMBINATION: CPT

## 2024-08-23 PROCEDURE — 6360000002 HC RX W HCPCS: Performed by: HOSPITALIST

## 2024-08-23 PROCEDURE — 2580000003 HC RX 258

## 2024-08-23 PROCEDURE — 2580000003 HC RX 258: Performed by: HOSPITALIST

## 2024-08-23 PROCEDURE — 36415 COLL VENOUS BLD VENIPUNCTURE: CPT

## 2024-08-23 PROCEDURE — 82330 ASSAY OF CALCIUM: CPT

## 2024-08-23 PROCEDURE — 83036 HEMOGLOBIN GLYCOSYLATED A1C: CPT

## 2024-08-23 PROCEDURE — 84100 ASSAY OF PHOSPHORUS: CPT

## 2024-08-23 PROCEDURE — 82565 ASSAY OF CREATININE: CPT

## 2024-08-23 RX ORDER — 0.9 % SODIUM CHLORIDE 0.9 %
1000 INTRAVENOUS SOLUTION INTRAVENOUS ONCE
Status: COMPLETED | OUTPATIENT
Start: 2024-08-23 | End: 2024-08-23

## 2024-08-23 RX ORDER — 0.9 % SODIUM CHLORIDE 0.9 %
15 INTRAVENOUS SOLUTION INTRAVENOUS ONCE
Status: DISCONTINUED | OUTPATIENT
Start: 2024-08-23 | End: 2024-08-24

## 2024-08-23 RX ORDER — SODIUM CHLORIDE 9 MG/ML
INJECTION, SOLUTION INTRAVENOUS CONTINUOUS
Status: DISCONTINUED | OUTPATIENT
Start: 2024-08-23 | End: 2024-08-24

## 2024-08-23 RX ORDER — MAGNESIUM SULFATE IN WATER 40 MG/ML
2000 INJECTION, SOLUTION INTRAVENOUS PRN
Status: DISCONTINUED | OUTPATIENT
Start: 2024-08-23 | End: 2024-08-24

## 2024-08-23 RX ORDER — MAGNESIUM SULFATE IN WATER 40 MG/ML
2000 INJECTION, SOLUTION INTRAVENOUS PRN
Status: DISCONTINUED | OUTPATIENT
Start: 2024-08-23 | End: 2024-08-24 | Stop reason: HOSPADM

## 2024-08-23 RX ORDER — DEXTROSE MONOHYDRATE AND SODIUM CHLORIDE 5; .45 G/100ML; G/100ML
INJECTION, SOLUTION INTRAVENOUS CONTINUOUS PRN
Status: DISCONTINUED | OUTPATIENT
Start: 2024-08-23 | End: 2024-08-24 | Stop reason: HOSPADM

## 2024-08-23 RX ORDER — POTASSIUM CHLORIDE 7.45 MG/ML
10 INJECTION INTRAVENOUS PRN
Status: DISCONTINUED | OUTPATIENT
Start: 2024-08-23 | End: 2024-08-24

## 2024-08-23 RX ORDER — ENOXAPARIN SODIUM 100 MG/ML
40 INJECTION SUBCUTANEOUS DAILY
Status: DISCONTINUED | OUTPATIENT
Start: 2024-08-24 | End: 2024-08-24 | Stop reason: HOSPADM

## 2024-08-23 RX ORDER — DEXTROSE MONOHYDRATE, SODIUM CHLORIDE, AND POTASSIUM CHLORIDE 50; 1.49; 4.5 G/1000ML; G/1000ML; G/1000ML
INJECTION, SOLUTION INTRAVENOUS CONTINUOUS PRN
Status: DISCONTINUED | OUTPATIENT
Start: 2024-08-23 | End: 2024-08-24

## 2024-08-23 RX ORDER — POLYETHYLENE GLYCOL 3350 17 G/17G
17 POWDER, FOR SOLUTION ORAL DAILY PRN
Status: DISCONTINUED | OUTPATIENT
Start: 2024-08-23 | End: 2024-08-24 | Stop reason: HOSPADM

## 2024-08-23 RX ADMIN — POTASSIUM CHLORIDE 10 MEQ: 7.46 INJECTION, SOLUTION INTRAVENOUS at 22:49

## 2024-08-23 RX ADMIN — DEXTROSE AND SODIUM CHLORIDE: 5; 450 INJECTION, SOLUTION INTRAVENOUS at 21:26

## 2024-08-23 RX ADMIN — SODIUM CHLORIDE 1000 ML: 9 INJECTION, SOLUTION INTRAVENOUS at 15:47

## 2024-08-23 RX ADMIN — SODIUM CHLORIDE: 9 INJECTION, SOLUTION INTRAVENOUS at 18:26

## 2024-08-23 RX ADMIN — SODIUM CHLORIDE 10.8 UNITS/HR: 9 INJECTION, SOLUTION INTRAVENOUS at 18:28

## 2024-08-23 ASSESSMENT — ENCOUNTER SYMPTOMS
BACK PAIN: 0
NAUSEA: 1
DIARRHEA: 0
SHORTNESS OF BREATH: 0
COUGH: 0
VOMITING: 1
ABDOMINAL PAIN: 1

## 2024-08-23 ASSESSMENT — PAIN SCALES - GENERAL: PAINLEVEL_OUTOF10: 5

## 2024-08-23 ASSESSMENT — LIFESTYLE VARIABLES
HOW OFTEN DO YOU HAVE A DRINK CONTAINING ALCOHOL: 4 OR MORE TIMES A WEEK
HOW MANY STANDARD DRINKS CONTAINING ALCOHOL DO YOU HAVE ON A TYPICAL DAY: 3 OR 4
HOW OFTEN DO YOU HAVE A DRINK CONTAINING ALCOHOL: NEVER

## 2024-08-23 ASSESSMENT — PAIN DESCRIPTION - LOCATION: LOCATION: ABDOMEN

## 2024-08-23 ASSESSMENT — PAIN - FUNCTIONAL ASSESSMENT: PAIN_FUNCTIONAL_ASSESSMENT: 0-10

## 2024-08-23 NOTE — ED NOTES
Patient presents to ED c/o abdominal pain, nausea, and vomiting since yesterday. Patient is type I diabetic supposed to take novalog and lantus. Patient has been out of lantus x2-3 weeks because his insurance stopped covering it. Patient reports BS 500s at home PTA for which he took 16 units novalog SQ PTA.  Patient is alert and oriented x4, answering questions appropriately. Respirations even and unlabored. Patient changed into gown, placed on full cardiac monitor, BP cuff, and pulse ox. IV established. Call light within reach. Will continue to monitor.

## 2024-08-23 NOTE — ED PROVIDER NOTES
[facility]  Emergency Department  Faculty Attestation     I performed a history and physical examination of the patient and discussed management with the resident. I reviewed the resident’s note and agree with the documented findings and plan of care. Any areas of disagreement are noted on the chart. I was personally present for the key portions of any procedures. I have documented in the chart those procedures where I was not present during the key portions. I have reviewed the emergency nurses triage note. I agree with the chief complaint, past medical history, past surgical history, allergies, medications, social and family history as documented unless otherwise noted below.    For Physician Assistant/ Nurse Practitioner cases/documentation I have personally evaluated this patient and have completed at least one if not all key elements of the E/M (history, physical exam, and MDM). Additional findings are as noted.    Preliminary note started at 3:31 PM EDT    Primary Care Physician:  Amanda Enrique APRN - CNP    Screenings:  [unfilled]    CHIEF COMPLAINT     No chief complaint on file.      RECENT VITALS:   There were no vitals taken for this visit.    LABS:  Labs Reviewed - No data to display    Radiology  No orders to display       CRITICAL CARE: There was a high probability of clinically significant/life threatening deterioration in this patient's condition which required my urgent intervention.  Total critical care time was none minutes.  This excludes any time for separately reportable procedures.     EKG:      Attending Physician Additional  Notes    Patient is vomiting and epigastric abdominal pain worse with vomiting.  No blood in the emesis or in the stools.  No diarrhea.  History of type 1 diabetes, DKA, out of his long-acting insulin, using only regular, checking sugars 4 times a day, normally sugars have been reasonable range except for the morning sugars as

## 2024-08-23 NOTE — ED PROVIDER NOTES
Care of Guillermina Warner was assumed from previous attending and is being seen for Abdominal Pain and Emesis  .  The patient's initial evaluation and plan have been discussed with the prior provider who initially evaluated the patient.    Handoff taken on the following patient from prior Attending Physician:Dr. Harper 5:21 PM EDT      Attestation    I was available and discussed any additional care issues that arose and coordinated the management plans with the resident(s) caring for the patient during my duty period. Any areas of disagreement with resident’s documentation of care or procedures are noted on the chart. I was personally present for the key portions of any/all procedures during my duty period. I have documented in the chart those procedures where I was not present during the key portions.      EMERGENCY DEPARTMENT COURSE / MEDICAL DECISION MAKING:       MEDICATIONS GIVEN:  Orders Placed This Encounter   Medications    sodium chloride 0.9 % bolus 1,000 mL    insulin regular (HUMULIN R;NOVOLIN R) 100 Units in sodium chloride 0.9 % 100 mL infusion     Order Specific Question:   Goal Blood Glucose Range     Answer:   DKA: 150-200     Order Specific Question:   Calculate initial bolus with the embedded Insulin Calculator?     Answer:   No    OR Linked Order Group     dextrose bolus 10% 125 mL     dextrose bolus 10% 250 mL    potassium chloride 10 mEq/100 mL IVPB (Peripheral Line)    magnesium sulfate 2000 mg in 50 mL IVPB premix    sodium phosphate 15 mmol in sodium chloride 0.9 % 250 mL IVPB    dextrose 5 % and 0.45 % NaCl with KCl 20 mEq infusion    FOLLOWED BY Linked Order Group     sodium chloride 0.9 % bolus 953 mL     0.9 % sodium chloride infusion       LABS / RADIOLOGY:     Labs Reviewed   CBC WITH AUTO DIFFERENTIAL - Abnormal; Notable for the following components:       Result Value    Hematocrit 40.0 (*)     Lymphocytes Absolute 1.07 (*)     All other components within normal limits   COMPREHENSIVE  METABOLIC PANEL - Abnormal; Notable for the following components:    Sodium 131 (*)     Potassium 5.4 (*)     Chloride 94 (*)     CO2 18 (*)     Anion Gap 19 (*)     Glucose 621 (*)     All other components within normal limits   ELECTROLYTES PLUS - Abnormal; Notable for the following components:    POC Sodium 128 (*)     POC Potassium 5.4 (*)     All other components within normal limits   CALCIUM, IONIC (POC) - Abnormal; Notable for the following components:    POC Ionized Calcium 1.09 (*)     All other components within normal limits   BETA-HYDROXYBUTYRATE - Abnormal; Notable for the following components:    Beta-Hydroxybutyrate 4.62 (*)     All other components within normal limits   POC GLUCOSE FINGERSTICK - Abnormal; Notable for the following components:    POC Glucose 548 (*)     All other components within normal limits   VENOUS BLOOD GAS, POINT OF CARE - Abnormal; Notable for the following components:    pH, [place] 7.303 (*)     PO2, [place] 23.0 (*)     HCO3, Venous 21.6 (*)     Negative Base Excess, [place] 4.7 (*)     O2 Sat, [place] 34.3 (*)     All other components within normal limits   LACTIC ACID,POINT OF CARE - Abnormal; Notable for the following components:    POC Lactic Acid 1.6 (*)     All other components within normal limits   POCT GLUCOSE - Abnormal; Notable for the following components:    POC Glucose >700 (*)     All other components within normal limits   POC GLUCOSE FINGERSTICK - Abnormal; Notable for the following components:    POC Glucose >600 (*)     All other components within normal limits   LIPASE   HGB/HCT   URINALYSIS WITH REFLEX TO CULTURE   CREATININE W/GFR POINT OF CARE   UREA N (POC)       No results found.    RECENT VITALS:     Temp: 97.1 °F (36.2 °C),  Pulse: 74, Respirations: 16, BP: 124/79, SpO2: 100 %    This patient is a 23 y.o. Male with type 1 diabetic unable to get long acting meds due to insurance,  Nausea and vomiting, and concern for dka    OUTSTANDING TASKS / RECOMMENDATIONS:

## 2024-08-23 NOTE — ED PROVIDER NOTES
[facility]A STEPDOWN  Emergency Department Encounter  Emergency Medicine Resident     Pt Name:Guillermina Warner  MRN: [id number]  Birthdate 2000  Date of evaluation: 8/23/24  PCP:  Amanda Enrique APRN - CNP  Note Started: 5:54 PM EDT      CHIEF COMPLAINT       Chief Complaint   Patient presents with    Abdominal Pain    Emesis       HISTORY OF PRESENT ILLNESS  (Location/Symptom, Timing/Onset, Context/Setting, Quality, Duration, Modifying Factors, Severity.)      Guillermina Warner is a 23 y.o. male who presents with generalized fatigue with nausea and vomiting.  Patient states that he ran out of his long-acting insulin for the past 2 weeks and has been using his short acting insulin.  Patient states that his symptoms began yesterday and progressively worsened.  Patient measured his glucose at home prior to arrival and states that his readings were in the 500s.  Denies any fever/chills, chest pain, shortness of breath, headaches or any difficulties with bladder/bowel movements.    PAST MEDICAL / SURGICAL / SOCIAL / FAMILY HISTORY      has a past medical history of JARETT (acute kidney injury) (HCC), Diabetes mellitus (HCC), and Type 1 diabetes mellitus (HCC).       has no past surgical history on file.      Social History     Socioeconomic History    Marital status: Single     Spouse name: Not on file    Number of children: Not on file    Years of education: Not on file    Highest education level: Not on file   Occupational History     Employer: N/A   Tobacco Use    Smoking status: Some Days     Current packs/day: 0.25     Average packs/day: 0.3 packs/day for 2.0 years (0.5 ttl pk-yrs)     Types: Cigarettes    Smokeless tobacco: Never   Vaping Use    Vaping status: Never Used   Substance and Sexual Activity    Alcohol use: Yes     Alcohol/week: 10.0 standard drinks of alcohol     Types: 10 Shots of liquor per week    Drug use: Yes     Types: Marijuana (Weed)     Comment: occasional    Sexual activity: Yes     Partners:    Insulin Pen Needle ([employer] PEN NEEDLES) 31G X 6 MM MISC 1 each by Does not apply route daily  Patient not taking: Reported on 6/7/2023 2/24/23   Nav Strickland DO   Lancets MISC 1 each by Does not apply route daily 2/24/23   Nav Strickland DO   blood glucose monitor strips Test 2 times a day & as needed for symptoms of irregular blood glucose. Dispense sufficient amount for indicated testing frequency plus additional to accommodate PRN testing needs. 2/24/23   Nav Strickland DO   glucose monitoring (FREESTYLE FREEDOM) kit 1 kit by Does not apply route daily  Patient not taking: Reported on 6/7/2023 2/24/23   StricklandNav travis DO   Continuous Blood Gluc Sensor (FREESTYLE MALKA SENSOR SYSTEM) [facility] [place] Type 1 DM. Check blood sugars 5-6/day  Patient not taking: Reported on 6/7/2023 3/10/22   Kang Fletcher MD   Continuous Blood Gluc  (FREESTYLE MALKA 14 DAY READER) JAYCE Check blood sugars 5-6/day. Type 1 DM  Patient not taking: Reported on 6/7/2023 3/1/22   Margarita Montoya MD   Insulin Pen Needle 30G X 5 MM MISC 1 Device by Does not apply route 4 times daily  Patient not taking: Reported on 6/7/2023 12/22/20   Leander Barraza, APRN - CNP   EPINEPHrine (EPIPEN JR 2-ITALO) 0.15 MG/0.3ML SOAJ Use as directed for allergic reaction 10/2/20   Zoila Osman MD         REVIEW OF SYSTEMS       Review of Systems   Constitutional:  Positive for fatigue. Negative for chills and fever.   Respiratory:  Negative for cough and shortness of breath.    Cardiovascular:  Negative for chest pain and leg swelling.   Gastrointestinal:  Positive for abdominal pain, nausea and vomiting. Negative for diarrhea.   Genitourinary:  Negative for dysuria and frequency.   Musculoskeletal:  Negative for back pain and neck pain.   Skin:  Negative for rash.   Neurological:  Negative for dizziness, syncope, numbness and headaches.   All other systems reviewed and are negative.      PHYSICAL EXAM      INITIAL VITALS:   /79   Pulse 74   Temp

## 2024-08-23 NOTE — ED NOTES
ED to inpatient nurses report      Chief Complaint:  Chief Complaint   Patient presents with    Abdominal Pain    Emesis     Present to ED from: home    MOA:     LOC: alert and orientated to name, place, date  Mobility: Independent  Oxygen Baseline: room air    Current needs required: room air   Pending ED orders: UA, insulin gtt  Present condition: stable    Why did the patient come to the ED? Nausea and vomiting  What is the plan? Insulin gtt  Any procedures or intervention occur? IV, IVF, labs  Any safety concerns?? none    Mental Status:  Level of Consciousness: Alert (0)    Psych Assessment:      Vital signs   Vitals:    08/23/24 1532 08/23/24 1535   BP:  124/79   Pulse: 74    Resp: 16    Temp: 97.1 °F (36.2 °C)    TempSrc: Axillary    SpO2: 100%    Weight: 63.5 kg (140 lb)    Height: 1.803 m (5' 11\")         Vitals:  Patient Vitals for the past 24 hrs:   BP Temp Temp src Pulse Resp SpO2 Height Weight   08/23/24 1535 124/79 -- -- -- -- -- -- --   08/23/24 1532 -- 97.1 °F (36.2 °C) Axillary 74 16 100 % 1.803 m (5' 11\") 63.5 kg (140 lb)      Visit Vitals  /79   Pulse 74   Temp 97.1 °F (36.2 °C) (Axillary)   Resp 16   Ht 1.803 m (5' 11\")   Wt 63.5 kg (140 lb)   SpO2 100%   BMI 19.53 kg/m²        LDAs:   Peripheral IV 08/23/24 Left Antecubital (Active)   Site Assessment Clean, dry & intact 08/23/24 1546   Line Status Blood return noted;Brisk blood return;Specimen collected;Flushed;Normal saline locked 08/23/24 1546   Phlebitis Assessment No symptoms 08/23/24 1546   Infiltration Assessment 0 08/23/24 1546   Dressing Status New dressing applied;Clean, dry & intact 08/23/24 1546   Dressing Type Transparent 08/23/24 1546       Ambulatory Status:  Presents to emergency department  because of falls (Syncope, seizure, or loss of consciousness): No, Age > 70: No, Altered Mental Status, Intoxication with alcohol or substance confusion (Disorientation, impaired judgment, poor safety awaremess, or inability to follow

## 2024-08-24 VITALS
DIASTOLIC BLOOD PRESSURE: 90 MMHG | TEMPERATURE: 97.9 F | HEIGHT: 71 IN | SYSTOLIC BLOOD PRESSURE: 127 MMHG | BODY MASS INDEX: 19.12 KG/M2 | OXYGEN SATURATION: 100 % | HEART RATE: 57 BPM | WEIGHT: 136.6 LBS | RESPIRATION RATE: 13 BRPM

## 2024-08-24 LAB
ANION GAP SERPL CALCULATED.3IONS-SCNC: 11 MMOL/L (ref 9–16)
ANION GAP SERPL CALCULATED.3IONS-SCNC: 9 MMOL/L (ref 9–16)
BILIRUB UR QL STRIP: NEGATIVE
BUN SERPL-MCNC: 11 MG/DL (ref 6–20)
BUN SERPL-MCNC: 13 MG/DL (ref 6–20)
CALCIUM SERPL-MCNC: 8.5 MG/DL (ref 8.6–10.4)
CALCIUM SERPL-MCNC: 8.5 MG/DL (ref 8.6–10.4)
CHLORIDE SERPL-SCNC: 103 MMOL/L (ref 98–107)
CHLORIDE SERPL-SCNC: 103 MMOL/L (ref 98–107)
CLARITY UR: CLEAR
CO2 SERPL-SCNC: 19 MMOL/L (ref 20–31)
CO2 SERPL-SCNC: 22 MMOL/L (ref 20–31)
COLOR UR: YELLOW
CREAT SERPL-MCNC: 0.7 MG/DL (ref 0.7–1.2)
CREAT SERPL-MCNC: 0.7 MG/DL (ref 0.7–1.2)
EPI CELLS #/AREA URNS HPF: NORMAL /HPF (ref 0–5)
EST. AVERAGE GLUCOSE BLD GHB EST-MCNC: 278 MG/DL
GFR, ESTIMATED: >90 ML/MIN/1.73M2
GFR, ESTIMATED: >90 ML/MIN/1.73M2
GLUCOSE BLD-MCNC: 137 MG/DL (ref 75–110)
GLUCOSE BLD-MCNC: 151 MG/DL (ref 75–110)
GLUCOSE BLD-MCNC: 151 MG/DL (ref 75–110)
GLUCOSE BLD-MCNC: 152 MG/DL (ref 75–110)
GLUCOSE BLD-MCNC: 156 MG/DL (ref 75–110)
GLUCOSE BLD-MCNC: 161 MG/DL (ref 75–110)
GLUCOSE BLD-MCNC: 174 MG/DL (ref 75–110)
GLUCOSE BLD-MCNC: 184 MG/DL (ref 75–110)
GLUCOSE BLD-MCNC: 187 MG/DL (ref 75–110)
GLUCOSE BLD-MCNC: 189 MG/DL (ref 75–110)
GLUCOSE BLD-MCNC: 217 MG/DL (ref 75–110)
GLUCOSE SERPL-MCNC: 137 MG/DL (ref 74–99)
GLUCOSE SERPL-MCNC: 188 MG/DL (ref 74–99)
GLUCOSE UR STRIP-MCNC: ABNORMAL MG/DL
HBA1C MFR BLD: 11.3 % (ref 4–6)
HGB UR QL STRIP.AUTO: NEGATIVE
KETONES UR STRIP-MCNC: ABNORMAL MG/DL
LEUKOCYTE ESTERASE UR QL STRIP: NEGATIVE
MAGNESIUM SERPL-MCNC: 2 MG/DL (ref 1.6–2.6)
MAGNESIUM SERPL-MCNC: 2.1 MG/DL (ref 1.6–2.6)
MUCOUS THREADS URNS QL MICRO: NORMAL
NITRITE UR QL STRIP: NEGATIVE
PH UR STRIP: 5.5 [PH] (ref 5–8)
PHOSPHATE SERPL-MCNC: 2.7 MG/DL (ref 2.5–4.5)
PHOSPHATE SERPL-MCNC: 3.6 MG/DL (ref 2.5–4.5)
POTASSIUM SERPL-SCNC: 4 MMOL/L (ref 3.7–5.3)
POTASSIUM SERPL-SCNC: 4.2 MMOL/L (ref 3.7–5.3)
PROT UR STRIP-MCNC: ABNORMAL MG/DL
SODIUM SERPL-SCNC: 131 MMOL/L (ref 136–145)
SODIUM SERPL-SCNC: 136 MMOL/L (ref 136–145)
SP GR UR STRIP: 1.04 (ref 1–1.03)
UROBILINOGEN UR STRIP-ACNC: NORMAL EU/DL (ref 0–1)
WBC #/AREA URNS HPF: NORMAL /HPF (ref 0–5)

## 2024-08-24 PROCEDURE — 6360000002 HC RX W HCPCS: Performed by: HOSPITALIST

## 2024-08-24 PROCEDURE — 82947 ASSAY GLUCOSE BLOOD QUANT: CPT

## 2024-08-24 PROCEDURE — 36415 COLL VENOUS BLD VENIPUNCTURE: CPT

## 2024-08-24 PROCEDURE — 80048 BASIC METABOLIC PNL TOTAL CA: CPT

## 2024-08-24 PROCEDURE — 84100 ASSAY OF PHOSPHORUS: CPT

## 2024-08-24 PROCEDURE — 6370000000 HC RX 637 (ALT 250 FOR IP): Performed by: STUDENT IN AN ORGANIZED HEALTH CARE EDUCATION/TRAINING PROGRAM

## 2024-08-24 PROCEDURE — 99232 SBSQ HOSP IP/OBS MODERATE 35: CPT | Performed by: STUDENT IN AN ORGANIZED HEALTH CARE EDUCATION/TRAINING PROGRAM

## 2024-08-24 PROCEDURE — 83735 ASSAY OF MAGNESIUM: CPT

## 2024-08-24 PROCEDURE — 2580000003 HC RX 258: Performed by: HOSPITALIST

## 2024-08-24 RX ORDER — INSULIN LISPRO 100 [IU]/ML
2 INJECTION, SOLUTION INTRAVENOUS; SUBCUTANEOUS
Status: DISCONTINUED | OUTPATIENT
Start: 2024-08-24 | End: 2024-08-24 | Stop reason: HOSPADM

## 2024-08-24 RX ORDER — PEN NEEDLE, DIABETIC 31 G X1/4"
1 NEEDLE, DISPOSABLE MISCELLANEOUS DAILY
Qty: 100 EACH | Refills: 3 | Status: SHIPPED | OUTPATIENT
Start: 2024-08-24

## 2024-08-24 RX ORDER — INSULIN GLARGINE 100 [IU]/ML
INJECTION, SOLUTION SUBCUTANEOUS
Qty: 5 ADJUSTABLE DOSE PRE-FILLED PEN SYRINGE | Refills: 3 | Status: SHIPPED | OUTPATIENT
Start: 2024-08-24

## 2024-08-24 RX ORDER — INSULIN GLARGINE 100 [IU]/ML
INJECTION, SOLUTION SUBCUTANEOUS
Qty: 5 ADJUSTABLE DOSE PRE-FILLED PEN SYRINGE | Refills: 3 | Status: SHIPPED | OUTPATIENT
Start: 2024-08-24 | End: 2024-08-24

## 2024-08-24 RX ORDER — INSULIN GLARGINE 100 [IU]/ML
11 INJECTION, SOLUTION SUBCUTANEOUS DAILY
Status: DISCONTINUED | OUTPATIENT
Start: 2024-08-24 | End: 2024-08-24 | Stop reason: HOSPADM

## 2024-08-24 RX ORDER — DEXTROSE MONOHYDRATE 100 MG/ML
INJECTION, SOLUTION INTRAVENOUS CONTINUOUS PRN
Status: DISCONTINUED | OUTPATIENT
Start: 2024-08-24 | End: 2024-08-24

## 2024-08-24 RX ORDER — GLUCAGON 1 MG/ML
1 KIT INJECTION PRN
Status: DISCONTINUED | OUTPATIENT
Start: 2024-08-24 | End: 2024-08-24 | Stop reason: HOSPADM

## 2024-08-24 RX ADMIN — POTASSIUM CHLORIDE 10 MEQ: 7.46 INJECTION, SOLUTION INTRAVENOUS at 12:09

## 2024-08-24 RX ADMIN — POTASSIUM CHLORIDE 10 MEQ: 7.46 INJECTION, SOLUTION INTRAVENOUS at 10:13

## 2024-08-24 RX ADMIN — POTASSIUM CHLORIDE 10 MEQ: 7.46 INJECTION, SOLUTION INTRAVENOUS at 04:52

## 2024-08-24 RX ADMIN — DEXTROSE AND SODIUM CHLORIDE: 5; 450 INJECTION, SOLUTION INTRAVENOUS at 04:10

## 2024-08-24 RX ADMIN — POTASSIUM CHLORIDE 10 MEQ: 7.46 INJECTION, SOLUTION INTRAVENOUS at 00:00

## 2024-08-24 RX ADMIN — DEXTROSE AND SODIUM CHLORIDE: 5; 450 INJECTION, SOLUTION INTRAVENOUS at 10:14

## 2024-08-24 RX ADMIN — POTASSIUM CHLORIDE 10 MEQ: 7.46 INJECTION, SOLUTION INTRAVENOUS at 05:51

## 2024-08-24 RX ADMIN — POTASSIUM CHLORIDE 10 MEQ: 7.46 INJECTION, SOLUTION INTRAVENOUS at 03:49

## 2024-08-24 RX ADMIN — POTASSIUM CHLORIDE 10 MEQ: 7.46 INJECTION, SOLUTION INTRAVENOUS at 11:15

## 2024-08-24 RX ADMIN — INSULIN LISPRO 2 UNITS: 100 INJECTION, SOLUTION INTRAVENOUS; SUBCUTANEOUS at 10:52

## 2024-08-24 RX ADMIN — INSULIN GLARGINE 11 UNITS: 100 INJECTION, SOLUTION SUBCUTANEOUS at 10:51

## 2024-08-24 RX ADMIN — POTASSIUM CHLORIDE 10 MEQ: 7.46 INJECTION, SOLUTION INTRAVENOUS at 01:02

## 2024-08-24 NOTE — PROGRESS NOTES
[facility]  Office: [phone]  Hilton Frank, DO, Tom Mercado, DO, Alvin Benites DO, Valdo Villar, DO, Amy Kapadia MD, Kiki Patel MD, Antony Kirkpatrick MD, Génesis Benoit MD,  Cuba Griffith MD, Shaila Ashford MD, Kailyn Head MD,  Atiya Leal DO, Teresa Jasmine MD, Miki Peña MD, Juan David Frank DO, Urvashi Romeo MD,  Ashutosh Najera DO, Margarita Montoya MD, Bozena Reyes MD, Mimi Mckeon MD, Anjum Cr MD,  Arley Mcneil MD, Marsha Sheriff MD, Fernandez Chun MD, Magalie Coombs MD, Gerry Gusman MD, Irasema Multani MD, Cliff Cuevas, DO, Dutch Rob DO, Nav Strickland DO, Gaurav Manzanares MD,  Arnol Bryson MD, Shirley Waterhouse, CNP,  Jess Lovelace, CNP, Cliff Chang, CNP,  Sary Fitzpatrick, DNP, Mikayla Benavides, CNP, Linette Crespo, CNP, eYn Pena, CNP, Tawana Leal, CNP, Domenica Camp, PA-C, Shasta Goldman, PA-C, Coral Vyas, CNP, Sheyla Robles, CNP,  Adelaida Ruth, CNP, Concepción Herr, CNP, Erica Kim, CNP, Jillian Wilson, CNS, Martha Ziegler, CNP, Pauly Brooks CNP, Tracy Schwab, CNP         [facility]   IN-PATIENT SERVICE   [facility]    Progress Note    8/24/2024    1:19 PM    Name:   Guillermina Warner  MRN:     [id number]     Acct:      [id number]   Room:   0403/0403-01   Day:  1  Admit Date:  8/23/2024  3:24 PM    PCP:   Amanda Enrique APRN - CNP  Code Status:  Full Code    Subjective:     C/C:   Chief Complaint   Patient presents with    Abdominal Pain    Emesis     Interval History Status: not changed.     Patient seen and examined.  Currently on insulin drip D5 drip asking to go home.  Glucose has been less than 200.  Discussed with patient has been out of Lantus for a month due to insurance issues and refilling issues.  Wants to use Lantus.    Brief History:     23-year-old male past medical history of diabetes type 1 presents with nausea vomiting abdominal pain found to have DKA.  Was put on insulin drip.   Transition back to Lantus.  Discussed with patient wants to go back home on Lantus.    Review of Systems:     Constitutional:  negative for chills, fevers, sweats  Respiratory:  negative for cough, dyspnea on exertion, shortness of breath, wheezing  Cardiovascular:  negative for chest pain, chest pressure/discomfort, lower extremity edema, palpitations  Gastrointestinal:  negative for abdominal pain, constipation, diarrhea, nausea, vomiting  Neurological:  negative for dizziness, headache    Medications:     Allergies:  No Known Allergies    Current Meds:   Scheduled Meds:    insulin glargine  11 Units SubCUTAneous Daily    insulin lispro  2 Units SubCUTAneous TID WC    sodium chloride  15 mL/kg IntraVENous Once    enoxaparin  40 mg SubCUTAneous Daily    sodium chloride  15 mL/kg IntraVENous Once     Continuous Infusions:    dextrose      dextrose 5% and 0.45% NaCl with KCl 20 mEq      sodium chloride 250 mL/hr at 08/23/24 1826    sodium chloride      dextrose 5 % and 0.45 % NaCl 150 mL/hr at 08/24/24 1014     PRN Meds: glucose, dextrose bolus **OR** dextrose bolus, glucagon (rDNA), dextrose, dextrose bolus **OR** dextrose bolus, potassium chloride, magnesium sulfate, sodium phosphate 15 mmol in sodium chloride 0.9 % 250 mL IVPB, dextrose 5% and 0.45% NaCl with KCl 20 mEq, dextrose bolus **OR** dextrose bolus, potassium chloride, magnesium sulfate, polyethylene glycol, dextrose 5 % and 0.45 % NaCl    Data:     Past Medical History:   has a past medical history of JARETT (acute kidney injury) ([facility]), Diabetes mellitus ([facility]), and Type 1 diabetes mellitus ([facility]).    Social History:   reports that he has been smoking cigarettes. He has a 0.5 pack-year smoking history. He has never used smokeless tobacco. He reports current alcohol use of about 10.0 standard drinks of alcohol per week. He reports current drug use. Drug: Marijuana (Weed).     Family History:   Family History   Problem Relation Age of Onset    Asthma Father

## 2024-08-24 NOTE — DISCHARGE SUMMARY
[facility]  Office: [phone]  Hilton Frank DO, Tom Mercado DO, Alvin Benites DO, Valdo Villar DO, Amy Kapadia MD, Kiki Patel MD, Antony Kirkpatrick MD, Génesis Benoit MD,  Cuba Griffith MD, Shaila Ashford MD, Kailyn Head MD,  Atiya Leal DO, Teresa Jasmine MD, Miki Peña MD, Juan David Frank DO, Urvashi Romeo MD,  Ashutosh Najera DO, Margarita Montoya MD, Bozena Reyes MD, Mimi Mckeon MD, Anjum Cr MD,  Arley Mcneil MD, Marsha Sheriff MD, Fernandez Chun MD, Magalie Coombs MD, Gerry Gusman MD, Irasema Multani MD, Cliff Cuevas DO, Dutch Rbo DO, Nav Strickland DO, Gaurav Manzanares MD,  Arnol Bryson MD, Shirley Waterhouse, CNP,  Jess Lovelace, CNP, Cliff Chang, CNP,  Sary Fitzpatrick, DNP, Mikayla Benavides, CNP, Linette Crespo, CNP, Yen Pena, CNP, Tawana Leal, CNP, Domenica Camp, PA-C, Shasta Goldman, PA-C, Coral Vyas, CNP, Sheyla Robles, CNP,  Adelaida Ruth, CNP, Concepción Herr, CNP, Erica Kim, CNP, Jillian Wilson, CNS, Martha Ziegler, CNP, Pauly Brooks, CNP, Tracy Schwab, CNP         [facility]   IN-PATIENT SERVICE   [facility]    Discharge Summary     Patient ID: Guillermina Warner  :  2000   MRN: [id number]     ACCOUNT:  [id number]   Patient's PCP: Amanda Enrique APRN - CNP  Admit Date: 2024   Discharge Date: 2024     Length of Stay: 1  Code Status:  Prior  Admitting Physician: Juan David Frank DO  Discharge Physician: Kailyn Head MD     Active Discharge Diagnoses:     Hospital Problem Lists:  Principal Problem:    DKA, type 1, not at goal (HCC)  Resolved Problems:    * No resolved hospital problems. *      Admission Condition:  stable     Discharged Condition: stable    Hospital Stay:     Hospital Course:  Guillermina Warner is a 23 y.o. male who was admitted for the management of   DKA, type 1, not at goal (HCC) , presented to ER with Abdominal Pain and Emesis      23-year-old male  past medical history of diabetes type 1 presents with nausea vomiting abdominal pain found to have DKA. Was put on insulin drip. Transition back to Lantus. Discussed with patient wants to go back home on Lantus.     Significant therapeutic interventions: n/a    Significant Diagnostic Studies:   Labs / Micro:  CBC:   Lab Results   Component Value Date/Time    WBC 4.2 08/23/2024 03:56 PM    RBC 4.53 08/23/2024 03:56 PM    HGB 13.2 08/23/2024 03:56 PM    HCT 40.0 08/23/2024 03:56 PM    MCV 88.3 08/23/2024 03:56 PM    MCH 29.1 08/23/2024 03:56 PM    MCHC 33.0 08/23/2024 03:56 PM    RDW 12.7 08/23/2024 03:56 PM     08/23/2024 03:56 PM     BMP:    Lab Results   Component Value Date/Time    GLUCOSE 188 08/24/2024 08:23 AM     08/24/2024 08:23 AM    K 4.2 08/24/2024 08:23 AM     08/24/2024 08:23 AM    CO2 19 08/24/2024 08:23 AM    ANIONGAP 9 08/24/2024 08:23 AM    ANIONGAP 16 05/14/2023 06:22 AM    BUN 11 08/24/2024 08:23 AM    CREATININE 0.7 08/24/2024 08:23 AM    CALCIUM 8.5 08/24/2024 08:23 AM    LABGLOM >90 08/24/2024 08:23 AM    LABGLOM >60 10/11/2023 11:19 AM    GFRAA >60 08/14/2022 05:21 AM    GFR      08/14/2022 05:21 AM        Radiology:  No results found.    Consultations:    Consults:     Final Specialist Recommendations/Findings:   IP CONSULT TO HOSPITALIST  IP CONSULT TO DIABETES EDUCATOR      The patient was seen and examined on day of discharge and this discharge summary is in conjunction with any daily progress note from day of discharge.    Discharge plan:     Disposition: Home    Physician Follow Up:     Amanda Enrique APRN - CNP  [address]   [place] [facility] [address]  [phone]    Follow up      Amanda Enrique APRN - CNP  [address] [place] [address]  [phone]             Requiring Further Evaluation/Follow Up POST HOSPITALIZATION/Incidental Findings: Follow-up PCP    Diet: diabetic diet    Activity: As tolerated    Instructions to

## 2024-08-24 NOTE — PLAN OF CARE
Problem: Discharge Planning  Goal: Discharge to home or other facility with appropriate resources  8/24/2024 0739 by Christina Dempsey RN  Outcome: Progressing  8/24/2024 0523 by Charles Wayne RN  Outcome: Progressing     Problem: Safety - Adult  Goal: Free from fall injury  8/24/2024 0739 by Christina Dempsey, RN  Outcome: Progressing  8/24/2024 0523 by Charles Wayne RN  Outcome: Progressing

## 2024-08-24 NOTE — DISCHARGE SUMMARY
Pt IV removed. AVS thoroughly explained with no further questions at this time. Pt received medications via Meds to Beds. Pt refused wheelchair walkout. Pt ambulated to private vehicle. Pt packed all personal belongings including cell phone.

## 2024-08-24 NOTE — PLAN OF CARE
Patient in no apparent distress at this time.  No falls or new injuries noted.  No complaints at this time.  Call light left within reach.

## 2024-08-26 NOTE — PROGRESS NOTES
CLINICAL PHARMACY NOTE: MEDS TO BEDS    Total # of Prescriptions Filled: 2   The following medications were delivered to the patient:  Pen mouna pizarro    Additional Documentation:   No copay Delivered by Nori

## 2024-10-24 NOTE — PROGRESS NOTES
Dee Dee is a 77 year old who is being evaluated via a billable telephone visit.    What phone number would you like to be contacted at? [phone] (M)   How would you like to obtain your AVS? Lynne  Originating Location (pt. Location): Home    Distant Location (provider location):  On-site    Dee Dee was seen today for diabetes.    Diagnoses and all orders for this visit:    Type 2 diabetes mellitus without complication, without long-term current use of insulin (H)  -     metFORMIN (GLUCOPHAGE XR) 500 MG 24 hr tablet; Take 2 tablets (1,000 mg) by mouth daily (with dinner). for diabetes  -     Hemoglobin A1c; Future  -     Basic metabolic panel  (Ca, Cl, CO2, Creat, Gluc, K, Na, BUN); Future  Patient stopped Ozempic d/t changes in vision s.e. 9/26 and since then her vision has improved.  Patient's DM was well controlled with Metformin prior to starting Ozempic, so we do not need to replace it because it was intended to provide additional benefit.   When she started Ozempic she decreased her Metformin dose from 1000mg to 500mg, so she can start taking 1000mg again since she's no longer taking Ozempic.    Vision changes  Follows eye doctor and has upcoming appointment   Fhx glaucoma  Asked patient to remind provider to send me their notes.    S/P drug eluting coronary stent placement  Takes pravastatin.    Benign hypertension  Takes losartan, Plavix, hydrochlorothiazide, and hydralazine.    Screening for deficiency anemia  -     Vitamin B12; Future  Recommended taking vitamin B12 supplement a few times a week.  Labs ordered, patient will complete at a lab-only appointment prior to her next visit with me.    Other orders  -     REVIEW OF HEALTH MAINTENANCE PROTOCOL ORDERS      Subjective   Dee Dee is a 77 year old, presenting for the following health issues:  Diabetes    HPI   Dee Dee is a 77 year old, presenting for the following health issues:  Diabetes    Review of Systems  Constitutional, HEENT, cardiovascular, pulmonary,  Physical Therapy    DATE: 2021    NAME: Stephanie Camejo  MRN: [id number]   : 2000      Patient not seen this date for Physical Therapy due to:    Patient independent with functional mobility with no acute PT needs. Will defer PT evaluation at this time. Please reorder PT if future needs arise. Pt agreeable with plan and verbalizes no concerns in regards to functional mobility upon discharge.        Electronically signed by Tootie Smith PT on 2021 at 11:22 AM GI, , musculoskeletal, neuro, skin, endocrine and psych systems are negative, except as otherwise noted.      Objective    Vitals - Patient Reported  Systolic (Patient Reported): 136  Diastolic (Patient Reported): 78  Pain Score: No Pain (0)  Vitals:  No vitals were obtained today due to virtual visit.    Physical Exam   General: Alert and no distress //Respiratory: No audible wheeze, cough, or shortness of breath // Psychiatric:  Appropriate affect, tone, and pace of words      Phone call duration: 7 minutes  Signed Electronically by: Zoraida Braga MD    This document serves as a record of the services and decisions personally performed and made by Dr. Braga. It was created on her behalf by Leticia Bauer, a trained medical scribe. The creation of this document is based the provider's statements to the medical scribe.

## 2024-11-05 ENCOUNTER — HOSPITAL ENCOUNTER (INPATIENT)
Age: 24
LOS: 2 days | Discharge: HOME | DRG: 420 | End: 2024-11-07
Attending: EMERGENCY MEDICINE | Admitting: INTERNAL MEDICINE
Payer: MEDICAID

## 2024-11-05 DIAGNOSIS — E10.10 TYPE 1 DIABETES MELLITUS WITH KETOACIDOSIS WITHOUT COMA (HCC): Primary | ICD-10-CM

## 2024-11-05 DIAGNOSIS — E10.65 TYPE 1 DIABETES MELLITUS WITH HYPERGLYCEMIA (HCC): ICD-10-CM

## 2024-11-05 LAB
ALBUMIN SERPL-MCNC: 4.6 G/DL (ref 3.5–5.2)
ALBUMIN/GLOB SERPL: 1.4 {RATIO} (ref 1–2.5)
ALP SERPL-CCNC: 107 U/L (ref 40–129)
ALT SERPL-CCNC: 13 U/L (ref 10–50)
AMPHET UR QL SCN: NEGATIVE
ANION GAP SERPL CALCULATED.3IONS-SCNC: 22 MMOL/L (ref 9–16)
ANION GAP SERPL CALCULATED.3IONS-SCNC: 31 MMOL/L (ref 9–16)
ANION GAP SERPL CALCULATED.3IONS-SCNC: 31 MMOL/L (ref 9–16)
AST SERPL-CCNC: 17 U/L (ref 10–50)
B-OH-BUTYR SERPL-MCNC: 8.11 MMOL/L (ref 0.02–0.27)
BACTERIA URNS QL MICRO: NORMAL
BARBITURATES UR QL SCN: NEGATIVE
BASOPHILS # BLD: 0.05 K/UL (ref 0–0.2)
BASOPHILS NFR BLD: 1 % (ref 0–2)
BENZODIAZ UR QL: NEGATIVE
BILIRUB SERPL-MCNC: 0.4 MG/DL (ref 0–1.2)
BILIRUB UR QL STRIP: NEGATIVE
BODY TEMPERATURE: 37
BUN BLD-MCNC: 24 MG/DL (ref 8–26)
BUN SERPL-MCNC: 19 MG/DL (ref 6–20)
BUN SERPL-MCNC: 20 MG/DL (ref 6–20)
BUN SERPL-MCNC: 21 MG/DL (ref 6–20)
CA-I BLD-SCNC: 1.19 MMOL/L (ref 1.15–1.33)
CALCIUM SERPL-MCNC: 8.7 MG/DL (ref 8.6–10.4)
CALCIUM SERPL-MCNC: 9.1 MG/DL (ref 8.6–10.4)
CALCIUM SERPL-MCNC: 9.8 MG/DL (ref 8.6–10.4)
CANNABINOIDS UR QL SCN: POSITIVE
CASTS #/AREA URNS LPF: NORMAL /LPF (ref 0–8)
CHLORIDE BLD-SCNC: 105 MMOL/L (ref 98–107)
CHLORIDE SERPL-SCNC: 107 MMOL/L (ref 98–107)
CHLORIDE SERPL-SCNC: 91 MMOL/L (ref 98–107)
CHLORIDE SERPL-SCNC: 99 MMOL/L (ref 98–107)
CLARITY UR: CLEAR
CO2 BLD CALC-SCNC: 13 MMOL/L (ref 22–30)
CO2 SERPL-SCNC: 10 MMOL/L (ref 20–31)
CO2 SERPL-SCNC: 12 MMOL/L (ref 20–31)
CO2 SERPL-SCNC: 8 MMOL/L (ref 20–31)
COCAINE UR QL SCN: NEGATIVE
COHGB MFR BLD: 4.1 % (ref 0–5)
COLOR UR: YELLOW
CREAT SERPL-MCNC: 1.2 MG/DL (ref 0.7–1.2)
EGFR, POC: >90 ML/MIN/1.73M2
EOSINOPHIL # BLD: 0.27 K/UL (ref 0–0.44)
EOSINOPHILS RELATIVE PERCENT: 5 % (ref 1–4)
EPI CELLS #/AREA URNS HPF: NORMAL /HPF (ref 0–5)
ERYTHROCYTE [DISTWIDTH] IN BLOOD BY AUTOMATED COUNT: 12 % (ref 11.8–14.4)
EST. AVERAGE GLUCOSE BLD GHB EST-MCNC: 292 MG/DL
FENTANYL UR QL: NEGATIVE
FIO2 ON VENT: ABNORMAL %
GFR, ESTIMATED: 86 ML/MIN/1.73M2
GLUCOSE BLD-MCNC: 181 MG/DL (ref 75–110)
GLUCOSE BLD-MCNC: 183 MG/DL (ref 75–110)
GLUCOSE BLD-MCNC: 318 MG/DL (ref 75–110)
GLUCOSE BLD-MCNC: 437 MG/DL (ref 75–110)
GLUCOSE BLD-MCNC: 511 MG/DL (ref 75–110)
GLUCOSE BLD-MCNC: 618 MG/DL (ref 74–100)
GLUCOSE SERPL-MCNC: 161 MG/DL (ref 74–99)
GLUCOSE SERPL-MCNC: 474 MG/DL (ref 74–99)
GLUCOSE SERPL-MCNC: 593 MG/DL (ref 74–99)
GLUCOSE UR STRIP-MCNC: ABNORMAL MG/DL
HBA1C MFR BLD: 11.8 % (ref 4–6)
HCO3 VENOUS: 10.7 MMOL/L (ref 24–30)
HCO3 VENOUS: 13.5 MMOL/L (ref 22–29)
HCT VFR BLD AUTO: 41.6 % (ref 40.7–50.3)
HCT VFR BLD AUTO: 49 % (ref 41–53)
HGB BLD-MCNC: 13.2 G/DL (ref 13–17)
HGB UR QL STRIP.AUTO: NEGATIVE
IMM GRANULOCYTES # BLD AUTO: 0.05 K/UL (ref 0–0.3)
IMM GRANULOCYTES NFR BLD: 1 %
KETONES UR STRIP-MCNC: ABNORMAL MG/DL
LACTIC ACID, WHOLE BLOOD: 3.4 MMOL/L (ref 0.7–2.1)
LACTIC ACID, WHOLE BLOOD: 3.5 MMOL/L (ref 0.7–2.1)
LEUKOCYTE ESTERASE UR QL STRIP: NEGATIVE
LIPASE SERPL-CCNC: 31 U/L (ref 13–60)
LYMPHOCYTES NFR BLD: 1.3 K/UL (ref 1.1–3.7)
LYMPHOCYTES RELATIVE PERCENT: 24 % (ref 24–43)
MAGNESIUM SERPL-MCNC: 2.1 MG/DL (ref 1.6–2.6)
MAGNESIUM SERPL-MCNC: 2.2 MG/DL (ref 1.6–2.6)
MCH RBC QN AUTO: 28.6 PG (ref 25.2–33.5)
MCHC RBC AUTO-ENTMCNC: 31.7 G/DL (ref 28.4–34.8)
MCV RBC AUTO: 90.2 FL (ref 82.6–102.9)
METHADONE UR QL: NEGATIVE
MONOCYTES NFR BLD: 0.27 K/UL (ref 0.1–1.2)
MONOCYTES NFR BLD: 5 % (ref 3–12)
MORPHOLOGY: NORMAL
NEGATIVE BASE EXCESS, VEN: 12.4 MMOL/L (ref 0–2)
NEGATIVE BASE EXCESS, VEN: 16.5 MMOL/L (ref 0–2)
NEUTROPHILS NFR BLD: 64 % (ref 36–65)
NEUTS SEG NFR BLD: 3.46 K/UL (ref 1.5–8.1)
NITRITE UR QL STRIP: NEGATIVE
NRBC BLD-RTO: 0 PER 100 WBC
O2 SAT, VEN: 42.8 % (ref 60–85)
O2 SAT, VEN: 93.1 % (ref 60–85)
OPIATES UR QL SCN: NEGATIVE
OSMOLALITY SERPL: 329 MOSM/KG (ref 275–295)
OXYCODONE UR QL SCN: NEGATIVE
PCO2 VENOUS: 29.8 MM HG (ref 39–55)
PCO2 VENOUS: 31.2 MM HG (ref 41–51)
PCP UR QL SCN: NEGATIVE
PH UR STRIP: 5.5 [PH] (ref 5–8)
PH VENOUS: 7.18 (ref 7.32–7.42)
PH VENOUS: 7.25 (ref 7.32–7.43)
PHOSPHATE SERPL-MCNC: 4.3 MG/DL (ref 2.5–4.5)
PHOSPHATE SERPL-MCNC: 5.5 MG/DL (ref 2.5–4.5)
PLATELET # BLD AUTO: 361 K/UL (ref 138–453)
PMV BLD AUTO: 12 FL (ref 8.1–13.5)
PO2 VENOUS: 27.6 MM HG (ref 30–50)
PO2 VENOUS: 82.1 MM HG (ref 30–50)
POC ANION GAP: 15 MMOL/L (ref 7–16)
POC CREATININE: 0.9 MG/DL (ref 0.51–1.19)
POC HEMOGLOBIN (CALC): 16.6 G/DL (ref 13.5–17.5)
POC LACTIC ACID: 3.1 MMOL/L (ref 0.56–1.39)
POTASSIUM BLD-SCNC: 5.3 MMOL/L (ref 3.5–4.5)
POTASSIUM SERPL-SCNC: 4.6 MMOL/L (ref 3.7–5.3)
POTASSIUM SERPL-SCNC: 4.7 MMOL/L (ref 3.7–5.3)
POTASSIUM SERPL-SCNC: 4.8 MMOL/L (ref 3.7–5.3)
PROT SERPL-MCNC: 7.8 G/DL (ref 6.6–8.7)
PROT UR STRIP-MCNC: ABNORMAL MG/DL
RBC # BLD AUTO: 4.61 M/UL (ref 4.21–5.77)
RBC #/AREA URNS HPF: NORMAL /HPF (ref 0–4)
SODIUM BLD-SCNC: 132 MMOL/L (ref 138–146)
SODIUM SERPL-SCNC: 132 MMOL/L (ref 136–145)
SODIUM SERPL-SCNC: 138 MMOL/L (ref 136–145)
SODIUM SERPL-SCNC: 141 MMOL/L (ref 136–145)
SP GR UR STRIP: 1.03 (ref 1–1.03)
TEST INFORMATION: ABNORMAL
TROPONIN I SERPL HS-MCNC: <6 NG/L (ref 0–22)
TROPONIN I SERPL HS-MCNC: <6 NG/L (ref 0–22)
UROBILINOGEN UR STRIP-ACNC: NORMAL EU/DL (ref 0–1)
WBC #/AREA URNS HPF: NORMAL /HPF (ref 0–5)
WBC OTHER # BLD: 5.4 K/UL (ref 3.5–11.3)

## 2024-11-05 PROCEDURE — 80053 COMPREHEN METABOLIC PANEL: CPT

## 2024-11-05 PROCEDURE — 6370000000 HC RX 637 (ALT 250 FOR IP)

## 2024-11-05 PROCEDURE — 83930 ASSAY OF BLOOD OSMOLALITY: CPT

## 2024-11-05 PROCEDURE — 83690 ASSAY OF LIPASE: CPT

## 2024-11-05 PROCEDURE — 83735 ASSAY OF MAGNESIUM: CPT

## 2024-11-05 PROCEDURE — 93005 ELECTROCARDIOGRAM TRACING: CPT

## 2024-11-05 PROCEDURE — 82805 BLOOD GASES W/O2 SATURATION: CPT

## 2024-11-05 PROCEDURE — 84520 ASSAY OF UREA NITROGEN: CPT

## 2024-11-05 PROCEDURE — 82803 BLOOD GASES ANY COMBINATION: CPT

## 2024-11-05 PROCEDURE — 96375 TX/PRO/DX INJ NEW DRUG ADDON: CPT

## 2024-11-05 PROCEDURE — 80307 DRUG TEST PRSMV CHEM ANLYZR: CPT

## 2024-11-05 PROCEDURE — 80048 BASIC METABOLIC PNL TOTAL CA: CPT

## 2024-11-05 PROCEDURE — 6360000002 HC RX W HCPCS

## 2024-11-05 PROCEDURE — 82947 ASSAY GLUCOSE BLOOD QUANT: CPT

## 2024-11-05 PROCEDURE — 2060000000 HC ICU INTERMEDIATE R&B

## 2024-11-05 PROCEDURE — 83036 HEMOGLOBIN GLYCOSYLATED A1C: CPT

## 2024-11-05 PROCEDURE — 82010 KETONE BODYS QUAN: CPT

## 2024-11-05 PROCEDURE — 83605 ASSAY OF LACTIC ACID: CPT

## 2024-11-05 PROCEDURE — 99285 EMERGENCY DEPT VISIT HI MDM: CPT

## 2024-11-05 PROCEDURE — 85014 HEMATOCRIT: CPT

## 2024-11-05 PROCEDURE — 99223 1ST HOSP IP/OBS HIGH 75: CPT | Performed by: INTERNAL MEDICINE

## 2024-11-05 PROCEDURE — 84100 ASSAY OF PHOSPHORUS: CPT

## 2024-11-05 PROCEDURE — 2580000003 HC RX 258

## 2024-11-05 PROCEDURE — 96365 THER/PROPH/DIAG IV INF INIT: CPT

## 2024-11-05 PROCEDURE — 96376 TX/PRO/DX INJ SAME DRUG ADON: CPT

## 2024-11-05 PROCEDURE — 85025 COMPLETE CBC W/AUTO DIFF WBC: CPT

## 2024-11-05 PROCEDURE — 82565 ASSAY OF CREATININE: CPT

## 2024-11-05 PROCEDURE — 82330 ASSAY OF CALCIUM: CPT

## 2024-11-05 PROCEDURE — 2500000003 HC RX 250 WO HCPCS

## 2024-11-05 PROCEDURE — 80051 ELECTROLYTE PANEL: CPT

## 2024-11-05 PROCEDURE — 81001 URINALYSIS AUTO W/SCOPE: CPT

## 2024-11-05 PROCEDURE — 84484 ASSAY OF TROPONIN QUANT: CPT

## 2024-11-05 PROCEDURE — 87040 BLOOD CULTURE FOR BACTERIA: CPT

## 2024-11-05 PROCEDURE — 36415 COLL VENOUS BLD VENIPUNCTURE: CPT

## 2024-11-05 RX ORDER — MAGNESIUM SULFATE IN WATER 40 MG/ML
2000 INJECTION, SOLUTION INTRAVENOUS PRN
Status: DISCONTINUED | OUTPATIENT
Start: 2024-11-05 | End: 2024-11-07 | Stop reason: HOSPADM

## 2024-11-05 RX ORDER — POLYETHYLENE GLYCOL 3350 17 G/17G
17 POWDER, FOR SOLUTION ORAL DAILY PRN
Status: DISCONTINUED | OUTPATIENT
Start: 2024-11-05 | End: 2024-11-07 | Stop reason: HOSPADM

## 2024-11-05 RX ORDER — ONDANSETRON 2 MG/ML
4 INJECTION INTRAMUSCULAR; INTRAVENOUS ONCE
Status: COMPLETED | OUTPATIENT
Start: 2024-11-05 | End: 2024-11-05

## 2024-11-05 RX ORDER — 0.9 % SODIUM CHLORIDE 0.9 %
500 INTRAVENOUS SOLUTION INTRAVENOUS ONCE
Status: COMPLETED | OUTPATIENT
Start: 2024-11-05 | End: 2024-11-05

## 2024-11-05 RX ORDER — POTASSIUM CHLORIDE 7.45 MG/ML
10 INJECTION INTRAVENOUS PRN
Status: DISCONTINUED | OUTPATIENT
Start: 2024-11-05 | End: 2024-11-05

## 2024-11-05 RX ORDER — LABETALOL HYDROCHLORIDE 5 MG/ML
10 INJECTION, SOLUTION INTRAVENOUS EVERY 6 HOURS PRN
Status: DISCONTINUED | OUTPATIENT
Start: 2024-11-05 | End: 2024-11-07 | Stop reason: HOSPADM

## 2024-11-05 RX ORDER — SODIUM CHLORIDE 0.9 % (FLUSH) 0.9 %
5-40 SYRINGE (ML) INJECTION PRN
Status: DISCONTINUED | OUTPATIENT
Start: 2024-11-05 | End: 2024-11-07 | Stop reason: HOSPADM

## 2024-11-05 RX ORDER — SODIUM CHLORIDE 0.9 % (FLUSH) 0.9 %
5-40 SYRINGE (ML) INJECTION EVERY 12 HOURS SCHEDULED
Status: DISCONTINUED | OUTPATIENT
Start: 2024-11-05 | End: 2024-11-07 | Stop reason: HOSPADM

## 2024-11-05 RX ORDER — POTASSIUM CHLORIDE 7.45 MG/ML
10 INJECTION INTRAVENOUS PRN
Status: DISCONTINUED | OUTPATIENT
Start: 2024-11-05 | End: 2024-11-07 | Stop reason: HOSPADM

## 2024-11-05 RX ORDER — DEXTROSE MONOHYDRATE, SODIUM CHLORIDE, AND POTASSIUM CHLORIDE 50; 1.49; 4.5 G/1000ML; G/1000ML; G/1000ML
INJECTION, SOLUTION INTRAVENOUS CONTINUOUS PRN
Status: DISCONTINUED | OUTPATIENT
Start: 2024-11-05 | End: 2024-11-07 | Stop reason: HOSPADM

## 2024-11-05 RX ORDER — HEPARIN SODIUM 5000 [USP'U]/ML
5000 INJECTION, SOLUTION INTRAVENOUS; SUBCUTANEOUS EVERY 8 HOURS SCHEDULED
Status: DISCONTINUED | OUTPATIENT
Start: 2024-11-05 | End: 2024-11-07 | Stop reason: HOSPADM

## 2024-11-05 RX ORDER — POTASSIUM CHLORIDE 1500 MG/1
40 TABLET, EXTENDED RELEASE ORAL PRN
Status: DISCONTINUED | OUTPATIENT
Start: 2024-11-05 | End: 2024-11-05

## 2024-11-05 RX ORDER — ONDANSETRON 4 MG/1
4 TABLET, ORALLY DISINTEGRATING ORAL EVERY 8 HOURS PRN
Status: DISCONTINUED | OUTPATIENT
Start: 2024-11-05 | End: 2024-11-07 | Stop reason: HOSPADM

## 2024-11-05 RX ORDER — SODIUM CHLORIDE 9 MG/ML
INJECTION, SOLUTION INTRAVENOUS PRN
Status: DISCONTINUED | OUTPATIENT
Start: 2024-11-05 | End: 2024-11-07 | Stop reason: HOSPADM

## 2024-11-05 RX ORDER — ACETAMINOPHEN 325 MG/1
650 TABLET ORAL EVERY 6 HOURS PRN
Status: DISCONTINUED | OUTPATIENT
Start: 2024-11-05 | End: 2024-11-07 | Stop reason: HOSPADM

## 2024-11-05 RX ORDER — ONDANSETRON 2 MG/ML
4 INJECTION INTRAMUSCULAR; INTRAVENOUS EVERY 6 HOURS PRN
Status: DISCONTINUED | OUTPATIENT
Start: 2024-11-05 | End: 2024-11-07 | Stop reason: HOSPADM

## 2024-11-05 RX ORDER — SODIUM CHLORIDE 9 MG/ML
INJECTION, SOLUTION INTRAVENOUS CONTINUOUS
Status: CANCELLED | OUTPATIENT
Start: 2024-11-05

## 2024-11-05 RX ORDER — HYDRALAZINE HYDROCHLORIDE 20 MG/ML
10 INJECTION INTRAMUSCULAR; INTRAVENOUS EVERY 6 HOURS PRN
Status: DISCONTINUED | OUTPATIENT
Start: 2024-11-05 | End: 2024-11-07 | Stop reason: HOSPADM

## 2024-11-05 RX ORDER — SODIUM CHLORIDE 9 MG/ML
INJECTION, SOLUTION INTRAVENOUS CONTINUOUS
Status: DISCONTINUED | OUTPATIENT
Start: 2024-11-05 | End: 2024-11-07 | Stop reason: HOSPADM

## 2024-11-05 RX ORDER — MAGNESIUM SULFATE IN WATER 40 MG/ML
2000 INJECTION, SOLUTION INTRAVENOUS PRN
Status: DISCONTINUED | OUTPATIENT
Start: 2024-11-05 | End: 2024-11-05

## 2024-11-05 RX ORDER — ACETAMINOPHEN 650 MG/1
650 SUPPOSITORY RECTAL EVERY 6 HOURS PRN
Status: DISCONTINUED | OUTPATIENT
Start: 2024-11-05 | End: 2024-11-07 | Stop reason: HOSPADM

## 2024-11-05 RX ADMIN — SODIUM CHLORIDE 500 ML: 9 INJECTION, SOLUTION INTRAVENOUS at 16:46

## 2024-11-05 RX ADMIN — SODIUM CHLORIDE: 9 INJECTION, SOLUTION INTRAVENOUS at 17:47

## 2024-11-05 RX ADMIN — ONDANSETRON 4 MG: 2 INJECTION INTRAMUSCULAR; INTRAVENOUS at 16:46

## 2024-11-05 RX ADMIN — SODIUM CHLORIDE 9 UNITS/HR: 9 INJECTION, SOLUTION INTRAVENOUS at 17:12

## 2024-11-05 RX ADMIN — SODIUM CHLORIDE 500 ML: 0.9 INJECTION, SOLUTION INTRAVENOUS at 13:50

## 2024-11-05 RX ADMIN — SODIUM CHLORIDE: 9 INJECTION, SOLUTION INTRAVENOUS at 20:27

## 2024-11-05 RX ADMIN — DEXTROSE MONOHYDRATE, SODIUM CHLORIDE, AND POTASSIUM CHLORIDE: 50; 1.49; 4.5 INJECTION, SOLUTION INTRAVENOUS at 21:01

## 2024-11-05 RX ADMIN — ONDANSETRON 4 MG: 2 INJECTION INTRAMUSCULAR; INTRAVENOUS at 14:32

## 2024-11-05 ASSESSMENT — PAIN SCALES - GENERAL
PAINLEVEL_OUTOF10: 7
PAINLEVEL_OUTOF10: 7

## 2024-11-05 ASSESSMENT — ENCOUNTER SYMPTOMS
DIARRHEA: 0
CHEST TIGHTNESS: 0
WHEEZING: 0
CONSTIPATION: 0
BLOOD IN STOOL: 0
CHOKING: 0
RECTAL PAIN: 0
APNEA: 0
COUGH: 0
ABDOMINAL PAIN: 1
VOMITING: 1
NAUSEA: 1
SHORTNESS OF BREATH: 1
STRIDOR: 0
ANAL BLEEDING: 0

## 2024-11-05 ASSESSMENT — PAIN DESCRIPTION - LOCATION: LOCATION: ABDOMEN

## 2024-11-05 ASSESSMENT — PAIN - FUNCTIONAL ASSESSMENT: PAIN_FUNCTIONAL_ASSESSMENT: 0-10

## 2024-11-05 NOTE — ED PROVIDER NOTES
[facility] ED  Emergency Department Encounter  Emergency Medicine Resident     Pt Name:Guillermina Warner  MRN: [id number]  Birthdate 2000  Date of evaluation: 11/5/24  PCP:  No primary care provider on file.  Note Started: 1:51 PM EST      CHIEF COMPLAINT       Chief Complaint   Patient presents with    Vomiting       HISTORY OF PRESENT ILLNESS  (Location/Symptom, Timing/Onset, Context/Setting, Quality, Duration, Modifying Factors, Severity.)      Guillermina Warner is a 24 y.o. male who presents with 2-day history of drowsiness, along with multiple episode of vomiting today.  Patient states that he is having generalized bodyaches and trouble breathing.  Patient also endorsed that he missed his insulin dose last night.  Patient states that his symptoms of drowsiness, fatigue worsen progressively.  Denies any fever, chest pain, headache or vision issues.  Denies any sick contact, diarrhea, urinary symptoms.    PAST MEDICAL / SURGICAL / SOCIAL / FAMILY HISTORY      has a past medical history of JARETT (acute kidney injury) (HCC), Diabetes mellitus (HCC), and Type 1 diabetes mellitus (HCC).       has no past surgical history on file.      Social History     Socioeconomic History    Marital status: Single     Spouse name: Not on file    Number of children: Not on file    Years of education: Not on file    Highest education level: Not on file   Occupational History     Employer: N/A   Tobacco Use    Smoking status: Some Days     Current packs/day: 0.25     Average packs/day: 0.3 packs/day for 2.0 years (0.5 ttl pk-yrs)     Types: Cigarettes    Smokeless tobacco: Never   Vaping Use    Vaping status: Never Used   Substance and Sexual Activity    Alcohol use: Yes     Alcohol/week: 10.0 standard drinks of alcohol     Types: 10 Shots of liquor per week     Comment: 3-5 times per week 3-4 drinks per time    Drug use: Yes     Types: Marijuana (Weed)     Comment: occasional    Sexual activity: Yes     Partners:

## 2024-11-05 NOTE — ED NOTES
ED to inpatient nurses report      Chief Complaint:  Chief Complaint   Patient presents with    Vomiting     Present to ED from: home    MOA:     LOC: alert and orientated to name, place, date  Mobility: Independent  Oxygen Baseline: room air    Current needs required: n/a   Pending ED orders:   Present condition: stable resting on cot with fiance at bedside     Why did the patient come to the ED? Pt to ed via wc to room with fiance at bedside with complaints of abd pain, nausea, emesis for the past few days  Pt states he hasn't been taking his nightly insulin  Pt states abd pain 7/10 aching cont  Pt denies taking anything pta for symptoms  Pt denies any recent exposure to anyone sick that he knows of  EPOC completed   Pt placed on cont cardiac monitor, ekg completed, iv established, labs drawn, labeled and will send to lab via orders  Pt alert and oriented x4, talking in complete sentences, respirations even and unlabored. Pt acting age appropriate. White board updated, will continue to plan of care    What is the plan? Admit to intermed  Any procedures or intervention occur? Labs, meds fluids  Any safety concerns?? On insulin drip     Mental Status:  Level of Consciousness: Alert (0)    Psych Assessment:   Psychosocial  Psychosocial (WDL): Within Defined Limits  Vital signs   Vitals:    11/05/24 1436 11/05/24 1445 11/05/24 1446 11/05/24 1500   BP:  129/82  127/74   Pulse: 59 56 55 66   Resp: 13 15 13 12   Temp:       TempSrc:       SpO2: 98% 99% 99% 93%        Vitals:  Patient Vitals for the past 24 hrs:   BP Temp Temp src Pulse Resp SpO2   11/05/24 1500 127/74 -- -- 66 12 93 %   11/05/24 1446 -- -- -- 55 13 99 %   11/05/24 1445 129/82 -- -- 56 15 99 %   11/05/24 1436 -- -- -- 59 13 98 %   11/05/24 1430 122/65 -- -- 73 14 --   11/05/24 1424 (!) 128/57 -- -- 60 -- --   11/05/24 1417 -- -- -- 74 11 100 %   11/05/24 1415 (!) 141/90 -- -- 54 13 --   11/05/24 1333 -- 97.8 °F (36.6 °C) Oral -- -- --   11/05/24 1330

## 2024-11-05 NOTE — ED NOTES
Pt to ed via wc to room with cherry at bedside with complaints of abd pain, nausea, emesis for the past few days  Pt states he hasn't been taking his nightly insulin  Pt states abd pain 7/10 aching cont  Pt denies taking anything pta for symptoms  Pt denies any recent exposure to anyone sick that he knows of  EPOC completed   Pt placed on cont cardiac monitor, ekg completed, iv established, labs drawn, labeled and will send to lab via orders  Pt alert and oriented x4, talking in complete sentences, respirations even and unlabored. Pt acting age appropriate. White board updated, will continue to plan of care

## 2024-11-05 NOTE — ED PROVIDER NOTES
Faculty Sign-Out Attestation  Handoff taken on the following patient from prior Attending Physician: Christina  Note Started: 4:16 PM EST    I was available and discussed any additional care issues that arose and coordinated the management plans with the resident(s) caring for the patient during my duty period. Any areas of disagreement with resident’s documentation of care or procedures are noted on the chart. I was personally present for the key portions of any/all procedures during my duty period. I have documented in the chart those procedures where I was not present during the key portions.    24-year-old male presents emergency department with nausea and vomiting.  Found to be in DKA.  Likely due to medication noncompliance.  Initiated fluid resuscitation and insulin infusion pending medical admission    Jaylen Grant DO    Attending Physician        Jaylen Grant DO  11/05/24 9024

## 2024-11-05 NOTE — ED PROVIDER NOTES
[facility]     Emergency Department     Faculty Attestation    I performed a history and physical examination of the patient and discussed management with the resident. I have reviewed and agree with the resident’s findings including all diagnostic interpretations, and treatment plans as written at the time of my review. Any areas of disagreement are noted on the chart. I was personally present for the key portions of any procedures. I have documented in the chart those procedures where I was not present during the key portions. For Physician Assistant/ Nurse Practitioner cases/documentation I have personally evaluated this patient and have completed at least one if not all key elements of the E/M (history, physical exam, and MDM). Additional findings are as noted.    PtName: Guillermina Warner  MRN: [id number]  Birthdate 2000  Date of evaluation: 11/5/24  Note Started: 1:53 PM EST    Primary Care Physician: Amanda Enrique APRN - CNP        History: This is a 24 y.o. male who presents to the Emergency Department with complaint of drowsy fatigue.  Patient has been not been taking his evening insulin dose for at least a month now.  Patient now having vomiting.  No fever no chest pain no shortness of breath does complain of generalized myalgia    Physical:   oral temperature is 97.8 °F (36.6 °C). His blood pressure is 127/78 and his pulse is 70. His respiration is 13 and oxygen saturation is 100%.  Lungs clear auscultation bilateral, heart regular rate and rhythm abdomen soft nontender    Impression: Hyperglycemia    Plan: EPOC, IV fluid, CBC, BMP, serum ketones, serum osmolality, EKG, insulin      EKG Interpretation    Interpreted by me  Normal sinus rhythm ventricular rate of 72, normal MO interval, normal QRS duration, left axis deviation, normal QT,  Compared EKG of May 14, 2023,        Medical Decision Making  Problems Addressed:  Type 1 diabetes mellitus

## 2024-11-05 NOTE — H&P
rate  250 mL/h  -Will monitor electrolytes and replace as needed  -Although infectious workup including urine culture, blood culture, chest x-ray has been sent      Hypertension  -Known case of hypertension  -Blood pressure has been fairly controlled 113/64  -Patient takes lisinopril 20 Mg at home  -Currently his lisinopril has been held as he is developing JARETT  -Will monitor vitals    DVT ppx:  GI ppx:     PT/OT/SW  Discharge Planning:    Amauri Greer MD  Internal Medicine Resident, PGY-1  [facility]; [place], [place]  11/5/2024, 9:17 PM

## 2024-11-06 ENCOUNTER — APPOINTMENT (OUTPATIENT)
Dept: GENERAL RADIOLOGY | Age: 24
DRG: 420 | End: 2024-11-06
Payer: MEDICAID

## 2024-11-06 PROBLEM — F12.10 MARIJUANA ABUSE: Status: ACTIVE | Noted: 2024-11-06

## 2024-11-06 LAB
ANION GAP SERPL CALCULATED.3IONS-SCNC: 12 MMOL/L (ref 9–16)
ANION GAP SERPL CALCULATED.3IONS-SCNC: 13 MMOL/L (ref 9–16)
ANION GAP SERPL CALCULATED.3IONS-SCNC: 15 MMOL/L (ref 9–16)
BASOPHILS # BLD: <0.03 K/UL (ref 0–0.2)
BASOPHILS NFR BLD: 0 % (ref 0–2)
BUN SERPL-MCNC: 19 MG/DL (ref 6–20)
BUN SERPL-MCNC: 21 MG/DL (ref 6–20)
BUN SERPL-MCNC: 22 MG/DL (ref 6–20)
CALCIUM SERPL-MCNC: 8.7 MG/DL (ref 8.6–10.4)
CALCIUM SERPL-MCNC: 8.9 MG/DL (ref 8.6–10.4)
CALCIUM SERPL-MCNC: 9.1 MG/DL (ref 8.6–10.4)
CHLORIDE SERPL-SCNC: 108 MMOL/L (ref 98–107)
CHLORIDE SERPL-SCNC: 109 MMOL/L (ref 98–107)
CHLORIDE SERPL-SCNC: 109 MMOL/L (ref 98–107)
CO2 SERPL-SCNC: 15 MMOL/L (ref 20–31)
CO2 SERPL-SCNC: 17 MMOL/L (ref 20–31)
CO2 SERPL-SCNC: 18 MMOL/L (ref 20–31)
CREAT SERPL-MCNC: 1 MG/DL (ref 0.7–1.2)
CREAT SERPL-MCNC: 1.1 MG/DL (ref 0.7–1.2)
CREAT SERPL-MCNC: 1.2 MG/DL (ref 0.7–1.2)
EKG ATRIAL RATE: 72 BPM
EKG P AXIS: 81 DEGREES
EKG P-R INTERVAL: 156 MS
EKG Q-T INTERVAL: 418 MS
EKG QRS DURATION: 110 MS
EKG QTC CALCULATION (BAZETT): 457 MS
EKG R AXIS: -64 DEGREES
EKG T AXIS: 45 DEGREES
EKG VENTRICULAR RATE: 72 BPM
EOSINOPHIL # BLD: <0.03 K/UL (ref 0–0.44)
EOSINOPHILS RELATIVE PERCENT: 0 % (ref 1–4)
ERYTHROCYTE [DISTWIDTH] IN BLOOD BY AUTOMATED COUNT: 12.3 % (ref 11.8–14.4)
GFR, ESTIMATED: 86 ML/MIN/1.73M2
GFR, ESTIMATED: >90 ML/MIN/1.73M2
GFR, ESTIMATED: >90 ML/MIN/1.73M2
GLUCOSE BLD-MCNC: 117 MG/DL (ref 75–110)
GLUCOSE BLD-MCNC: 135 MG/DL (ref 75–110)
GLUCOSE BLD-MCNC: 144 MG/DL (ref 75–110)
GLUCOSE BLD-MCNC: 154 MG/DL (ref 75–110)
GLUCOSE BLD-MCNC: 155 MG/DL (ref 75–110)
GLUCOSE BLD-MCNC: 158 MG/DL (ref 75–110)
GLUCOSE BLD-MCNC: 161 MG/DL (ref 75–110)
GLUCOSE BLD-MCNC: 162 MG/DL (ref 75–110)
GLUCOSE BLD-MCNC: 164 MG/DL (ref 75–110)
GLUCOSE BLD-MCNC: 165 MG/DL (ref 75–110)
GLUCOSE BLD-MCNC: 166 MG/DL (ref 75–110)
GLUCOSE BLD-MCNC: 179 MG/DL (ref 75–110)
GLUCOSE BLD-MCNC: 186 MG/DL (ref 75–110)
GLUCOSE BLD-MCNC: 187 MG/DL (ref 75–110)
GLUCOSE BLD-MCNC: 187 MG/DL (ref 75–110)
GLUCOSE BLD-MCNC: 190 MG/DL (ref 75–110)
GLUCOSE BLD-MCNC: 190 MG/DL (ref 75–110)
GLUCOSE BLD-MCNC: 196 MG/DL (ref 75–110)
GLUCOSE BLD-MCNC: 196 MG/DL (ref 75–110)
GLUCOSE BLD-MCNC: 200 MG/DL (ref 75–110)
GLUCOSE BLD-MCNC: 214 MG/DL (ref 75–110)
GLUCOSE BLD-MCNC: 232 MG/DL (ref 75–110)
GLUCOSE SERPL-MCNC: 151 MG/DL (ref 74–99)
GLUCOSE SERPL-MCNC: 158 MG/DL (ref 74–99)
GLUCOSE SERPL-MCNC: 215 MG/DL (ref 74–99)
HCT VFR BLD AUTO: 34.5 % (ref 40.7–50.3)
HGB BLD-MCNC: 11.5 G/DL (ref 13–17)
IMM GRANULOCYTES # BLD AUTO: 0.05 K/UL (ref 0–0.3)
IMM GRANULOCYTES NFR BLD: 0 %
LYMPHOCYTES NFR BLD: 1.01 K/UL (ref 1.1–3.7)
LYMPHOCYTES RELATIVE PERCENT: 7 % (ref 24–43)
MAGNESIUM SERPL-MCNC: 2 MG/DL (ref 1.6–2.6)
MAGNESIUM SERPL-MCNC: 2 MG/DL (ref 1.6–2.6)
MAGNESIUM SERPL-MCNC: 2.1 MG/DL (ref 1.6–2.6)
MCH RBC QN AUTO: 28.8 PG (ref 25.2–33.5)
MCHC RBC AUTO-ENTMCNC: 33.3 G/DL (ref 28.4–34.8)
MCV RBC AUTO: 86.3 FL (ref 82.6–102.9)
MONOCYTES NFR BLD: 0.91 K/UL (ref 0.1–1.2)
MONOCYTES NFR BLD: 6 % (ref 3–12)
NEUTROPHILS NFR BLD: 86 % (ref 36–65)
NEUTS SEG NFR BLD: 12.45 K/UL (ref 1.5–8.1)
NRBC BLD-RTO: 0 PER 100 WBC
PHOSPHATE SERPL-MCNC: 1.7 MG/DL (ref 2.5–4.5)
PHOSPHATE SERPL-MCNC: 2 MG/DL (ref 2.5–4.5)
PHOSPHATE SERPL-MCNC: 2.7 MG/DL (ref 2.5–4.5)
PLATELET # BLD AUTO: 337 K/UL (ref 138–453)
PMV BLD AUTO: 11.3 FL (ref 8.1–13.5)
POTASSIUM SERPL-SCNC: 4.2 MMOL/L (ref 3.7–5.3)
POTASSIUM SERPL-SCNC: 4.6 MMOL/L (ref 3.7–5.3)
POTASSIUM SERPL-SCNC: 5.2 MMOL/L (ref 3.7–5.3)
RBC # BLD AUTO: 4 M/UL (ref 4.21–5.77)
SODIUM SERPL-SCNC: 138 MMOL/L (ref 136–145)
SODIUM SERPL-SCNC: 139 MMOL/L (ref 136–145)
SODIUM SERPL-SCNC: 139 MMOL/L (ref 136–145)
WBC OTHER # BLD: 14.4 K/UL (ref 3.5–11.3)

## 2024-11-06 PROCEDURE — 71045 X-RAY EXAM CHEST 1 VIEW: CPT

## 2024-11-06 PROCEDURE — 6370000000 HC RX 637 (ALT 250 FOR IP)

## 2024-11-06 PROCEDURE — G0108 DIAB MANAGE TRN  PER INDIV: HCPCS

## 2024-11-06 PROCEDURE — 2500000003 HC RX 250 WO HCPCS

## 2024-11-06 PROCEDURE — 85025 COMPLETE CBC W/AUTO DIFF WBC: CPT

## 2024-11-06 PROCEDURE — 36415 COLL VENOUS BLD VENIPUNCTURE: CPT

## 2024-11-06 PROCEDURE — 83735 ASSAY OF MAGNESIUM: CPT

## 2024-11-06 PROCEDURE — 84100 ASSAY OF PHOSPHORUS: CPT

## 2024-11-06 PROCEDURE — 99232 SBSQ HOSP IP/OBS MODERATE 35: CPT | Performed by: INTERNAL MEDICINE

## 2024-11-06 PROCEDURE — 6360000002 HC RX W HCPCS

## 2024-11-06 PROCEDURE — 80048 BASIC METABOLIC PNL TOTAL CA: CPT

## 2024-11-06 PROCEDURE — 2060000000 HC ICU INTERMEDIATE R&B

## 2024-11-06 PROCEDURE — 2580000003 HC RX 258

## 2024-11-06 RX ORDER — INSULIN GLARGINE 100 [IU]/ML
20 INJECTION, SOLUTION SUBCUTANEOUS 2 TIMES DAILY
Status: DISCONTINUED | OUTPATIENT
Start: 2024-11-06 | End: 2024-11-06

## 2024-11-06 RX ORDER — INSULIN GLARGINE 100 [IU]/ML
20 INJECTION, SOLUTION SUBCUTANEOUS 2 TIMES DAILY
Status: DISCONTINUED | OUTPATIENT
Start: 2024-11-07 | End: 2024-11-07

## 2024-11-06 RX ORDER — GLUCAGON 1 MG/ML
1 KIT INJECTION PRN
Status: DISCONTINUED | OUTPATIENT
Start: 2024-11-06 | End: 2024-11-07 | Stop reason: HOSPADM

## 2024-11-06 RX ORDER — INSULIN LISPRO 100 [IU]/ML
0-8 INJECTION, SOLUTION INTRAVENOUS; SUBCUTANEOUS
Status: DISCONTINUED | OUTPATIENT
Start: 2024-11-07 | End: 2024-11-07

## 2024-11-06 RX ORDER — DEXTROSE MONOHYDRATE 100 MG/ML
INJECTION, SOLUTION INTRAVENOUS CONTINUOUS PRN
Status: DISCONTINUED | OUTPATIENT
Start: 2024-11-06 | End: 2024-11-07 | Stop reason: HOSPADM

## 2024-11-06 RX ORDER — INSULIN GLARGINE 100 [IU]/ML
20 INJECTION, SOLUTION SUBCUTANEOUS ONCE
Status: COMPLETED | OUTPATIENT
Start: 2024-11-06 | End: 2024-11-06

## 2024-11-06 RX ADMIN — DEXTROSE MONOHYDRATE, SODIUM CHLORIDE, AND POTASSIUM CHLORIDE: 50; 1.49; 4.5 INJECTION, SOLUTION INTRAVENOUS at 17:24

## 2024-11-06 RX ADMIN — SODIUM PHOSPHATE, MONOBASIC, MONOHYDRATE AND SODIUM PHOSPHATE, DIBASIC, ANHYDROUS 15 MMOL: 276; 142 INJECTION, SOLUTION INTRAVENOUS at 16:06

## 2024-11-06 RX ADMIN — POTASSIUM CHLORIDE 10 MEQ: 7.45 INJECTION INTRAVENOUS at 12:24

## 2024-11-06 RX ADMIN — POTASSIUM CHLORIDE 10 MEQ: 7.45 INJECTION INTRAVENOUS at 15:13

## 2024-11-06 RX ADMIN — POTASSIUM CHLORIDE 10 MEQ: 7.45 INJECTION INTRAVENOUS at 06:04

## 2024-11-06 RX ADMIN — SODIUM CHLORIDE 2.7 UNITS/HR: 9 INJECTION, SOLUTION INTRAVENOUS at 02:01

## 2024-11-06 RX ADMIN — INSULIN GLARGINE 20 UNITS: 100 INJECTION, SOLUTION SUBCUTANEOUS at 17:25

## 2024-11-06 RX ADMIN — SODIUM CHLORIDE 0.8 UNITS/HR: 9 INJECTION, SOLUTION INTRAVENOUS at 05:04

## 2024-11-06 RX ADMIN — SODIUM CHLORIDE 1 UNITS/HR: 9 INJECTION, SOLUTION INTRAVENOUS at 06:02

## 2024-11-06 RX ADMIN — HEPARIN SODIUM 5000 UNITS: 5000 INJECTION INTRAVENOUS; SUBCUTANEOUS at 21:48

## 2024-11-06 RX ADMIN — DEXTROSE MONOHYDRATE, SODIUM CHLORIDE, AND POTASSIUM CHLORIDE: 50; 1.49; 4.5 INJECTION, SOLUTION INTRAVENOUS at 11:04

## 2024-11-06 RX ADMIN — POTASSIUM CHLORIDE 10 MEQ: 7.45 INJECTION INTRAVENOUS at 05:05

## 2024-11-06 ASSESSMENT — ENCOUNTER SYMPTOMS
NAUSEA: 0
SHORTNESS OF BREATH: 0
CHEST TIGHTNESS: 0
ABDOMINAL DISTENTION: 0
STRIDOR: 0
ABDOMINAL PAIN: 0
COUGH: 0
DIARRHEA: 0
APNEA: 0
VOMITING: 0
WHEEZING: 0
CHOKING: 0
CONSTIPATION: 0
ANAL BLEEDING: 0

## 2024-11-06 ASSESSMENT — PAIN SCALES - GENERAL
PAINLEVEL_OUTOF10: 0
PAINLEVEL_OUTOF10: 0

## 2024-11-06 NOTE — ED NOTES
[facility] lab results received from lab, Urine Glucose 3+ with large Ketones, CO2 8 and Glucose 474.

## 2024-11-06 NOTE — CARE COORDINATION
Case Management Assessment  Initial Evaluation    Date/Time of Evaluation: 11/6/2024 8:54 AM  Assessment Completed by: Daja Murdock RN    If patient is discharged prior to next notation, then this note serves as note for discharge by case management.    Patient Name: Guillermina Warner                   YOB: 2000  Diagnosis: DKA, type 1, not at goal (HCC) [E10.10]  Type 1 diabetes mellitus with ketoacidosis without coma (HCC) [E10.10]                   Date / Time: 11/5/2024  1:19 PM    Patient Admission Status: Inpatient   Readmission Risk (Low < 19, Mod (19-27), High > 27): Readmission Risk Score: 10.7    Current PCP: No primary care provider on file.  PCP verified by CM? Yes (pt reports has new pcp and  is awaiting appointment with new pcp)    Chart Reviewed: Yes      History Provided by: Patient  Patient Orientation: Alert and Oriented, Person, Place, Situation    Patient Cognition: Alert    Hospitalization in the last 30 days (Readmission):  No    If yes, Readmission Assessment in CM Navigator will be completed.    Advance Directives:      Code Status: Full Code   Patient's Primary Decision Maker is: Legal Next of Kin      Discharge Planning:    Patient lives with: Parent Type of Home: House  Primary Care Giver: Self  Patient Support Systems include: Family Members, Children   Current Financial resources: Medicaid  Current community resources:    Current services prior to admission: Durable Medical Equipment            Current DME: Glucometer            Type of Home Care services:  None    ADLS  Prior functional level: Independent in ADLs/IADLs  Current functional level: Independent in ADLs/IADLs    PT AM-PAC:   /24  OT AM-PAC:   /24    Family can provide assistance at DC: Yes  Would you like Case Management to discuss the discharge plan with any other family members/significant others, and if so, who? No  Plans to Return to Present Housing: Yes  Other Identified Issues/Barriers to RETURNING

## 2024-11-06 NOTE — PLAN OF CARE
Problem: Chronic Conditions and Co-morbidities  Goal: Patient's chronic conditions and co-morbidity symptoms are monitored and maintained or improved  Outcome: Progressing     Problem: Discharge Planning  Goal: Discharge to home or other facility with appropriate resources  Outcome: Progressing     Problem: Skin/Tissue Integrity  Goal: Absence of new skin breakdown  Description: 1.  Monitor for areas of redness and/or skin breakdown  2.  Assess vascular access sites hourly  3.  Every 4-6 hours minimum:  Change oxygen saturation probe site  4.  Every 4-6 hours:  If on nasal continuous positive airway pressure, respiratory therapy assess nares and determine need for appliance change or resting period.  Outcome: Progressing     Problem: Pain  Goal: Verbalizes/displays adequate comfort level or baseline comfort level  Outcome: Progressing

## 2024-11-07 VITALS
HEIGHT: 71 IN | RESPIRATION RATE: 16 BRPM | WEIGHT: 127.87 LBS | SYSTOLIC BLOOD PRESSURE: 121 MMHG | TEMPERATURE: 98.2 F | DIASTOLIC BLOOD PRESSURE: 88 MMHG | BODY MASS INDEX: 17.9 KG/M2 | OXYGEN SATURATION: 100 % | HEART RATE: 71 BPM

## 2024-11-07 LAB
ANION GAP SERPL CALCULATED.3IONS-SCNC: 10 MMOL/L (ref 9–16)
BASOPHILS # BLD: 0.03 K/UL (ref 0–0.2)
BASOPHILS NFR BLD: 0 % (ref 0–2)
BUN SERPL-MCNC: 11 MG/DL (ref 6–20)
CALCIUM SERPL-MCNC: 8.6 MG/DL (ref 8.6–10.4)
CHLORIDE SERPL-SCNC: 108 MMOL/L (ref 98–107)
CO2 SERPL-SCNC: 19 MMOL/L (ref 20–31)
CREAT SERPL-MCNC: 0.7 MG/DL (ref 0.7–1.2)
EOSINOPHIL # BLD: <0.03 K/UL (ref 0–0.44)
EOSINOPHILS RELATIVE PERCENT: 0 % (ref 1–4)
ERYTHROCYTE [DISTWIDTH] IN BLOOD BY AUTOMATED COUNT: 13 % (ref 11.8–14.4)
GFR, ESTIMATED: >90 ML/MIN/1.73M2
GLUCOSE BLD-MCNC: 100 MG/DL (ref 75–110)
GLUCOSE BLD-MCNC: 109 MG/DL (ref 75–110)
GLUCOSE BLD-MCNC: 132 MG/DL (ref 75–110)
GLUCOSE BLD-MCNC: 51 MG/DL (ref 75–110)
GLUCOSE BLD-MCNC: 72 MG/DL (ref 75–110)
GLUCOSE SERPL-MCNC: 50 MG/DL (ref 74–99)
HCT VFR BLD AUTO: 32.7 % (ref 40.7–50.3)
HGB BLD-MCNC: 10.7 G/DL (ref 13–17)
IMM GRANULOCYTES # BLD AUTO: <0.03 K/UL (ref 0–0.3)
IMM GRANULOCYTES NFR BLD: 0 %
LYMPHOCYTES NFR BLD: 2.46 K/UL (ref 1.1–3.7)
LYMPHOCYTES RELATIVE PERCENT: 30 % (ref 24–43)
MCH RBC QN AUTO: 28.5 PG (ref 25.2–33.5)
MCHC RBC AUTO-ENTMCNC: 32.7 G/DL (ref 28.4–34.8)
MCV RBC AUTO: 87 FL (ref 82.6–102.9)
MONOCYTES NFR BLD: 0.51 K/UL (ref 0.1–1.2)
MONOCYTES NFR BLD: 6 % (ref 3–12)
NEUTROPHILS NFR BLD: 63 % (ref 36–65)
NEUTS SEG NFR BLD: 5.17 K/UL (ref 1.5–8.1)
NRBC BLD-RTO: 0 PER 100 WBC
PLATELET # BLD AUTO: 297 K/UL (ref 138–453)
PMV BLD AUTO: 10.9 FL (ref 8.1–13.5)
POTASSIUM SERPL-SCNC: 3.7 MMOL/L (ref 3.7–5.3)
RBC # BLD AUTO: 3.76 M/UL (ref 4.21–5.77)
SODIUM SERPL-SCNC: 137 MMOL/L (ref 136–145)
WBC OTHER # BLD: 8.2 K/UL (ref 3.5–11.3)

## 2024-11-07 PROCEDURE — 99232 SBSQ HOSP IP/OBS MODERATE 35: CPT | Performed by: INTERNAL MEDICINE

## 2024-11-07 PROCEDURE — 36415 COLL VENOUS BLD VENIPUNCTURE: CPT

## 2024-11-07 PROCEDURE — 80048 BASIC METABOLIC PNL TOTAL CA: CPT

## 2024-11-07 PROCEDURE — 2580000003 HC RX 258

## 2024-11-07 PROCEDURE — 82947 ASSAY GLUCOSE BLOOD QUANT: CPT

## 2024-11-07 PROCEDURE — 85025 COMPLETE CBC W/AUTO DIFF WBC: CPT

## 2024-11-07 RX ORDER — INSULIN GLARGINE 100 [IU]/ML
15 INJECTION, SOLUTION SUBCUTANEOUS 2 TIMES DAILY
Status: DISCONTINUED | OUTPATIENT
Start: 2024-11-07 | End: 2024-11-07 | Stop reason: HOSPADM

## 2024-11-07 RX ORDER — INSULIN GLARGINE 100 [IU]/ML
INJECTION, SOLUTION SUBCUTANEOUS
Qty: 5 ADJUSTABLE DOSE PRE-FILLED PEN SYRINGE | Refills: 3 | Status: CANCELLED | OUTPATIENT
Start: 2024-11-07

## 2024-11-07 RX ORDER — INSULIN GLARGINE 100 [IU]/ML
INJECTION, SOLUTION SUBCUTANEOUS
Qty: 5 ADJUSTABLE DOSE PRE-FILLED PEN SYRINGE | Refills: 3 | Status: SHIPPED | OUTPATIENT
Start: 2024-11-07

## 2024-11-07 RX ORDER — INSULIN LISPRO 100 [IU]/ML
0-4 INJECTION, SOLUTION INTRAVENOUS; SUBCUTANEOUS
Status: DISCONTINUED | OUTPATIENT
Start: 2024-11-07 | End: 2024-11-07 | Stop reason: HOSPADM

## 2024-11-07 RX ORDER — INSULIN ASPART 100 [IU]/ML
INJECTION, SOLUTION INTRAVENOUS; SUBCUTANEOUS SEE ADMIN INSTRUCTIONS
COMMUNITY

## 2024-11-07 RX ORDER — LISINOPRIL 20 MG/1
20 TABLET ORAL DAILY
COMMUNITY
Start: 2024-10-29

## 2024-11-07 RX ADMIN — SODIUM CHLORIDE, PRESERVATIVE FREE 10 ML: 5 INJECTION INTRAVENOUS at 08:06

## 2024-11-07 ASSESSMENT — ENCOUNTER SYMPTOMS
DIARRHEA: 0
STRIDOR: 0
ABDOMINAL DISTENTION: 0
CONSTIPATION: 0
CHOKING: 0
ABDOMINAL PAIN: 0
SHORTNESS OF BREATH: 0
NAUSEA: 0
ANAL BLEEDING: 0
VOMITING: 0
CHEST TIGHTNESS: 0
COUGH: 0
WHEEZING: 0
APNEA: 0

## 2024-11-07 NOTE — PLAN OF CARE
Called  to patient room, He is adamant to leave, reports that he will go today even if he is discharged or not. Told that patient he is here so we could monitor his blood glucose levels as they were low this morning. Explained the risks of having low blood sugar including loss of consciousness, seizures, or possibly death, He verbalizes understanding and reports that he is \"dealing with his insuline since he was 12\" and he could manage it at home. He reports that he has novolog at home and his girlfiend refilled his lantus yesterday.    Since patient insistent on going home, will send insulin to his pharmacy and he reports that he  has blood glucose monitor at home and can check it at home. Explained to him that he needs close follow-up with his primary care physician and endocrinologist. Medication sent to his pharmacy of choice [facility]  PGY-3, Internal Medicine Resident  [facility], [place]         11/7/2024, 6:36 PM

## 2024-11-07 NOTE — PLAN OF CARE
Problem: Chronic Conditions and Co-morbidities  Goal: Patient's chronic conditions and co-morbidity symptoms are monitored and maintained or improved  11/7/2024 1615 by Julieth Thompson RN  Outcome: Progressing  11/7/2024 0302 by Laura Perdomo RN  Outcome: Progressing  Flowsheets (Taken 11/6/2024 2000)  Care Plan - Patient's Chronic Conditions and Co-Morbidity Symptoms are Monitored and Maintained or Improved: Monitor and assess patient's chronic conditions and comorbid symptoms for stability, deterioration, or improvement     Problem: Discharge Planning  Goal: Discharge to home or other facility with appropriate resources  11/7/2024 1615 by Julieth Thompson RN  Outcome: Progressing  11/7/2024 0302 by Laura Perdomo RN  Outcome: Progressing  Flowsheets (Taken 11/6/2024 2000)  Discharge to home or other facility with appropriate resources: Identify barriers to discharge with patient and caregiver     Problem: Skin/Tissue Integrity  Goal: Absence of new skin breakdown  Description: 1.  Monitor for areas of redness and/or skin breakdown  2.  Assess vascular access sites hourly  3.  Every 4-6 hours minimum:  Change oxygen saturation probe site  4.  Every 4-6 hours:  If on nasal continuous positive airway pressure, respiratory therapy assess nares and determine need for appliance change or resting period.  11/7/2024 1615 by Julieth Thompson RN  Outcome: Progressing  11/7/2024 0302 by Laura Perdomo RN  Outcome: Progressing     Problem: Pain  Goal: Verbalizes/displays adequate comfort level or baseline comfort level  11/7/2024 1615 by Julieth Thompson RN  Outcome: Progressing  11/7/2024 0302 by Laura Perdomo RN  Outcome: Progressing

## 2024-11-07 NOTE — PLAN OF CARE
Problem: Chronic Conditions and Co-morbidities  Goal: Patient's chronic conditions and co-morbidity symptoms are monitored and maintained or improved  11/7/2024 1842 by Julieth Thompson RN  Outcome: Adequate for Discharge  11/7/2024 1615 by Julieth Thompson RN  Outcome: Progressing     Problem: Discharge Planning  Goal: Discharge to home or other facility with appropriate resources  11/7/2024 1842 by Julieth Thompson RN  Outcome: Adequate for Discharge  11/7/2024 1615 by Julieth Thompson RN  Outcome: Progressing     Problem: Skin/Tissue Integrity  Goal: Absence of new skin breakdown  Description: 1.  Monitor for areas of redness and/or skin breakdown  2.  Assess vascular access sites hourly  3.  Every 4-6 hours minimum:  Change oxygen saturation probe site  4.  Every 4-6 hours:  If on nasal continuous positive airway pressure, respiratory therapy assess nares and determine need for appliance change or resting period.  11/7/2024 1842 by Julieth Thompson RN  Outcome: Adequate for Discharge  11/7/2024 1615 by Julieth Thompson RN  Outcome: Progressing     Problem: Pain  Goal: Verbalizes/displays adequate comfort level or baseline comfort level  11/7/2024 1842 by Julieth Thompson RN  Outcome: Adequate for Discharge  11/7/2024 1615 by Julieth Thompson RN  Outcome: Progressing

## 2024-11-07 NOTE — DISCHARGE INSTRUCTIONS
If you begin to experience any symptoms such as chest pain shortness of breath nausea vomiting dizziness drowsiness abdominal pain loss of consciousness or any other symptoms you find concerning please come to the ED for follow-up evaluation.    If you have been given medication please take them as prescribed. Do not take more medication than recommended at any given time.     Please follow-up with your primary care provider within 5 to 7 days for continued care.     Please feel free return to the hospital if your symptoms worsen or any new concerning symptoms develop.  Follow-up with your primary care physician as needed for all other the concerns.

## 2024-11-07 NOTE — PLAN OF CARE
Problem: Chronic Conditions and Co-morbidities  Goal: Patient's chronic conditions and co-morbidity symptoms are monitored and maintained or improved  11/7/2024 0302 by Laura Perdomo RN  Outcome: Progressing  Flowsheets (Taken 11/6/2024 2000)  Care Plan - Patient's Chronic Conditions and Co-Morbidity Symptoms are Monitored and Maintained or Improved: Monitor and assess patient's chronic conditions and comorbid symptoms for stability, deterioration, or improvement  11/6/2024 1831 by Julieth Thompson RN  Outcome: Progressing     Problem: Discharge Planning  Goal: Discharge to home or other facility with appropriate resources  11/7/2024 0302 by Laura Perdomo RN  Outcome: Progressing  Flowsheets (Taken 11/6/2024 2000)  Discharge to home or other facility with appropriate resources: Identify barriers to discharge with patient and caregiver  11/6/2024 1831 by Julieth Thompson RN  Outcome: Progressing     Problem: Skin/Tissue Integrity  Goal: Absence of new skin breakdown  Description: 1.  Monitor for areas of redness and/or skin breakdown  2.  Assess vascular access sites hourly  3.  Every 4-6 hours minimum:  Change oxygen saturation probe site  4.  Every 4-6 hours:  If on nasal continuous positive airway pressure, respiratory therapy assess nares and determine need for appliance change or resting period.  11/7/2024 0302 by Laura Perdomo RN  Outcome: Progressing  11/6/2024 1831 by Julieth Thompson RN  Outcome: Progressing     Problem: Pain  Goal: Verbalizes/displays adequate comfort level or baseline comfort level  11/7/2024 0302 by Laura Perdomo RN  Outcome: Progressing  11/6/2024 1831 by Julieth Thompson RN  Outcome: Progressing

## 2024-11-07 NOTE — PROGRESS NOTES
[facility]  Internal Medicine Teaching Residency Program  Inpatient Daily Progress Note  ______________________________________________________________________________    Patient: Guillermina Warner  YOB: 2000   MRN:[id number]    Acct: [id number]     Room: 0416/0416-01  Admit date: 11/5/2024  Today's date: 11/06/24  Number of days in the hospital: 1    SUBJECTIVE   Admitting Diagnosis: DKA, type 1, not at goal (HCC)  CC: Abdominal pain, nausea vomiting  Pt examined at bedside. Chart & results reviewed.   N.p.o.  -Patient slept with, denies abdominal pain, nausea, vomiting  -He is in DKA protocol  -Patient is stable vital wise denies chest pain, shortness of breath remains afebrile, blood pressure 121/88 saturation 91% in room air      -Morning lab reviewed  POCT glucose 155, potassium 5.2, anion gap 15, creatinine 1.2,    Plan  -Monitor labs  -Bridge insulin drip to support as able  -Start diet whenever able  -Will monitor    Review of Systems   Constitutional:  Negative for activity change, appetite change, chills, diaphoresis, fatigue and unexpected weight change.   Respiratory:  Negative for apnea, cough, choking, chest tightness, shortness of breath, wheezing and stridor.    Cardiovascular:  Negative for chest pain, palpitations and leg swelling.   Gastrointestinal:  Negative for abdominal distention, abdominal pain, anal bleeding, constipation, diarrhea, nausea and vomiting.   Genitourinary:  Negative for difficulty urinating, hematuria, penile swelling and urgency.   Neurological:  Negative for dizziness and headaches.   Psychiatric/Behavioral:  Negative for confusion.       BRIEF HISTORY   The patient is a pleasant 24 y.o. male with a PMHx significant for      Type 1 diabetes mellitus  Hypertension  presents with a chief complaint of abdominal pain, nausea and vomiting.  According to the patient abdominal pain nausea and vomiting started 2 days ago.  
  Attending Physician Statement  I have discussed the case, including pertinent history and exam findings with the resident and the team.  I have seen and examined the patient and the key elements of the encounter have been performed by me.  I agree with the assessment, plan and orders as documented by the resident.      Review of Systems:   In addition to the pertinent positives and negatives as stated within HPI and the review of systems as documented in their notes, all other systems were reviewed when able to and are reported negative.    24 years old gentleman with a history of type 1 diabetes, presented because of nausea vomiting.  Found to be in DKA.  Most likely from noncompliance.  Started on IV fluids and given insulin.  Continue DKA protocol.  Monitor and replace electrolytes  Patient was advised compliance  He will need to follow-up with his PCP/endocrinologist for chronic diabetes management      Shaila Ashford MD, FACP  Attending Physician, Internal Medicine Service    Internal Medicine Residency Program  11/5/2024, 8:18 PM    
[facility]    Admission Medication Reconciliation       The patient's list of current home medications has been reviewed.     Source(s) of information: Dispense Report, Patient, [facility] Office Visit Summary (10/29/24)    Based on information provided by the above source(s), I have updated the patient's home med list as described below.     I changed or updated the following medications on the patient's home medication list:  Removed None   Added Lisinopril 20 mg    Adjusted   Insulin Glargine (Lantus) - per patient, taking 10 units in the morning and 30 units in the evening   Insulin Lispro (NovoLog) - per patient, carb correction (1 unit per 10 grams carbohydrates) + sliding scale; correction for glucose >130 mg/dL   Other Notes None     Please feel free to call me with any questions about this encounter. Thank you.    Electronically signed by ENDER BRANNON RPH on 11/7/2024 at 2:44 PM   
Diabetes Education - Inpatient  Met with patient, Guillermina, acknowledged known diabetes - type 1 and dx at age of 12.    Had brief discussion of self care, patient aware of need for daily insulin and that he had gaps with loss of PCP care.    He is not stating he has a new PCP/ but he did not give me the name of the provider.   Writer also encouraged follow up with endocrinologist care  - provided a list of endo in [facility]. Reinforced with endo care further options for insulin pump and CGM     Patient has insurance and has family support/ parents.    Has discussion of DKA prevention - patient did have knowledge of how to check urine ketones and need to drink water and use insulin. He is also aware of symptoms of high BG.  Encouraged prompt self care if having high BG and given rational for why to need for both basal / lantus and bolus meal time insulin.    Patient did explain he took in past 30 units of long / lantus nightly and during day novolog on scale. He further stated his scale was for food, he stated he could take between 8 - 16 units of novolog pre meal.  Writer reviewed and encouraged good site rotation of insulin injections and not to skip insulin.     Patient denies need to practice self care skills.    He expressed will need insulin pens, pen tips. He stated has home BG meter and supplies to check BG at home. Offered outpatient follow up / provided patient with writer's card.    After bedside visit - writer did more chart review and found notes from care everywhere - [facility] from JOSÉ LUIS Rodrigues-CNP on 10/29/24 1400 --- the notes summary indicated patient was given refills on lantus and humalog, ref for dental, eye and endo care. - see [facility] for info:      15 :20 Call to [facility]- confirmed this patient is can been here for care/ PCP and he was ref to [facility] - [place],  [phone].  Update to discharge 
Occupational Therapy    [facility]  Occupational Therapy Not Seen Note    DATE: 2024    NAME: Guillermina Warner  MRN: [id number]   : 2000      Patient not seen this date for Occupational Therapy due to:    Patient independent with ADLs and functional tasks with no acute OT needs. Will defer OT evaluation at this time. Please reorder OT if future needs arise.  Spoke w/ staff and pt who reports no current deficits w/ functional performance. Educated to notify RN/MD if any new concerns arise.     Electronically signed by Noni Schaefer OT/S on 2024 at 9:25 AM    
Physical Therapy        Physical Therapy Cancel Note      DATE: 2024    NAME: Guillermina Warner  MRN: [id number]   : 2000      Patient not seen this date for Physical Therapy due to:    Patient independent with functional mobility. Will defer PT evaluation at this time. Please reorder PT if future needs arise. Spoke with pt. Pt denies acute PT concerns, agreeable to deferral.      Electronically signed by Mel Jenkins PT on 2024 at 11:40 AM      
Provider notified of Potassium level of 5.2, according to DKA Protocol 5.2 required two bags of 10 MEQ replacement, patient is already running continuous fluids with potassium in them of 20 MEQ's. In a effort to conserve the limited supply of iv fluids and potassium provider was asked if they would like to hold prn replacement for now, since patients potassium had actual gone up from prior BMP and was at the very top of the cut off for replacement. Provider still wanted replacements given. Pharmacy notified and dispensed medication.    Electronically signed by Marli Guerra RN on 11/6/2024 at 6:21 AM    
Pt discharge to home, IV removed, writer educated pt on all discharge instructions and medications, pt voiced understanding  
PRN  polyethylene glycol, 17 g, Daily PRN  acetaminophen, 650 mg, Q6H PRN   Or  acetaminophen, 650 mg, Q6H PRN  labetalol, 10 mg, Q6H PRN  hydrALAZINE, 10 mg, Q6H PRN        Diagnostic Labs:  CBC:   Recent Labs     11/05/24  1433 11/06/24  1052 11/07/24  0405   WBC 5.4 14.4* 8.2   RBC 4.61 4.00* 3.76*   HGB 13.2 11.5* 10.7*   HCT 41.6 34.5* 32.7*   MCV 90.2 86.3 87.0   RDW 12.0 12.3 13.0    337 297     BMP:   Recent Labs     11/06/24  0243 11/06/24  1052 11/06/24  1507 11/07/24  0405    138 139 137   K 5.2 4.6 4.2 3.7   * 109* 108* 108*   CO2 15* 17* 18* 19*   PHOS 2.7 2.0* 1.7*  --    BUN 21* 22* 19 11   CREATININE 1.2 1.1 1.0 0.7     BNP: No results for input(s): \"BNP\" in the last 72 hours.  PT/INR: No results for input(s): \"PROTIME\", \"INR\" in the last 72 hours.  APTT: No results for input(s): \"APTT\" in the last 72 hours.  CARDIAC ENZYMES: No results for input(s): \"CKMB\", \"CKMBINDEX\", \"TROPONINI\" in the last 72 hours.    Invalid input(s): \"CKTOTAL;3\"  FASTING LIPID PANEL:  Lab Results   Component Value Date    CHOL 328 (H) 01/21/2016    HDL 75 01/21/2016    TRIG 192 (H) 01/21/2016     LIVER PROFILE:   Recent Labs     11/05/24  1433   AST 17   ALT 13   BILITOT 0.4   ALKPHOS 107      MICROBIOLOGY:   Lab Results   Component Value Date/Time    CULTURE NO GROWTH 1 DAY 11/05/2024 06:10 PM       Imaging:    XR CHEST (SINGLE VIEW FRONTAL)    Result Date: 11/6/2024  Normal examination.       ASSESSMENT & PLAN     Assessment and Plan:    Principal Problem:    DKA, type 1, not at goal (HCC)  Active Problems:    Diabetic ketoacidosis without coma associated with type 1 diabetes mellitus (HCC)    Essential (primary) hypertension    JARETT (acute kidney injury) (HCC)    Marijuana abuse  Resolved Problems:    * No resolved hospital problems. *    DKA without coma associated with type 1 diabetes mellitus  -Patient is known case of type 1 diabetes mellitus diagnosed at the age of 12  -Was on Lantus 10 units in the

## 2024-11-09 NOTE — DISCHARGE SUMMARY
[facility]     Department of Internal Medicine - Staff Internal Medicine Teaching Service    INPATIENT DISCHARGE SUMMARY      Patient Identification:  Guillermina Warner is a 24 y.o. male.  :  2000  MRN: [id number]     Acct: [id number]   PCP: No primary care provider on file.  Admit Date:  2024  Discharge date and time: 2024  6:49 PM   Attending Provider: No att. providers found                                     ACTIVE DISCHARGE DIAGNOSES     Hospital Problem Lists:  Principal Problem:    DKA, type 1, not at goal (HCC)  Active Problems:    Diabetic ketoacidosis without coma associated with type 1 diabetes mellitus (HCC)    Essential (primary) hypertension    JARETT (acute kidney injury) (HCC)    Marijuana abuse  Resolved Problems:    * No resolved hospital problems. *      HOSPITAL STAY     Brief Inpatient course:   Guillermina Warner is a 24 y.o. male who was admitted for the management of DKA, type 1, not at goal (HCC), presented to the emergency department with abdominal pain associated with nausea and vomiting.   Patient is a known case of type 1 diabetes mellitus on insulin at home.  Patient was missing his doses of Lantus since a month.  In the ED patient was found to have he is blood sugar level 618, beta hydroxybutyrate was elevated to 8.1, A1c 11.8 and blood gas analysis showed metabolic acidosis with pH of 7.2.  Patient was admitted for  the management of DKA.  Patient was started on DKA protocol.  Patient was given fluid and insulin drip.  When his blood glucose level was acceptable and anion gap was closed he was bridged with subcutaneous Lantus.  Diabetic educator was consulted and he was given education.  Patient was discharged on 15 units of Lantus twice daily.  He was recommended to follow-up with endocrinologist and his PCP.      Procedures/ Significant Interventions:    none    Consults:     Consults:     Final Specialist Recommendations/Findings:   HEAVENLY

## 2024-11-10 LAB
MICROORGANISM SPEC CULT: NORMAL
SERVICE CMNT-IMP: NORMAL
SPECIMEN DESCRIPTION: NORMAL

## 2025-05-12 NOTE — PROGRESS NOTES
[facility]  Occupational Therapy Not Seen Note    DATE: 2020  Name: Nona Urrutia  : 2000  MRN: [id number]    Patient not available for Occupational Therapy due to:    [] Testing:    [] Hemodialysis    [] Blood Transfusion in Progress    []Refusal by Patient:    [] Surgery/Procedure:    [] Strict Bedrest    [] Sedation    [] Spine Precautions     [] Pt being transferred to palliative care at this time. Spoke with pt/family and OT services to be defered. [x] Spoke with RN, Pt independent with functional mobility and ADLs.  Pt with no OT acute care needs at this time, will defer OT eval.    [] Other    Next Scheduled Treatment:    Jordan Anes, OTR/L Monthly or less

## 2025-06-06 ENCOUNTER — ANCILLARY PROCEDURE (OUTPATIENT)
Dept: EMERGENCY DEPT | Age: 25
End: 2025-06-06
Attending: EMERGENCY MEDICINE
Payer: MEDICAID

## 2025-06-06 ENCOUNTER — HOSPITAL ENCOUNTER (EMERGENCY)
Age: 25
Discharge: HOME | End: 2025-06-06
Attending: EMERGENCY MEDICINE
Payer: MEDICAID

## 2025-06-06 VITALS
RESPIRATION RATE: 14 BRPM | DIASTOLIC BLOOD PRESSURE: 94 MMHG | HEART RATE: 100 BPM | SYSTOLIC BLOOD PRESSURE: 128 MMHG | TEMPERATURE: 98.2 F | OXYGEN SATURATION: 100 %

## 2025-06-06 DIAGNOSIS — E10.65 TYPE 1 DIABETES MELLITUS WITH HYPERGLYCEMIA (HCC): ICD-10-CM

## 2025-06-06 DIAGNOSIS — Z76.0 ENCOUNTER FOR MEDICATION REFILL: ICD-10-CM

## 2025-06-06 DIAGNOSIS — Q18.1 EAR CYSTS: Primary | ICD-10-CM

## 2025-06-06 PROCEDURE — 6370000000 HC RX 637 (ALT 250 FOR IP)

## 2025-06-06 PROCEDURE — 99283 EMERGENCY DEPT VISIT LOW MDM: CPT

## 2025-06-06 PROCEDURE — 76882 US LMTD JT/FCL EVL NVASC XTR: CPT

## 2025-06-06 RX ORDER — DOXYCYCLINE HYCLATE 100 MG
100 TABLET ORAL ONCE
Status: COMPLETED | OUTPATIENT
Start: 2025-06-06 | End: 2025-06-06

## 2025-06-06 RX ORDER — INSULIN GLARGINE 100 [IU]/ML
INJECTION, SOLUTION SUBCUTANEOUS
Qty: 5 ADJUSTABLE DOSE PRE-FILLED PEN SYRINGE | Refills: 3 | Status: SHIPPED | OUTPATIENT
Start: 2025-06-06

## 2025-06-06 RX ORDER — INSULIN ASPART 100 [IU]/ML
1 INJECTION, SOLUTION INTRAVENOUS; SUBCUTANEOUS SEE ADMIN INSTRUCTIONS
Qty: 5 ADJUSTABLE DOSE PRE-FILLED PEN SYRINGE | Refills: 0 | Status: SHIPPED | OUTPATIENT
Start: 2025-06-06

## 2025-06-06 RX ORDER — DOXYCYCLINE HYCLATE 100 MG
100 TABLET ORAL 2 TIMES DAILY
Qty: 14 TABLET | Refills: 0 | Status: SHIPPED | OUTPATIENT
Start: 2025-06-06 | End: 2025-06-13

## 2025-06-06 RX ADMIN — DOXYCYCLINE HYCLATE 100 MG: 100 TABLET, COATED ORAL at 13:12

## 2025-06-06 RX ADMIN — Medication 3 ML: at 13:12

## 2025-06-06 NOTE — ED PROVIDER NOTES
[facility] EMERGENCY DEPARTMENT     Emergency Department     Faculty Attestation    I performed a history and physical examination of the patient and discussed management with the resident. I reviewed the resident’s note and agree with the documented findings and plan of care. Any areas of disagreement are noted on the chart. I was personally present for the key portions of any procedures. I have documented in the chart those procedures where I was not present during the key portions. I have reviewed the emergency nurses triage note. I agree with the chief complaint, past medical history, past surgical history, allergies, medications, social and family history as documented unless otherwise noted below. For Physician Assistant/ Nurse Practitioner cases/documentation I have personally evaluated this patient and have completed at least one if not all key elements of the E/M (history, physical exam, and MDM). Additional findings are as noted.    1:36 PM EDT    Patient presents with a lump just in front of his left ear.  He says it popped up over the last couple of days.  He says it is not painful.  He denies any ear pain.  He says he feels well otherwise.  Patient does have a history of diabetes for which he takes insulin.  On exam, patient is sitting up on the side of the bed and appears well.  Patient does have a ptosis of the left eyelid which he says is normal for him.  There is a small mass just medial to the left ear.  It is nontender and feels firm.  Bedside ultrasound shows solid collection.  I do not feel that aspiration is indicated at this time.  Will treat with antibiotics and have him follow-up with ENT.      Sary Marc MD  Attending Emergency  Physician

## 2025-06-06 NOTE — ED PROVIDER NOTES
[facility] EMERGENCY DEPARTMENT  Emergency Department Encounter  Emergency Medicine Resident     Pt Name:Guillermina Warner  MRN: [id number]  Birthdate 2000  Date of evaluation: 6/6/25  PCP:  No primary care provider on file.  Note Started: 12:53 PM EDT      CHIEF COMPLAINT       Chief Complaint   Patient presents with    Cyst     preauricular       HISTORY OF PRESENT ILLNESS  (Location/Symptom, Timing/Onset, Context/Setting, Quality, Duration, Modifying Factors, Severity.)      Guillermina Warner is a 24 y.o. male history of diabetes, hypertension who presents with swelling in front of the left ear that began a couple days ago.  Patient states he noticed a small cystlike structure starting department from the ear.  It is rapidly grown in size.  Has not drained.  Has minimal surrounding redness.  No changes in his hearing, no headaches.  No fevers at home.  No history of similar    PAST MEDICAL / SURGICAL / SOCIAL / FAMILY HISTORY      has a past medical history of JARETT (acute kidney injury), Diabetes mellitus (HCC), and Type 1 diabetes mellitus (HCC).       has no past surgical history on file.      Social History     Socioeconomic History    Marital status: Single     Spouse name: Not on file    Number of children: Not on file    Years of education: Not on file    Highest education level: Not on file   Occupational History     Employer: N/A   Tobacco Use    Smoking status: Some Days     Current packs/day: 0.25     Average packs/day: 0.3 packs/day for 2.0 years (0.5 ttl pk-yrs)     Types: Cigarettes    Smokeless tobacco: Never   Vaping Use    Vaping status: Never Used   Substance and Sexual Activity    Alcohol use: Yes     Alcohol/week: 10.0 standard drinks of alcohol     Types: 10 Shots of liquor per week     Comment: 3-5 times per week 3-4 drinks per time    Drug use: Yes     Types: Marijuana (Weed)     Comment: occasional    Sexual activity: Yes     Partners: Female   Other Topics Concern    Not on file  addition to sliding scale. Correction for glucose >130 mg/dL. 6/6/25  Yes Zhanna Guerrero DO   insulin glargine (LANTUS SOLOSTAR) 100 UNIT/ML injection pen Inject 15 units in the morning and 10 units at night under the skin or as directed 6/6/25  Yes Zhanna Guerrero DO   lisinopril (PRINIVIL;ZESTRIL) 20 MG tablet Take 1 tablet by mouth daily 10/29/24   ProviderMc MD   Insulin Pen Needle ([employer] PEN NEEDLES) 31G X 6 MM MISC 1 each by Does not apply route daily 8/24/24   Kailyn Head MD   Insulin Pen Needle (BD PEN NEEDLE BARBARA 2ND GEN) 32G X 4 MM MISC use 1 PEN NEEDLE to inject MEDICATION subcutaneously twice a day 12/14/23   Antoinette Gonzalez MD   Lancets MISC 1 each by Does not apply route daily 2/24/23   Nav Strickland DO   blood glucose monitor strips Test 2 times a day & as needed for symptoms of irregular blood glucose. Dispense sufficient amount for indicated testing frequency plus additional to accommodate PRN testing needs. 2/24/23   Nav Strickland DO   glucose monitoring (FREESTYLE FREEDOM) kit 1 kit by Does not apply route daily  Patient not taking: Reported on 6/7/2023 2/24/23   Nav Strickland DO   Continuous Blood Gluc Sensor (FREESTYLE MALKA SENSOR SYSTEM) [facility] Type 1 DM. Check blood sugars 5-6/day  Patient not taking: Reported on 6/7/2023 3/10/22   Kang Fletcher MD   Continuous Blood Gluc  (FREESTYLE MALKA 14 DAY READER) JAYCE Check blood sugars 5-6/day. Type 1 DM  Patient not taking: Reported on 6/7/2023 3/1/22   Margarita Montoya MD   Insulin Pen Needle 30G X 5 MM MISC 1 Device by Does not apply route 4 times daily  Patient not taking: Reported on 6/7/2023 12/22/20   Leander Barraza, APRN - CNP   EPINEPHrine (EPIPEN JR 2-ITALO) 0.15 MG/0.3ML SOAJ Use as directed for allergic reaction 10/2/20   Jacqui, Zoila, MD       PHYSICAL EXAM      INITIAL VITALS:   BP (!) 128/94   Pulse 100   Temp 98.2 °F (36.8 °C)   Resp 14   SpO2 100%     Physical Exam  Vitals reviewed.

## 2025-06-06 NOTE — ED NOTES
Pt arrives to ED 35 via triage.   Pt co cyst in from of his L ear that began a couple days ago.   Pt states that it has been rapidly growing.   Pt states that he has not had any drainage.   Pt denies any changes in his hearing.   Pt states that he does have a hx of diabetes.

## 2025-06-06 NOTE — DISCHARGE INSTRUCTIONS
You were seen due to concerns for cystic structure in front of your left ear.  You will be discharged home with antibiotics that you need to take over the next week in case that this is an infection.  You also requested refill of your home insulin which was provided.  You need to follow-up outpatient with a ear nose and throat doctor to reevaluate the cyst.  Return the emergency department for any worsening severe pain, swelling, fevers, hearing changes, other new or concerning symptoms    You will need to follow up with ENT. Appointment can be arranged with an adult ENT provider. You can contact EITHER of the practices listed here to schedule a follow up.  Please contact the office at the number listed below to coordinate follow up.     OPTION 1:  Ear, Nose, and Throat surgeons at [facility]     [facility]:  Dr. Saurav Kearns and Dr. Adriano Harvey  [address] [facility] [address] [phone]     OPTION 2:  [facility]  [address]  [place] [address]  Appointment scheduling:  [phone]

## 2025-08-02 ENCOUNTER — HOSPITAL ENCOUNTER (EMERGENCY)
Age: 25
Discharge: HOME | End: 2025-08-02
Attending: EMERGENCY MEDICINE

## 2025-08-02 VITALS
DIASTOLIC BLOOD PRESSURE: 96 MMHG | SYSTOLIC BLOOD PRESSURE: 148 MMHG | TEMPERATURE: 98.7 F | HEART RATE: 69 BPM | OXYGEN SATURATION: 100 % | RESPIRATION RATE: 18 BRPM

## 2025-08-02 DIAGNOSIS — K08.89 PAIN, DENTAL: Primary | ICD-10-CM

## 2025-08-02 DIAGNOSIS — E10.65 TYPE 1 DIABETES MELLITUS WITH HYPERGLYCEMIA (HCC): ICD-10-CM

## 2025-08-02 DIAGNOSIS — E10.8: ICD-10-CM

## 2025-08-02 DIAGNOSIS — E10.10 DKA, TYPE 1, NOT AT GOAL (HCC): ICD-10-CM

## 2025-08-02 PROCEDURE — 99283 EMERGENCY DEPT VISIT LOW MDM: CPT

## 2025-08-02 RX ORDER — INSULIN GLARGINE 100 [IU]/ML
INJECTION, SOLUTION SUBCUTANEOUS
Qty: 5 ADJUSTABLE DOSE PRE-FILLED PEN SYRINGE | Refills: 0 | Status: SHIPPED | OUTPATIENT
Start: 2025-08-02

## 2025-08-02 RX ORDER — IBUPROFEN 600 MG/1
600 TABLET, FILM COATED ORAL 3 TIMES DAILY PRN
Qty: 30 TABLET | Refills: 0 | Status: SHIPPED | OUTPATIENT
Start: 2025-08-02

## 2025-08-02 RX ORDER — INSULIN ASPART 100 [IU]/ML
1 INJECTION, SOLUTION INTRAVENOUS; SUBCUTANEOUS SEE ADMIN INSTRUCTIONS
Qty: 5 ADJUSTABLE DOSE PRE-FILLED PEN SYRINGE | Refills: 0 | Status: SHIPPED | OUTPATIENT
Start: 2025-08-02

## 2025-08-02 RX ORDER — PEN NEEDLE, DIABETIC 31 G X1/4"
1 NEEDLE, DISPOSABLE MISCELLANEOUS PRN
Qty: 200 EACH | Refills: 0 | Status: SHIPPED | OUTPATIENT
Start: 2025-08-02

## 2025-08-02 RX ORDER — ACETAMINOPHEN 500 MG
1000 TABLET ORAL 3 TIMES DAILY PRN
Qty: 40 TABLET | Refills: 0 | Status: SHIPPED | OUTPATIENT
Start: 2025-08-02

## 2025-08-02 ASSESSMENT — ENCOUNTER SYMPTOMS
SHORTNESS OF BREATH: 0
ABDOMINAL PAIN: 0
SORE THROAT: 0
COUGH: 0
NAUSEA: 0
VOMITING: 0
TROUBLE SWALLOWING: 0
VOICE CHANGE: 0

## 2025-08-02 NOTE — DISCHARGE INSTRUCTIONS
Be sure to follow up with a dentist as soon as you are able.  Also be sure to follow up with your primary care provider.  Return to emergency department for any acutely worsening/changing symptoms or any other acute concerns including fever, inability to open mouth, throat swelling, trouble breathing or swallowing.

## 2025-08-02 NOTE — ED PROVIDER NOTES
[facility]     Emergency Department     Faculty Attestation    I performed a history and physical examination of the patient and discussed management with the resident. I reviewed the resident's note and agree with the documented findings and plan of care. Any areas of disagreement are noted on the chart. I was personally present for the key portions of any procedures. I have documented in the chart those procedures where I was not present during the key portions. I have reviewed the emergency nurses triage note. I agree with the chief complaint, past medical history, past surgical history, allergies, medications, social and family history as documented unless otherwise noted below. For Physician Assistant/ Nurse Practitioner cases/documentation I have personally evaluated this patient and have completed at least one if not all key elements of the E/M (history, physical exam, and MDM). Additional findings are as noted.      Good airway, no drooling, no sublingual swelling, pain around multiple teeth left lower jaw with significant caries,  no facial swelling, no swelling over the neck, left anterior cervical lymphadenopathy.  Heart exam normal. No meningeal signs. Neuro intact.    Hank Abdi MD FACEP  Attending Physician       Hank Abdi MD  08/02/25 9511

## 2025-08-02 NOTE — ED PROVIDER NOTES
[facility] EMERGENCY DEPARTMENT  Emergency Department Encounter  Emergency Medicine Resident     Pt Name:Guillermina Warner  MRN: [id number]  Birthdate 2000  Date of evaluation: 8/2/25  PCP:  No primary care provider on file.  Note Started: 2:34 PM EDT      CHIEF COMPLAINT       Chief Complaint   Patient presents with    Dental Pain       HISTORY OF PRESENT ILLNESS  (Location/Symptom, Timing/Onset, Context/Setting, Quality, Duration, Modifying Factors, Severity.)      Guillermina Warner is a 24 y.o. male who presents with dental pain.  States this started a couple days ago.  Has tried taking Tylenol and over-the-counter numbing gel with moderate relief.  Denies fever, chills, nausea, vomiting, chest pain, shortness of breath, cough, abdominal pain, throat swelling, facial swelling, trouble speaking or swallowing, trismus.  Has not contacted a dentist for this yet.  Additionally, patient asking for refills on home insulin and insulin pen needles as he is almost out.    PAST MEDICAL / SURGICAL / SOCIAL / FAMILY HISTORY      has a past medical history of JARETT (acute kidney injury), Diabetes mellitus (HCC), and Type 1 diabetes mellitus (HCC).       has no past surgical history on file.      Social History     Socioeconomic History    Marital status: Single     Spouse name: Not on file    Number of children: Not on file    Years of education: Not on file    Highest education level: Not on file   Occupational History     Employer: N/A   Tobacco Use    Smoking status: Some Days     Current packs/day: 0.25     Average packs/day: 0.3 packs/day for 2.0 years (0.5 ttl pk-yrs)     Types: Cigarettes    Smokeless tobacco: Never   Vaping Use    Vaping status: Never Used   Substance and Sexual Activity    Alcohol use: Yes     Alcohol/week: 10.0 standard drinks of alcohol     Types: 10 Shots of liquor per week     Comment: 3-5 times per week 3-4 drinks per time    Drug use: Yes     Types: Marijuana (Weed)     Comment: